# Patient Record
Sex: FEMALE | Race: WHITE | Employment: OTHER | URBAN - METROPOLITAN AREA
[De-identification: names, ages, dates, MRNs, and addresses within clinical notes are randomized per-mention and may not be internally consistent; named-entity substitution may affect disease eponyms.]

---

## 2017-08-07 ENCOUNTER — OFFICE VISIT (OUTPATIENT)
Dept: ORTHOPEDIC SURGERY | Age: 82
End: 2017-08-07

## 2017-08-07 VITALS
SYSTOLIC BLOOD PRESSURE: 134 MMHG | BODY MASS INDEX: 35.03 KG/M2 | WEIGHT: 218 LBS | HEIGHT: 66 IN | RESPIRATION RATE: 16 BRPM | DIASTOLIC BLOOD PRESSURE: 69 MMHG

## 2017-08-07 DIAGNOSIS — M65.329 TRIGGER INDEX FINGER, UNSPECIFIED LATERALITY: ICD-10-CM

## 2017-08-07 DIAGNOSIS — G56.03 CARPAL TUNNEL SYNDROME, BILATERAL: Primary | ICD-10-CM

## 2017-08-07 DIAGNOSIS — M65.311 TRIGGER THUMB OF BOTH THUMBS: ICD-10-CM

## 2017-08-07 DIAGNOSIS — M65.312 TRIGGER THUMB OF BOTH THUMBS: ICD-10-CM

## 2017-08-07 PROCEDURE — 1090F PRES/ABSN URINE INCON ASSESS: CPT | Performed by: ORTHOPAEDIC SURGERY

## 2017-08-07 PROCEDURE — 1036F TOBACCO NON-USER: CPT | Performed by: ORTHOPAEDIC SURGERY

## 2017-08-07 PROCEDURE — 4040F PNEUMOC VAC/ADMIN/RCVD: CPT | Performed by: ORTHOPAEDIC SURGERY

## 2017-08-07 PROCEDURE — G8417 CALC BMI ABV UP PARAM F/U: HCPCS | Performed by: ORTHOPAEDIC SURGERY

## 2017-08-07 PROCEDURE — 99203 OFFICE O/P NEW LOW 30 MIN: CPT | Performed by: ORTHOPAEDIC SURGERY

## 2017-08-07 PROCEDURE — 1123F ACP DISCUSS/DSCN MKR DOCD: CPT | Performed by: ORTHOPAEDIC SURGERY

## 2017-08-07 PROCEDURE — G8427 DOCREV CUR MEDS BY ELIG CLIN: HCPCS | Performed by: ORTHOPAEDIC SURGERY

## 2017-08-07 RX ORDER — SIMVASTATIN 40 MG
40 TABLET ORAL NIGHTLY
COMMUNITY
Start: 2017-06-26 | End: 2018-07-02 | Stop reason: SDUPTHER

## 2017-08-07 RX ORDER — LISINOPRIL AND HYDROCHLOROTHIAZIDE 20; 12.5 MG/1; MG/1
TABLET ORAL
COMMUNITY
Start: 2017-05-23 | End: 2018-09-18 | Stop reason: SDUPTHER

## 2017-08-07 RX ORDER — GLIPIZIDE AND METFORMIN HCL 2.5; 5 MG/1; MG/1
1 TABLET, FILM COATED ORAL
COMMUNITY
Start: 2017-06-09 | End: 2017-09-25 | Stop reason: ALTCHOICE

## 2017-08-07 RX ORDER — GABAPENTIN 100 MG/1
CAPSULE ORAL
COMMUNITY
Start: 2017-07-06 | End: 2017-08-31 | Stop reason: ALTCHOICE

## 2017-08-07 RX ORDER — CELECOXIB 200 MG/1
200 CAPSULE ORAL DAILY
COMMUNITY
Start: 2017-07-28 | End: 2018-05-15 | Stop reason: SDUPTHER

## 2017-08-07 RX ORDER — AMLODIPINE BESYLATE 5 MG/1
5 TABLET ORAL EVERY EVENING
COMMUNITY
Start: 2017-06-09 | End: 2018-04-24

## 2017-08-07 RX ORDER — LEVOTHYROXINE SODIUM 112 UG/1
112 TABLET ORAL DAILY
COMMUNITY
Start: 2017-06-26 | End: 2018-07-02 | Stop reason: SDUPTHER

## 2017-08-07 RX ORDER — ESTRADIOL 0.1 MG/G
2 CREAM VAGINAL PRN
COMMUNITY
End: 2020-03-30 | Stop reason: SDUPTHER

## 2017-08-07 RX ORDER — ATENOLOL 50 MG/1
50 TABLET ORAL DAILY
COMMUNITY
Start: 2017-06-26 | End: 2018-07-23 | Stop reason: SDUPTHER

## 2017-08-28 ENCOUNTER — OFFICE VISIT (OUTPATIENT)
Dept: ORTHOPEDIC SURGERY | Age: 82
End: 2017-08-28

## 2017-08-28 VITALS
SYSTOLIC BLOOD PRESSURE: 147 MMHG | WEIGHT: 218 LBS | DIASTOLIC BLOOD PRESSURE: 64 MMHG | HEART RATE: 51 BPM | HEIGHT: 66 IN | TEMPERATURE: 97.8 F | BODY MASS INDEX: 35.03 KG/M2

## 2017-08-28 DIAGNOSIS — M17.12 ARTHRITIS OF LEFT KNEE: ICD-10-CM

## 2017-08-28 DIAGNOSIS — Z96.651 HISTORY OF TOTAL RIGHT KNEE REPLACEMENT: ICD-10-CM

## 2017-08-28 DIAGNOSIS — M25.562 LEFT KNEE PAIN, UNSPECIFIED CHRONICITY: Primary | ICD-10-CM

## 2017-08-28 PROCEDURE — 99214 OFFICE O/P EST MOD 30 MIN: CPT | Performed by: PHYSICIAN ASSISTANT

## 2017-08-28 PROCEDURE — 1036F TOBACCO NON-USER: CPT | Performed by: PHYSICIAN ASSISTANT

## 2017-08-28 PROCEDURE — 1123F ACP DISCUSS/DSCN MKR DOCD: CPT | Performed by: PHYSICIAN ASSISTANT

## 2017-08-28 PROCEDURE — G8417 CALC BMI ABV UP PARAM F/U: HCPCS | Performed by: PHYSICIAN ASSISTANT

## 2017-08-28 PROCEDURE — 1090F PRES/ABSN URINE INCON ASSESS: CPT | Performed by: PHYSICIAN ASSISTANT

## 2017-08-28 PROCEDURE — 4040F PNEUMOC VAC/ADMIN/RCVD: CPT | Performed by: PHYSICIAN ASSISTANT

## 2017-08-28 PROCEDURE — G8427 DOCREV CUR MEDS BY ELIG CLIN: HCPCS | Performed by: PHYSICIAN ASSISTANT

## 2017-08-28 PROCEDURE — 20610 DRAIN/INJ JOINT/BURSA W/O US: CPT | Performed by: PHYSICIAN ASSISTANT

## 2017-08-31 ENCOUNTER — PAT TELEPHONE (OUTPATIENT)
Dept: PREADMISSION TESTING | Age: 82
End: 2017-08-31

## 2017-08-31 VITALS — WEIGHT: 218 LBS | HEIGHT: 66 IN | BODY MASS INDEX: 35.03 KG/M2

## 2017-09-05 ENCOUNTER — HOSPITAL ENCOUNTER (OUTPATIENT)
Dept: SURGERY | Age: 82
Discharge: HOME | End: 2017-09-05
Attending: ORTHOPAEDIC SURGERY | Admitting: ORTHOPAEDIC SURGERY

## 2017-09-05 VITALS
HEART RATE: 58 BPM | TEMPERATURE: 97.6 F | SYSTOLIC BLOOD PRESSURE: 153 MMHG | BODY MASS INDEX: 33.86 KG/M2 | RESPIRATION RATE: 18 BRPM | OXYGEN SATURATION: 98 % | DIASTOLIC BLOOD PRESSURE: 69 MMHG | WEIGHT: 209.77 LBS

## 2017-09-05 LAB
GLUCOSE BLD-MCNC: 150 MG/DL (ref 70–99)
GLUCOSE BLD-MCNC: 167 MG/DL (ref 70–99)
PERFORMED ON: ABNORMAL
PERFORMED ON: ABNORMAL

## 2017-09-05 RX ORDER — PROMETHAZINE HYDROCHLORIDE 25 MG/ML
6.25 INJECTION, SOLUTION INTRAMUSCULAR; INTRAVENOUS
Status: ACTIVE | OUTPATIENT
Start: 2017-09-05 | End: 2017-09-05

## 2017-09-05 RX ORDER — FENTANYL CITRATE 50 UG/ML
25 INJECTION, SOLUTION INTRAMUSCULAR; INTRAVENOUS EVERY 5 MIN PRN
Status: DISCONTINUED | OUTPATIENT
Start: 2017-09-05 | End: 2017-09-06 | Stop reason: HOSPADM

## 2017-09-05 RX ORDER — LABETALOL HYDROCHLORIDE 5 MG/ML
5 INJECTION, SOLUTION INTRAVENOUS EVERY 10 MIN PRN
Status: DISCONTINUED | OUTPATIENT
Start: 2017-09-05 | End: 2017-09-06 | Stop reason: HOSPADM

## 2017-09-05 RX ORDER — SODIUM CHLORIDE 0.9 % (FLUSH) 0.9 %
10 SYRINGE (ML) INJECTION PRN
Status: DISCONTINUED | OUTPATIENT
Start: 2017-09-05 | End: 2017-09-06 | Stop reason: HOSPADM

## 2017-09-05 RX ORDER — SODIUM CHLORIDE 9 MG/ML
INJECTION, SOLUTION INTRAVENOUS CONTINUOUS
Status: DISCONTINUED | OUTPATIENT
Start: 2017-09-05 | End: 2017-09-06 | Stop reason: HOSPADM

## 2017-09-05 RX ORDER — SODIUM CHLORIDE 0.9 % (FLUSH) 0.9 %
10 SYRINGE (ML) INJECTION EVERY 12 HOURS SCHEDULED
Status: DISCONTINUED | OUTPATIENT
Start: 2017-09-05 | End: 2017-09-06 | Stop reason: HOSPADM

## 2017-09-05 RX ADMIN — SODIUM CHLORIDE: 9 INJECTION, SOLUTION INTRAVENOUS at 09:45

## 2017-09-05 ASSESSMENT — PAIN SCALES - GENERAL
PAINLEVEL_OUTOF10: 0

## 2017-09-05 ASSESSMENT — PAIN - FUNCTIONAL ASSESSMENT: PAIN_FUNCTIONAL_ASSESSMENT: 0-10

## 2017-09-08 ENCOUNTER — TELEPHONE (OUTPATIENT)
Dept: ORTHOPEDIC SURGERY | Age: 82
End: 2017-09-08

## 2017-09-08 ENCOUNTER — OFFICE VISIT (OUTPATIENT)
Dept: ORTHOPEDIC SURGERY | Age: 82
End: 2017-09-08

## 2017-09-08 VITALS — HEIGHT: 66 IN | BODY MASS INDEX: 35.03 KG/M2 | RESPIRATION RATE: 16 BRPM | WEIGHT: 218 LBS

## 2017-09-08 DIAGNOSIS — G56.03 CARPAL TUNNEL SYNDROME, BILATERAL: Primary | ICD-10-CM

## 2017-09-08 PROCEDURE — 99024 POSTOP FOLLOW-UP VISIT: CPT | Performed by: PHYSICIAN ASSISTANT

## 2017-09-11 ENCOUNTER — OFFICE VISIT (OUTPATIENT)
Dept: ORTHOPEDIC SURGERY | Age: 82
End: 2017-09-11

## 2017-09-11 VITALS — HEIGHT: 66 IN | WEIGHT: 218 LBS | BODY MASS INDEX: 35.03 KG/M2 | RESPIRATION RATE: 15 BRPM

## 2017-09-11 DIAGNOSIS — G56.03 CARPAL TUNNEL SYNDROME, BILATERAL: Primary | ICD-10-CM

## 2017-09-11 PROCEDURE — 99024 POSTOP FOLLOW-UP VISIT: CPT | Performed by: PHYSICIAN ASSISTANT

## 2017-09-25 ENCOUNTER — PAT TELEPHONE (OUTPATIENT)
Dept: PREADMISSION TESTING | Age: 82
End: 2017-09-25

## 2017-09-25 ENCOUNTER — SURG/PROC ORDERS (OUTPATIENT)
Dept: ANESTHESIOLOGY | Age: 82
End: 2017-09-25

## 2017-09-25 VITALS — BODY MASS INDEX: 35.03 KG/M2 | WEIGHT: 218 LBS | HEIGHT: 66 IN

## 2017-09-25 RX ORDER — SODIUM CHLORIDE 9 MG/ML
INJECTION, SOLUTION INTRAVENOUS CONTINUOUS
Status: CANCELLED | OUTPATIENT
Start: 2017-09-25

## 2017-09-25 RX ORDER — SODIUM CHLORIDE 0.9 % (FLUSH) 0.9 %
10 SYRINGE (ML) INJECTION PRN
Status: CANCELLED | OUTPATIENT
Start: 2017-09-25

## 2017-09-25 RX ORDER — SODIUM CHLORIDE 0.9 % (FLUSH) 0.9 %
10 SYRINGE (ML) INJECTION EVERY 12 HOURS SCHEDULED
Status: CANCELLED | OUTPATIENT
Start: 2017-09-25

## 2017-09-26 VITALS
DIASTOLIC BLOOD PRESSURE: 72 MMHG | WEIGHT: 209.11 LBS | BODY MASS INDEX: 33.61 KG/M2 | RESPIRATION RATE: 18 BRPM | HEART RATE: 56 BPM | TEMPERATURE: 97 F | OXYGEN SATURATION: 96 % | SYSTOLIC BLOOD PRESSURE: 165 MMHG | HEIGHT: 66 IN

## 2017-09-26 LAB
ANION GAP SERPL CALCULATED.3IONS-SCNC: 13 MMOL/L (ref 3–16)
BUN BLDV-MCNC: 34 MG/DL (ref 7–20)
CALCIUM SERPL-MCNC: 9.7 MG/DL (ref 8.3–10.6)
CHLORIDE BLD-SCNC: 102 MMOL/L (ref 99–110)
CO2: 23 MMOL/L (ref 21–32)
CREAT SERPL-MCNC: 0.9 MG/DL (ref 0.6–1.2)
GFR AFRICAN AMERICAN: >60
GFR NON-AFRICAN AMERICAN: 59
GLUCOSE BLD-MCNC: 173 MG/DL (ref 70–99)
GLUCOSE BLD-MCNC: 178 MG/DL (ref 70–99)
PERFORMED ON: ABNORMAL
POTASSIUM SERPL-SCNC: 4.8 MMOL/L (ref 3.5–5.1)
SODIUM BLD-SCNC: 138 MMOL/L (ref 136–145)

## 2017-09-26 RX ORDER — SODIUM CHLORIDE 9 MG/ML
INJECTION, SOLUTION INTRAVENOUS CONTINUOUS
Status: DISCONTINUED | OUTPATIENT
Start: 2017-09-26 | End: 2017-09-27 | Stop reason: HOSPADM

## 2017-09-26 RX ORDER — SODIUM CHLORIDE 0.9 % (FLUSH) 0.9 %
10 SYRINGE (ML) INJECTION EVERY 12 HOURS SCHEDULED
Status: DISCONTINUED | OUTPATIENT
Start: 2017-09-26 | End: 2017-09-27 | Stop reason: HOSPADM

## 2017-09-26 RX ORDER — FENTANYL CITRATE 50 UG/ML
25 INJECTION, SOLUTION INTRAMUSCULAR; INTRAVENOUS EVERY 5 MIN PRN
Status: DISCONTINUED | OUTPATIENT
Start: 2017-09-26 | End: 2017-09-27 | Stop reason: HOSPADM

## 2017-09-26 RX ORDER — ONDANSETRON 2 MG/ML
4 INJECTION INTRAMUSCULAR; INTRAVENOUS
Status: ACTIVE | OUTPATIENT
Start: 2017-09-26 | End: 2017-09-26

## 2017-09-26 RX ORDER — MORPHINE SULFATE 2 MG/ML
1 INJECTION, SOLUTION INTRAMUSCULAR; INTRAVENOUS EVERY 5 MIN PRN
Status: DISCONTINUED | OUTPATIENT
Start: 2017-09-26 | End: 2017-09-27 | Stop reason: HOSPADM

## 2017-09-26 RX ORDER — MEPERIDINE HYDROCHLORIDE 25 MG/ML
12.5 INJECTION INTRAMUSCULAR; INTRAVENOUS; SUBCUTANEOUS EVERY 5 MIN PRN
Status: DISCONTINUED | OUTPATIENT
Start: 2017-09-26 | End: 2017-09-27 | Stop reason: HOSPADM

## 2017-09-26 RX ORDER — FENTANYL CITRATE 50 UG/ML
50 INJECTION, SOLUTION INTRAMUSCULAR; INTRAVENOUS EVERY 5 MIN PRN
Status: DISCONTINUED | OUTPATIENT
Start: 2017-09-26 | End: 2017-09-27 | Stop reason: HOSPADM

## 2017-09-26 RX ORDER — SODIUM CHLORIDE 0.9 % (FLUSH) 0.9 %
10 SYRINGE (ML) INJECTION PRN
Status: DISCONTINUED | OUTPATIENT
Start: 2017-09-26 | End: 2017-09-27 | Stop reason: HOSPADM

## 2017-09-26 RX ORDER — MORPHINE SULFATE 2 MG/ML
2 INJECTION, SOLUTION INTRAMUSCULAR; INTRAVENOUS EVERY 5 MIN PRN
Status: DISCONTINUED | OUTPATIENT
Start: 2017-09-26 | End: 2017-09-27 | Stop reason: HOSPADM

## 2017-09-26 RX ORDER — OXYCODONE HYDROCHLORIDE AND ACETAMINOPHEN 5; 325 MG/1; MG/1
1 TABLET ORAL PRN
Status: ACTIVE | OUTPATIENT
Start: 2017-09-26 | End: 2017-09-26

## 2017-09-26 RX ORDER — OXYCODONE HYDROCHLORIDE AND ACETAMINOPHEN 5; 325 MG/1; MG/1
2 TABLET ORAL PRN
Status: ACTIVE | OUTPATIENT
Start: 2017-09-26 | End: 2017-09-26

## 2017-09-26 RX ADMIN — SODIUM CHLORIDE: 9 INJECTION, SOLUTION INTRAVENOUS at 08:07

## 2017-09-26 ASSESSMENT — ENCOUNTER SYMPTOMS: SHORTNESS OF BREATH: 0

## 2017-09-26 ASSESSMENT — PAIN SCALES - GENERAL
PAINLEVEL_OUTOF10: 0

## 2017-09-26 ASSESSMENT — PAIN - FUNCTIONAL ASSESSMENT: PAIN_FUNCTIONAL_ASSESSMENT: 0-10

## 2017-09-28 ENCOUNTER — HOSPITAL ENCOUNTER (OUTPATIENT)
Dept: SURGERY | Age: 82
Discharge: HOME | End: 2017-09-26

## 2017-10-02 ENCOUNTER — OFFICE VISIT (OUTPATIENT)
Dept: ORTHOPEDIC SURGERY | Age: 82
End: 2017-10-02

## 2017-10-02 VITALS — WEIGHT: 209 LBS | RESPIRATION RATE: 18 BRPM | BODY MASS INDEX: 30.96 KG/M2 | HEIGHT: 69 IN

## 2017-10-02 DIAGNOSIS — M65.329 TRIGGER INDEX FINGER, UNSPECIFIED LATERALITY: Primary | ICD-10-CM

## 2017-10-02 PROCEDURE — 99024 POSTOP FOLLOW-UP VISIT: CPT | Performed by: PHYSICIAN ASSISTANT

## 2017-10-02 NOTE — PROGRESS NOTES
Ms. Adria Lino returns today in follow-up of her recent right Carpal Tunnel Release, right index finger and thumb trigger finger release done approximately 1 week ago. She has done well noting mild discomfort and no other reported complications. She notes pre-operative symptoms to be Improved at this time. Physical Exam:  Skin incision is healing well, no significant drainage, no dehiscence. Digital range of motion is full and equal bilateral.  Wrist range of motion is full and equal bilateral.  Sensation is Improved from preoperatvely  Vascular examination reveals normal, good capillary refill and good color. Swelling is minimal.  There is no clinical evidence of persistant Median Nerve compression. Preoperative triggering has improved. Impression:  Ms. Adria Lino is doing well after recent right Carpal Tunnel Release, right index finger and thumb trigger finger release    Plan:  Ms. Adria Lino is instructed in work on Active & Passive range of motion of the digits, wrist, & elbow. These modalities were specifically demonstrated to her today. We discussed the appropriateness of gradual resumption of use of the operated hand and the return to normal use as comfort allows. She is given instructions regarding management of the fresh surgical incision and progressive use of desensitization and tissue massage techniques. We discussed the appropriate expectations and timeline for symptom improvement. She is provided a written patient instruction sheet titled: Postoperative Instructions After Carpal Tunnel Release. I have asked Ms. Adria Lino to follow-up with me, either by scheduling an appointment for approximately 2-4 weeks from now, or by contacting me by telephone over the next 2-4 weeks if her symptoms have not fully resolved or if she has not regained full & painless return of function.       She is also specifically instructed to return to the office or call for an appointment sooner if her symptoms are changing or worsening prior to that time.

## 2017-10-02 NOTE — MR AVS SNAPSHOT
After Visit Summary             Argentina Silverman   10/2/2017 1:00 PM   Office Visit    Description:  Female : 1929   Provider:  Solomon Post PA-C   Department:  [address] [facility] Sp[facility]              Your Follow-Up and Future Appointments         Below is a list of your follow-up and future appointments. This may not be a complete list as you may have made appointments directly with providers that we are not aware of or your providers may have made some for you. Please call your providers to confirm appointments. It is important to keep your appointments. Please bring your current insurance card, photo ID, co-pay, and all medication bottles to your appointment. If self-pay, payment is expected at the time of service. Information from Your Visit        Department     Name Address Phone Fax    [address] [facility]) [address] [place] [address] [phone]. [phone]      You Were Seen for:         Comments    Trigger index finger, unspecified laterality   [[id number]]         Vital Signs     Respirations Height Weight Body Mass Index Smoking Status       18 5' 9\" (1.753 m) 209 lb (94.8 kg) 30.86 kg/m2 Never Smoker       Additional Information about your Body Mass Index (BMI)           Your BMI as listed above is considered obese (30 or more). BMI is an estimate of body fat, calculated from your height and weight. The higher your BMI, the greater your risk of heart disease, high blood pressure, type 2 diabetes, stroke, gallstones, arthritis, sleep apnea, and certain cancers. BMI is not perfect. It may overestimate body fat in athletes and people who are more muscular. Even a small weight loss (between 5 and 10 percent of your current weight) by decreasing your calorie intake and becoming more physically active will help lower your risk of developing or worsening diseases associated with obesity. Learn more at: [email or web address]          Instructions      Postoperative Instructions After Trigger Finger Release    Dr. Ho George. Osbaldo      1. After first office visit from surgery, you may chose to wear a small bandage over the incision if you wish, though you do not need to. 2. Keep incision dry for a total of 12 days from the day of surgery. Thereafter, you may wash with mild soap and water and shower normally. 3. Once your stiches have fully disappeared, you should begin gently massaging the incision with Vitamin E (may use Vitamin E lotion or contents of Vitamin E capsule). 4. Work hard on motion of the fingers and wrist, straightening each finger fully and bending each finger fully, bending wrist forward and bending wrist backwards. Do not be concerned if you experience discomfort. This will not damage the surgery. 5. You may begin using the hand as it feels comfortable beginning 12 - 14 days from the day of surgery. You may not feel entirely comfortable gripping or lifting heavy objects for several weeks. 6. The stitches are dissolvable and may take up to three weeks from day of surgery to completely disappear. 7. You may expect to see some skin peel off around the incision. You may be left with a small area of pink baby skin. This is quite normal.            Protocol for Managing Open Wounds  Dr. Ho George. Osbaldo            1. Please remove all dressings so that you may see the skin fully. 2. Prepare a clean sink full of warm water (bath temperature). Add one to two squirts of liquid antibacterial soap. Insert hand and soak for approximately five minutes, making sure to exercise your motion of fingers and wrists fully while soaking in warm water. 3. I typically recommend soaking two to three times per day with a clean dressing change after each soak.     Thank you for choosing [facility]) Physicians for your Hand and Upper Extremity needs. If we can be of any further assistance to you, please do not hesitate to contact us. Office Phone Number:  [phone]  or  [phone]              Medications and Orders      Your Current Medications Are              metFORMIN (GLUCOPHAGE) 500 MG tablet Take 1,000 mg by mouth 2 times daily (with meals)     amLODIPine (NORVASC) 5 MG tablet Take 5 mg by mouth every evening     atenolol (TENORMIN) 50 MG tablet Take 50 mg by mouth daily     celecoxib (CELEBREX) 200 MG capsule Take 200 mg by mouth daily     levothyroxine (SYNTHROID) 112 MCG tablet Take 112 mcg by mouth daily     lisinopril-hydrochlorothiazide (PRINZIDE;ZESTORETIC) 20-12.5 MG per tablet     simvastatin (ZOCOR) 40 MG tablet Take 40 mg by mouth nightly     aspirin 81 MG tablet Take 81 mg by mouth daily    estradiol (ESTRACE) 0.1 MG/GM vaginal cream Place 2 g vaginally as needed     Multiple Vitamin (MULTI VITAMIN DAILY PO) Take by mouth      Allergies              Gabapentin     LOWER LEG SWELLING    Pravachol [Pravastatin Sodium] Rash    Tegaderm Ag Mesh 2\"x2\" [Wound Dressings] Rash         Additional Information        Basic Information     Date Of Birth Sex Race Ethnicity Preferred Language    12/30/1929 Female White Non-/Non  English      Problem List as of 10/2/2017                 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 How Do I Sign Up? 1. In your Internet browser, go to [email or web address]  2. Click on the Sign Up Now link in the Sign In box. You will see the New Member Sign Up page. 3. Enter your [employer] Access Code exactly as it appears below. You will not need to use this code after youve completed the sign-up process. If you do not sign up before the expiration date, you must request a new code. [employer] Access Code: [email or web address]  Expires: 10/6/2017  3:47 PM    4. Enter your Social Security Number ([id number]) and Date of Birth (mm/dd/yyyy) as indicated and click Submit. You will be taken to the next sign-up page. 5. Create a [employer] ID. This will be your [employer] login ID and cannot be changed, so think of one that is secure and easy to remember. 6. Create a [employer] password. You can change your password at any time. 7. Enter your Password Reset Question and Answer. This can be used at a later time if you forget your password. 8. Enter your e-mail address. You will receive e-mail notification when new information is available in [address]. 9. Click Sign Up. You can now view your medical record. Additional Information  If you have questions, please contact the physician practice where you receive care. Remember, [employer] is NOT to be used for urgent needs. For medical emergencies, dial 911. For questions regarding your [employer] account call [phone]. If you have a clinical question, please call your doctor's office.

## 2017-10-02 NOTE — PATIENT INSTRUCTIONS
Postoperative Instructions After Trigger Finger Release    Dr. Tu Roblero      1. After first office visit from surgery, you may chose to wear a small bandage over the incision if you wish, though you do not need to. 2. Keep incision dry for a total of 12 days from the day of surgery. Thereafter, you may wash with mild soap and water and shower normally. 3. Once your stiches have fully disappeared, you should begin gently massaging the incision with Vitamin E (may use Vitamin E lotion or contents of Vitamin E capsule). 4. Work hard on motion of the fingers and wrist, straightening each finger fully and bending each finger fully, bending wrist forward and bending wrist backwards. Do not be concerned if you experience discomfort. This will not damage the surgery. 5. You may begin using the hand as it feels comfortable beginning 12 - 14 days from the day of surgery. You may not feel entirely comfortable gripping or lifting heavy objects for several weeks. 6. The stitches are dissolvable and may take up to three weeks from day of surgery to completely disappear. 7. You may expect to see some skin peel off around the incision. You may be left with a small area of pink baby skin. This is quite normal.            Protocol for Managing Open Wounds  Dr. Tu Castro. Osbaldo            1. Please remove all dressings so that you may see the skin fully. 2. Prepare a clean sink full of warm water (bath temperature). Add one to two squirts of liquid antibacterial soap. Insert hand and soak for approximately five minutes, making sure to exercise your motion of fingers and wrists fully while soaking in warm water. 3. I typically recommend soaking two to three times per day with a clean dressing change after each soak. Thank you for choosing [facility]) Physicians for your Hand and Upper Extremity needs. If we can be of any further assistance to you, please do not hesitate to contact us.     Office Phone Number:  [phone]  or  [phone]

## 2018-03-22 ENCOUNTER — TELEPHONE (OUTPATIENT)
Dept: FAMILY MEDICINE CLINIC | Age: 83
End: 2018-03-22

## 2018-03-22 NOTE — TELEPHONE ENCOUNTER
Please call Hannah Gagnon -- I am so sorry, but right now I am not taking new patients because my schedule is full.

## 2018-04-24 ENCOUNTER — OFFICE VISIT (OUTPATIENT)
Dept: INTERNAL MEDICINE CLINIC | Age: 83
End: 2018-04-24

## 2018-04-24 VITALS
BODY MASS INDEX: 30.81 KG/M2 | OXYGEN SATURATION: 97 % | HEART RATE: 76 BPM | DIASTOLIC BLOOD PRESSURE: 62 MMHG | HEIGHT: 69 IN | SYSTOLIC BLOOD PRESSURE: 124 MMHG | WEIGHT: 208 LBS | RESPIRATION RATE: 16 BRPM

## 2018-04-24 DIAGNOSIS — I10 ESSENTIAL HYPERTENSION: ICD-10-CM

## 2018-04-24 DIAGNOSIS — E89.0 POSTABLATIVE HYPOTHYROIDISM: ICD-10-CM

## 2018-04-24 DIAGNOSIS — E11.9 TYPE 2 DIABETES MELLITUS WITHOUT COMPLICATION, WITHOUT LONG-TERM CURRENT USE OF INSULIN (HCC): Primary | ICD-10-CM

## 2018-04-24 DIAGNOSIS — R60.0 PEDAL EDEMA: ICD-10-CM

## 2018-04-24 DIAGNOSIS — M40.13 OTHER SECONDARY KYPHOSIS, CERVICOTHORACIC REGION: ICD-10-CM

## 2018-04-24 DIAGNOSIS — M17.12 ARTHRITIS OF LEFT KNEE: ICD-10-CM

## 2018-04-24 DIAGNOSIS — E78.2 MIXED HYPERLIPIDEMIA: ICD-10-CM

## 2018-04-24 PROBLEM — M25.562 LEFT KNEE PAIN: Status: RESOLVED | Noted: 2017-08-28 | Resolved: 2018-04-24

## 2018-04-24 PROBLEM — E03.9 HYPOTHYROIDISM: Status: RESOLVED | Noted: 2018-04-24 | Resolved: 2018-04-24

## 2018-04-24 PROBLEM — E03.9 HYPOTHYROIDISM: Status: ACTIVE | Noted: 2018-04-24

## 2018-04-24 LAB — HBA1C MFR BLD: 7.8 %

## 2018-04-24 PROCEDURE — 1090F PRES/ABSN URINE INCON ASSESS: CPT | Performed by: INTERNAL MEDICINE

## 2018-04-24 PROCEDURE — 83036 HEMOGLOBIN GLYCOSYLATED A1C: CPT | Performed by: INTERNAL MEDICINE

## 2018-04-24 PROCEDURE — G8417 CALC BMI ABV UP PARAM F/U: HCPCS | Performed by: INTERNAL MEDICINE

## 2018-04-24 PROCEDURE — 1123F ACP DISCUSS/DSCN MKR DOCD: CPT | Performed by: INTERNAL MEDICINE

## 2018-04-24 PROCEDURE — 1036F TOBACCO NON-USER: CPT | Performed by: INTERNAL MEDICINE

## 2018-04-24 PROCEDURE — 4040F PNEUMOC VAC/ADMIN/RCVD: CPT | Performed by: INTERNAL MEDICINE

## 2018-04-24 PROCEDURE — G8427 DOCREV CUR MEDS BY ELIG CLIN: HCPCS | Performed by: INTERNAL MEDICINE

## 2018-04-24 PROCEDURE — 99204 OFFICE O/P NEW MOD 45 MIN: CPT | Performed by: INTERNAL MEDICINE

## 2018-04-24 ASSESSMENT — PATIENT HEALTH QUESTIONNAIRE - PHQ9
2. FEELING DOWN, DEPRESSED OR HOPELESS: 0
SUM OF ALL RESPONSES TO PHQ QUESTIONS 1-9: 0
SUM OF ALL RESPONSES TO PHQ9 QUESTIONS 1 & 2: 0
1. LITTLE INTEREST OR PLEASURE IN DOING THINGS: 0

## 2018-05-10 ENCOUNTER — OFFICE VISIT (OUTPATIENT)
Dept: INTERNAL MEDICINE CLINIC | Age: 83
End: 2018-05-10

## 2018-05-10 VITALS
RESPIRATION RATE: 16 BRPM | OXYGEN SATURATION: 94 % | WEIGHT: 208 LBS | DIASTOLIC BLOOD PRESSURE: 65 MMHG | SYSTOLIC BLOOD PRESSURE: 110 MMHG | HEIGHT: 69 IN | BODY MASS INDEX: 30.81 KG/M2 | HEART RATE: 69 BPM

## 2018-05-10 DIAGNOSIS — B00.1 HERPES LABIALIS: Primary | ICD-10-CM

## 2018-05-10 PROCEDURE — G8417 CALC BMI ABV UP PARAM F/U: HCPCS | Performed by: NURSE PRACTITIONER

## 2018-05-10 PROCEDURE — 1123F ACP DISCUSS/DSCN MKR DOCD: CPT | Performed by: NURSE PRACTITIONER

## 2018-05-10 PROCEDURE — 99213 OFFICE O/P EST LOW 20 MIN: CPT | Performed by: NURSE PRACTITIONER

## 2018-05-10 PROCEDURE — G8427 DOCREV CUR MEDS BY ELIG CLIN: HCPCS | Performed by: NURSE PRACTITIONER

## 2018-05-10 PROCEDURE — 4040F PNEUMOC VAC/ADMIN/RCVD: CPT | Performed by: NURSE PRACTITIONER

## 2018-05-10 PROCEDURE — 1090F PRES/ABSN URINE INCON ASSESS: CPT | Performed by: NURSE PRACTITIONER

## 2018-05-10 PROCEDURE — 1036F TOBACCO NON-USER: CPT | Performed by: NURSE PRACTITIONER

## 2018-05-10 RX ORDER — VALACYCLOVIR HYDROCHLORIDE 500 MG/1
500 TABLET, FILM COATED ORAL 2 TIMES DAILY
Qty: 10 TABLET | Refills: 1 | Status: ON HOLD | OUTPATIENT
Start: 2018-05-10 | End: 2018-07-08 | Stop reason: HOSPADM

## 2018-05-10 RX ORDER — ACYCLOVIR 50 MG/G
CREAM TOPICAL
Qty: 1 TUBE | Refills: 0 | Status: SHIPPED | OUTPATIENT
Start: 2018-05-10 | End: 2018-05-11 | Stop reason: CLARIF

## 2018-05-15 RX ORDER — CELECOXIB 200 MG/1
200 CAPSULE ORAL DAILY
Qty: 60 CAPSULE | Refills: 2 | Status: ON HOLD | OUTPATIENT
Start: 2018-05-15 | End: 2018-07-08 | Stop reason: HOSPADM

## 2018-07-02 NOTE — TELEPHONE ENCOUNTER
Patient requesting a medication refill.   Medication: Levothyroxine and simvastatin  Dosage: 112 mcg and 40 mg  Frequency: 1 tab daily and 1 tab nightly    Pharmacy: [facility]  Next office visit: 8/24/18  Last regular office visit: 4/24/18

## 2018-07-03 RX ORDER — LEVOTHYROXINE SODIUM 112 UG/1
112 TABLET ORAL DAILY
Qty: 30 TABLET | Refills: 7 | Status: SHIPPED | OUTPATIENT
Start: 2018-07-03 | End: 2019-01-31 | Stop reason: SDUPTHER

## 2018-07-03 RX ORDER — SIMVASTATIN 40 MG
40 TABLET ORAL NIGHTLY
Qty: 30 TABLET | Refills: 7 | Status: SHIPPED | OUTPATIENT
Start: 2018-07-03 | End: 2019-06-09 | Stop reason: SDUPTHER

## 2018-07-07 PROBLEM — S12.000A CLOSED FRACTURE OF FIRST CERVICAL VERTEBRA (HCC): Status: ACTIVE | Noted: 2018-07-07

## 2018-07-07 PROBLEM — S12.100A DENS FRACTURE, CLOSED, INITIAL ENCOUNTER (HCC): Status: ACTIVE | Noted: 2018-07-07

## 2018-07-07 PROBLEM — S12.100A CLOSED FRACTURE OF SECOND CERVICAL VERTEBRA (HCC): Status: ACTIVE | Noted: 2018-07-07

## 2018-07-11 ENCOUNTER — TELEPHONE (OUTPATIENT)
Dept: INTERNAL MEDICINE CLINIC | Age: 83
End: 2018-07-11

## 2018-07-11 NOTE — TELEPHONE ENCOUNTER
Please make sure patient is scheduled for discharge followup and orders can be signed off after that.

## 2018-07-11 NOTE — TELEPHONE ENCOUNTER
SPOKE WITH CARETAKER, SAYS THAT SHE WILL CHECK PATIENTS SCHEDULE AND GIVE THE OFFICE A CALL BACK TO SCHEDULE AN APPOINTMENT.    [facility] NOTIFIED.

## 2018-07-12 ENCOUNTER — OFFICE VISIT (OUTPATIENT)
Dept: INTERNAL MEDICINE CLINIC | Age: 83
End: 2018-07-12

## 2018-07-12 VITALS
HEART RATE: 62 BPM | SYSTOLIC BLOOD PRESSURE: 126 MMHG | DIASTOLIC BLOOD PRESSURE: 89 MMHG | RESPIRATION RATE: 14 BRPM | OXYGEN SATURATION: 96 %

## 2018-07-12 DIAGNOSIS — S12.101D CLOSED NONDISPLACED FRACTURE OF SECOND CERVICAL VERTEBRA WITH ROUTINE HEALING, UNSPECIFIED FRACTURE MORPHOLOGY, SUBSEQUENT ENCOUNTER: ICD-10-CM

## 2018-07-12 DIAGNOSIS — Z09 HOSPITAL DISCHARGE FOLLOW-UP: Primary | ICD-10-CM

## 2018-07-12 DIAGNOSIS — E11.9 TYPE 2 DIABETES MELLITUS WITHOUT COMPLICATION, WITHOUT LONG-TERM CURRENT USE OF INSULIN (HCC): ICD-10-CM

## 2018-07-12 DIAGNOSIS — S12.091D OTHER CLOSED NONDISPLACED FRACTURE OF FIRST CERVICAL VERTEBRA WITH ROUTINE HEALING, SUBSEQUENT ENCOUNTER: ICD-10-CM

## 2018-07-12 PROCEDURE — 1111F DSCHRG MED/CURRENT MED MERGE: CPT | Performed by: INTERNAL MEDICINE

## 2018-07-12 PROCEDURE — 99495 TRANSJ CARE MGMT MOD F2F 14D: CPT | Performed by: INTERNAL MEDICINE

## 2018-07-12 NOTE — PROGRESS NOTES
112 MCG tablet  Take 1 tablet by mouth daily             lisinopril-hydrochlorothiazide (PRINZIDE;ZESTORETIC) 20-12.5 MG per tablet               metFORMIN (GLUCOPHAGE) 500 MG tablet  Take 1,000 mg by mouth 2 times daily (with meals)              Multiple Vitamin (MULTI VITAMIN DAILY PO)  Take by mouth             simvastatin (ZOCOR) 40 MG tablet  Take 1 tablet by mouth nightly             traMADol (ULTRAM) 50 MG tablet  Take 1 tablet by mouth every 6 hours as needed for Pain for up to 7 days. Intended supply: 7 days. Take lowest dose possible to manage pain. Medications marked \"taking\" at this time  Outpatient Prescriptions Marked as Taking for the 7/12/18 encounter (Office Visit) with Laureano Copeland MD   Medication Sig Dispense Refill    acetaminophen (AMINOFEN) 325 MG tablet Take 2 tablets by mouth every 6 hours as needed for Pain 120 tablet 0    calcitonin (MIACALCIN) 200 UNIT/ACT nasal spray 1 spray by Nasal route 2 times daily 1 Bottle 3    levothyroxine (SYNTHROID) 112 MCG tablet Take 1 tablet by mouth daily 30 tablet 7    simvastatin (ZOCOR) 40 MG tablet Take 1 tablet by mouth nightly 30 tablet 7    Cranberry-Cholecalciferol 4200-500 MG-UNIT CAPS Take by mouth      metFORMIN (GLUCOPHAGE) 500 MG tablet Take 1,000 mg by mouth 2 times daily (with meals)       atenolol (TENORMIN) 50 MG tablet Take 50 mg by mouth daily       lisinopril-hydrochlorothiazide (PRINZIDE;ZESTORETIC) 20-12.5 MG per tablet       aspirin 81 MG tablet Take 81 mg by mouth daily      estradiol (ESTRACE) 0.1 MG/GM vaginal cream Place 2 g vaginally as needed       Multiple Vitamin (MULTI VITAMIN DAILY PO) Take by mouth          Medications patient taking as of now reconciled against medications ordered at time of hospital discharge: Yes    Vitals:    07/12/18 1112   BP: 126/89   Pulse: 62   Resp: 14   SpO2: 96%     There is no height or weight on file to calculate BMI.      Wt Readings from Last 3 Encounters: 07/06/18 210 lb (95.3 kg)   05/10/18 208 lb (94.3 kg)   04/24/18 208 lb (94.3 kg)     BP Readings from Last 3 Encounters:   07/12/18 126/89   07/08/18 (!) 178/69   07/06/18 (!) 187/54        Inpatient course: Discharge summary reviewed- see chart. Chief Complaint   Patient presents with   [address] from Hospital     fall at restaurant     History of Present illness - Follow up of Hospital diagnosis(es): fall with C1 and C2 fractures, conservatively managed with J collar and neurosurg followup.scheduled for Aug 7    Reviewed circumstances of fall:  Narrow walkway between tables at [facility] with protruding high chair leg that tripped her    Home OT and PT evaluating and will make a few changes for home safety    Interval history/Current status: *started calcitonin, pain managed with tylenol only though has some toradol on hand, bruising changing colors .  Pt was also advised to stop celebrex, calcium and osteobiflex--advised that she can resume calcijm and osteobiflex now, and ask about celebrex when she sees [facility] in 3 weeks, since she is missing it for chronic shoulder arthritis pain    Physical Exam: in wheelchair, extensive facial bruising, J collar in place  Cor RRR  Lungs CTA  Ecchymosis left volar wrist, bilateral knees left > right  Remainder of exam appears atraumatic    Ext wihtout deformity,   Assessment/Plan:  Mary Beth Eric was seen today for follow-up from hospital.    Diagnoses and all orders for this visit:    Hospital discharge follow-up    Other closed nondisplaced fracture of first cervical vertebra with routine healing, subsequent encounter    Closed nondisplaced fracture of second cervical vertebra with routine healing, unspecified fracture morphology, subsequent encounter    Type 2 diabetes mellitus without complication, without long-term current use of insulin [facility])          Medical Decision Making: moderate complexity

## 2018-07-23 NOTE — TELEPHONE ENCOUNTER
Patient requesting a medication refill.   Medication: atenolol 50 mg and metformin 500 mg  Dosage: 1 tab  Frequency: 1 tab daily and 2 tabs BID  [facility]  Next office visit: 8/24/18  Last regular office visit: 4/24/18

## 2018-07-23 NOTE — TELEPHONE ENCOUNTER
Patient called requesting refills on her Metformin, and atenolol. Please call in to [facility] on [address].

## 2018-07-24 RX ORDER — ATENOLOL 50 MG/1
50 TABLET ORAL DAILY
Qty: 30 TABLET | Refills: 11 | Status: SHIPPED | OUTPATIENT
Start: 2018-07-24 | End: 2019-08-14 | Stop reason: SDUPTHER

## 2018-08-24 ENCOUNTER — OFFICE VISIT (OUTPATIENT)
Dept: INTERNAL MEDICINE CLINIC | Age: 83
End: 2018-08-24

## 2018-08-24 ENCOUNTER — HOSPITAL ENCOUNTER (OUTPATIENT)
Dept: OTHER | Age: 83
Discharge: HOME | End: 2018-08-24
Attending: INTERNAL MEDICINE | Admitting: INTERNAL MEDICINE

## 2018-08-24 VITALS
HEART RATE: 68 BPM | RESPIRATION RATE: 16 BRPM | SYSTOLIC BLOOD PRESSURE: 129 MMHG | DIASTOLIC BLOOD PRESSURE: 64 MMHG | HEIGHT: 66 IN | WEIGHT: 198 LBS | BODY MASS INDEX: 31.82 KG/M2 | OXYGEN SATURATION: 95 %

## 2018-08-24 DIAGNOSIS — S91.311A LACERATION OF RIGHT FOOT, INITIAL ENCOUNTER: ICD-10-CM

## 2018-08-24 DIAGNOSIS — E89.0 POSTABLATIVE HYPOTHYROIDISM: ICD-10-CM

## 2018-08-24 DIAGNOSIS — I10 ESSENTIAL HYPERTENSION: ICD-10-CM

## 2018-08-24 DIAGNOSIS — E11.9 TYPE 2 DIABETES MELLITUS WITHOUT COMPLICATION, WITHOUT LONG-TERM CURRENT USE OF INSULIN (HCC): Primary | ICD-10-CM

## 2018-08-24 DIAGNOSIS — E78.2 MIXED HYPERLIPIDEMIA: ICD-10-CM

## 2018-08-24 DIAGNOSIS — E11.9 TYPE 2 DIABETES MELLITUS WITHOUT COMPLICATION, WITHOUT LONG-TERM CURRENT USE OF INSULIN (HCC): ICD-10-CM

## 2018-08-24 LAB
A/G RATIO: 1.6 (ref 1.1–2.2)
ALBUMIN SERPL-MCNC: 4.3 G/DL (ref 3.4–5)
ALP BLD-CCNC: 65 U/L (ref 40–129)
ALT SERPL-CCNC: 9 U/L (ref 10–40)
ANION GAP SERPL CALCULATED.3IONS-SCNC: 16 MMOL/L (ref 3–16)
AST SERPL-CCNC: 14 U/L (ref 15–37)
BILIRUB SERPL-MCNC: 0.6 MG/DL (ref 0–1)
BUN BLDV-MCNC: 19 MG/DL (ref 7–20)
CALCIUM SERPL-MCNC: 10.2 MG/DL (ref 8.3–10.6)
CHLORIDE BLD-SCNC: 97 MMOL/L (ref 99–110)
CHOLESTEROL, TOTAL: 134 MG/DL (ref 0–199)
CO2: 25 MMOL/L (ref 21–32)
CREAT SERPL-MCNC: 0.8 MG/DL (ref 0.6–1.2)
ESTIMATED AVERAGE GLUCOSE: 168.6 MG/DL
GFR AFRICAN AMERICAN: >60
GFR NON-AFRICAN AMERICAN: >60
GLOBULIN: 2.7 G/DL
GLUCOSE BLD-MCNC: 186 MG/DL (ref 70–99)
HBA1C MFR BLD: 7.5 %
HDLC SERPL-MCNC: 44 MG/DL (ref 40–60)
LDL CHOLESTEROL CALCULATED: 48 MG/DL
POTASSIUM SERPL-SCNC: 4.3 MMOL/L (ref 3.5–5.1)
SODIUM BLD-SCNC: 138 MMOL/L (ref 136–145)
TOTAL PROTEIN: 7 G/DL (ref 6.4–8.2)
TRIGL SERPL-MCNC: 212 MG/DL (ref 0–150)
TSH REFLEX FT4: 0.84 UIU/ML (ref 0.27–4.2)
VLDLC SERPL CALC-MCNC: 42 MG/DL

## 2018-08-24 PROCEDURE — G8417 CALC BMI ABV UP PARAM F/U: HCPCS | Performed by: INTERNAL MEDICINE

## 2018-08-24 PROCEDURE — 4040F PNEUMOC VAC/ADMIN/RCVD: CPT | Performed by: INTERNAL MEDICINE

## 2018-08-24 PROCEDURE — 99215 OFFICE O/P EST HI 40 MIN: CPT | Performed by: INTERNAL MEDICINE

## 2018-08-24 PROCEDURE — 1036F TOBACCO NON-USER: CPT | Performed by: INTERNAL MEDICINE

## 2018-08-24 PROCEDURE — G8427 DOCREV CUR MEDS BY ELIG CLIN: HCPCS | Performed by: INTERNAL MEDICINE

## 2018-08-24 PROCEDURE — 1123F ACP DISCUSS/DSCN MKR DOCD: CPT | Performed by: INTERNAL MEDICINE

## 2018-08-24 PROCEDURE — 1090F PRES/ABSN URINE INCON ASSESS: CPT | Performed by: INTERNAL MEDICINE

## 2018-08-24 PROCEDURE — 1101F PT FALLS ASSESS-DOCD LE1/YR: CPT | Performed by: INTERNAL MEDICINE

## 2018-08-24 NOTE — PROGRESS NOTES
Chief Complaint   Patient presents with    Diabetes       HPI: Here for diabetes and management of multiple chronic conditions as per the active problems list below, which I reviewed and updated with the patient today. States doing well with no new concerns except if noted below. Noted scratch on foot this morning-- blood on floor, not sure how sustained. Pt sustained dens fracture in July and is doing well, still wearing collar. I have reviewed the chart notes available from myself and other providers. I have addressed all active problems listed below, and created or updated the problems list as needed. 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       Discussed all labs, other tests, and imaging if available   Lab Results   Component Value Date    WBC 15.5 (H) 07/08/2018    HGB 11.9 (L) 07/08/2018    HCT 35.9 (L) 07/08/2018    MCV 94.1 07/08/2018     07/08/2018    LYMPHOPCT 20.4 07/08/2018    RBC 3.81 (L) 07/08/2018    MCH 31.1 07/08/2018    MCHC 33.1 07/08/2018    RDW 14.1 07/08/2018     Lab Results   Component Value Date     08/24/2018    K 4.3 08/24/2018    CL 97 (L) 08/24/2018    CO2 25 08/24/2018    BUN 19 08/24/2018    CREATININE 0.8 08/24/2018    GLUCOSE 186 (H) 08/24/2018    CALCIUM 10.2 08/24/2018    PROT 7.0 08/24/2018    LABALBU 4.3 08/24/2018    BILITOT 0.6 08/24/2018    ALKPHOS 65 08/24/2018    AST 14 (L) 08/24/2018    ALT 9 (L) 08/24/2018    LABGLOM >60 08/24/2018    GFRAA >60 08/24/2018    AGRATIO 1.6 08/24/2018    GLOB 2.7 08/24/2018     Lab Results   Component Value Date    TRIG 212 (H) 08/24/2018     Lab Results   Component Value Date    HDL 44 08/24/2018     Lab Results   Component Value Date    LDLCALC 48 08/24/2018     No results found for: PSA, PSADIA  Lab Results   Component Value Date    TSHFT4 0.84 08/24/2018    TSH 0.42 08/06/2010     Lab Results   Component Value Date    LABA1C 7.5 08/24/2018     Lab Results   Component Value Date    .6 08/24/2018       Prior to Admission medications    Medication Sig Start Date End Date Taking?  Authorizing Provider   metFORMIN (GLUCOPHAGE) 1000 MG tablet Take 1 tablet by mouth 2 times daily (with meals) 7/24/18  Yes Denisse Cortes MD   atenolol (TENORMIN) 50 MG tablet Take 1 tablet by mouth daily 7/24/18  Yes Denisse Cortes MD   acetaminophen (AMINOFEN) 325 MG tablet Take 2 tablets by mouth every 6 hours as needed for Pain 7/8/18  Yes Cynthia Stuart MD   calcitonin (MIACALCIN) 200 UNIT/ACT nasal spray 1 spray by Nasal route 2 times daily 7/8/18  Yes Cynthia Stuart MD   levothyroxine (SYNTHROID) 112 MCG tablet Take 1 tablet by mouth daily 7/3/18  Yes Denisse Cortes MD   simvastatin (ZOCOR) 40 MG tablet Take 1 tablet by mouth nightly 7/3/18  Yes Denisse Cortes MD   Cranberry-Cholecalciferol 4200-500 MG-UNIT CAPS Take by mouth   Yes Historical Provider, MD   lisinopril-hydrochlorothiazide (PRINZIDE;ZESTORETIC) 20-12.5 MG per tablet  5/23/17  Yes Historical Provider, MD   aspirin 81 MG tablet Take 81 mg by mouth daily   Yes Historical Provider, MD   estradiol (ESTRACE) 0.1 MG/GM vaginal cream Place 2 g vaginally as needed    Yes Historical Provider, MD   Multiple Vitamin (MULTI VITAMIN DAILY PO) Take by mouth   Yes Historical Provider, MD       ROS  12 system review as documented by MA, reviewed    Past Medical History:   Diagnosis

## 2018-08-24 NOTE — PROGRESS NOTES
CONSTITUTIONAL: [x] All Symptoms Negative  [] Fever     [] Chills     [] Weight Loss  [] Fatigue     [] Sweating     [] Weakness  Comments:     EYE: [x] All Symptoms Negative  [] Blurred Vision     [] Double Vision     [] Light Sensitivity  [] Eye Pain     [] Eye Discharge     [] Eye Redness  Comments:     GASTROINTESTINAL: [x] All Symptoms Negative  [] Heart Burn     [] Nausea     [] Vomiting  [] Abdominal Pain     Diarrhea     [] Constipation  [] Blood in Stool     [] Black Tarry Stool  Comments:     ENDO: [x] All Symptoms Negative  [] Easy Bruising     [] Increased Thirst  Comments:      SKIN: [] All Symptoms Negative  [] Rash     [] Itching  Comments: CUT ON SOLE RT FOOT    NEUROLOGICAL: [x] All Symptoms Negative  [] Dizziness     [] Tingling     [] Tremor  [] Sensory Change     [] Speech Change     [] Focal Weakness  [] Seizures     [] LOC  Comments:     CARDIOVASCULAR: [x] All Symptoms Negative  [] Chest Pain     [] Palpitations     [] Orthopnea  [] Cramps When Walking     [] Leg Swelling  Comments:     URINARY: [x] All Symptoms Negative  [] Pain/Burning     [] Urgency     [] Frequency  [] Blood in Urine     [] Flank Pain  Comments:     PSYCHIATRIC: [x] All Symptoms Negative  [] Nervous/Anxious     [] Suicidal Ideas     [] Substance Use/Abuse  [] Hallucinations     [] Nervous/Anxious     [] Insomnia  [] Memory Loss  Comments:     HENT: [x] All Symptoms Negative  [] Headache     [] Hearing Loss     [] Ringing     [] Ear Pain  [] Ear Discharge     [] Nose Bleed     [] Congestion  [] Stridor     [] Sore Throat  Comments:     RESPIRATORY: [x] All Symptoms Negative  [] Cough     [] Bloody Mucous     [] Sputum Production  [] Shortness of Breath     [] Wheezing  Comments:     MUSKOSKELETAL: [x] All Symptoms Negative  [] Muscle Aches     [] Neck Pain     [] Back Pain  [] Joint Pain     [] Falls  Comments:

## 2018-09-18 DIAGNOSIS — I10 ESSENTIAL HYPERTENSION: Primary | ICD-10-CM

## 2018-09-20 RX ORDER — LISINOPRIL AND HYDROCHLOROTHIAZIDE 20; 12.5 MG/1; MG/1
1 TABLET ORAL DAILY
Qty: 30 TABLET | Refills: 3 | Status: SHIPPED | OUTPATIENT
Start: 2018-09-20 | End: 2018-11-23 | Stop reason: SDUPTHER

## 2018-09-21 ENCOUNTER — HOSPITAL ENCOUNTER (OUTPATIENT)
Dept: OTHER | Age: 83
Discharge: HOME | End: 2018-09-28
Attending: NEUROLOGICAL SURGERY | Admitting: NEUROLOGICAL SURGERY

## 2018-09-21 DIAGNOSIS — S12.000A: ICD-10-CM

## 2018-09-21 DIAGNOSIS — S12.000A CLOSED DISPLACED FRACTURE OF FIRST CERVICAL VERTEBRA, UNSPECIFIED FRACTURE MORPHOLOGY, INITIAL ENCOUNTER (HCC): ICD-10-CM

## 2018-10-04 ENCOUNTER — HOSPITAL ENCOUNTER (OUTPATIENT)
Dept: CT IMAGING | Age: 83
Discharge: HOME | End: 2018-10-04
Payer: MEDICARE

## 2018-10-04 DIAGNOSIS — S12.9XXA CLOSED FRACTURE OF CERVICAL VERTEBRA WITHOUT SPINAL CORD INJURY, UNSPECIFIED CERVICAL VERTEBRAL LEVEL, INITIAL ENCOUNTER (HCC): ICD-10-CM

## 2018-10-04 PROCEDURE — 72125 CT NECK SPINE W/O DYE: CPT

## 2018-11-23 DIAGNOSIS — I10 ESSENTIAL HYPERTENSION: ICD-10-CM

## 2018-11-26 RX ORDER — LISINOPRIL AND HYDROCHLOROTHIAZIDE 20; 12.5 MG/1; MG/1
TABLET ORAL
Qty: 90 TABLET | Refills: 2 | Status: SHIPPED | OUTPATIENT
Start: 2018-11-26 | End: 2019-08-26 | Stop reason: SDUPTHER

## 2018-12-27 ENCOUNTER — OFFICE VISIT (OUTPATIENT)
Dept: INTERNAL MEDICINE CLINIC | Age: 83
End: 2018-12-27
Payer: MEDICARE

## 2018-12-27 VITALS
SYSTOLIC BLOOD PRESSURE: 171 MMHG | WEIGHT: 200 LBS | HEIGHT: 66 IN | HEART RATE: 59 BPM | DIASTOLIC BLOOD PRESSURE: 68 MMHG | RESPIRATION RATE: 16 BRPM | BODY MASS INDEX: 32.14 KG/M2

## 2018-12-27 DIAGNOSIS — E78.2 MIXED HYPERLIPIDEMIA: ICD-10-CM

## 2018-12-27 DIAGNOSIS — E11.9 TYPE 2 DIABETES MELLITUS WITHOUT COMPLICATION, WITHOUT LONG-TERM CURRENT USE OF INSULIN (HCC): Primary | ICD-10-CM

## 2018-12-27 DIAGNOSIS — I10 ESSENTIAL HYPERTENSION: ICD-10-CM

## 2018-12-27 DIAGNOSIS — E89.0 POSTABLATIVE HYPOTHYROIDISM: ICD-10-CM

## 2018-12-27 DIAGNOSIS — I89.0 LYMPHEDEMA: ICD-10-CM

## 2018-12-27 DIAGNOSIS — R60.0 PEDAL EDEMA: ICD-10-CM

## 2018-12-27 PROCEDURE — 1123F ACP DISCUSS/DSCN MKR DOCD: CPT | Performed by: INTERNAL MEDICINE

## 2018-12-27 PROCEDURE — 1036F TOBACCO NON-USER: CPT | Performed by: INTERNAL MEDICINE

## 2018-12-27 PROCEDURE — 4040F PNEUMOC VAC/ADMIN/RCVD: CPT | Performed by: INTERNAL MEDICINE

## 2018-12-27 PROCEDURE — 1101F PT FALLS ASSESS-DOCD LE1/YR: CPT | Performed by: INTERNAL MEDICINE

## 2018-12-27 PROCEDURE — 1090F PRES/ABSN URINE INCON ASSESS: CPT | Performed by: INTERNAL MEDICINE

## 2018-12-27 PROCEDURE — G8417 CALC BMI ABV UP PARAM F/U: HCPCS | Performed by: INTERNAL MEDICINE

## 2018-12-27 PROCEDURE — G8482 FLU IMMUNIZE ORDER/ADMIN: HCPCS | Performed by: INTERNAL MEDICINE

## 2018-12-27 PROCEDURE — G8427 DOCREV CUR MEDS BY ELIG CLIN: HCPCS | Performed by: INTERNAL MEDICINE

## 2018-12-27 PROCEDURE — 99214 OFFICE O/P EST MOD 30 MIN: CPT | Performed by: INTERNAL MEDICINE

## 2018-12-27 RX ORDER — FUROSEMIDE 20 MG/1
20 TABLET ORAL DAILY
Qty: 30 TABLET | Refills: 1 | Status: SHIPPED | OUTPATIENT
Start: 2018-12-27 | End: 2019-03-08 | Stop reason: SDUPTHER

## 2018-12-27 ASSESSMENT — ENCOUNTER SYMPTOMS
WHEEZING: 0
DIARRHEA: 0
BLOOD IN STOOL: 0
VOMITING: 0
EYE REDNESS: 0
ABDOMINAL PAIN: 0
CONSTIPATION: 0
STRIDOR: 0
EYE PAIN: 0
SHORTNESS OF BREATH: 0
EYE DISCHARGE: 0
SORE THROAT: 0
COUGH: 0
BACK PAIN: 0

## 2019-02-01 RX ORDER — LEVOTHYROXINE SODIUM 112 UG/1
TABLET ORAL
Qty: 30 TABLET | Refills: 6 | Status: SHIPPED | OUTPATIENT
Start: 2019-02-01 | End: 2019-02-26 | Stop reason: SDUPTHER

## 2019-02-26 ENCOUNTER — OFFICE VISIT (OUTPATIENT)
Dept: INTERNAL MEDICINE CLINIC | Age: 84
End: 2019-02-26
Payer: MEDICARE

## 2019-02-26 VITALS
HEART RATE: 61 BPM | TEMPERATURE: 97.6 F | OXYGEN SATURATION: 96 % | RESPIRATION RATE: 18 BRPM | DIASTOLIC BLOOD PRESSURE: 62 MMHG | SYSTOLIC BLOOD PRESSURE: 128 MMHG

## 2019-02-26 DIAGNOSIS — E78.2 MIXED HYPERLIPIDEMIA: ICD-10-CM

## 2019-02-26 DIAGNOSIS — I89.0 LYMPHEDEMA: ICD-10-CM

## 2019-02-26 DIAGNOSIS — E89.0 POSTABLATIVE HYPOTHYROIDISM: ICD-10-CM

## 2019-02-26 DIAGNOSIS — M54.2 CHRONIC NECK PAIN: ICD-10-CM

## 2019-02-26 DIAGNOSIS — E11.9 TYPE 2 DIABETES MELLITUS WITHOUT COMPLICATION, WITHOUT LONG-TERM CURRENT USE OF INSULIN (HCC): ICD-10-CM

## 2019-02-26 DIAGNOSIS — G89.29 CHRONIC NECK PAIN: ICD-10-CM

## 2019-02-26 DIAGNOSIS — Z79.899 HIGH RISK MEDICATION USE: ICD-10-CM

## 2019-02-26 DIAGNOSIS — I10 ESSENTIAL HYPERTENSION: ICD-10-CM

## 2019-02-26 DIAGNOSIS — E11.9 TYPE 2 DIABETES MELLITUS WITHOUT COMPLICATION, WITHOUT LONG-TERM CURRENT USE OF INSULIN (HCC): Primary | ICD-10-CM

## 2019-02-26 LAB
ALBUMIN SERPL-MCNC: 4.2 G/DL (ref 3.4–5)
ANION GAP SERPL CALCULATED.3IONS-SCNC: 17 MMOL/L (ref 3–16)
BUN BLDV-MCNC: 21 MG/DL (ref 7–20)
CALCIUM SERPL-MCNC: 10.2 MG/DL (ref 8.3–10.6)
CHLORIDE BLD-SCNC: 97 MMOL/L (ref 99–110)
CO2: 25 MMOL/L (ref 21–32)
CREAT SERPL-MCNC: 0.8 MG/DL (ref 0.6–1.2)
GFR AFRICAN AMERICAN: >60
GFR NON-AFRICAN AMERICAN: >60
GLUCOSE BLD-MCNC: 149 MG/DL (ref 70–99)
PHOSPHORUS: 3.9 MG/DL (ref 2.5–4.9)
POTASSIUM SERPL-SCNC: 4.6 MMOL/L (ref 3.5–5.1)
SODIUM BLD-SCNC: 139 MMOL/L (ref 136–145)

## 2019-02-26 PROCEDURE — 1036F TOBACCO NON-USER: CPT | Performed by: INTERNAL MEDICINE

## 2019-02-26 PROCEDURE — 1101F PT FALLS ASSESS-DOCD LE1/YR: CPT | Performed by: INTERNAL MEDICINE

## 2019-02-26 PROCEDURE — G0009 ADMIN PNEUMOCOCCAL VACCINE: HCPCS | Performed by: INTERNAL MEDICINE

## 2019-02-26 PROCEDURE — 90732 PPSV23 VACC 2 YRS+ SUBQ/IM: CPT | Performed by: INTERNAL MEDICINE

## 2019-02-26 PROCEDURE — 4040F PNEUMOC VAC/ADMIN/RCVD: CPT | Performed by: INTERNAL MEDICINE

## 2019-02-26 PROCEDURE — G8427 DOCREV CUR MEDS BY ELIG CLIN: HCPCS | Performed by: INTERNAL MEDICINE

## 2019-02-26 PROCEDURE — G8417 CALC BMI ABV UP PARAM F/U: HCPCS | Performed by: INTERNAL MEDICINE

## 2019-02-26 PROCEDURE — G8482 FLU IMMUNIZE ORDER/ADMIN: HCPCS | Performed by: INTERNAL MEDICINE

## 2019-02-26 PROCEDURE — 1123F ACP DISCUSS/DSCN MKR DOCD: CPT | Performed by: INTERNAL MEDICINE

## 2019-02-26 PROCEDURE — 1090F PRES/ABSN URINE INCON ASSESS: CPT | Performed by: INTERNAL MEDICINE

## 2019-02-26 PROCEDURE — 99214 OFFICE O/P EST MOD 30 MIN: CPT | Performed by: INTERNAL MEDICINE

## 2019-02-26 RX ORDER — LEVOTHYROXINE SODIUM 112 UG/1
TABLET ORAL
Qty: 90 TABLET | Refills: 3 | Status: SHIPPED | OUTPATIENT
Start: 2019-02-26 | End: 2020-02-27

## 2019-02-26 RX ORDER — CELECOXIB 200 MG/1
CAPSULE ORAL
COMMUNITY
Start: 2019-01-31 | End: 2020-03-30 | Stop reason: SDUPTHER

## 2019-02-26 ASSESSMENT — PATIENT HEALTH QUESTIONNAIRE - PHQ9
SUM OF ALL RESPONSES TO PHQ QUESTIONS 1-9: 1
DEPRESSION UNABLE TO ASSESS: PT REFUSES
1. LITTLE INTEREST OR PLEASURE IN DOING THINGS: 1
SUM OF ALL RESPONSES TO PHQ QUESTIONS 1-9: 1

## 2019-02-27 LAB
ESTIMATED AVERAGE GLUCOSE: 171.4 MG/DL
HBA1C MFR BLD: 7.6 %

## 2019-03-08 DIAGNOSIS — E78.2 MIXED HYPERLIPIDEMIA: ICD-10-CM

## 2019-03-08 DIAGNOSIS — R60.0 PEDAL EDEMA: ICD-10-CM

## 2019-03-08 DIAGNOSIS — I89.0 LYMPHEDEMA: ICD-10-CM

## 2019-03-08 RX ORDER — FUROSEMIDE 20 MG/1
TABLET ORAL
Qty: 30 TABLET | Refills: 0 | Status: SHIPPED | OUTPATIENT
Start: 2019-03-08 | End: 2019-04-11 | Stop reason: SDUPTHER

## 2019-04-11 DIAGNOSIS — E78.2 MIXED HYPERLIPIDEMIA: ICD-10-CM

## 2019-04-11 DIAGNOSIS — I89.0 LYMPHEDEMA: ICD-10-CM

## 2019-04-11 DIAGNOSIS — R60.0 PEDAL EDEMA: ICD-10-CM

## 2019-04-12 RX ORDER — FUROSEMIDE 20 MG/1
TABLET ORAL
Qty: 30 TABLET | Refills: 0 | Status: SHIPPED | OUTPATIENT
Start: 2019-04-12 | End: 2019-05-06 | Stop reason: SDUPTHER

## 2019-05-06 DIAGNOSIS — E78.2 MIXED HYPERLIPIDEMIA: ICD-10-CM

## 2019-05-06 DIAGNOSIS — I89.0 LYMPHEDEMA: ICD-10-CM

## 2019-05-06 DIAGNOSIS — R60.0 PEDAL EDEMA: ICD-10-CM

## 2019-05-06 RX ORDER — FUROSEMIDE 20 MG/1
TABLET ORAL
Qty: 30 TABLET | Refills: 1 | Status: SHIPPED | OUTPATIENT
Start: 2019-05-06 | End: 2019-06-09 | Stop reason: SDUPTHER

## 2019-05-06 NOTE — TELEPHONE ENCOUNTER
Patient requesting a medication refill. Medication lasix  Dosage 20mg  Frequencydaily  Last [facility] 4/12/19  [facility]   Next office visit6/27/19  Last regular office visit2/26/19

## 2019-06-09 DIAGNOSIS — E78.2 MIXED HYPERLIPIDEMIA: ICD-10-CM

## 2019-06-09 DIAGNOSIS — R60.0 PEDAL EDEMA: ICD-10-CM

## 2019-06-09 DIAGNOSIS — I89.0 LYMPHEDEMA: ICD-10-CM

## 2019-06-10 RX ORDER — FUROSEMIDE 20 MG/1
TABLET ORAL
Qty: 30 TABLET | Refills: 0 | Status: SHIPPED | OUTPATIENT
Start: 2019-06-10 | End: 2019-07-18 | Stop reason: SDUPTHER

## 2019-06-10 RX ORDER — SIMVASTATIN 40 MG
TABLET ORAL
Qty: 30 TABLET | Refills: 6 | Status: SHIPPED | OUTPATIENT
Start: 2019-06-10 | End: 2019-12-02

## 2019-06-10 NOTE — TELEPHONE ENCOUNTER
Requested Prescriptions     Pending Prescriptions Disp Refills    simvastatin (ZOCOR) 40 MG tablet [Pharmacy Med Name: SIMVASTATIN 40 MG TABLET] 30 tablet 6     Sig: TAKE ONE TABLET BY MOUTH ONCE NIGHTLY    furosemide (LASIX) 20 MG tablet [Pharmacy Med Name: FUROSEMIDE 20 MG TABLET] 30 tablet 0     Sig: TAKE ONE TABLET BY MOUTH DAILY   last filled simvastatin - 7/3/18 Furosemide- 5/6/19 needs to be signed first then will be sent to [address] [place] [address]  2/26/19  Next  6/27/19

## 2019-06-27 ENCOUNTER — OFFICE VISIT (OUTPATIENT)
Dept: INTERNAL MEDICINE CLINIC | Age: 84
End: 2019-06-27
Payer: MEDICARE

## 2019-06-27 VITALS
DIASTOLIC BLOOD PRESSURE: 60 MMHG | TEMPERATURE: 98.1 F | HEIGHT: 66 IN | BODY MASS INDEX: 31.53 KG/M2 | OXYGEN SATURATION: 97 % | HEART RATE: 53 BPM | SYSTOLIC BLOOD PRESSURE: 130 MMHG | WEIGHT: 196.2 LBS

## 2019-06-27 DIAGNOSIS — E89.0 POSTABLATIVE HYPOTHYROIDISM: ICD-10-CM

## 2019-06-27 DIAGNOSIS — I89.0 LYMPHEDEMA: ICD-10-CM

## 2019-06-27 DIAGNOSIS — E11.9 TYPE 2 DIABETES MELLITUS WITHOUT COMPLICATION, WITHOUT LONG-TERM CURRENT USE OF INSULIN (HCC): Primary | ICD-10-CM

## 2019-06-27 DIAGNOSIS — I10 ESSENTIAL HYPERTENSION: ICD-10-CM

## 2019-06-27 DIAGNOSIS — Z91.81 AT HIGH RISK FOR FALLS: ICD-10-CM

## 2019-06-27 DIAGNOSIS — E78.2 MIXED HYPERLIPIDEMIA: ICD-10-CM

## 2019-06-27 LAB — HBA1C MFR BLD: 7.5 %

## 2019-06-27 PROCEDURE — 4040F PNEUMOC VAC/ADMIN/RCVD: CPT | Performed by: INTERNAL MEDICINE

## 2019-06-27 PROCEDURE — 99214 OFFICE O/P EST MOD 30 MIN: CPT | Performed by: INTERNAL MEDICINE

## 2019-06-27 PROCEDURE — 1036F TOBACCO NON-USER: CPT | Performed by: INTERNAL MEDICINE

## 2019-06-27 PROCEDURE — G8417 CALC BMI ABV UP PARAM F/U: HCPCS | Performed by: INTERNAL MEDICINE

## 2019-06-27 PROCEDURE — G8427 DOCREV CUR MEDS BY ELIG CLIN: HCPCS | Performed by: INTERNAL MEDICINE

## 2019-06-27 PROCEDURE — 1123F ACP DISCUSS/DSCN MKR DOCD: CPT | Performed by: INTERNAL MEDICINE

## 2019-06-27 PROCEDURE — 83036 HEMOGLOBIN GLYCOSYLATED A1C: CPT | Performed by: INTERNAL MEDICINE

## 2019-06-27 PROCEDURE — 1090F PRES/ABSN URINE INCON ASSESS: CPT | Performed by: INTERNAL MEDICINE

## 2019-06-27 ASSESSMENT — PATIENT HEALTH QUESTIONNAIRE - PHQ9
SUM OF ALL RESPONSES TO PHQ QUESTIONS 1-9: 0
2. FEELING DOWN, DEPRESSED OR HOPELESS: 0
SUM OF ALL RESPONSES TO PHQ9 QUESTIONS 1 & 2: 0
SUM OF ALL RESPONSES TO PHQ QUESTIONS 1-9: 0
1. LITTLE INTEREST OR PLEASURE IN DOING THINGS: 0

## 2019-06-27 ASSESSMENT — ENCOUNTER SYMPTOMS
COUGH: 1
SHORTNESS OF BREATH: 0
WHEEZING: 0
ANAL BLEEDING: 0
ABDOMINAL PAIN: 0
SINUS PRESSURE: 0
EYE DISCHARGE: 0
ALLERGIC/IMMUNOLOGIC NEGATIVE: 1
COLOR CHANGE: 0
APNEA: 0
DIARRHEA: 0
RECTAL PAIN: 0
RHINORRHEA: 0
BACK PAIN: 0
FACIAL SWELLING: 0
ABDOMINAL DISTENTION: 0
NAUSEA: 0
GASTROINTESTINAL NEGATIVE: 1
EYE PAIN: 0
EYE ITCHING: 0
SORE THROAT: 1
PHOTOPHOBIA: 0
CHEST TIGHTNESS: 0
BLOOD IN STOOL: 0
SINUS PAIN: 0
CONSTIPATION: 0
VOMITING: 0
EYES NEGATIVE: 1
VOICE CHANGE: 0
STRIDOR: 0
TROUBLE SWALLOWING: 0
EYE REDNESS: 0
CHOKING: 0

## 2019-06-27 NOTE — ASSESSMENT & PLAN NOTE
Stable on current levothyroxine dose. Denies heat or cold intolerance, unintentional weight change, tachycardia, diarrhea or constipation.

## 2019-06-27 NOTE — ASSESSMENT & PLAN NOTE
Blood pressure stable today and remains asymptomatic without new headaches or changes in vision. Compliant with prinzide and atenolol. As above, feels unsteady and has had one fall a year ago but none since and denies orthostatic presyncope.

## 2019-06-27 NOTE — PROGRESS NOTES
Chief Complaint   Patient presents with    Diabetes     Diabetes 4 month follow up - has a cold - for about a week       HPI: Here for diabetes followup and management of multiple chronic conditions as per the active problems list below, which I reviewed and updated with the patient today. States doing well with no new concerns except if noted below. I have reviewed the chart notes available from myself and other providers. I have addressed all activeproblems listed below, and created or updated the problems list as needed. I have reviewed and updated the problems list in detail with patient. 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    Arthritis of left knee    Type 2 diabetes mellitus without complication, without long-term current use of insulin (HCC)    Essential hypertension    Mixed hyperlipidemia    Pedal edema    Other secondary kyphosis, cervicothoracic region    Postablative hypothyroidism    Closed fracture of second cervical vertebra (HCC)    Closed fracture of first cervical vertebra (HCC)    Lymphedema    High risk medication use    Chronic neck pain       Type 2 diabetes mellitus without complication, without long-term current use of insulin (HCC)  In past has been well-controlled but in the last two years, has had elevated A1C above 7.5 controlled by metformin daily and lifestyle. Checks BG about once per week and usually is around 125-130. Tingling intermittently distal to knees bilaterally and feels unstable without lightheadedness or presyncope when she first gets up. Occasionally has diplopia, followed by ophthalmology (next appointment at end of July). Denies polyuria, polydipsia, chest pain, heart palpitations, SOB. Eats many fruits, vegetables and salads. Eats potato bread and cake occasionally.  Does not get out to walk much due to fear after a fall where she cracked two vertebrae. Saw podiatry last week. Essential hypertension  Blood pressure stable today and remains asymptomatic without new headaches or changes in vision. Compliant with prinzide and atenolol. As above, feels unsteady and has had one fall a year ago but none since and denies orthostatic presyncope. Lymphedema  Bilateral edema has been the same recently but has been improved since starting lasix. Right continues to be more swollen than left. Compliant with lasix daily. Postablative hypothyroidism  Stable on current levothyroxine dose. Denies heat or cold intolerance, unintentional weight change, tachycardia, diarrhea or constipation. Mixed hyperlipidemia  Stable on high intensity statin and ASA. No myalgias. Mildly increased bleeding and bruising since being on ASA.       Discussed all labs, other tests, and imaging if available   Lab Results   Component Value Date    WBC 15.5 (H) 07/08/2018    HGB 11.9 (L) 07/08/2018    HCT 35.9 (L) 07/08/2018    MCV 94.1 07/08/2018     07/08/2018    LYMPHOPCT 20.4 07/08/2018    RBC 3.81 (L) 07/08/2018    MCH 31.1 07/08/2018    MCHC 33.1 07/08/2018    RDW 14.1 07/08/2018     Lab Results   Component Value Date     02/26/2019    K 4.6 02/26/2019    CL 97 (L) 02/26/2019    CO2 25 02/26/2019    BUN 21 (H) 02/26/2019    CREATININE 0.8 02/26/2019    GLUCOSE 149 (H) 02/26/2019    CALCIUM 10.2 02/26/2019    PROT 7.0 08/24/2018    LABALBU 4.2 02/26/2019    BILITOT 0.6 08/24/2018    ALKPHOS 65 08/24/2018    AST 14 (L) 08/24/2018    ALT 9 (L) 08/24/2018    LABGLOM >60 02/26/2019    GFRAA >60 02/26/2019    AGRATIO 1.6 08/24/2018    GLOB 2.7 08/24/2018     Lab Results   Component Value Date    TRIG 212 (H) 08/24/2018     Lab Results   Component Value Date    HDL 44 08/24/2018     Lab Results   Component Value Date    LDLCALC 48 08/24/2018     No results found for: PSA, PSADIA  Lab Results   Component Value Date    TSHFT4 0.84 08/24/2018    TSH 0.42 08/06/2010     Lab Results   Component Value Date    LABA1C 7.5 06/27/2019     Lab Results   Component Value Date    .4 02/26/2019       I have extensively reviewed and reconciled the medication list, discontinued medications not taking or no longer appropriate, and updated the active meds list    Prior to Admission medications    Medication Sig Start Date End Date Taking?  Authorizing Provider   simvastatin (ZOCOR) 40 MG tablet TAKE ONE TABLET BY MOUTH ONCE NIGHTLY 6/10/19  Yes Savanna Allen MD   furosemide (LASIX) 20 MG tablet TAKE ONE TABLET BY MOUTH DAILY 6/10/19  Yes Savanna Allen MD   levothyroxine (SYNTHROID) 112 MCG tablet TAKE ONE TABLET BY MOUTH DAILY 2/26/19  Yes Savanna Allen MD   lisinopril-hydrochlorothiazide (PRINZIDE;ZESTORETIC) 20-12.5 MG per tablet TAKE ONE TABLET BY MOUTH DAILY 11/26/18  Yes Savanna Allen MD   metFORMIN (GLUCOPHAGE) 1000 MG tablet Take 1 tablet by mouth 2 times daily (with meals) 7/24/18  Yes Savanna Allen MD   atenolol (TENORMIN) 50 MG tablet Take 1 tablet by mouth daily 7/24/18  Yes Savanna Allen MD   acetaminophen (AMINOFEN) 325 MG tablet Take 2 tablets by mouth every 6 hours as needed for Pain 7/8/18  Yes Nakita Vanegas MD   Cranberry-Cholecalciferol 4200-500 MG-UNIT CAPS Take by mouth   Yes Historical Provider, MD   aspirin 81 MG tablet Take 81 mg by mouth daily   Yes Historical Provider, MD   Multiple Vitamin (MULTI VITAMIN DAILY PO) Take by mouth   Yes Historical Provider, MD   celecoxib (CELEBREX) 200 MG capsule  1/31/19   Historical Provider, MD   estradiol (ESTRACE) 0.1 MG/GM vaginal cream Place 2 g vaginally as needed     Historical Provider, MD       ROS: 12 systems reviewed, as documented by MA    Past Medical History:   Diagnosis Date    Arthritis     Cataract     Diabetes ([facility] [address]     High blood pressure     Kidney problem     Neuropathy     Thyroid disorder        Past Surgical History: Upper Arm, Position: Sitting, Cuff Size: Large Adult)   Pulse 53   Temp 98.1 °F (36.7 °C) (Oral)   Ht 5' 6\" (1.676 m)   Wt 196 lb 3.2 oz (89 kg)   SpO2 97%   BMI 31.67 kg/m²   Body mass index is 31.67 kg/m². Physical Exam   Constitutional: She is oriented to person, place, and time. She appears well-developed and well-nourished. No distress. HENT:   Head: Normocephalic and atraumatic. Nose: Nose normal.   Mouth/Throat: No oropharyngeal exudate. Mild chin tremoring   Eyes: Pupils are equal, round, and reactive to light. Conjunctivae and EOM are normal. Right eye exhibits no discharge. Left eye exhibits no discharge. No scleral icterus. Neck: Neck supple. No JVD present. No tracheal deviation present. Cardiovascular: Normal rate, regular rhythm, normal heart sounds and intact distal pulses. Exam reveals no gallop and no friction rub. No murmur heard. Pulmonary/Chest: Effort normal and breath sounds normal. No respiratory distress. She has no wheezes. She has no rales. She exhibits no tenderness. Musculoskeletal: Normal range of motion. She exhibits edema (mixed edema with pitting mildly to knees with significant non-pitting edema as well, worse in R). She exhibits no tenderness. Lymphadenopathy:     She has no cervical adenopathy. Neurological: She is alert and oriented to person, place, and time. No cranial nerve deficit. She exhibits normal muscle tone. Coordination normal.   Skin: Skin is warm and dry. Capillary refill takes less than 2 seconds. No rash noted. She is not diaphoretic. No erythema. No pallor. Psychiatric: She has a normal mood and affect. Her behavior is normal. Judgment and thought content normal.   Nursing note and vitals reviewed.       ASSESSMENT AND PLANS:      Except as noted below, all chronic problems have been reviewed and are stable to continue medications or other therapy as previously documented in the patient's chart, with changes per orders or documentation below:        Assessment and Plan: Patient received counseling and, if relevant,printed instructions for all symptoms listed in CC and HPI, as well as for all diagnoses brought onto today's visit note below. Typical counseling includes, but is not limited to, non pharmacologic measures to manage listed symptoms and conditions; appropriate use, risks and benefits for all prescribed medications; potential interactions between medications both prescribed and OTC; diet; exercise; healthy behaviors; and goalsetting to improve health. Pt.or responsible party was involved in shared decision making and had opportunity to have all questions answered. 1. Type 2 diabetes mellitus without complication, without long-term current use of insulin ([facility] [address]- A1C has been stable over the last two years but worse than it has been for her previously on continued diet and metformin. Given lack of other neuropathic findings, fairly good glycemic control chronically, and isolated tingling with bilateral leg lymphedema, symptom may have compressive components. It is less likely to be neuropathic although this is possible. - POCT glycosylated hemoglobin (Hb A1C)  - Comprehensive Metabolic Panel; Future    2. Lymphedema- stable on current regimen. Continue compression stockings and daily lasix. - Comprehensive Metabolic Panel; Future    3. Essential hypertension- stable without concerns for hypotension despite multiple medications. Appropriate to continue current regimen. - Comprehensive Metabolic Panel; Future    4. Mixed hyperlipidemia- due for fasting lipids today but is not fasting. Will order for her to complete.  - Lipid Panel; Future    5. Postablative hypothyroidism- continue current regimen.   - TSH WITH REFLEX TO FT4; Future    6.  At high risk for falls        Problem List     Type 2 diabetes mellitus without complication, without long-term current use of insulin ([facility] [address] - Primary     In past has been well-controlled but in the last two years, has had elevated A1C above 7.5 controlled by metformin daily and lifestyle. Checks BG about once per week and usually is around 125-130. Tingling intermittently distal to knees bilaterally and feels unstable without lightheadedness or presyncope when she first gets up. Occasionally has diplopia, followed by ophthalmology (next appointment at end of July). Denies polyuria, polydipsia, chest pain, heart palpitations, SOB. Eats many fruits, vegetables and salads. Eats potato bread and cake occasionally. Does not get out to walk much due to fear after a fall where she cracked two vertebrae. Saw podiatry last week. Relevant Medications    metFORMIN (GLUCOPHAGE) 1000 MG tablet    Other Relevant Orders    POCT glycosylated hemoglobin (Hb A1C) (Completed)    Comprehensive Metabolic Panel    Essential hypertension     Blood pressure stable today and remains asymptomatic without new headaches or changes in vision. Compliant with prinzide and atenolol. As above, feels unsteady and has had one fall a year ago but none since and denies orthostatic presyncope. Relevant Medications    aspirin 81 MG tablet    atenolol (TENORMIN) 50 MG tablet    lisinopril-hydrochlorothiazide (PRINZIDE;ZESTORETIC) 20-12.5 MG per tablet    simvastatin (ZOCOR) 40 MG tablet    furosemide (LASIX) 20 MG tablet    Other Relevant Orders    Comprehensive Metabolic Panel    Mixed hyperlipidemia     Stable on high intensity statin and ASA. No myalgias. Mildly increased bleeding and bruising since being on ASA. Relevant Medications    aspirin 81 MG tablet    atenolol (TENORMIN) 50 MG tablet    lisinopril-hydrochlorothiazide (PRINZIDE;ZESTORETIC) 20-12.5 MG per tablet    simvastatin (ZOCOR) 40 MG tablet    furosemide (LASIX) 20 MG tablet    Other Relevant Orders    Lipid Panel    Postablative hypothyroidism     Stable on current levothyroxine dose.  Denies heat or cold intolerance, unintentional weight

## 2019-06-27 NOTE — ASSESSMENT & PLAN NOTE
Bilateral edema has been the same recently but has been improved since starting lasix. Right continues to be more swollen than left. Compliant with lasix daily.

## 2019-06-27 NOTE — ASSESSMENT & PLAN NOTE
In past has been well-controlled but in the last two years, has had elevated A1C above 7.5 controlled by metformin daily and lifestyle. Checks BG about once per week and usually is around 125-130. Tingling intermittently distal to knees bilaterally and feels unstable without lightheadedness or presyncope when she first gets up. Occasionally has diplopia, followed by ophthalmology (next appointment at end of July). Denies polyuria, polydipsia, chest pain, heart palpitations, SOB. Eats many fruits, vegetables and salads. Eats potato bread and cake occasionally. Does not get out to walk much due to fear after a fall where she cracked two vertebrae. Saw podiatry last week.

## 2019-06-27 NOTE — PROGRESS NOTES
Review of Systems   Constitutional: Negative. Negative for activity change, appetite change, chills, diaphoresis, fatigue, fever and unexpected weight change. HENT: Positive for congestion and sore throat. Negative for dental problem, drooling, ear discharge, ear pain, facial swelling, hearing loss, mouth sores, nosebleeds, postnasal drip, rhinorrhea, sinus pressure, sinus pain, sneezing, tinnitus, trouble swallowing and voice change. Has had a cold for a little over a week   Eyes: Negative. Negative for photophobia, pain, discharge, redness, itching and visual disturbance. Respiratory: Positive for cough. Negative for apnea, choking, chest tightness, shortness of breath, wheezing and stridor. Has a cold   Cardiovascular: Negative. Negative for chest pain, palpitations and leg swelling. Gastrointestinal: Negative. Negative for abdominal distention, abdominal pain, anal bleeding, blood in stool, constipation, diarrhea, nausea, rectal pain and vomiting. Endocrine: Negative. Negative for cold intolerance, heat intolerance, polydipsia, polyphagia and polyuria. Genitourinary: Negative. Negative for decreased urine volume, difficulty urinating, dyspareunia, dysuria, enuresis, flank pain, frequency, genital sores, hematuria, menstrual problem, pelvic pain, urgency, vaginal bleeding, vaginal discharge and vaginal pain. Musculoskeletal: Negative. Negative for arthralgias, back pain, gait problem, joint swelling, myalgias, neck pain and neck stiffness. Skin: Negative. Negative for color change, pallor, rash and wound. Allergic/Immunologic: Negative. Negative for environmental allergies, food allergies and immunocompromised state. Neurological: Negative. Negative for dizziness, tremors, seizures, syncope, facial asymmetry, speech difficulty, weakness, light-headedness, numbness and headaches. Hematological: Negative. Negative for adenopathy. Does not bruise/bleed easily. Psychiatric/Behavioral: Negative. Negative for agitation, behavioral problems, confusion, decreased concentration, dysphoric mood, hallucinations, self-injury, sleep disturbance and suicidal ideas. The patient is not nervous/anxious and is not hyperactive.

## 2019-06-27 NOTE — ASSESSMENT & PLAN NOTE
Stable on high intensity statin and ASA. No myalgias. Mildly increased bleeding and bruising since being on ASA.

## 2019-07-18 DIAGNOSIS — E78.2 MIXED HYPERLIPIDEMIA: ICD-10-CM

## 2019-07-18 DIAGNOSIS — I89.0 LYMPHEDEMA: ICD-10-CM

## 2019-07-18 DIAGNOSIS — R60.0 PEDAL EDEMA: ICD-10-CM

## 2019-07-18 RX ORDER — FUROSEMIDE 20 MG/1
TABLET ORAL
Qty: 30 TABLET | Refills: 0 | Status: SHIPPED | OUTPATIENT
Start: 2019-07-18 | End: 2019-08-26 | Stop reason: SDUPTHER

## 2019-08-15 RX ORDER — ATENOLOL 50 MG/1
TABLET ORAL
Qty: 30 TABLET | Refills: 10 | Status: SHIPPED | OUTPATIENT
Start: 2019-08-15 | End: 2019-12-02

## 2019-10-31 DIAGNOSIS — I10 ESSENTIAL HYPERTENSION: ICD-10-CM

## 2019-11-01 RX ORDER — LISINOPRIL AND HYDROCHLOROTHIAZIDE 20; 12.5 MG/1; MG/1
TABLET ORAL
Qty: 90 TABLET | Refills: 0 | Status: SHIPPED | OUTPATIENT
Start: 2019-11-01 | End: 2019-12-02

## 2019-11-07 DIAGNOSIS — R60.0 PEDAL EDEMA: ICD-10-CM

## 2019-11-07 DIAGNOSIS — E78.2 MIXED HYPERLIPIDEMIA: ICD-10-CM

## 2019-11-07 DIAGNOSIS — I89.0 LYMPHEDEMA: ICD-10-CM

## 2019-11-08 RX ORDER — FUROSEMIDE 20 MG/1
TABLET ORAL
Qty: 30 TABLET | Refills: 0 | Status: SHIPPED | OUTPATIENT
Start: 2019-11-08 | End: 2019-12-02

## 2019-12-02 ENCOUNTER — OFFICE VISIT (OUTPATIENT)
Dept: INTERNAL MEDICINE CLINIC | Age: 84
End: 2019-12-02
Payer: MEDICARE

## 2019-12-02 VITALS
BODY MASS INDEX: 31.53 KG/M2 | HEIGHT: 66 IN | RESPIRATION RATE: 12 BRPM | OXYGEN SATURATION: 95 % | DIASTOLIC BLOOD PRESSURE: 74 MMHG | TEMPERATURE: 97.9 F | SYSTOLIC BLOOD PRESSURE: 138 MMHG | WEIGHT: 196.2 LBS | HEART RATE: 61 BPM

## 2019-12-02 DIAGNOSIS — I10 ESSENTIAL HYPERTENSION: ICD-10-CM

## 2019-12-02 DIAGNOSIS — Z79.899 HIGH RISK MEDICATION USE: ICD-10-CM

## 2019-12-02 DIAGNOSIS — E89.0 POSTABLATIVE HYPOTHYROIDISM: ICD-10-CM

## 2019-12-02 DIAGNOSIS — I89.0 LYMPHEDEMA: ICD-10-CM

## 2019-12-02 DIAGNOSIS — E78.2 MIXED HYPERLIPIDEMIA: ICD-10-CM

## 2019-12-02 DIAGNOSIS — R60.0 PEDAL EDEMA: ICD-10-CM

## 2019-12-02 DIAGNOSIS — E11.9 TYPE 2 DIABETES MELLITUS WITHOUT COMPLICATION, WITHOUT LONG-TERM CURRENT USE OF INSULIN (HCC): Primary | ICD-10-CM

## 2019-12-02 PROCEDURE — 99214 OFFICE O/P EST MOD 30 MIN: CPT | Performed by: INTERNAL MEDICINE

## 2019-12-02 PROCEDURE — 1090F PRES/ABSN URINE INCON ASSESS: CPT | Performed by: INTERNAL MEDICINE

## 2019-12-02 PROCEDURE — 1123F ACP DISCUSS/DSCN MKR DOCD: CPT | Performed by: INTERNAL MEDICINE

## 2019-12-02 PROCEDURE — G8427 DOCREV CUR MEDS BY ELIG CLIN: HCPCS | Performed by: INTERNAL MEDICINE

## 2019-12-02 PROCEDURE — 1036F TOBACCO NON-USER: CPT | Performed by: INTERNAL MEDICINE

## 2019-12-02 PROCEDURE — 4040F PNEUMOC VAC/ADMIN/RCVD: CPT | Performed by: INTERNAL MEDICINE

## 2019-12-02 PROCEDURE — G8417 CALC BMI ABV UP PARAM F/U: HCPCS | Performed by: INTERNAL MEDICINE

## 2019-12-02 PROCEDURE — G8482 FLU IMMUNIZE ORDER/ADMIN: HCPCS | Performed by: INTERNAL MEDICINE

## 2019-12-02 RX ORDER — FUROSEMIDE 20 MG/1
20 TABLET ORAL DAILY
Qty: 90 TABLET | Refills: 1 | Status: SHIPPED | OUTPATIENT
Start: 2019-12-02 | End: 2020-03-30 | Stop reason: SDUPTHER

## 2019-12-02 RX ORDER — ATENOLOL 50 MG/1
50 TABLET ORAL DAILY
Qty: 90 TABLET | Refills: 3 | Status: SHIPPED | OUTPATIENT
Start: 2019-12-02 | End: 2020-03-30 | Stop reason: SDUPTHER

## 2019-12-02 RX ORDER — LISINOPRIL 20 MG/1
20 TABLET ORAL DAILY
Qty: 90 TABLET | Refills: 3 | Status: SHIPPED | OUTPATIENT
Start: 2019-12-02 | End: 2020-03-30 | Stop reason: SDUPTHER

## 2019-12-02 RX ORDER — SIMVASTATIN 40 MG
40 TABLET ORAL NIGHTLY
Qty: 90 TABLET | Refills: 3 | Status: SHIPPED | OUTPATIENT
Start: 2019-12-02 | End: 2020-03-30 | Stop reason: SDUPTHER

## 2019-12-02 ASSESSMENT — ENCOUNTER SYMPTOMS
SHORTNESS OF BREATH: 0
WHEEZING: 0
EYE PAIN: 0
DIARRHEA: 0
SORE THROAT: 0
BLOOD IN STOOL: 0
NAUSEA: 0
STRIDOR: 0
PHOTOPHOBIA: 0
CONSTIPATION: 0
COUGH: 0
EYE REDNESS: 0
EYE DISCHARGE: 0
BACK PAIN: 0
VOMITING: 0
ABDOMINAL PAIN: 0

## 2020-01-08 ENCOUNTER — HOSPITAL ENCOUNTER (OUTPATIENT)
Dept: WOMENS IMAGING | Age: 85
Discharge: HOME | End: 2020-01-08
Payer: MEDICARE

## 2020-01-08 PROCEDURE — 77067 SCR MAMMO BI INCL CAD: CPT

## 2020-01-27 RX ORDER — LISINOPRIL AND HYDROCHLOROTHIAZIDE 20; 12.5 MG/1; MG/1
TABLET ORAL
Qty: 90 TABLET | Refills: 0 | OUTPATIENT
Start: 2020-01-27

## 2020-01-27 NOTE — TELEPHONE ENCOUNTER
Patient called and stated melyr did not have the plain lisinopril. Spoke with pharmacy they did have it and going to refill for patient.

## 2020-02-05 DIAGNOSIS — E89.0 POSTABLATIVE HYPOTHYROIDISM: ICD-10-CM

## 2020-02-05 DIAGNOSIS — E11.9 TYPE 2 DIABETES MELLITUS WITHOUT COMPLICATION, WITHOUT LONG-TERM CURRENT USE OF INSULIN (HCC): ICD-10-CM

## 2020-02-05 DIAGNOSIS — E78.2 MIXED HYPERLIPIDEMIA: ICD-10-CM

## 2020-02-05 LAB
A/G RATIO: 1.6 (ref 1.1–2.2)
ALBUMIN SERPL-MCNC: 4.5 G/DL (ref 3.4–5)
ALP BLD-CCNC: 77 U/L (ref 40–129)
ALT SERPL-CCNC: 10 U/L (ref 10–40)
ANION GAP SERPL CALCULATED.3IONS-SCNC: 14 MMOL/L (ref 3–16)
AST SERPL-CCNC: 14 U/L (ref 15–37)
BILIRUB SERPL-MCNC: 0.4 MG/DL (ref 0–1)
BUN BLDV-MCNC: 24 MG/DL (ref 7–20)
CALCIUM SERPL-MCNC: 9.9 MG/DL (ref 8.3–10.6)
CHLORIDE BLD-SCNC: 98 MMOL/L (ref 99–110)
CHOLESTEROL, TOTAL: 223 MG/DL (ref 0–199)
CO2: 25 MMOL/L (ref 21–32)
CREAT SERPL-MCNC: 1.1 MG/DL (ref 0.6–1.2)
GFR AFRICAN AMERICAN: 56
GFR NON-AFRICAN AMERICAN: 47
GLOBULIN: 2.9 G/DL
GLUCOSE BLD-MCNC: 138 MG/DL (ref 70–99)
HDLC SERPL-MCNC: 45 MG/DL (ref 40–60)
LDL CHOLESTEROL CALCULATED: ABNORMAL MG/DL
LDL CHOLESTEROL DIRECT: 151 MG/DL
POTASSIUM SERPL-SCNC: 4.9 MMOL/L (ref 3.5–5.1)
SODIUM BLD-SCNC: 137 MMOL/L (ref 136–145)
TOTAL PROTEIN: 7.4 G/DL (ref 6.4–8.2)
TRIGL SERPL-MCNC: 308 MG/DL (ref 0–150)
TSH REFLEX FT4: 1.47 UIU/ML (ref 0.27–4.2)
VLDLC SERPL CALC-MCNC: ABNORMAL MG/DL

## 2020-02-06 LAB
ESTIMATED AVERAGE GLUCOSE: 157.1 MG/DL
HBA1C MFR BLD: 7.1 %

## 2020-02-27 RX ORDER — LEVOTHYROXINE SODIUM 112 UG/1
TABLET ORAL
Qty: 90 TABLET | Refills: 2 | Status: SHIPPED | OUTPATIENT
Start: 2020-02-27 | End: 2020-03-30 | Stop reason: SDUPTHER

## 2020-02-27 NOTE — TELEPHONE ENCOUNTER
Requested Prescriptions     Pending Prescriptions Disp Refills    levothyroxine (SYNTHROID) 112 MCG tablet [Pharmacy Med Name: LEVOTHYROXINE 112 MCG TABLET] 90 tablet 2     Sig: TAKE ONE TABLET BY MOUTH DAILY   Patient requesting a medication refill.     Last filled on: 11/6/19  [facility]  Next office visit: 4/2/20  Last regular office visit: 12/2/19

## 2020-03-30 NOTE — TELEPHONE ENCOUNTER
[facility]     Patient has cancel appointment had schedule due to corona virus

## 2020-03-31 RX ORDER — FUROSEMIDE 20 MG/1
20 TABLET ORAL DAILY
Qty: 90 TABLET | Refills: 1 | Status: SHIPPED | OUTPATIENT
Start: 2020-03-31 | End: 2020-09-14

## 2020-03-31 RX ORDER — ATENOLOL 50 MG/1
50 TABLET ORAL DAILY
Qty: 90 TABLET | Refills: 3 | Status: SHIPPED | OUTPATIENT
Start: 2020-03-31 | End: 2021-01-18 | Stop reason: SDUPTHER

## 2020-03-31 RX ORDER — ESTRADIOL 0.1 MG/G
2 CREAM VAGINAL
Qty: 1 TUBE | Refills: 5 | Status: SHIPPED | OUTPATIENT
Start: 2020-04-01 | End: 2020-06-18

## 2020-03-31 RX ORDER — LISINOPRIL 20 MG/1
20 TABLET ORAL DAILY
Qty: 90 TABLET | Refills: 0 | Status: SHIPPED | OUTPATIENT
Start: 2020-03-31 | End: 2020-07-02

## 2020-03-31 RX ORDER — SIMVASTATIN 40 MG
40 TABLET ORAL NIGHTLY
Qty: 90 TABLET | Refills: 3 | Status: SHIPPED | OUTPATIENT
Start: 2020-03-31 | End: 2020-06-18

## 2020-03-31 RX ORDER — LEVOTHYROXINE SODIUM 112 UG/1
TABLET ORAL
Qty: 90 TABLET | Refills: 3 | Status: SHIPPED | OUTPATIENT
Start: 2020-03-31 | End: 2020-06-24 | Stop reason: SDUPTHER

## 2020-03-31 RX ORDER — CELECOXIB 200 MG/1
200 CAPSULE ORAL DAILY PRN
Qty: 90 CAPSULE | Refills: 1 | Status: SHIPPED | OUTPATIENT
Start: 2020-03-31 | End: 2020-09-14

## 2020-06-18 ENCOUNTER — OFFICE VISIT (OUTPATIENT)
Dept: INTERNAL MEDICINE CLINIC | Age: 85
End: 2020-06-18
Payer: MEDICARE

## 2020-06-18 VITALS
BODY MASS INDEX: 32.05 KG/M2 | TEMPERATURE: 97.2 F | DIASTOLIC BLOOD PRESSURE: 82 MMHG | OXYGEN SATURATION: 93 % | HEART RATE: 52 BPM | RESPIRATION RATE: 16 BRPM | WEIGHT: 199.4 LBS | SYSTOLIC BLOOD PRESSURE: 130 MMHG | HEIGHT: 66 IN

## 2020-06-18 PROCEDURE — G8427 DOCREV CUR MEDS BY ELIG CLIN: HCPCS | Performed by: INTERNAL MEDICINE

## 2020-06-18 PROCEDURE — 99214 OFFICE O/P EST MOD 30 MIN: CPT | Performed by: INTERNAL MEDICINE

## 2020-06-18 PROCEDURE — 1123F ACP DISCUSS/DSCN MKR DOCD: CPT | Performed by: INTERNAL MEDICINE

## 2020-06-18 PROCEDURE — 1090F PRES/ABSN URINE INCON ASSESS: CPT | Performed by: INTERNAL MEDICINE

## 2020-06-18 PROCEDURE — 3051F HG A1C>EQUAL 7.0%<8.0%: CPT | Performed by: INTERNAL MEDICINE

## 2020-06-18 PROCEDURE — 4040F PNEUMOC VAC/ADMIN/RCVD: CPT | Performed by: INTERNAL MEDICINE

## 2020-06-18 PROCEDURE — 1036F TOBACCO NON-USER: CPT | Performed by: INTERNAL MEDICINE

## 2020-06-18 PROCEDURE — G8417 CALC BMI ABV UP PARAM F/U: HCPCS | Performed by: INTERNAL MEDICINE

## 2020-06-18 RX ORDER — ATORVASTATIN CALCIUM 40 MG/1
40 TABLET, FILM COATED ORAL DAILY
Qty: 90 TABLET | Refills: 3 | Status: SHIPPED | OUTPATIENT
Start: 2020-06-18 | End: 2021-05-21

## 2020-06-18 ASSESSMENT — ENCOUNTER SYMPTOMS
APNEA: 0
CHEST TIGHTNESS: 0
CHOKING: 0
CONSTIPATION: 0
RESPIRATORY NEGATIVE: 1
EYE PAIN: 0
EYES NEGATIVE: 1
BACK PAIN: 0
COUGH: 0
FACIAL SWELLING: 0
GASTROINTESTINAL NEGATIVE: 1
DIARRHEA: 0
BLOOD IN STOOL: 0
VOICE CHANGE: 0
ABDOMINAL PAIN: 0
SINUS PAIN: 0
SHORTNESS OF BREATH: 0
ABDOMINAL DISTENTION: 0
SINUS PRESSURE: 0
SORE THROAT: 0
ALLERGIC/IMMUNOLOGIC NEGATIVE: 1
EYE DISCHARGE: 0
WHEEZING: 0
EYE REDNESS: 0
VOMITING: 0
STRIDOR: 0
EYE ITCHING: 0
PHOTOPHOBIA: 0
ANAL BLEEDING: 0
NAUSEA: 0
RHINORRHEA: 0
RECTAL PAIN: 0
TROUBLE SWALLOWING: 0
COLOR CHANGE: 0

## 2020-06-18 ASSESSMENT — PATIENT HEALTH QUESTIONNAIRE - PHQ9
SUM OF ALL RESPONSES TO PHQ QUESTIONS 1-9: 0
SUM OF ALL RESPONSES TO PHQ9 QUESTIONS 1 & 2: 0
1. LITTLE INTEREST OR PLEASURE IN DOING THINGS: 0
2. FEELING DOWN, DEPRESSED OR HOPELESS: 0
SUM OF ALL RESPONSES TO PHQ QUESTIONS 1-9: 0

## 2020-06-18 NOTE — PROGRESS NOTES
Chief Complaint   Patient presents with    Diabetes     Diabetes follow up       HPI: Here for diabetes followup and management of multiple chronic conditions as per the active problems list below, which I reviewed and updated with the patient today. States doing well with no new concerns except if noted below. Has been testing BS a couple of times per week, running mid 120s since she got back on testing. Daughter states it helps her stay adherent with her diet to test and wants her to continue. I have reviewed the chart notes available from myself and other providers. I have addressed all activeproblems listed below, and created or updated the problems list as needed. I have reviewed and updated the problems list in detail with patient. 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       Discussed all labs, other tests, and imaging if available   Lab Results   Component Value Date    WBC 15.5 (H) 07/08/2018    HGB 11.9 (L) 07/08/2018    HCT 35.9 (L) 07/08/2018    MCV 94.1 07/08/2018     07/08/2018    LYMPHOPCT 20.4 07/08/2018    RBC 3.81 (L) 07/08/2018    MCH 31.1 07/08/2018    MCHC 33.1 07/08/2018    RDW 14.1 07/08/2018     Lab Results   Component Value Date     02/05/2020    K 4.9 02/05/2020    CL 98 (L) 02/05/2020    CO2 25 Cranberry-Cholecalciferol 4200-500 MG-UNIT CAPS Take by mouth Yes Historical Provider, MD   aspirin 81 MG tablet Take 81 mg by mouth daily Yes Historical Provider, MD   Multiple Vitamin (MULTI VITAMIN DAILY PO) Take by mouth Yes Historical Provider, MD   estradiol (ESTRACE) 0.1 MG/GM vaginal cream Place 2 g vaginally three times a week PRN  Patient not taking: Reported on 6/18/2020  Maris Shin MD          ROS: 12 systems reviewed, as documented by MA    Past Medical History:   Diagnosis Date    Arthritis     Cataract     Diabetes ([facility] [address]     High blood pressure     Kidney problem     Neuropathy     Thyroid disorder        Past Surgical History:   Procedure Laterality Date    APPENDECTOMY      CARPAL TUNNEL RELEASE Left 09/05/2017      Left carpal tunnel release  & Left Thumb, Index Finger and Middle Finger trigger finger release    CARPAL TUNNEL RELEASE Right 09/26/2017    COLONOSCOPY      FINGER TRIGGER RELEASE Right 09/26/2017    Thumb & Index Finger    HYSTERECTOMY      OVARY REMOVAL      TOTAL KNEE ARTHROPLASTY Right 2006    Dr. Nora Mayorga History     Socioeconomic History    Marital status:       Spouse name: Not on file    Number of children: Not on file    Years of education: Not on file    Highest education level: Not on file   Occupational History    Occupation: NA   Social Needs    Financial resource strain: Not on file    Food insecurity     Worry: Not on file     Inability: Not on file    Transportation needs     Medical: Not on file     Non-medical: Not on file   Tobacco Use    Smoking status: Never Smoker    Smokeless tobacco: Never Used   Substance and Sexual Activity    Alcohol use: Yes     Comment: occas    Drug use: No    Sexual activity: Not on file   Lifestyle    Physical activity     Days per week: Not on file     Minutes per session: Not on file    Stress: Not on file   Relationships    Social connections     Talks on phone: Not on file Gets together: Not on file     Attends Yazidi service: Not on file     Active member of club or organization: Not on file     Attends meetings of clubs or organizations: Not on file     Relationship status: Not on file    Intimate partner violence     Fear of current or ex partner: Not on file     Emotionally abused: Not on file     Physically abused: Not on file     Forced sexual activity: Not on file   Other Topics Concern    Not on file   Social History Narrative    Not on file       History reviewed. No pertinent family history. Health Maintenance Due   Topic Date Due    Annual Wellness Visit (AWV)  05/29/2019         /82 (Site: Left Lower Arm, Position: Sitting, Cuff Size: Medium Adult)   Pulse 52   Temp 97.2 °F (36.2 °C) (Temporal)   Resp 16   Ht 5' 6\" (1.676 m)   Wt 199 lb 6.4 oz (90.4 kg)   SpO2 93%   BMI 32.18 kg/m²   Body mass index is 32.18 kg/m². Physical Exam  Constitutional:       General: She is not in acute distress. Comments: Using cane, moves very cautiously and slowly   HENT:      Head: Normocephalic and atraumatic. Nose: Nose normal.      Mouth/Throat:      Comments: Wearing mask  Eyes:      General: No scleral icterus. Right eye: No discharge. Left eye: No discharge. Conjunctiva/sclera: Conjunctivae normal.      Pupils: Pupils are equal, round, and reactive to light. Neck:      Musculoskeletal: Neck supple. Thyroid: No thyromegaly. Vascular: No JVD. Trachea: No tracheal deviation. Cardiovascular:      Rate and Rhythm: Normal rate and regular rhythm. Heart sounds: Normal heart sounds. No murmur. No friction rub. No gallop. Pulmonary:      Effort: Pulmonary effort is normal. No respiratory distress. Breath sounds: Normal breath sounds. No wheezing or rales. Chest:      Chest wall: No tenderness. Abdominal:      General: Bowel sounds are normal. There is no distension. Palpations: Abdomen is soft.  There is no mass. Tenderness: There is no abdominal tenderness. There is no guarding or rebound. Musculoskeletal: Normal range of motion. General: No tenderness. Comments: Kyphotic spine   Lymphadenopathy:      Cervical: No cervical adenopathy. Skin:     General: Skin is warm and dry. Capillary Refill: Capillary refill takes less than 2 seconds. Coloration: Skin is not pale. Findings: No erythema or rash. Neurological:      Mental Status: She is alert and oriented to person, place, and time. Cranial Nerves: No cranial nerve deficit. Motor: No abnormal muscle tone. Coordination: Coordination normal.      Deep Tendon Reflexes: Reflexes are normal and symmetric. Reflexes normal.   Psychiatric:         Mood and Affect: Mood normal.         Behavior: Behavior normal.         Thought Content: Thought content normal.         Judgment: Judgment normal.         ASSESSMENT AND PLANS:      Except as noted below, all chronic problems have been reviewed and are stable to continue medications or other therapy as previously documented in the patient's chart, with changes per orders or documentation below:        Assessment and Plan: Patient received counseling and, if relevant,printed instructions for all symptoms listed in CC and HPI, as well as for all diagnoses brought onto today's visit note below. Typical counseling includes, but is not limited to, non pharmacologic measures to manage listed symptoms and conditions; appropriate use, risks and benefits for all prescribed medications; potential interactions between medications both prescribed and OTC; diet; exercise; healthy behaviors; and goalsetting to improve health. Pt.or responsible party was involved in shared decision making and had opportunity to have all questions answered. 1. Type 2 diabetes mellitus without complication, without long-term current use of insulin ([facility] [address]    2. Essential hypertension    3.  Mixed

## 2020-06-24 ENCOUNTER — TELEPHONE (OUTPATIENT)
Dept: INTERNAL MEDICINE CLINIC | Age: 85
End: 2020-06-24

## 2020-06-24 RX ORDER — LEVOTHYROXINE SODIUM 112 UG/1
TABLET ORAL
Qty: 10 TABLET | Refills: 0 | Status: SHIPPED | OUTPATIENT
Start: 2020-06-24 | End: 2020-10-26

## 2020-07-02 RX ORDER — LISINOPRIL 20 MG/1
TABLET ORAL
Qty: 90 TABLET | Refills: 3 | Status: SHIPPED | OUTPATIENT
Start: 2020-07-02 | End: 2020-10-26

## 2020-09-14 RX ORDER — FUROSEMIDE 20 MG/1
TABLET ORAL
Qty: 90 TABLET | Refills: 0 | Status: SHIPPED | OUTPATIENT
Start: 2020-09-14 | End: 2020-10-26

## 2020-09-14 RX ORDER — CELECOXIB 200 MG/1
CAPSULE ORAL
Qty: 90 CAPSULE | Refills: 0 | Status: SHIPPED | OUTPATIENT
Start: 2020-09-14 | End: 2020-12-14 | Stop reason: SDUPTHER

## 2020-09-14 NOTE — TELEPHONE ENCOUNTER
Requested Prescriptions     Pending Prescriptions Disp Refills    furosemide (LASIX) 20 MG tablet [Pharmacy Med Name: FUROSEMIDE TABS 20MG] 90 tablet 3     Sig: TAKE 1 TABLET DAILY    celecoxib (CELEBREX) 200 MG capsule [Pharmacy Med Name: CELECOXIB CAPS 200MG] 90 capsule 3     Sig: TAKE 1 CAPSULE DAILY AS NEEDED FOR PAIN   Patient requesting a medication refill.   Last filled on: 6/15/20  Ph[facility]: [facility]  Next office visit: 10/26/2020  Last regular office visit: 6/18/2020

## 2020-10-26 ENCOUNTER — OFFICE VISIT (OUTPATIENT)
Dept: INTERNAL MEDICINE CLINIC | Age: 85
End: 2020-10-26
Payer: MEDICARE

## 2020-10-26 VITALS
HEIGHT: 66 IN | SYSTOLIC BLOOD PRESSURE: 162 MMHG | WEIGHT: 193 LBS | DIASTOLIC BLOOD PRESSURE: 70 MMHG | BODY MASS INDEX: 31.02 KG/M2 | HEIGHT: 66 IN | SYSTOLIC BLOOD PRESSURE: 162 MMHG | TEMPERATURE: 97.9 F | HEART RATE: 65 BPM | WEIGHT: 193 LBS | OXYGEN SATURATION: 99 % | OXYGEN SATURATION: 99 % | DIASTOLIC BLOOD PRESSURE: 70 MMHG | HEART RATE: 65 BPM | BODY MASS INDEX: 31.02 KG/M2 | TEMPERATURE: 97.9 F

## 2020-10-26 DIAGNOSIS — R60.0 PEDAL EDEMA: ICD-10-CM

## 2020-10-26 DIAGNOSIS — E11.9 TYPE 2 DIABETES MELLITUS WITHOUT COMPLICATION, WITHOUT LONG-TERM CURRENT USE OF INSULIN (HCC): ICD-10-CM

## 2020-10-26 DIAGNOSIS — I89.0 LYMPHEDEMA: ICD-10-CM

## 2020-10-26 LAB
A/G RATIO: 1.6 (ref 1.1–2.2)
ALBUMIN SERPL-MCNC: 4.1 G/DL (ref 3.4–5)
ALP BLD-CCNC: 73 U/L (ref 40–129)
ALT SERPL-CCNC: 14 U/L (ref 10–40)
ANION GAP SERPL CALCULATED.3IONS-SCNC: 13 MMOL/L (ref 3–16)
AST SERPL-CCNC: 17 U/L (ref 15–37)
BILIRUB SERPL-MCNC: 0.5 MG/DL (ref 0–1)
BUN BLDV-MCNC: 21 MG/DL (ref 7–20)
CALCIUM SERPL-MCNC: 10.1 MG/DL (ref 8.3–10.6)
CHLORIDE BLD-SCNC: 100 MMOL/L (ref 99–110)
CO2: 26 MMOL/L (ref 21–32)
CREAT SERPL-MCNC: 0.9 MG/DL (ref 0.6–1.2)
GFR AFRICAN AMERICAN: >60
GFR NON-AFRICAN AMERICAN: 59
GLOBULIN: 2.5 G/DL
GLUCOSE BLD-MCNC: 177 MG/DL (ref 70–99)
HBA1C MFR BLD: 6.9 %
POTASSIUM SERPL-SCNC: 5.4 MMOL/L (ref 3.5–5.1)
SODIUM BLD-SCNC: 139 MMOL/L (ref 136–145)
TOTAL PROTEIN: 6.6 G/DL (ref 6.4–8.2)

## 2020-10-26 PROCEDURE — 1123F ACP DISCUSS/DSCN MKR DOCD: CPT | Performed by: INTERNAL MEDICINE

## 2020-10-26 PROCEDURE — G8427 DOCREV CUR MEDS BY ELIG CLIN: HCPCS | Performed by: INTERNAL MEDICINE

## 2020-10-26 PROCEDURE — G8417 CALC BMI ABV UP PARAM F/U: HCPCS | Performed by: INTERNAL MEDICINE

## 2020-10-26 PROCEDURE — 90694 VACC AIIV4 NO PRSRV 0.5ML IM: CPT | Performed by: INTERNAL MEDICINE

## 2020-10-26 PROCEDURE — 83036 HEMOGLOBIN GLYCOSYLATED A1C: CPT | Performed by: INTERNAL MEDICINE

## 2020-10-26 PROCEDURE — 99214 OFFICE O/P EST MOD 30 MIN: CPT | Performed by: INTERNAL MEDICINE

## 2020-10-26 PROCEDURE — G0438 PPPS, INITIAL VISIT: HCPCS | Performed by: INTERNAL MEDICINE

## 2020-10-26 PROCEDURE — 1036F TOBACCO NON-USER: CPT | Performed by: INTERNAL MEDICINE

## 2020-10-26 PROCEDURE — G0008 ADMIN INFLUENZA VIRUS VAC: HCPCS | Performed by: INTERNAL MEDICINE

## 2020-10-26 PROCEDURE — 4040F PNEUMOC VAC/ADMIN/RCVD: CPT | Performed by: INTERNAL MEDICINE

## 2020-10-26 PROCEDURE — 1090F PRES/ABSN URINE INCON ASSESS: CPT | Performed by: INTERNAL MEDICINE

## 2020-10-26 PROCEDURE — G8484 FLU IMMUNIZE NO ADMIN: HCPCS | Performed by: INTERNAL MEDICINE

## 2020-10-26 RX ORDER — FUROSEMIDE 20 MG/1
20 TABLET ORAL DAILY
Qty: 145 TABLET | Refills: 1 | Status: SHIPPED | OUTPATIENT
Start: 2020-10-26 | End: 2021-01-18

## 2020-10-26 RX ORDER — LEVOTHYROXINE SODIUM 112 UG/1
TABLET ORAL
Qty: 90 TABLET | Refills: 3 | Status: SHIPPED | OUTPATIENT
Start: 2020-10-26 | End: 2021-09-16 | Stop reason: SDUPTHER

## 2020-10-26 RX ORDER — LISINOPRIL 40 MG/1
40 TABLET ORAL DAILY
Qty: 90 TABLET | Refills: 3 | Status: ON HOLD | OUTPATIENT
Start: 2020-10-26 | End: 2021-05-11 | Stop reason: HOSPADM

## 2020-10-26 ASSESSMENT — ENCOUNTER SYMPTOMS
COUGH: 0
SHORTNESS OF BREATH: 0
EYE DISCHARGE: 0
BACK PAIN: 0
EYE PAIN: 0
EYE REDNESS: 0
DIARRHEA: 1
BLOOD IN STOOL: 0
VOMITING: 0
PHOTOPHOBIA: 0
CONSTIPATION: 0
ABDOMINAL PAIN: 0
STRIDOR: 0
SORE THROAT: 0
NAUSEA: 0
WHEEZING: 0

## 2020-10-26 ASSESSMENT — PATIENT HEALTH QUESTIONNAIRE - PHQ9
1. LITTLE INTEREST OR PLEASURE IN DOING THINGS: 0
SUM OF ALL RESPONSES TO PHQ QUESTIONS 1-9: 0
SUM OF ALL RESPONSES TO PHQ9 QUESTIONS 1 & 2: 0
2. FEELING DOWN, DEPRESSED OR HOPELESS: 0
SUM OF ALL RESPONSES TO PHQ QUESTIONS 1-9: 0
SUM OF ALL RESPONSES TO PHQ QUESTIONS 1-9: 0

## 2020-10-26 ASSESSMENT — LIFESTYLE VARIABLES: HOW OFTEN DO YOU HAVE A DRINK CONTAINING ALCOHOL: 0

## 2020-10-26 NOTE — PROGRESS NOTES
Cataract     Diabetes ([facility] [address]     High blood pressure     Kidney problem     Neuropathy     Thyroid disorder        Past Surgical History:   Procedure Laterality Date    APPENDECTOMY      CARPAL TUNNEL RELEASE Left 09/05/2017      Left carpal tunnel release  & Left Thumb, Index Finger and Middle Finger trigger finger release    CARPAL TUNNEL RELEASE Right 09/26/2017    COLONOSCOPY      FINGER TRIGGER RELEASE Right 09/26/2017    Thumb & Index Finger    HYSTERECTOMY      OVARY REMOVAL      TOTAL KNEE ARTHROPLASTY Right 2006    Dr. Silva Vargas       No family history on file. CareTeam (Including outside providers/suppliers regularly involved in providing care):   Patient Care Team:  Robert Whaley MD as PCP - Olga Long MD as PCP - [facility] Empaneled Provider    Wt Readings from Last 3 Encounters:   10/26/20 193 lb (87.5 kg)   10/26/20 193 lb (87.5 kg)   06/18/20 199 lb 6.4 oz (90.4 kg)     Vitals:    10/26/20 1047 10/26/20 1050   BP: (!) 193/80 (!) 162/70   Site:  Left Upper Arm   Pulse: 65    Temp: 97.9 °F (36.6 °C)    SpO2: 99%    Weight: 193 lb (87.5 kg)    Height: 5' 6\" (1.676 m)      Body mass index is 31.15 kg/m². Based upon direct observation of the patient, evaluation of cognition reveals recent and remote memory intact. Patient's complete Health Risk Assessment and screening values have been reviewed and are found in Flowsheets. The following problems were reviewed today and where indicated follow up appointments were made and/or referrals ordered. Positive Risk Factor Screenings with Interventions:     Cognitive:   Words recalled: 0 Words Recalled  Clock Drawing Test (CDT) Score: Normal  Total Score Interpretation: Positive Mini-Cog  Cognitive Impairment Interventions:  · gave memory tips, family is assisting    Health Habits/Nutrition:  Health Habits/Nutrition  Do you exercise for at least 20 minutes 2-3 times per week?: (!) No  Have you lost any weight without trying in the past 3 months?: No  Do you eat fewer than 2 meals per day?: No  Have you seen a dentist within the past year?: (!) No(dentures)  Body mass index: (!) 31.15  Health Habits/Nutrition Interventions:  · Inadequate physical activity:  educational materials provided to promote increased physical activity    Personalized Preventive Plan   Current Health Maintenance Status  Immunization History   Administered Date(s) Administered    Influenza Vaccine, unspecified formulation 10/10/2003, 10/05/2004, 10/04/2005, 09/01/2012, 10/01/2014, 11/20/2017    Influenza Virus Vaccine 11/20/2017    Influenza, High Dose (Fluzone 65 yrs and older) 09/28/2018    Influenza, Krupa Mo, Recombinant, IM PF (Flublok 18 yrs and older) 10/05/2019    Pneumococcal Conjugate 13-valent ([id number]) 02/15/2018    Pneumococcal Polysaccharide ([id number]) 02/26/2019, 10/05/2019    Tdap (Boostrix, Adacel) 11/20/2017    Zoster Live (Zostavax) 11/20/2017    Zoster Recombinant (Shingrix) 10/05/2019, 12/23/2019        Health Maintenance   Topic Date Due    Annual Wellness Visit (AWV)  05/29/2019    Flu vaccine (1) 09/01/2020    Lipid screen  02/05/2021    TSH testing  02/05/2021    Potassium monitoring  02/05/2021    Creatinine monitoring  02/05/2021    DTaP/Tdap/Td vaccine (2 - Td) 11/20/2027    Shingles Vaccine  Completed    Pneumococcal 65+ years Vaccine  Completed    Hepatitis A vaccine  Aged Out    Hib vaccine  Aged Out    Meningococcal (ACWY) vaccine  Aged Out     Recommendations for [employer] Due: see orders and patient instructions/AVS.  . Recommended screening schedule for the next 5-10 years is provided to the patient in written form: see Patient Kendal Ahr was seen today for medicare awv.     Diagnoses and all orders for this visit:    Routine general medical examination at a health care facility

## 2020-10-26 NOTE — PROGRESS NOTES
Chief Complaint   Patient presents with    Diabetes     4 month f/u       HPI: Here for diabetes followup and management of multiple chronic conditions as per the active problems list below, which I reviewed and updated with the patient today. States doing well with no new concerns except if noted below. I have reviewed the chart notes available from myself and other providers. I have addressed all activeproblems listed below, and created or updated the problems list as needed. I have reviewed and updated the problems list in detail with patient. 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       Discussed all labs, other tests, and imaging if available   Lab Results   Component Value Date    WBC 15.5 (H) 07/08/2018    HGB 11.9 (L) 07/08/2018    HCT 35.9 (L) 07/08/2018    MCV 94.1 07/08/2018     07/08/2018    LYMPHOPCT 20.4 07/08/2018    RBC 3.81 (L) 07/08/2018    MCH 31.1 07/08/2018    MCHC 33.1 07/08/2018    RDW 14.1 07/08/2018     Lab Results   Component Value Date     10/26/2020    K 5.4 (H) 10/26/2020     10/26/2020    CO2 26 10/26/2020    BUN 21 (H) 10/26/2020    CREATININE 0.9 10/26/2020    GLUCOSE 177 (H) 10/26/2020    CALCIUM 10.1 10/26/2020    PROT 6.6 10/26/2020    LABALBU 4.1 10/26/2020    BILITOT 0.5 10/26/2020 ALKPHOS 73 10/26/2020    AST 17 10/26/2020    ALT 14 10/26/2020    LABGLOM 59 (A) 10/26/2020    GFRAA >60 10/26/2020    AGRATIO 1.6 10/26/2020    GLOB 2.5 10/26/2020     Lab Results   Component Value Date    TRIG 308 (H) 02/05/2020    TRIG 212 (H) 08/24/2018     Lab Results   Component Value Date    HDL 45 02/05/2020    HDL 44 08/24/2018     Lab Results   Component Value Date    LDLCALC see below 02/05/2020    LDLCALC 48 08/24/2018     No results found for: PSA, PSADIA  Lab Results   Component Value Date    TSHFT4 1.47 02/05/2020    TSH 0.42 08/06/2010     Lab Results   Component Value Date    LABA1C 6.9 10/26/2020     Lab Results   Component Value Date    .1 02/05/2020       I have extensively reviewed and reconciled the medication list, discontinued medications not taking or no longer appropriate, and updated the active meds list    Prior to Visit Medications    Medication Sig Taking?  Authorizing Provider   furosemide (LASIX) 20 MG tablet TAKE 1 TABLET DAILY Yes Carey Jose MD   celecoxib (CELEBREX) 200 MG capsule TAKE 1 CAPSULE DAILY AS NEEDED FOR PAIN Yes Carey Jose MD   lisinopril (PRINIVIL;ZESTRIL) 20 MG tablet TAKE 1 TABLET DAILY Yes Nikolay Bynum MD   levothyroxine (SYNTHROID) 112 MCG tablet TAKE ONE TABLET BY MOUTH DAILY Yes Nikolay Bynum MD   atorvastatin (LIPITOR) 40 MG tablet Take 1 tablet by mouth daily Yes Nikolay Bynum MD   metFORMIN (GLUCOPHAGE) 1000 MG tablet Take 1 tablet by mouth 2 times daily (with meals) Yes Nikolay Bynum MD   atenolol (TENORMIN) 50 MG tablet Take 1 tablet by mouth daily Yes Nikolay Bynum MD   Cranberry-Cholecalciferol 4200-500 MG-UNIT CAPS Take by mouth Yes Historical Provider, MD   aspirin 81 MG tablet Take 81 mg by mouth daily Yes Historical Provider, MD   Multiple Vitamin (MULTI VITAMIN DAILY PO) Take by mouth Yes Historical Provider, MD   acetaminophen (AMINOFEN) 325 MG tablet Take 2 tablets by mouth every 6 hours as needed for Pain  Patient not taking: Reported on 10/26/2020  Berna Murray MD          ROS: 12 systems reviewed, as documented by MA    Past Medical History:   Diagnosis Date    Arthritis     Cataract     [facility])     High blood pressure     Kidney problem     Neuropathy     Thyroid disorder        Past Surgical History:   Procedure Laterality Date    APPENDECTOMY      CARPAL TUNNEL RELEASE Left 09/05/2017      Left carpal tunnel release  & Left Thumb, Index Finger and Middle Finger trigger finger release    CARPAL TUNNEL RELEASE Right 09/26/2017    COLONOSCOPY      FINGER TRIGGER RELEASE Right 09/26/2017    Thumb & Index Finger    HYSTERECTOMY      OVARY REMOVAL      TOTAL KNEE ARTHROPLASTY Right 2006    Dr. Sarah Hagan History     Socioeconomic History    Marital status:       Spouse name: Not on file    Number of children: Not on file    Years of education: Not on file    Highest education level: Not on file   Occupational History    Occupation: NA   Social Needs    Financial resource strain: Not on file    Food insecurity     Worry: Not on file     Inability: Not on file    Transportation needs     Medical: Not on file     Non-medical: Not on file   Tobacco Use    Smoking status: Never Smoker    Smokeless tobacco: Never Used   Substance and Sexual Activity    Alcohol use: Yes     Comment: occas    Drug use: No    Sexual activity: Not on file   Lifestyle    Physical activity     Days per week: Not on file     Minutes per session: Not on file    Stress: Not on file   Relationships    Social connections     Talks on phone: Not on file     Gets together: Not on file     Attends [employer] service: Not on file     Active member of club or organization: Not on file     Attends meetings of clubs or organizations: Not on file     Relationship status: Not on file    Intimate partner violence     Fear of current or ex partner: Not on file     Emotionally abused: Not on file Physically abused: Not on file     Forced sexual activity: Not on file   Other Topics Concern    Not on file   Social History Narrative    Not on file       No family history on file. There are no preventive care reminders to display for this patient. BP (!) 162/70   Pulse 65   Temp 97.9 °F (36.6 °C) (Infrared)   Ht 5' 6\" (1.676 m)   Wt 193 lb (87.5 kg)   SpO2 99%   BMI 31.15 kg/m²   Body mass index is 31.15 kg/m². Physical Exam  Constitutional:       General: She is not in acute distress. HENT:      Head: Normocephalic and atraumatic. Nose: Nose normal.      Mouth/Throat:      Pharynx: No oropharyngeal exudate. Eyes:      General: No scleral icterus. Right eye: No discharge. Left eye: No discharge. Conjunctiva/sclera: Conjunctivae normal.      Pupils: Pupils are equal, round, and reactive to light. Neck:      Musculoskeletal: Neck supple. Thyroid: No thyromegaly. Vascular: No JVD. Trachea: No tracheal deviation. Cardiovascular:      Rate and Rhythm: Normal rate and regular rhythm. Heart sounds: Normal heart sounds. No murmur. No friction rub. No gallop. Pulmonary:      Effort: Pulmonary effort is normal. No respiratory distress. Breath sounds: Normal breath sounds. No wheezing or rales. Chest:      Chest wall: No tenderness. Abdominal:      General: Bowel sounds are normal. There is no distension. Palpations: Abdomen is soft. There is no mass. Tenderness: There is no abdominal tenderness. There is no guarding or rebound. Musculoskeletal: Normal range of motion. General: No tenderness. Lymphadenopathy:      Cervical: No cervical adenopathy. Skin:     General: Skin is warm and dry. Coloration: Skin is not pale. Findings: No erythema or rash. Neurological:      Mental Status: She is alert and oriented to person, place, and time. Cranial Nerves: No cranial nerve deficit.       Motor: No abnormal muscle tone. Coordination: Coordination normal.      Deep Tendon Reflexes: Reflexes are normal and symmetric. Reflexes normal.   Psychiatric:         Judgment: Judgment normal.         ASSESSMENT AND PLANS:      Except as noted below, all chronic problems have been reviewed and are stable to continue medications or other therapy as previously documented in the patient's chart, with changes per orders or documentation below:        Assessment and Plan: Patient received counseling and, if relevant,printed instructions for all symptoms listed in CC and HPI, as well as for all diagnoses brought onto today's visit note below. Typical counseling includes, but is not limited to, non pharmacologic measures to manage listed symptoms and conditions; appropriate use, risks and benefits for all prescribed medications; potential interactions between medications both prescribed and OTC; diet; exercise; healthy behaviors; and goalsetting to improve health. Pt.or responsible party was involved in shared decision making and had opportunity to have all questions answered. 1. Type 2 diabetes mellitus without complication, without long-term current use of insulin (HCC)  - metFORMIN (GLUCOPHAGE) 1000 MG tablet; Take 1 tablet by mouth 2 times daily (with meals)  Dispense: 180 tablet; Refill: 0  - POCT glycosylated hemoglobin (Hb A1C)  - COMPREHENSIVE METABOLIC PANEL; Future  - lisinopril (PRINIVIL;ZESTRIL) 40 MG tablet; Take 1 tablet by mouth daily  Dispense: 90 tablet; Refill: 3    2. Postablative hypothyroidism  - levothyroxine (SYNTHROID) 112 MCG tablet; TAKE ONE TABLET BY MOUTH DAILY  Dispense: 90 tablet; Refill: 3    3. Pedal edema  - COMPREHENSIVE METABOLIC PANEL; Future  - furosemide (LASIX) 20 MG tablet; Take 1 tablet by mouth daily Take noon dose if increased swelling. Dispense: 145 tablet; Refill: 1    4. Lymphedema  - COMPREHENSIVE METABOLIC PANEL; Future  - furosemide (LASIX) 20 MG tablet;  Take 1 tablet by mouth

## 2020-10-26 NOTE — PATIENT INSTRUCTIONS
Personalized Preventive Plan for Edison Castillo - 10/26/2020  Medicare offers a range of preventive health benefits. Some of the tests and screenings are paid in full while other may be subject to a deductible, co-insurance, and/or copay. Some of these benefits include a comprehensive review of your medical history including lifestyle, illnesses that may run in your family, and various assessments and screenings as appropriate. After reviewing your medical record and screening and assessments performed today your provider may have ordered immunizations, labs, imaging, and/or referrals for you. A list of these orders (if applicable) as well as your Preventive Care list are included within your After Visit Summary for your review. Other Preventive Recommendations:    · A preventive eye exam performed by an eye specialist is recommended every 1-2 years to screen for glaucoma; cataracts, macular degeneration, and other eye disorders. · A preventive dental visit is recommended every 6 months. · Try to get at least 150 minutes of exercise per week or 10,000 steps per day on a pedometer . · Order or download the FREE \"Exercise & Physical Activity: Your Everyday Guide\" from [employer]. Call 0-882.908.9721 or search The [employer] online. · You need 7428-0531 mg of calcium and 1279-3603 IU of vitamin D per day. It is possible to meet your calcium requirement with diet alone, but a vitamin D supplement is usually necessary to meet this goal.  · When exposed to the sun, use a sunscreen that protects against both UVA and UVB radiation with an SPF of 30 or greater. Reapply every 2 to 3 hours or after sweating, drying off with a towel, or swimming. · Always wear a seat belt when traveling in a car. Always wear a helmet when riding a bicycle or motorcycle. Personalized Preventive Plan for Edison Castillo - 10/26/2020  Medicare offers a range of preventive health benefits. Some of the tests and screenings are paid in full while other may be subject to a deductible, co-insurance, and/or copay. Some of these benefits include a comprehensive review of your medical history including lifestyle, illnesses that may run in your family, and various assessments and screenings as appropriate. After reviewing your medical record and screening and assessments performed today your provider may have ordered immunizations, labs, imaging, and/or referrals for you. A list of these orders (if applicable) as well as your Preventive Care list are included within your After Visit Summary for your review. Other Preventive Recommendations:    A preventive eye exam performed by an eye specialist is recommended every 1-2 years to screen for glaucoma; cataracts, macular degeneration, and other eye disorders. A preventive dental visit is recommended every 6 months. Try to get at least 150 minutes of exercise per week or 10,000 steps per day on a pedometer . Order or download the FREE \"Exercise & Physical Activity: Your Everyday Guide\" from The [employer]. Call 0-920.622.2942 or search [employer] online. You need 3438-0181 mg of calcium and 0649-8609 IU of vitamin D per day. It is possible to meet your calcium requirement with diet alone, but a vitamin D supplement is usually necessary to meet this goal.  When exposed to the sun, use a sunscreen that protects against both UVA and UVB radiation with an SPF of 30 or greater. Reapply every 2 to 3 hours or after sweating, drying off with a towel, or swimming. Always wear a seat belt when traveling in a car. Always wear a helmet when riding a bicycle or motorcycle. Keep Your Memory [place]       Many factors can affect your ability to remembera hectic lifestyle, aging, stress, chronic disease, and certain medicines. But, there are steps you can take to sharpen your mind and help preserve your memory.    Challenge Your Brain   Regularly challenging your mind may help keeps it in top shape. Good mental exercises include:   Crossword puzzlesUse a dictionary if you need it; you will learn more that way. Brainteasers Try some! Crafts, such as wood working and sewing   Hobbies, such as gardening and building model airplanes   SocializingVisit old friends or join groups to meet new ones. Reading   Learning a new language   Taking a class, whether it be art history or ian chi   TravelingExperience the food, history, and culture of your destination   Learning to use a computer   Going to museums, the theater, or thought-provoking movies   Changing things in your daily life, such as reversing your pattern in the grocery store or brushing your teeth using your nondominant hand   Use Memory Aids   There is no need to remember every detail on your own. These memory aids can help:   Calendars and day planners   Electronic organizers to store all sorts of helpful informationThese devices can \"beep\" to remind you of appointments. A book of days to record birthdays, anniversaries, and other occasions that occur on the same date every year   Detailed \"to-do\" lists and strategically placed sticky notes   Quick \"study\" sessionsBefore a gathering, review who will be there so their names will be fresh in your mind. Establish routinesFor example, keep your keys, wallet, and umbrella in the same place all the time or take medicine with your 8:00 AM glass of juice   Live a Healthy Life   Many actions that will keep your body strong will do the same for your mind. For example:   Talk to Your Doctor About Herbs and Supplements    Malnutrition and vitamin deficiencies can impair your mental function. For example, vitamin B12 deficiency can cause a range of symptoms, including confusion. But, what if your nutritional needs are being met? Can herbs and supplements still offer a benefit?  Researchers have investigated a range of natural remedies, such as ginkgo , ginseng , and the supplement phosphatidylserine (PS). So far, though, the evidence is inconsistent as to whether these products can improve memory or thinking. If you are interested in taking herbs and supplements, talk to your doctor first because they may interact with other medicines that you are taking. Exercise Regularly    Among the many benefits of regular exercise are increased blood flow to the brain and decreased risk of certain diseases that can interfere with memory function. One study found that even moderate exercise has a beneficial effect. Examples of \"moderate\" exercise include:   Playing 18 holes of golf once a week, without a cart   Playing tennis twice a week   Walking one mile per day   Manage Stress    It can be tough to remember what is important when your mind is cluttered. Make time for relaxation. Choose activities that calm you down, and make it routine. Manage Chronic Conditions    Side effects of high blood pressure , diabetes, and heart disease can interfere with mental function. Many of the lifestyle steps discussed here can help manage these conditions. Strive to eat a healthy diet, exercise regularly, get stress under control, and follow your doctor's advice for your condition. Minimize Medications    Talk to your doctor about the medicines that you take. Some may be unnecessary. Also, healthy lifestyle habits may lower the need for certain drugs.      Last Reviewed: April 2010 Valentina Gomez MD   Updated: 4/13/2010

## 2020-10-26 NOTE — PROGRESS NOTES
Review of Systems   Constitutional: Negative for chills, fatigue, fever and unexpected weight change. HENT: Negative for congestion, ear discharge, ear pain, hearing loss, nosebleeds and sore throat. Eyes: Negative for photophobia, pain, discharge, redness and visual disturbance. Respiratory: Negative for cough, shortness of breath, wheezing and stridor. Cardiovascular: Negative for chest pain and palpitations. Gastrointestinal: Positive for diarrhea. Negative for abdominal pain, blood in stool, constipation, nausea and vomiting. Genitourinary: Negative for dysuria, flank pain, frequency, hematuria and urgency. Musculoskeletal: Positive for myalgias (shoulder). Negative for back pain and neck pain. Skin: Negative for rash. Neurological: Negative for dizziness, tremors, seizures, syncope, weakness and headaches. Hematological: Does not bruise/bleed easily. Psychiatric/Behavioral: Negative for hallucinations. The patient is not nervous/anxious.

## 2020-10-26 NOTE — PROGRESS NOTES
Medicare Annual Wellness Visit  Name: Anil Sheehan Date: 10/26/2020   MRN: [id number] Sex: Female   Age: 80 y.o. Ethnicity: Non-/Non    : 1929 Race: Eloina Max is here for Medicare AWV (AWV)    Screenings for behavioral, psychosocial and functional/safety risks, and cognitive dysfunction are all negative except as indicated below. These results, as well as other patient data from the [address] form, are documented in Flowsheets linked to this Encounter. Allergies   Allergen Reactions    Gabapentin      LOWER LEG SWELLING    Pravachol [Pravastatin Sodium] Rash    Tegaderm Ag Mesh 2\"X2\" [Wound Dressings] Rash       Prior to Visit Medications    Medication Sig Taking? Authorizing Provider   levothyroxine (SYNTHROID) 112 MCG tablet TAKE ONE TABLET BY MOUTH DAILY  Jerald Hinds MD   metFORMIN (GLUCOPHAGE) 1000 MG tablet Take 1 tablet by mouth 2 times daily (with meals)  Jerald Hinds MD   furosemide (LASIX) 20 MG tablet Take 1 tablet by mouth daily Take noon dose if increased swelling.   Jerald Hinds MD   celecoxib (CELEBREX) 200 MG capsule TAKE 1 CAPSULE DAILY AS NEEDED FOR PAIN  Deedee Kuhn MD   lisinopril (PRINIVIL;ZESTRIL) 20 MG tablet TAKE 1 TABLET DAILY  Jerald Hinds MD   atorvastatin (LIPITOR) 40 MG tablet Take 1 tablet by mouth daily  Jerald Hinds MD   atenolol (TENORMIN) 50 MG tablet Take 1 tablet by mouth daily  Jerald Hinds MD   acetaminophen (AMINOFEN) 325 MG tablet Take 2 tablets by mouth every 6 hours as needed for Pain  Patient not taking: Reported on 10/26/2020  Kathy Jordan MD   Cranberry-Cholecalciferol 4200-500 MG-UNIT CAPS Take by mouth  Historical Provider, MD   aspirin 81 MG tablet Take 81 mg by mouth daily  Historical Provider, MD   Multiple Vitamin (MULTI VITAMIN DAILY PO) Take by mouth  Historical Provider, MD       Past Medical History:   Diagnosis Date    Arthritis     Cataract     Diabetes ([facility] [address]     High blood pressure     Kidney problem     Neuropathy     Thyroid disorder        Past Surgical History:   Procedure Laterality Date    APPENDECTOMY      CARPAL TUNNEL RELEASE Left 09/05/2017      Left carpal tunnel release  & Left Thumb, Index Finger and Middle Finger trigger finger release    CARPAL TUNNEL RELEASE Right 09/26/2017    COLONOSCOPY      FINGER TRIGGER RELEASE Right 09/26/2017    Thumb & Index Finger    HYSTERECTOMY      OVARY REMOVAL      TOTAL KNEE ARTHROPLASTY Right 2006    Dr. Kamaljit Lee       No family history on file. CareTeam (Including outside providers/suppliers regularly involved in providing care):   Patient Care Team:  Radha Prasad MD as PCP - Sukh Wilkerson MD as PCP - [facility] Empaneled Provider    Wt Readings from Last 3 Encounters:   10/26/20 193 lb (87.5 kg)   10/26/20 193 lb (87.5 kg)   06/18/20 199 lb 6.4 oz (90.4 kg)     Vitals:    10/26/20 1047 10/26/20 1050   BP: (!) 193/80 (!) 162/70   Site:  Left Upper Arm   Pulse: 65    Temp: 97.9 °F (36.6 °C)    SpO2: 99%    Weight: 193 lb (87.5 kg)    Height: 5' 6\" (1.676 m)      Body mass index is 31.15 kg/m². Based upon direct observation of the patient, evaluation of cognition reveals {MEMORY:46804}. Patient's complete Health Risk Assessment and screening values have been reviewed and are found in Flowsheets. The following problems were reviewed today and where indicated follow up appointments were made and/or referrals ordered. Positive Risk Factor Screenings with Interventions:     Cognitive:   Words recalled: 0 Words Recalled  Clock Drawing Test (CDT) Score: Normal  Total Score Interpretation: Positive Mini-Cog  Cognitive Impairment Interventions:  · {Medicare AWV Cognitive Impairment Interventions:611399474}    Health Habits/Nutrition:  Health Habits/Nutrition  Do you exercise for at least 20 minutes 2-3 times per week?: (!) No  Have you lost any weight without trying in the past 3 months?: No  Do you eat fewer than 2 meals per day?: No  Have you seen a dentist within the past year?: (!) No(dentures)  Body mass index: (!) 31.15  Health Habits/Nutrition Interventions:  · {Medicare AW Health Habits/Nutrition Interventions:582543687}    Personalized Preventive Plan   Current Health Maintenance Status  Immunization History   Administered Date(s) Administered    Influenza Vaccine, unspecified formulation 10/10/2003, 10/05/2004, 10/04/2005, 09/01/2012, 10/01/2014, 11/20/2017    Influenza Virus Vaccine 11/20/2017    Influenza, High Dose (Fluzone 65 yrs and older) 09/28/2018    Influenza, Dulcy Cocks, Recombinant, IM PF (Flublok 18 yrs and older) 10/05/2019    Pneumococcal Conjugate 13-valent ([id number]) 02/15/2018    Pneumococcal Polysaccharide ([id number]) 02/26/2019, 10/05/2019    Tdap (Boostrix, Adacel) 11/20/2017    Zoster Live (Zostavax) 11/20/2017    Zoster Recombinant (Shingrix) 10/05/2019, 12/23/2019        Health Maintenance   Topic Date Due    Annual Wellness Visit (AWV)  05/29/2019    Flu vaccine (1) 09/01/2020    Lipid screen  02/05/2021    TSH testing  02/05/2021    Potassium monitoring  02/05/2021    Creatinine monitoring  02/05/2021    DTaP/Tdap/Td vaccine (2 - Td) 11/20/2027    Shingles Vaccine  Completed    Pneumococcal 65+ years Vaccine  Completed    Hepatitis A vaccine  Aged Out    Hib vaccine  Aged Out    Meningococcal (ACWY) vaccine  Aged Out     Recommendations for [employer] Due: see orders and patient instructions/AVS.  . Recommended screening schedule for the next 5-10 years is provided to the patient in written form: see Patient Instructions/AVS.    Holden Fragoso LPN, [birth year]/03/[birth year], performed the documented evaluation under the direct supervision of the attending physician. This encounter was performed under Remberto major MDs, direct supervision, 10/26/2020.

## 2020-11-04 ENCOUNTER — TELEPHONE (OUTPATIENT)
Dept: INTERNAL MEDICINE CLINIC | Age: 85
End: 2020-11-04

## 2020-11-04 NOTE — TELEPHONE ENCOUNTER
----- Message from Baylee Raymond MD sent at 10/29/2020  4:48 PM EDT -----  Labs were good except high glucose and potassium. Avoid  getting too much dietary potassium and make sure to follow your diabetes diet.

## 2020-11-23 ENCOUNTER — VIRTUAL VISIT (OUTPATIENT)
Dept: INTERNAL MEDICINE CLINIC | Age: 85
End: 2020-11-23
Payer: MEDICARE

## 2020-11-23 PROCEDURE — 99213 OFFICE O/P EST LOW 20 MIN: CPT | Performed by: INTERNAL MEDICINE

## 2020-11-23 RX ORDER — AMLODIPINE BESYLATE 5 MG/1
5 TABLET ORAL DAILY
Qty: 30 TABLET | Refills: 5 | Status: SHIPPED | OUTPATIENT
Start: 2020-11-23 | End: 2021-01-18

## 2020-11-23 NOTE — PROGRESS NOTES
necessary. Patient identification was verified at the start of the visit: Yes    Total time spent for this encounter: Not billed by time    Services were provided through a video synchronous discussion virtually to substitute for in-person clinic visit. Patient and provider were located at their individual homes. --Wang Yarbrough MD on 11/23/2020 at 2:28 PM    An electronic signature was used to authenticate this note.

## 2020-12-14 NOTE — TELEPHONE ENCOUNTER
Requested Prescriptions     Pending Prescriptions Disp Refills    celecoxib (CELEBREX) 200 MG capsule 90 capsule 0     Sig: TAKE 1 CAPSULE DAILY AS NEEDED FOR PAIN     Patient requesting a medication refill.     Next office visit: 12/21/2020    Last regular office visit: 11/23/2020

## 2020-12-15 RX ORDER — CELECOXIB 200 MG/1
CAPSULE ORAL
Qty: 90 CAPSULE | Refills: 1 | Status: SHIPPED | OUTPATIENT
Start: 2020-12-15 | End: 2021-01-18

## 2020-12-21 ENCOUNTER — VIRTUAL VISIT (OUTPATIENT)
Dept: INTERNAL MEDICINE CLINIC | Age: 85
End: 2020-12-21
Payer: MEDICARE

## 2020-12-21 PROCEDURE — 99213 OFFICE O/P EST LOW 20 MIN: CPT | Performed by: INTERNAL MEDICINE

## 2020-12-21 RX ORDER — HYDRALAZINE HYDROCHLORIDE 10 MG/1
10 TABLET, FILM COATED ORAL 3 TIMES DAILY
Qty: 90 TABLET | Refills: 1 | Status: SHIPPED | OUTPATIENT
Start: 2020-12-21 | End: 2021-01-18

## 2020-12-21 NOTE — PROGRESS NOTES
Olga Cartwright is a 80 y.o. female being evaluated by a Virtual Visit (video visit) encounter to address concerns as mentioned above. A caregiver was present when appropriate. Due to this being a TeleHealth encounter (During BDTPE-93 public health emergency), evaluation of the following organ systems was limited: Vitals/Constitutional/EENT/Resp/CV/GI//MS/Neuro/Skin/Heme-Lymph-Imm. Pursuant to the emergency declaration under the [address] [place] [address], [address] [place] [address] and the [employer] and [employer] Act, this Virtual Visit was conducted with patient's (and/or legal guardian's) consent, to reduce the patient's risk of exposure to COVID-19 and provide necessary medical care. The patient (and/or legal guardian) has also been advised to contact this office for worsening conditions or problems, and seek emergency medical treatment and/or call 911 if deemed necessary. Patient identification was verified at the start of the visit: Yes    Total time spent for this encounter: Not billed by time    Services were provided through a video synchronous discussion virtually to substitute for in-person clinic visit. Patient and provider were located at their individual homes. --Mana Colmenares MD on 12/21/2020 at 3:03 PM    An electronic signature was used to authenticate this note.

## 2021-01-04 ENCOUNTER — OFFICE VISIT (OUTPATIENT)
Dept: ORTHOPEDIC SURGERY | Age: 86
End: 2021-01-04
Payer: MEDICARE

## 2021-01-04 VITALS — WEIGHT: 193 LBS | BODY MASS INDEX: 31.02 KG/M2 | TEMPERATURE: 97.2 F | HEIGHT: 66 IN

## 2021-01-04 DIAGNOSIS — M12.811 ROTATOR CUFF ARTHROPATHY OF RIGHT SHOULDER: ICD-10-CM

## 2021-01-04 DIAGNOSIS — Z96.651 HISTORY OF TOTAL RIGHT KNEE REPLACEMENT: Primary | ICD-10-CM

## 2021-01-04 PROCEDURE — 1123F ACP DISCUSS/DSCN MKR DOCD: CPT | Performed by: PHYSICIAN ASSISTANT

## 2021-01-04 PROCEDURE — 1036F TOBACCO NON-USER: CPT | Performed by: PHYSICIAN ASSISTANT

## 2021-01-04 PROCEDURE — G8484 FLU IMMUNIZE NO ADMIN: HCPCS | Performed by: PHYSICIAN ASSISTANT

## 2021-01-04 PROCEDURE — 4040F PNEUMOC VAC/ADMIN/RCVD: CPT | Performed by: PHYSICIAN ASSISTANT

## 2021-01-04 PROCEDURE — 20610 DRAIN/INJ JOINT/BURSA W/O US: CPT | Performed by: PHYSICIAN ASSISTANT

## 2021-01-04 PROCEDURE — 1090F PRES/ABSN URINE INCON ASSESS: CPT | Performed by: PHYSICIAN ASSISTANT

## 2021-01-04 PROCEDURE — 99213 OFFICE O/P EST LOW 20 MIN: CPT | Performed by: PHYSICIAN ASSISTANT

## 2021-01-04 PROCEDURE — G8417 CALC BMI ABV UP PARAM F/U: HCPCS | Performed by: PHYSICIAN ASSISTANT

## 2021-01-04 PROCEDURE — G8427 DOCREV CUR MEDS BY ELIG CLIN: HCPCS | Performed by: PHYSICIAN ASSISTANT

## 2021-01-05 PROBLEM — M12.811 ROTATOR CUFF ARTHROPATHY OF RIGHT SHOULDER: Status: ACTIVE | Noted: 2021-01-05

## 2021-01-05 NOTE — PROGRESS NOTES
Subjective:      Patient ID: Lizet Gayle is a 80 y.o. female. Chief Complaint   Patient presents with    Shoulder Pain     right shoulder        HPI:   She is here for follow up her right knee arthroplasty. Date of procedure 2006. [facility]. No issues or complaints. Complaint today, right shoulder pain. Pain Scale 8/10 VAS. Location of pain deltoid region. Pain is worse with activity. Pain improves with rest.   Previous treatments have included Tylenol with minimal improvement    Review Of Systems:   A 14 point review of systems and history form completed by the patient has been reviewed. This form is scanned in the media tab of the patient's chart under today's date. Past Medical History:   Diagnosis Date    Arthritis     Cataract     Diabetes ([address]     High blood pressure     Kidney problem     Neuropathy     Thyroid disorder        No family history on file.     Past Surgical History:   Procedure Laterality Date    APPENDECTOMY      CARPAL TUNNEL RELEASE Left 09/05/2017      Left carpal tunnel release  & Left Thumb, Index Finger and Middle Finger trigger finger release    CARPAL TUNNEL RELEASE Right 09/26/2017    COLONOSCOPY      FINGER TRIGGER RELEASE Right 09/26/2017    Thumb & Index Finger    HYSTERECTOMY      OVARY REMOVAL      TOTAL KNEE ARTHROPLASTY Right 2006    Dr. Lou Aguirre Occupation: NA   Tobacco Use    Smoking status: Never Smoker    Smokeless tobacco: Never Used   Substance and Sexual Activity    Alcohol use: Yes     Comment: occas    Drug use: No    Sexual activity: Not on file       Current Outpatient Medications   Medication Sig Dispense Refill    hydrALAZINE (APRESOLINE) 10 MG tablet Take 1 tablet by mouth 3 times daily 90 tablet 1    celecoxib (CELEBREX) 200 MG capsule TAKE 1 CAPSULE DAILY AS NEEDED FOR PAIN 90 capsule 1    amLODIPine (NORVASC) 5 MG tablet Take 1 tablet by mouth daily 30 tablet 5    levothyroxine (SYNTHROID) 112 MCG tablet TAKE ONE TABLET BY MOUTH DAILY 90 tablet 3    metFORMIN (GLUCOPHAGE) 1000 MG tablet Take 1 tablet by mouth 2 times daily (with meals) 180 tablet 0    furosemide (LASIX) 20 MG tablet Take 1 tablet by mouth daily Take noon dose if increased swelling. 145 tablet 1    lisinopril (PRINIVIL;ZESTRIL) 40 MG tablet Take 1 tablet by mouth daily 90 tablet 3    atorvastatin (LIPITOR) 40 MG tablet Take 1 tablet by mouth daily 90 tablet 3    atenolol (TENORMIN) 50 MG tablet Take 1 tablet by mouth daily 90 tablet 3    acetaminophen (AMINOFEN) 325 MG tablet Take 2 tablets by mouth every 6 hours as needed for Pain 120 tablet 0    Cranberry-Cholecalciferol 4200-500 MG-UNIT CAPS Take by mouth      aspirin 81 MG tablet Take 81 mg by mouth daily      Multiple Vitamin (MULTI VITAMIN DAILY PO) Take by mouth       No current facility-administered medications for this visit. Objective:     She is alert, oriented x 3, pleasant, well nourished, developed and in no   acute distress. Temp 97.2 °F (36.2 °C) (Infrared)   Ht 5' 6\" (1.676 m)   Wt 193 lb (87.5 kg)   BMI 31.15 kg/m²      Examination of the right knee: Inspection of the skin demonstrates a well-healed midline incision. Inspection of the soft tissues without significant swelling or erythema. The overall alignment of the knee is neutral.  There is minimal intra-articular effusion. AROM     Extension 0     Flexion  120   There no pain associated with ROM testing. Pes anserine bursa non-tender to palpation. Patellar tendon non-tender to palpation. Quadriceps tendon non-tender to palpation. Collateral ligaments non-tender to palpation. Popliteal fossa non-tender to palpation. Retro patellar crepitus is not present. There is no instability with varus/valgus stress testing in full extension or 90 degrees of flexion. There is no instability with anterior drawer testing. Extensor Mechanism is intact. Examination of the right shoulder shows: There is no deformity. There is no erythema. There is no  soft tissue swelling. Deltoid region is  tender to palpation. AC Joint is not tender to palpation. Clavicle is not tender to palpation. Bicipital Groove is not  tender to palpation. Pectoralis  is not tender to palpation. Scapula/ trapezius is not tender to palpation. There is moderate weakness with supraspinatus testing. There is moderate pain with supraspinatus testing. Yergason Test negative. Drop Arm Test negative. Apprehension Test negative. Cross Arm Test negative. Shoulder AROM-         IR to ASIS ER 15    Examination of the upper extremities are intact with sensation to light touch. Motor testing  5/5 in all major motor groups including hand intrinsics. Radial, Median and Ulnar nerves are intact. He's Sign absent. Examination of the upper extremities shows intact perfusion to all extremities. No cyanosis. Digits are warm to touch. Capillary refill is less than 2 seconds. There is no edema noted. Examination of the skin over the upper extremities:  Reveals the skin to be intact without lacerations or abrasions. There are no significant erythema, rashes or skin lesions. X Rays: performed in the office today:   AP Standing, Lateral and Sunrise Right Knee: There is a right cemented total knee arthroplasty present. The alignment is satisfactory. There are no signs of failure or loosening. AP, Y and Axillary views of the right shoulder. Early glenohumeral arthritis with moderate superior migration of the humeral head within the glenoid consistent with early right shoulder rotator cuff arthropathy. Diagnosis:       ICD-10-CM    1.  History of total right knee replacement  Z96.651 XR SHOULDER RIGHT (MIN 2 VIEWS)     XR KNEE RIGHT (3 VIEWS)   2. Rotator cuff arthropathy of right shoulder  M12.811 VT ARTHROCENTESIS ASPIR&/INJ MAJOR JT/BURSA W/O US     CT TRIAMCINOLONE ACETONIDE INJ        Assessment and Plan:       Assessment:  She has a clinically and radiographically stable right knee arthroplasty which is now 4 years postop. She has right shoulder pain due to right shoulder rotator cuff arthropathy. 24 minutes was the total time spent on today's visit  including reviewing test results, obtaining or reviewing history, physical exam, time spent on documentation or ordering prescriptions, tests and procedures after the visit. Plan:  Medications-Tylenol    PT- A home exercise program was instructed today including ROM exercises and strengthening exercises. The patient verbalized understanding of these exercises as well as the importance of the exercise program to promote return of normal function. If pain intensifies or other problems arise you are to notify the office. Further Imaging-none indicated at this time    Procedures-right shoulder subacromial corticosteroid injection    Cortisone  Injection  PROCEDURE NOTE:  Pre op Diagnosis: Shoulder pain  Post op Diagnosis: Same  With the patient's permission, Tyrell Leal right shoulder was prepped  in standard sterile fashion with  Alcohol and 2 cc of 0.25% Marcaine and 1 cc of Kenalog 40 mg was injected into the right lateral subacromial space  without difficulty. She tolerated this well without difficulty. A band-aid was applied. She   was advised to ice the shoulder for 15-20 minutes to relieve any injection site related pain. Discussed use of corticosteroid injections for pain management. These can be safely done about every 3 to 4 months. Follow up-    Call or return to clinic if these symptoms worsen or fail to improve as anticipated.

## 2021-01-06 DIAGNOSIS — E11.9 TYPE 2 DIABETES MELLITUS WITHOUT COMPLICATION, WITHOUT LONG-TERM CURRENT USE OF INSULIN (HCC): ICD-10-CM

## 2021-01-06 NOTE — TELEPHONE ENCOUNTER
Requested Prescriptions     Pending Prescriptions Disp Refills    metFORMIN (GLUCOPHAGE) 1000 MG tablet [Pharmacy Med Name: METFORMIN HCL TABS 1000MG] 180 tablet 3     Sig: TAKE 1 TABLET TWICE A DAY WITH MEALS   Patient requesting a medication refill.   Pharmacy: [facility]  Next office visit: 1/18/2021  Last regular office visit: 12/21/2020

## 2021-01-18 ENCOUNTER — VIRTUAL VISIT (OUTPATIENT)
Dept: INTERNAL MEDICINE CLINIC | Age: 86
End: 2021-01-18
Payer: MEDICARE

## 2021-01-18 DIAGNOSIS — R60.0 PEDAL EDEMA: ICD-10-CM

## 2021-01-18 DIAGNOSIS — I10 ESSENTIAL HYPERTENSION: Primary | ICD-10-CM

## 2021-01-18 DIAGNOSIS — I89.0 LYMPHEDEMA: ICD-10-CM

## 2021-01-18 DIAGNOSIS — R00.1 BRADYCARDIA: ICD-10-CM

## 2021-01-18 PROCEDURE — 1090F PRES/ABSN URINE INCON ASSESS: CPT | Performed by: INTERNAL MEDICINE

## 2021-01-18 PROCEDURE — G8427 DOCREV CUR MEDS BY ELIG CLIN: HCPCS | Performed by: INTERNAL MEDICINE

## 2021-01-18 PROCEDURE — 4040F PNEUMOC VAC/ADMIN/RCVD: CPT | Performed by: INTERNAL MEDICINE

## 2021-01-18 PROCEDURE — 1123F ACP DISCUSS/DSCN MKR DOCD: CPT | Performed by: INTERNAL MEDICINE

## 2021-01-18 PROCEDURE — 99214 OFFICE O/P EST MOD 30 MIN: CPT | Performed by: INTERNAL MEDICINE

## 2021-01-18 RX ORDER — ATENOLOL 50 MG/1
50 TABLET ORAL DAILY
Qty: 90 TABLET | Refills: 3 | Status: SHIPPED | OUTPATIENT
Start: 2021-01-18 | End: 2021-01-19

## 2021-01-18 RX ORDER — FUROSEMIDE 40 MG/1
40 TABLET ORAL DAILY
Qty: 60 TABLET | Refills: 5 | Status: ON HOLD | OUTPATIENT
Start: 2021-01-18 | End: 2021-05-11 | Stop reason: HOSPADM

## 2021-01-18 RX ORDER — DOXAZOSIN MESYLATE 4 MG/1
4 TABLET ORAL DAILY
Qty: 30 TABLET | Refills: 3 | Status: SHIPPED | OUTPATIENT
Start: 2021-01-18 | End: 2021-02-23

## 2021-01-18 ASSESSMENT — PATIENT HEALTH QUESTIONNAIRE - PHQ9
SUM OF ALL RESPONSES TO PHQ QUESTIONS 1-9: 0
SUM OF ALL RESPONSES TO PHQ QUESTIONS 1-9: 0

## 2021-01-18 NOTE — PROGRESS NOTES
Chief Complaint   Patient presents with    1 Month Follow-Up     BP check       HPI: Virtual visit via do[employer]. me during covid-19 pandemic for htn followup. Added hydralazine with lisinopril, amlodipine, lasix at last visit. Also recommended compression for ongoing leg edema. Since starting the hydralazine, no side effects, but no improvement either. Leg/foot edema remains severe. Takes extra dose of lasix prn, not every day, even now with severe edema. She didn't tolerate the stronger compression so is not wearing any. Blood pressures remain > 678M systolic. Medications reviewed and reconciled with what patient reports to be taking. There were no vitals taken for this visit. See home reported vitals. Appears in usual state of health. Bilateral feet and ankles puffy      ASSESSMENT/PLAN: Pt received counseling and, if relevant, printed instructions for all symptoms listed in CC and HPI, as well as for all diagnoses listed below. 1. Essential hypertension--inadequately controlled. She needs to stop celebrex (and use only tylenol for pain), and will also stop amlodipine in view of severe edema. Try increasing lasix and adding doxazosin as next step. Hydralazine stopped since no impact. - atenolol (TENORMIN) 50 MG tablet; Take 1 tablet by mouth daily  Dispense: 90 tablet; Refill: 3    2. Lymphedema  - furosemide (LASIX) 40 MG tablet; Take 1 tablet by mouth daily Take second dose if increased swelling. Dispense: 60 tablet; Refill: 5    3. Pedal edema  - furosemide (LASIX) 40 MG tablet; Take 1 tablet by mouth daily Take second dose if increased swelling. Dispense: 60 tablet; Refill: 5    4. Bradycardia--mild but unable to push atenolol dosing due to this.        Problem List Items Addressed This Visit     Essential hypertension - Primary    Relevant Medications    atenolol (TENORMIN) 50 MG tablet    Pedal edema    Relevant Medications    furosemide (LASIX) 40 MG tablet    Lymphedema    Relevant Medications    furosemide (LASIX) 40 MG tablet    Bradycardia            Return in about 1 month (around 2/18/2021) for f/u mult med extended with labs. Conception Dus is a 80 y.o. female being evaluated by a Virtual Visit (video visit) encounter to address concerns as mentioned above. A caregiver was present when appropriate. Due to this being a TeleHealth encounter (During EMPHO-29 public health emergency), evaluation of the following organ systems was limited: Vitals/Constitutional/EENT/Resp/CV/GI//MS/Neuro/Skin/Heme-Lymph-Imm. Pursuant to the emergency declaration under the [facility], [address] [place] [address] authority and the [employer] and [employer] Act, this Virtual Visit was conducted with patient's (and/or legal guardian's) consent, to reduce the patient's risk of exposure to COVID-19 and provide necessary medical care. The patient (and/or legal guardian) has also been advised to contact this office for worsening conditions or problems, and seek emergency medical treatment and/or call 911 if deemed necessary. Patient identification was verified at the start of the visit: Yes    Total time spent for this encounter: Not billed by time    Services were provided through a video synchronous discussion virtually to substitute for in-person clinic visit. Patient and provider were located at their individual homes. --Cherrie Marques MD on 1/18/2021 at 3:10 PM    An electronic signature was used to authenticate this note.       MDM moderate

## 2021-01-19 ENCOUNTER — INITIAL CONSULT (OUTPATIENT)
Dept: SURGERY | Age: 86
End: 2021-01-19
Payer: MEDICARE

## 2021-01-19 VITALS — DIASTOLIC BLOOD PRESSURE: 69 MMHG | HEIGHT: 66 IN | BODY MASS INDEX: 31.15 KG/M2 | SYSTOLIC BLOOD PRESSURE: 165 MMHG

## 2021-01-19 DIAGNOSIS — M79.89 LEG SWELLING: Primary | ICD-10-CM

## 2021-01-19 PROCEDURE — G8484 FLU IMMUNIZE NO ADMIN: HCPCS | Performed by: SURGERY

## 2021-01-19 PROCEDURE — 1090F PRES/ABSN URINE INCON ASSESS: CPT | Performed by: SURGERY

## 2021-01-19 PROCEDURE — 1123F ACP DISCUSS/DSCN MKR DOCD: CPT | Performed by: SURGERY

## 2021-01-19 PROCEDURE — 99204 OFFICE O/P NEW MOD 45 MIN: CPT | Performed by: SURGERY

## 2021-01-19 PROCEDURE — 1036F TOBACCO NON-USER: CPT | Performed by: SURGERY

## 2021-01-19 PROCEDURE — 4040F PNEUMOC VAC/ADMIN/RCVD: CPT | Performed by: SURGERY

## 2021-01-19 PROCEDURE — G8417 CALC BMI ABV UP PARAM F/U: HCPCS | Performed by: SURGERY

## 2021-01-19 PROCEDURE — G8427 DOCREV CUR MEDS BY ELIG CLIN: HCPCS | Performed by: SURGERY

## 2021-01-19 ASSESSMENT — ENCOUNTER SYMPTOMS
RESPIRATORY NEGATIVE: 1
GASTROINTESTINAL NEGATIVE: 1
ALLERGIC/IMMUNOLOGIC NEGATIVE: 1

## 2021-01-19 NOTE — PROGRESS NOTES
Use    Smoking status: Never Smoker    Smokeless tobacco: Never Used   Substance and Sexual Activity    Alcohol use: Yes     Comment: occas    Drug use: No    Sexual activity: Not on file   Lifestyle    Physical activity     Days per week: Not on file     Minutes per session: Not on file    Stress: Not on file   Relationships    Social connections     Talks on phone: Not on file     Gets together: Not on file     Attends Faith service: Not on file     Active member of club or organization: Not on file     Attends meetings of clubs or organizations: Not on file     Relationship status: Not on file    Intimate partner violence     Fear of current or ex partner: Not on file     Emotionally abused: Not on file     Physically abused: Not on file     Forced sexual activity: Not on file   Other Topics Concern    Not on file   Social History Narrative    Not on file       No family history on file.       Current Outpatient Medications:     furosemide (LASIX) 40 MG tablet, Take 1 tablet by mouth daily Take second dose if increased swelling., Disp: 60 tablet, Rfl: 5    doxazosin (CARDURA) 4 MG tablet, Take 1 tablet by mouth daily Begin one half, increase to full after one week., Disp: 30 tablet, Rfl: 3    metFORMIN (GLUCOPHAGE) 1000 MG tablet, TAKE 1 TABLET TWICE A DAY WITH MEALS, Disp: 180 tablet, Rfl: 3    levothyroxine (SYNTHROID) 112 MCG tablet, TAKE ONE TABLET BY MOUTH DAILY, Disp: 90 tablet, Rfl: 3    lisinopril (PRINIVIL;ZESTRIL) 40 MG tablet, Take 1 tablet by mouth daily, Disp: 90 tablet, Rfl: 3    atorvastatin (LIPITOR) 40 MG tablet, Take 1 tablet by mouth daily, Disp: 90 tablet, Rfl: 3    acetaminophen (AMINOFEN) 325 MG tablet, Take 2 tablets by mouth every 6 hours as needed for Pain, Disp: 120 tablet, Rfl: 0    Cranberry-Cholecalciferol 4200-500 MG-UNIT CAPS, Take by mouth, Disp: , Rfl:     aspirin 81 MG tablet, Take 81 mg by mouth daily, Disp: , Rfl:     Multiple Vitamin (MULTI VITAMIN DAILY PO), Take by mouth, Disp: , Rfl:     Gabapentin, Pravachol [pravastatin sodium], and Tegaderm ag mesh 2\"x2\" [wound dressings]    Vitals:    01/19/21 1508   BP: (!) 165/69   Site: Left Upper Arm   Height: 5' 6\" (1.676 m)       Orders Only on 10/26/2020   Component Date Value Ref Range Status    Sodium 10/26/2020 139  136 - 145 mmol/L Final    Potassium 10/26/2020 5.4* 3.5 - 5.1 mmol/L Final    Chloride 10/26/2020 100  99 - 110 mmol/L Final    CO2 10/26/2020 26  21 - 32 mmol/L Final    Anion Gap 10/26/2020 13  3 - 16 Final    Glucose 10/26/2020 177* 70 - 99 mg/dL Final    BUN 10/26/2020 21* 7 - 20 mg/dL Final    CREATININE 10/26/2020 0.9  0.6 - 1.2 mg/dL Final    GFR Non- 10/26/2020 59* >60 Final    Comment: >60 mL/min/1.73m2 EGFR, calc. for ages 25 and older using the  MDRD formula (not corrected for weight), is valid for stable  renal function.  GFR  10/26/2020 >60  >60 Final    Comment: Chronic Kidney Disease: less than 60 ml/min/1.73 sq.m. Kidney Failure: less than 15 ml/min/1.73 sq.m. Results valid for patients 18 years and older.  Calcium 10/26/2020 10.1  8.3 - 10.6 mg/dL Final    Total Protein 10/26/2020 6.6  6.4 - 8.2 g/dL Final    Alb 10/26/2020 4.1  3.4 - 5.0 g/dL Final    Albumin/Globulin Ratio 10/26/2020 1.6  1.1 - 2.2 Final    Total Bilirubin 10/26/2020 0.5  0.0 - 1.0 mg/dL Final    Alkaline Phosphatase 10/26/2020 73  40 - 129 U/L Final    ALT 10/26/2020 14  10 - 40 U/L Final    AST 10/26/2020 17  15 - 37 U/L Final    Globulin 10/26/2020 2.5  g/dL Final       Review of Systems   Constitutional: Negative. HENT: Negative. Eyes: Positive for visual disturbance (wears glasses). Respiratory: Negative. Cardiovascular: Positive for palpitations and leg swelling. Gastrointestinal: Negative. Endocrine: Negative. Genitourinary: Negative.     Musculoskeletal: Positive for gait problem (in a wheelchair) and joint swelling (ankles). Skin: Negative. Allergic/Immunologic: Negative. Hematological: Negative. Psychiatric/Behavioral: Negative. All other systems reviewed and are negative. Physical Exam  Vitals signs and nursing note reviewed. Constitutional:       Appearance: Normal appearance. She is well-developed. HENT:      Mouth/Throat:      Pharynx: No oropharyngeal exudate. Eyes:      Conjunctiva/sclera: Conjunctivae normal.      Pupils: Pupils are equal, round, and reactive to light. Neck:      Musculoskeletal: Normal range of motion. Cardiovascular:      Rate and Rhythm: Normal rate. Rhythm irregular. Pulses:           Dorsalis pedis pulses are 2+ on the right side and 2+ on the left side. Posterior tibial pulses are 0 on the right side and 0 on the left side. Heart sounds: Normal heart sounds. Comments:     MEASUREMENTS:    RIGHT ANKLE: 32.4 cm   RIGHT CALF: 41.7 cm     LEFT ANKLE: 26.8 cm   LEFT CALF: 37.8 cm       Doppler 1/19/2021  Rt DP: biphasic  Rt PT: monophasic (weak)  Rt AT: not checked    Lt DP: biphasic  Lt PT: monophasic  Lt AT:not checked    Pulmonary:      Effort: Pulmonary effort is normal.      Breath sounds: Normal breath sounds. Chest:      Comments: Lungs clear to auscultation no hitting edema of lower back  Abdominal:      General: Bowel sounds are normal.   Musculoskeletal: Normal range of motion. Neurological:      Mental Status: She is alert and oriented to person, place, and time. Deep Tendon Reflexes: Reflexes are normal and symmetric. Psychiatric:         Speech: Speech normal.         Behavior: Behavior normal.         Thought Content: Thought content normal.         Judgment: Judgment normal.       Mrs. Mi Cea denies any cardiac problems. She is hypertensive, has high cholesterol and is diabetic, followed and controlled by Dr. Gabe Mckinney. She has had her appendix removed. She has had an hysterectomy. She has had a thyroidectomy. ASSESSMENT:    Problem List Items Addressed This Visit     None      Visit Diagnoses     Leg swelling    -  Primary        She has impressive swelling of the right leg versus the left right leg had a knee replacement 2006 she has denies any history of DVT or varicose veins has had no duplex scan of her veins that she can recall ever she has good pulse in each foot she has no pain she has pitting edema markedly worse on the right than the left he denies change in her abdominal girth or extra fluid in her abdomen does say that her doctor is increased her water pills recently and she thinks this is helped. If the ultrasound fails to show any reason for the swelling and I think we have to look at the pelvis and abdomen for a reason  PLAN:    Return for a bilateral LE venous scan. We will call with results. Lea Bingham MA am scribing for and in the presence of Yue Aquino MD on this date of 01/19/21    I Shree Blanc MD personally performed the services described in this documentation as scribed by the Medical Assistant Karie Skiff May in my presence and it is both accurate and complete.         Electronically signed by Yue Aquino MD on 1/19/2021 at 3:30 PM

## 2021-01-25 ENCOUNTER — PROCEDURE VISIT (OUTPATIENT)
Dept: SURGERY | Age: 86
End: 2021-01-25
Payer: MEDICARE

## 2021-01-25 DIAGNOSIS — M79.605 BILATERAL LEG PAIN: Primary | ICD-10-CM

## 2021-01-25 DIAGNOSIS — M79.604 BILATERAL LEG PAIN: Primary | ICD-10-CM

## 2021-01-25 PROCEDURE — 93970 EXTREMITY STUDY: CPT | Performed by: SURGERY

## 2021-01-29 ENCOUNTER — TELEPHONE (OUTPATIENT)
Dept: SURGERY | Age: 86
End: 2021-01-29

## 2021-02-23 ENCOUNTER — OFFICE VISIT (OUTPATIENT)
Dept: INTERNAL MEDICINE CLINIC | Age: 86
End: 2021-02-23
Payer: MEDICARE

## 2021-02-23 VITALS
BODY MASS INDEX: 31.82 KG/M2 | WEIGHT: 198 LBS | DIASTOLIC BLOOD PRESSURE: 66 MMHG | SYSTOLIC BLOOD PRESSURE: 136 MMHG | HEIGHT: 66 IN | HEART RATE: 66 BPM | OXYGEN SATURATION: 96 % | TEMPERATURE: 98.1 F

## 2021-02-23 DIAGNOSIS — I10 ESSENTIAL HYPERTENSION: ICD-10-CM

## 2021-02-23 DIAGNOSIS — E78.2 MIXED HYPERLIPIDEMIA: ICD-10-CM

## 2021-02-23 DIAGNOSIS — E11.9 TYPE 2 DIABETES MELLITUS WITHOUT COMPLICATION, WITHOUT LONG-TERM CURRENT USE OF INSULIN (HCC): Primary | ICD-10-CM

## 2021-02-23 DIAGNOSIS — E89.0 POSTABLATIVE HYPOTHYROIDISM: ICD-10-CM

## 2021-02-23 DIAGNOSIS — I89.0 LYMPHEDEMA: ICD-10-CM

## 2021-02-23 PROCEDURE — G8484 FLU IMMUNIZE NO ADMIN: HCPCS | Performed by: INTERNAL MEDICINE

## 2021-02-23 PROCEDURE — 1090F PRES/ABSN URINE INCON ASSESS: CPT | Performed by: INTERNAL MEDICINE

## 2021-02-23 PROCEDURE — 99214 OFFICE O/P EST MOD 30 MIN: CPT | Performed by: INTERNAL MEDICINE

## 2021-02-23 PROCEDURE — 4040F PNEUMOC VAC/ADMIN/RCVD: CPT | Performed by: INTERNAL MEDICINE

## 2021-02-23 PROCEDURE — 1036F TOBACCO NON-USER: CPT | Performed by: INTERNAL MEDICINE

## 2021-02-23 PROCEDURE — 1123F ACP DISCUSS/DSCN MKR DOCD: CPT | Performed by: INTERNAL MEDICINE

## 2021-02-23 PROCEDURE — G8417 CALC BMI ABV UP PARAM F/U: HCPCS | Performed by: INTERNAL MEDICINE

## 2021-02-23 PROCEDURE — G8427 DOCREV CUR MEDS BY ELIG CLIN: HCPCS | Performed by: INTERNAL MEDICINE

## 2021-02-23 RX ORDER — DOXAZOSIN MESYLATE 4 MG/1
4 TABLET ORAL DAILY
Qty: 14 TABLET | Refills: 0 | Status: SHIPPED | OUTPATIENT
Start: 2021-02-23 | End: 2021-05-09

## 2021-02-23 RX ORDER — DOXAZOSIN MESYLATE 4 MG/1
4 TABLET ORAL DAILY
Qty: 90 TABLET | Refills: 3 | Status: SHIPPED | OUTPATIENT
Start: 2021-02-23 | End: 2021-02-23 | Stop reason: SDUPTHER

## 2021-02-23 ASSESSMENT — PATIENT HEALTH QUESTIONNAIRE - PHQ9
SUM OF ALL RESPONSES TO PHQ QUESTIONS 1-9: 0
2. FEELING DOWN, DEPRESSED OR HOPELESS: 0
SUM OF ALL RESPONSES TO PHQ QUESTIONS 1-9: 0
SUM OF ALL RESPONSES TO PHQ9 QUESTIONS 1 & 2: 0

## 2021-02-23 NOTE — PROGRESS NOTES
Rate and Rhythm: Normal rate and regular rhythm. Heart sounds: Normal heart sounds. No murmur. No friction rub. No gallop. Pulmonary:      Effort: Pulmonary effort is normal. No respiratory distress. Breath sounds: Normal breath sounds. No wheezing or rales. Chest:      Chest wall: No tenderness. Abdominal:      General: Bowel sounds are normal. There is no distension. Palpations: Abdomen is soft. There is no mass. Tenderness: There is no abdominal tenderness. There is no guarding or rebound. Musculoskeletal: Normal range of motion. General: No tenderness. Lymphadenopathy:      Cervical: No cervical adenopathy. Skin:     General: Skin is warm and dry. Coloration: Skin is not pale. Findings: No erythema or rash. Neurological:      Mental Status: She is alert and oriented to person, place, and time. Cranial Nerves: No cranial nerve deficit. Motor: No abnormal muscle tone. Coordination: Coordination normal.      Deep Tendon Reflexes: Reflexes are normal and symmetric. Reflexes normal.   Psychiatric:         Judgment: Judgment normal.         ASSESSMENT AND PLANS:      Except as noted below, all chronic problems have been reviewed and are stable to continue medications or other therapy as previously documented in the patient's chart, with changes per orders or documentation below:        Assessment and Plan: Patient received counseling and, if relevant,printed instructions for all symptoms listed in CC and HPI, as well as for all diagnoses brought onto today's visit note below. Typical counseling includes, but is not limited to, non pharmacologic measures to manage listed symptoms and conditions; appropriate use, risks and benefits for all prescribed medications; potential interactions between medications both prescribed and OTC; diet; exercise; healthy behaviors; and goalsetting to improve health.  Pt.or responsible party was involved in shared decision making and had opportunity to have all questions answered. 1. Type 2 diabetes mellitus without complication, without long-term current use of insulin ([facility] [address]  - COMPREHENSIVE METABOLIC PANEL; Future  - HEMOGLOBIN A1C; Future    2. Mixed hyperlipidemia  - COMPREHENSIVE METABOLIC PANEL; Future  - LIPID PANEL; Future    3. Essential hypertension  - COMPREHENSIVE METABOLIC PANEL; Future    4. Lymphedema  - COMPREHENSIVE METABOLIC PANEL; Future    5.  Postablative hypothyroidism  - TSH WITH REFLEX TO FT4; Future            Problem List     Type 2 diabetes mellitus without complication, without long-term current use of insulin (HCC) - Primary    Relevant Medications    lisinopril (PRINIVIL;ZESTRIL) 40 MG tablet    metFORMIN (GLUCOPHAGE) 1000 MG tablet    Other Relevant Orders    COMPREHENSIVE METABOLIC PANEL    HEMOGLOBIN A1C    Essential hypertension    Relevant Orders    COMPREHENSIVE METABOLIC PANEL    Mixed hyperlipidemia    Relevant Medications    aspirin 81 MG tablet    atorvastatin (LIPITOR) 40 MG tablet    lisinopril (PRINIVIL;ZESTRIL) 40 MG tablet    furosemide (LASIX) 40 MG tablet    doxazosin (CARDURA) 4 MG tablet    Other Relevant Orders    COMPREHENSIVE METABOLIC PANEL    LIPID PANEL    Postablative hypothyroidism    Relevant Medications    levothyroxine (SYNTHROID) 112 MCG tablet    Other Relevant Orders    TSH WITH REFLEX TO FT4    Lymphedema    Relevant Medications    furosemide (LASIX) 40 MG tablet    Other Relevant Orders    COMPREHENSIVE METABOLIC PANEL        Orders Placed This Encounter   Procedures    TSH WITH REFLEX TO FT4     Standing Status:   Future     Standing Expiration Date:   2/23/2022    COMPREHENSIVE METABOLIC PANEL     Standing Status:   Future     Standing Expiration Date:   2/23/2022    LIPID PANEL     Standing Status:   Future     Standing Expiration Date:   2/23/2022     Order Specific Question:   Is Patient Fasting?/# of Hours     Answer:   yes    HEMOGLOBIN A1C Standing Status:   Future     Standing Expiration Date:   2/23/2022       I have reconciled the medications in chart with what patient reports to be taking, andreviewed action/ sideeffects and how to take any new medications. Patient/caregiver understands purpose and side effects. A complete  list of medications was provided in their after-visit summary. Return in about 4 months (around 6/23/2021) for f/u dm etc. extended. Time basedbilling: I spent over  30 minutes with this patient, and as is the nature of primary care and typical for my extended visits, over 50 percent of this visit was spent on counseling and coordination ofcare.

## 2021-02-23 NOTE — TELEPHONE ENCOUNTER
Patient doesn't have any refills at [facility]. They just wanted a couple of weeks worth to get by until [facility].

## 2021-02-24 NOTE — TELEPHONE ENCOUNTER
rx sent to wrong pharmacy. Called in to [facility] for a temporary supply of doxazosin 4 mg, total 14 no refills.

## 2021-02-26 DIAGNOSIS — E11.9 TYPE 2 DIABETES MELLITUS WITHOUT COMPLICATION, WITHOUT LONG-TERM CURRENT USE OF INSULIN (HCC): ICD-10-CM

## 2021-02-26 DIAGNOSIS — E78.2 MIXED HYPERLIPIDEMIA: ICD-10-CM

## 2021-02-26 DIAGNOSIS — I89.0 LYMPHEDEMA: ICD-10-CM

## 2021-02-26 DIAGNOSIS — I10 ESSENTIAL HYPERTENSION: ICD-10-CM

## 2021-02-26 DIAGNOSIS — E89.0 POSTABLATIVE HYPOTHYROIDISM: ICD-10-CM

## 2021-02-26 LAB
A/G RATIO: 1.5 (ref 1.1–2.2)
ALBUMIN SERPL-MCNC: 4.1 G/DL (ref 3.4–5)
ALP BLD-CCNC: 95 U/L (ref 40–129)
ALT SERPL-CCNC: 14 U/L (ref 10–40)
ANION GAP SERPL CALCULATED.3IONS-SCNC: 13 MMOL/L (ref 3–16)
AST SERPL-CCNC: 16 U/L (ref 15–37)
BILIRUB SERPL-MCNC: 0.3 MG/DL (ref 0–1)
BUN BLDV-MCNC: 17 MG/DL (ref 7–20)
CALCIUM SERPL-MCNC: 9.7 MG/DL (ref 8.3–10.6)
CHLORIDE BLD-SCNC: 98 MMOL/L (ref 99–110)
CHOLESTEROL, TOTAL: 146 MG/DL (ref 0–199)
CO2: 27 MMOL/L (ref 21–32)
CREAT SERPL-MCNC: 1 MG/DL (ref 0.6–1.2)
GFR AFRICAN AMERICAN: >60
GFR NON-AFRICAN AMERICAN: 52
GLOBULIN: 2.7 G/DL
GLUCOSE BLD-MCNC: 148 MG/DL (ref 70–99)
HDLC SERPL-MCNC: 54 MG/DL (ref 40–60)
LDL CHOLESTEROL CALCULATED: 70 MG/DL
POTASSIUM SERPL-SCNC: 5.2 MMOL/L (ref 3.5–5.1)
SODIUM BLD-SCNC: 138 MMOL/L (ref 136–145)
TOTAL PROTEIN: 6.8 G/DL (ref 6.4–8.2)
TRIGL SERPL-MCNC: 112 MG/DL (ref 0–150)
TSH REFLEX FT4: 2.13 UIU/ML (ref 0.27–4.2)
VLDLC SERPL CALC-MCNC: 22 MG/DL

## 2021-02-27 LAB
ESTIMATED AVERAGE GLUCOSE: 177.2 MG/DL
HBA1C MFR BLD: 7.8 %

## 2021-03-31 ENCOUNTER — OFFICE VISIT (OUTPATIENT)
Dept: ORTHOPEDIC SURGERY | Age: 86
End: 2021-03-31
Payer: MEDICARE

## 2021-03-31 VITALS
BODY MASS INDEX: 31.82 KG/M2 | HEIGHT: 66 IN | HEART RATE: 54 BPM | TEMPERATURE: 96.6 F | SYSTOLIC BLOOD PRESSURE: 142 MMHG | DIASTOLIC BLOOD PRESSURE: 57 MMHG | WEIGHT: 198 LBS

## 2021-03-31 DIAGNOSIS — M79.18 LEFT BUTTOCK PAIN: ICD-10-CM

## 2021-03-31 DIAGNOSIS — M17.12 ARTHRITIS OF LEFT KNEE: ICD-10-CM

## 2021-03-31 DIAGNOSIS — M16.12 ARTHRITIS OF LEFT HIP: ICD-10-CM

## 2021-03-31 DIAGNOSIS — M25.562 LEFT KNEE PAIN, UNSPECIFIED CHRONICITY: Primary | ICD-10-CM

## 2021-03-31 PROCEDURE — G8417 CALC BMI ABV UP PARAM F/U: HCPCS | Performed by: PHYSICIAN ASSISTANT

## 2021-03-31 PROCEDURE — G8484 FLU IMMUNIZE NO ADMIN: HCPCS | Performed by: PHYSICIAN ASSISTANT

## 2021-03-31 PROCEDURE — 1090F PRES/ABSN URINE INCON ASSESS: CPT | Performed by: PHYSICIAN ASSISTANT

## 2021-03-31 PROCEDURE — 99214 OFFICE O/P EST MOD 30 MIN: CPT | Performed by: PHYSICIAN ASSISTANT

## 2021-03-31 PROCEDURE — 1036F TOBACCO NON-USER: CPT | Performed by: PHYSICIAN ASSISTANT

## 2021-03-31 PROCEDURE — 1123F ACP DISCUSS/DSCN MKR DOCD: CPT | Performed by: PHYSICIAN ASSISTANT

## 2021-03-31 PROCEDURE — 20610 DRAIN/INJ JOINT/BURSA W/O US: CPT | Performed by: PHYSICIAN ASSISTANT

## 2021-03-31 PROCEDURE — G8427 DOCREV CUR MEDS BY ELIG CLIN: HCPCS | Performed by: PHYSICIAN ASSISTANT

## 2021-03-31 PROCEDURE — 4040F PNEUMOC VAC/ADMIN/RCVD: CPT | Performed by: PHYSICIAN ASSISTANT

## 2021-03-31 NOTE — PROGRESS NOTES
status: Never Smoker    Smokeless tobacco: Never Used   Substance and Sexual Activity    Alcohol use: Yes     Comment: occas    Drug use: No    Sexual activity: Not on file       Current Outpatient Medications   Medication Sig Dispense Refill    doxazosin (CARDURA) 4 MG tablet Take 1 tablet by mouth daily 14 tablet 0    furosemide (LASIX) 40 MG tablet Take 1 tablet by mouth daily Take second dose if increased swelling. 60 tablet 5    metFORMIN (GLUCOPHAGE) 1000 MG tablet TAKE 1 TABLET TWICE A DAY WITH MEALS 180 tablet 3    levothyroxine (SYNTHROID) 112 MCG tablet TAKE ONE TABLET BY MOUTH DAILY 90 tablet 3    lisinopril (PRINIVIL;ZESTRIL) 40 MG tablet Take 1 tablet by mouth daily 90 tablet 3    atorvastatin (LIPITOR) 40 MG tablet Take 1 tablet by mouth daily 90 tablet 3    acetaminophen (AMINOFEN) 325 MG tablet Take 2 tablets by mouth every 6 hours as needed for Pain 120 tablet 0    Cranberry-Cholecalciferol 4200-500 MG-UNIT CAPS Take by mouth      aspirin 81 MG tablet Take 81 mg by mouth daily      Multiple Vitamin (MULTI VITAMIN DAILY PO) Take by mouth       No current facility-administered medications for this visit. Objective:     She is alert, oriented x 3, pleasant, well nourished, developed and in no   acute distress. BP (!) 142/57   Pulse 54   Temp 96.6 °F (35.9 °C) (Temporal)   Ht 5' 6\" (1.676 m)   Wt 198 lb (89.8 kg)   BMI 31.96 kg/m²      Examination of the left knee: Inspection of the skin no skin lesion or abrasion. Inspection of the soft tissues minimal soft tissue swelling no erythema. The overall alignment of the knee is mild varus. Gait is antalgic. Gait noted when she got up from the wheelchair to the x-ray table. There is minimal intra-articular effusion.                          AROM:           PROM:  Extension-         5 degrees                   5 degrees  Flexion -           115 degrees                   120 degrees  There moderate pain associated with ROM Sunrise views of left knee:   No acute fractures or dislocations noted. Knee x-rays demonstrate osteoarthritis. Medial compartment-    4/4 Kellgren and Justo Classification. Lateral compartment-    2/4 Kellgren and Justo Classification. PF compartment-          3/4 Kellgren and Justo Classification. AP pelvis:  Moderate degenerative arthritis at the left and right hip. No acute fractures. Additional Tests reviewed: None. Additional Outside Records reviewed: None. Diagnosis:       ICD-10-CM    1. Left knee pain, unspecified chronicity  M25.562 XR KNEE LEFT (3 VIEWS)   2. Left buttock pain  M79.18 XR PELVIS (1-2 VIEWS)   3. Arthritis of left knee  M17.12 GA ARTHROCENTESIS ASPIR&/INJ MAJOR JT/BURSA W/O US     GA TRIAMCINOLONE ACETONIDE INJ   4. Arthritis of left hip  M16.12 FL ARTHR/ASP/INJ MAJOR JT/BURSA LT WO US        Assessment and Plan:       Assessment:  Left knee pain that radiates up the posterior thigh to her buttock region. X-rays demonstrate severe left knee arthritis and moderate left hip arthritis. Some of her symptoms may also be lumbar radicular nature. She is neurologically intact in both lower extremities. Plan:  Medications-she already takes Celebrex 200 mg daily. NSAIDS discussed. She  was advised that NSAID-type medications have two very important potential side effects: gastrointestinal irritation including hemorrhage and renal injuries. She was asked to take the medication with food and to stop if she experiences any GI upset. I asked her to call for vomiting, abdominal pain or black/bloody stools. She should have renal function testing per his medical provider periodically. The patient expresses understanding of these issues and questions were answered. She leaves in a couple weeks to go to [place] for vacation. I am recommending left knee intra-articular injection today. Left hip intra-articular injection in about 10 days.   If she fails to get significant improvement of her left buttock pain and left posterior thigh pain from that injection I would be willing to prescribe a Medrol Dosepak for possible stenosis. PT- A home exercise program was instructed today including ROM exercises and strengthening exercises. The patient verbalized understanding of these exercises as well as the importance of the exercise program to promote return of normal function. If pain intensifies or other problems arise you are to notify the office. Further Imaging- none    Procedures- Risk and benefits of corticosteroid intra-articular injection was discussed today. All questions were answered to her satisfaction. She verbally consented to proceed with intra-articular injection today. Cortisone Injection                                                       PROCEDURE NOTE:     Pre op Diagnosis:  left knee pain     Post op Diagnosis: Same  With the [facility] permission, her left knee was prepped  in standard sterile fashion with  Alcohol and 2 cc of 0.25% Marcaine and 1 cc of Kenalog 40 mg was injected into the left lateral compartment  without difficulty. The patient tolerated this well without difficulty. A band-aid was applied. The patient was advised to ice the knee for 15-20 minutes to relieve any injection site related pain. I have recommended intra articular injection of the left hip for both diagnostic and therapeutic purpose. This will be performed under fluoroscopy to be sure the injection is indeed intra articular. This will be performed by Dr Rosita Rouse and scheduled in the near future. The risks and benefits were discussed in detail today. All questions were answered. [facility] verbally consents to the procedure of cortisone hip injection. Again, she may call prior to leaving for vacation if no improvement from hip injection for a Medrol Dosepak.       Follow up:   Call or return to clinic if these symptoms worsen or fail to improve as anticipated.

## 2021-03-31 NOTE — LETTER
[facility]  Surgery Scheduling Form:    DEMOGRAPHICS:                                                                                                                Patient Name:  Vera Hinton  Patient :  1929   Patient SS#:      Patient Phone:  [phone] (home)      Patient Address:  [address] [address]    PCP:  Miguel Ridley MD  Insurance:   Payor/Plan Subscr  Sex Relation Sub. Ins. ID Effective Group Num   1.  Laukaantie 26 1929 Female Self [id number] 1/1/15 [id number]                                   [address] [address]     DIAGNOSIS & PROCEDURE:                                                                                            .Diagnosis:   Left Hip arthritis     M16.12  Operation:  Left Hip injection under fluoro   Location:  [facility]  Surgeon:  Soraya Encinas MD    SCHEDULING INFORMATION:                                                                                         .    Surgeon's Scheduling Instruction: 3.5cc of injectable 0.25%Marcaine and 1.5cc of injectable Kenalog 40 mg    Requested Date:   21 OR Time:  12:30 Patient Arrival Time:      OR Time Required:     Anesthesia:    Equipment:    Mini C-Arm:     Standard C-Arm:    Status: outpatient  PAT Required:    Comments:                        Clari Gipson MD      3/31/21  BILLING INFORMATION:                                                                                                     Procedure:       CPT Code Modifier  15276  [id number]    Left Hip injection under fluoro                            Pre-Certification:

## 2021-04-08 ENCOUNTER — OFFICE VISIT (OUTPATIENT)
Dept: ORTHOPEDIC SURGERY | Age: 86
End: 2021-04-08
Payer: MEDICARE

## 2021-04-08 ENCOUNTER — HOSPITAL ENCOUNTER (OUTPATIENT)
Dept: GENERAL RADIOLOGY | Age: 86
Discharge: HOME | End: 2021-04-08
Payer: MEDICARE

## 2021-04-08 DIAGNOSIS — M16.12 ARTHRITIS OF LEFT HIP: Primary | ICD-10-CM

## 2021-04-08 DIAGNOSIS — M16.12 PRIMARY OSTEOARTHRITIS OF LEFT HIP: ICD-10-CM

## 2021-04-08 PROCEDURE — 77002 NEEDLE LOCALIZATION BY XRAY: CPT

## 2021-04-08 PROCEDURE — 20610 DRAIN/INJ JOINT/BURSA W/O US: CPT

## 2021-04-08 PROCEDURE — 6360000002 HC RX W HCPCS

## 2021-04-08 PROCEDURE — 2500000003 HC RX 250 WO HCPCS

## 2021-04-08 NOTE — PROGRESS NOTES
After obtaining the patient's consent, the patient was placed supine on the fluoroscopy table. The left groin was prepped with chlorhexidine. The hip was visualized under fluoroscopic guidance. The needle was placed anteriorly into the left hip joint and aspiration was performed. No blood or joint fluid was aspirated. A mixture of 3.5mL 0.25% marcaine and 1.5mL 40 mg/mL Kenalog was injected into the left hip joint. The needle was removed and a bandage was applied. The patient tolerated the procedure without any difficulty. The patient was injected for degenerative arthritis of the hip.

## 2021-05-09 ENCOUNTER — HOSPITAL ENCOUNTER (INPATIENT)
Age: 86
LOS: 2 days | Discharge: HOME HEALTH | DRG: 312 | End: 2021-05-11
Attending: EMERGENCY MEDICINE | Admitting: HOSPITALIST
Payer: MEDICARE

## 2021-05-09 ENCOUNTER — APPOINTMENT (OUTPATIENT)
Dept: CT IMAGING | Age: 86
DRG: 312 | End: 2021-05-09
Payer: MEDICARE

## 2021-05-09 ENCOUNTER — APPOINTMENT (OUTPATIENT)
Dept: GENERAL RADIOLOGY | Age: 86
DRG: 312 | End: 2021-05-09
Payer: MEDICARE

## 2021-05-09 DIAGNOSIS — S09.90XA INJURY OF HEAD, INITIAL ENCOUNTER: Primary | ICD-10-CM

## 2021-05-09 DIAGNOSIS — I95.1 ORTHOSTASIS: ICD-10-CM
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
## 2021-05-09 DIAGNOSIS — S01.81XA FACIAL LACERATION, INITIAL ENCOUNTER: ICD-10-CM

## 2021-05-09 LAB
A/G RATIO: 1.2 (ref 1.1–2.2)
ALBUMIN SERPL-MCNC: 3.8 G/DL (ref 3.4–5)
ALP BLD-CCNC: 83 U/L (ref 40–129)
ALT SERPL-CCNC: 15 U/L (ref 10–40)
ANION GAP SERPL CALCULATED.3IONS-SCNC: 14 MMOL/L (ref 3–16)
AST SERPL-CCNC: 19 U/L (ref 15–37)
BACTERIA: ABNORMAL /HPF
BASOPHILS ABSOLUTE: 0.1 K/UL (ref 0–0.2)
BASOPHILS RELATIVE PERCENT: 0.4 %
BILIRUB SERPL-MCNC: 0.3 MG/DL (ref 0–1)
BILIRUBIN URINE: NEGATIVE
BLOOD, URINE: ABNORMAL
BUN BLDV-MCNC: 28 MG/DL (ref 7–20)
CALCIUM SERPL-MCNC: 9.4 MG/DL (ref 8.3–10.6)
CHLORIDE BLD-SCNC: 101 MMOL/L (ref 99–110)
CLARITY: ABNORMAL
CO2: 21 MMOL/L (ref 21–32)
COLOR: YELLOW
CREAT SERPL-MCNC: 1.1 MG/DL (ref 0.6–1.2)
EOSINOPHILS ABSOLUTE: 0.1 K/UL (ref 0–0.6)
EOSINOPHILS RELATIVE PERCENT: 0.8 %
EPITHELIAL CELLS, UA: 1 /HPF (ref 0–5)
GFR AFRICAN AMERICAN: 56
GFR NON-AFRICAN AMERICAN: 47
GLOBULIN: 3.1 G/DL
GLUCOSE BLD-MCNC: 150 MG/DL (ref 70–99)
GLUCOSE BLD-MCNC: 167 MG/DL (ref 70–99)
GLUCOSE BLD-MCNC: 170 MG/DL (ref 70–99)
GLUCOSE URINE: NEGATIVE MG/DL
HCT VFR BLD CALC: 33.7 % (ref 36–48)
HEMOGLOBIN: 11.2 G/DL (ref 12–16)
HYALINE CASTS: 0 /LPF (ref 0–8)
KETONES, URINE: NEGATIVE MG/DL
LACTIC ACID, SEPSIS: 2.4 MMOL/L (ref 0.4–1.9)
LACTIC ACID, SEPSIS: 2.4 MMOL/L (ref 0.4–1.9)
LEUKOCYTE ESTERASE, URINE: ABNORMAL
LYMPHOCYTES ABSOLUTE: 1.9 K/UL (ref 1–5.1)
LYMPHOCYTES RELATIVE PERCENT: 12 %
MCH RBC QN AUTO: 31.8 PG (ref 26–34)
MCHC RBC AUTO-ENTMCNC: 33.3 G/DL (ref 31–36)
MCV RBC AUTO: 95.2 FL (ref 80–100)
MICROSCOPIC EXAMINATION: YES
MONOCYTES ABSOLUTE: 1.1 K/UL (ref 0–1.3)
MONOCYTES RELATIVE PERCENT: 7.1 %
NEUTROPHILS ABSOLUTE: 12.5 K/UL (ref 1.7–7.7)
NEUTROPHILS RELATIVE PERCENT: 79.7 %
NITRITE, URINE: POSITIVE
PDW BLD-RTO: 14.5 % (ref 12.4–15.4)
PERFORMED ON: ABNORMAL
PERFORMED ON: ABNORMAL
PH UA: 5 (ref 5–8)
PLATELET # BLD: 307 K/UL (ref 135–450)
PMV BLD AUTO: 7.6 FL (ref 5–10.5)
POTASSIUM SERPL-SCNC: 4.8 MMOL/L (ref 3.5–5.1)
PROTEIN UA: 30 MG/DL
RBC # BLD: 3.54 M/UL (ref 4–5.2)
RBC UA: 3 /HPF (ref 0–4)
SODIUM BLD-SCNC: 136 MMOL/L (ref 136–145)
SPECIFIC GRAVITY UA: 1.01 (ref 1–1.03)
TOTAL CK: 154 U/L (ref 26–192)
TOTAL PROTEIN: 6.9 G/DL (ref 6.4–8.2)
TROPONIN: <0.01 NG/ML
URINE REFLEX TO CULTURE: YES
URINE TYPE: ABNORMAL
UROBILINOGEN, URINE: 1 E.U./DL
WBC # BLD: 15.6 K/UL (ref 4–11)
WBC UA: 20 /HPF (ref 0–5)

## 2021-05-09 PROCEDURE — 2500000003 HC RX 250 WO HCPCS: Performed by: PHYSICIAN ASSISTANT

## 2021-05-09 PROCEDURE — 94760 N-INVAS EAR/PLS OXIMETRY 1: CPT

## 2021-05-09 PROCEDURE — 0HQ1XZZ REPAIR FACE SKIN, EXTERNAL APPROACH: ICD-10-PCS | Performed by: EMERGENCY MEDICINE

## 2021-05-09 PROCEDURE — 87040 BLOOD CULTURE FOR BACTERIA: CPT

## 2021-05-09 PROCEDURE — 6370000000 HC RX 637 (ALT 250 FOR IP): Performed by: HOSPITALIST

## 2021-05-09 PROCEDURE — 1200000000 HC SEMI PRIVATE

## 2021-05-09 PROCEDURE — 93005 ELECTROCARDIOGRAM TRACING: CPT | Performed by: PHYSICIAN ASSISTANT

## 2021-05-09 PROCEDURE — 87086 URINE CULTURE/COLONY COUNT: CPT

## 2021-05-09 PROCEDURE — 80053 COMPREHEN METABOLIC PANEL: CPT

## 2021-05-09 PROCEDURE — 2580000003 HC RX 258: Performed by: HOSPITALIST

## 2021-05-09 PROCEDURE — 70450 CT HEAD/BRAIN W/O DYE: CPT

## 2021-05-09 PROCEDURE — 72125 CT NECK SPINE W/O DYE: CPT

## 2021-05-09 PROCEDURE — 85025 COMPLETE CBC W/AUTO DIFF WBC: CPT

## 2021-05-09 PROCEDURE — 6360000002 HC RX W HCPCS: Performed by: PHYSICIAN ASSISTANT

## 2021-05-09 PROCEDURE — 87186 SC STD MICRODIL/AGAR DIL: CPT

## 2021-05-09 PROCEDURE — 99284 EMERGENCY DEPT VISIT MOD MDM: CPT

## 2021-05-09 PROCEDURE — 84484 ASSAY OF TROPONIN QUANT: CPT

## 2021-05-09 PROCEDURE — 83605 ASSAY OF LACTIC ACID: CPT

## 2021-05-09 PROCEDURE — 12011 RPR F/E/E/N/L/M 2.5 CM/<: CPT

## 2021-05-09 PROCEDURE — 70486 CT MAXILLOFACIAL W/O DYE: CPT

## 2021-05-09 PROCEDURE — 81001 URINALYSIS AUTO W/SCOPE: CPT

## 2021-05-09 PROCEDURE — 82550 ASSAY OF CK (CPK): CPT

## 2021-05-09 PROCEDURE — 2580000003 HC RX 258: Performed by: PHYSICIAN ASSISTANT

## 2021-05-09 PROCEDURE — 73080 X-RAY EXAM OF ELBOW: CPT

## 2021-05-09 PROCEDURE — 87088 URINE BACTERIA CULTURE: CPT

## 2021-05-09 PROCEDURE — 83036 HEMOGLOBIN GLYCOSYLATED A1C: CPT

## 2021-05-09 PROCEDURE — 36415 COLL VENOUS BLD VENIPUNCTURE: CPT

## 2021-05-09 RX ORDER — POLYETHYLENE GLYCOL 3350 17 G/17G
17 POWDER, FOR SOLUTION ORAL DAILY PRN
Status: DISCONTINUED | OUTPATIENT
Start: 2021-05-09 | End: 2021-05-11 | Stop reason: HOSPADM

## 2021-05-09 RX ORDER — DEXTROSE MONOHYDRATE 50 MG/ML
100 INJECTION, SOLUTION INTRAVENOUS PRN
Status: DISCONTINUED | OUTPATIENT
Start: 2021-05-09 | End: 2021-05-11 | Stop reason: HOSPADM

## 2021-05-09 RX ORDER — SODIUM CHLORIDE 9 MG/ML
25 INJECTION, SOLUTION INTRAVENOUS PRN
Status: DISCONTINUED | OUTPATIENT
Start: 2021-05-09 | End: 2021-05-11 | Stop reason: HOSPADM

## 2021-05-09 RX ORDER — ONDANSETRON 2 MG/ML
4 INJECTION INTRAMUSCULAR; INTRAVENOUS EVERY 6 HOURS PRN
Status: DISCONTINUED | OUTPATIENT
Start: 2021-05-09 | End: 2021-05-11 | Stop reason: HOSPADM

## 2021-05-09 RX ORDER — NICOTINE POLACRILEX 4 MG
15 LOZENGE BUCCAL PRN
Status: DISCONTINUED | OUTPATIENT
Start: 2021-05-09 | End: 2021-05-11 | Stop reason: HOSPADM

## 2021-05-09 RX ORDER — ATORVASTATIN CALCIUM 40 MG/1
40 TABLET, FILM COATED ORAL DAILY
Status: DISCONTINUED | OUTPATIENT
Start: 2021-05-10 | End: 2021-05-11 | Stop reason: HOSPADM

## 2021-05-09 RX ORDER — ACETAMINOPHEN 325 MG/1
650 TABLET ORAL EVERY 6 HOURS PRN
Status: DISCONTINUED | OUTPATIENT
Start: 2021-05-09 | End: 2021-05-11 | Stop reason: HOSPADM

## 2021-05-09 RX ORDER — SODIUM CHLORIDE, SODIUM LACTATE, POTASSIUM CHLORIDE, CALCIUM CHLORIDE 600; 310; 30; 20 MG/100ML; MG/100ML; MG/100ML; MG/100ML
INJECTION, SOLUTION INTRAVENOUS CONTINUOUS
Status: DISCONTINUED | OUTPATIENT
Start: 2021-05-09 | End: 2021-05-11

## 2021-05-09 RX ORDER — ACETAMINOPHEN 650 MG/1
650 SUPPOSITORY RECTAL EVERY 6 HOURS PRN
Status: DISCONTINUED | OUTPATIENT
Start: 2021-05-09 | End: 2021-05-11 | Stop reason: HOSPADM

## 2021-05-09 RX ORDER — DEXTROSE MONOHYDRATE 25 G/50ML
12.5 INJECTION, SOLUTION INTRAVENOUS PRN
Status: DISCONTINUED | OUTPATIENT
Start: 2021-05-09 | End: 2021-05-11 | Stop reason: HOSPADM

## 2021-05-09 RX ORDER — PROMETHAZINE HYDROCHLORIDE 25 MG/1
12.5 TABLET ORAL EVERY 6 HOURS PRN
Status: DISCONTINUED | OUTPATIENT
Start: 2021-05-09 | End: 2021-05-11 | Stop reason: HOSPADM

## 2021-05-09 RX ORDER — LEVOTHYROXINE SODIUM 112 UG/1
112 TABLET ORAL
Status: DISCONTINUED | OUTPATIENT
Start: 2021-05-10 | End: 2021-05-09

## 2021-05-09 RX ORDER — SODIUM CHLORIDE 0.9 % (FLUSH) 0.9 %
5-40 SYRINGE (ML) INJECTION EVERY 12 HOURS SCHEDULED
Status: DISCONTINUED | OUTPATIENT
Start: 2021-05-09 | End: 2021-05-11 | Stop reason: HOSPADM

## 2021-05-09 RX ORDER — LIDOCAINE HYDROCHLORIDE 10 MG/ML
5 INJECTION, SOLUTION INFILTRATION; PERINEURAL ONCE
Status: COMPLETED | OUTPATIENT
Start: 2021-05-09 | End: 2021-05-09

## 2021-05-09 RX ORDER — SODIUM CHLORIDE 0.9 % (FLUSH) 0.9 %
5-40 SYRINGE (ML) INJECTION PRN
Status: DISCONTINUED | OUTPATIENT
Start: 2021-05-09 | End: 2021-05-11 | Stop reason: HOSPADM

## 2021-05-09 RX ORDER — 0.9 % SODIUM CHLORIDE 0.9 %
500 INTRAVENOUS SOLUTION INTRAVENOUS ONCE
Status: COMPLETED | OUTPATIENT
Start: 2021-05-09 | End: 2021-05-09

## 2021-05-09 RX ORDER — LEVOTHYROXINE SODIUM 112 UG/1
112 TABLET ORAL
Status: DISCONTINUED | OUTPATIENT
Start: 2021-05-09 | End: 2021-05-10

## 2021-05-09 RX ADMIN — CEFTRIAXONE 1000 MG: 1 INJECTION, POWDER, FOR SOLUTION INTRAMUSCULAR; INTRAVENOUS at 14:02

## 2021-05-09 RX ADMIN — SODIUM CHLORIDE, POTASSIUM CHLORIDE, SODIUM LACTATE AND CALCIUM CHLORIDE: 600; 310; 30; 20 INJECTION, SOLUTION INTRAVENOUS at 15:25

## 2021-05-09 RX ADMIN — LEVOTHYROXINE SODIUM 112 MCG: 0.11 TABLET ORAL at 17:56

## 2021-05-09 RX ADMIN — SODIUM CHLORIDE 500 ML: 9 INJECTION, SOLUTION INTRAVENOUS at 12:52

## 2021-05-09 RX ADMIN — LIDOCAINE HYDROCHLORIDE 5 ML: 10 INJECTION, SOLUTION INFILTRATION; PERINEURAL at 14:02

## 2021-05-09 ASSESSMENT — ENCOUNTER SYMPTOMS
NAUSEA: 0
ABDOMINAL PAIN: 0
VOMITING: 0
SHORTNESS OF BREATH: 0

## 2021-05-09 ASSESSMENT — PAIN DESCRIPTION - LOCATION
LOCATION: HEAD
LOCATION: HEAD

## 2021-05-09 ASSESSMENT — PAIN SCALES - GENERAL
PAINLEVEL_OUTOF10: 4
PAINLEVEL_OUTOF10: 0

## 2021-05-09 ASSESSMENT — PAIN DESCRIPTION - PAIN TYPE
TYPE: ACUTE PAIN
TYPE: ACUTE PAIN

## 2021-05-09 NOTE — ED NOTES
ED SBAR report provider to Sue Chavira RN. Patient to be transported to Room 4267 via stretcher by transport tech. Patient transported with bedside cardiac monitor and with IV medications infusing. IV site clean, dry, and intact. MEWS score and pain assessed as 0 and documented.  Updated patient and family on plan of care.'       Arleth Maude, RN  05/09/21 8913

## 2021-05-09 NOTE — PROGRESS NOTES
4 Eyes Skin Assessment     NAME:  Rafael Rashid  YOB: 1929  MEDICAL RECORD NUMBER:  [id number]    The patient is being assess for  Admission    I agree that 2 RN's have performed a thorough Head to Toe Skin Assessment on the patient. ALL assessment sites listed below have been assessed. Areas assessed by both nurses:    Head, Face, Ears, Shoulders, Back, Chest, Arms, Elbows, Hands, Sacrum. Buttock, Coccyx, Ischium and Legs. Feet and Heels        Does the Patient have a Wound?  No noted wound(s)       Alex Prevention initiated:  NA   Wound Care Orders initiated:  NA    Pressure Injury (Stage 3,4, Unstageable, DTI, NWPT, and Complex wounds) if present place consult order under [de-identified] NA    New and Established Ostomies if present place consult order under : NA      Nurse 1 eSignature: Electronically signed by Cayla Busby RN on 5/9/21 at 4:42 PM EDT    **SHARE this note so that the co-signing nurse is able to place an eSignature**    Nurse 2 eSignature: Electronically signed by Ade Nickerson RN on 5/9/21 at 4:42 PM EDT

## 2021-05-09 NOTE — H&P
HOSPITAL MEDICINE     History & Physical        Patient:  Shawanda De La Paz  YOB: 1929    MRN: [id number]     Acct: [de-identified]    PCP: Angelita Turner MD    Date of Admission: 5/9/2021    Date of Service:   Pt seen/examined on 5/9/2021     Admitted to Inpatient with expected LOS greater than two midnights due to medical therapy. History obtained from reviewing the medical record and patient/family Interview. Assessment:     Shawanda De La Paz is a 80 y.o. female who presented/brought to  on 5/9/2021  For       fall with scalp laceration, right periorbital ecchymosis  -CT head without intracranial bleeding  -CT cervical spine showed old C1, C2 fractures  -CT facial bones without acute fracture  -Supportive care, pain medication    Orthostatic hypotension  -Multifactorial, multiple antihypertensive agent, diuretics, (Cardura, Lasix, lisinopril 40 mg) possible dehydration  -Received IV bolus of 500 normal saline in the ED, continue IV fluid  -Monitor orthostatic vitals every shift  -Avoid aggressive treatment for hypertension in elderly  -Limit diuretics use    Bacteriuria/  Patient denied symptoms, received IV ceftriaxone in the ED Blood culture obtained . no plan to continue antibiotics will follow culture data    Leukocytosis  No clear nidus of infection.   Monitoring    Generalized debility  PT and OT consulted    Beatties mellitus type II  On Metformin, on hold, insulin sliding scale, diabetic diet      Hypothyroidism  Continue Synthroid             Code Status:  DNR CCA  PT/OT Eval Status:   ordered     DVT prophylaxis: [] Lovenox                                 [x] SCDs                                 [] SQ Heparin                                 [] Encourage ambulation           [] Already on Anticoagulation     Disposition:    [x] Home       [] TCU       [] Rehab       [] Psych       [] SNF       [] [place]       [] ARTHROPLASTY Right 2006    Dr. Debra Delaney       Medications Prior to Admission:      Prior to Admission medications    Medication Sig Start Date End Date Taking? Authorizing Provider   doxazosin (CARDURA) 4 MG tablet Take 1 tablet by mouth daily 2/23/21   Maria Elena Calixto MD   furosemide (LASIX) 40 MG tablet Take 1 tablet by mouth daily Take second dose if increased swelling. 1/18/21   Maria Elena Calixto MD   metFORMIN (GLUCOPHAGE) 1000 MG tablet TAKE 1 TABLET TWICE A DAY WITH MEALS 1/7/21   Maria Elena Calixto MD   levothyroxine (SYNTHROID) 112 MCG tablet TAKE ONE TABLET BY MOUTH DAILY 10/26/20   Maria Elena Calixto MD   lisinopril (PRINIVIL;ZESTRIL) 40 MG tablet Take 1 tablet by mouth daily 10/26/20   Maria Elena Calixto MD   atorvastatin (LIPITOR) 40 MG tablet Take 1 tablet by mouth daily 6/18/20   Maria Elena Calixto MD   acetaminophen (AMINOFEN) 325 MG tablet Take 2 tablets by mouth every 6 hours as needed for Pain 7/8/18   Autumn Valdivia MD   Cranberry-Cholecalciferol 4200-500 MG-UNIT CAPS Take by mouth    Historical Provider, MD   aspirin 81 MG tablet Take 81 mg by mouth daily    Historical Provider, MD   Multiple Vitamin (MULTI VITAMIN DAILY PO) Take by mouth    Historical Provider, MD       Allergies:  Gabapentin, Pravachol [pravastatin sodium], and Tegaderm ag mesh 2\"x2\" [wound dressings]    Social History:      The patient currently lives alone    TOBACCO:   reports that she has never smoked. She has never used smokeless tobacco.  ETOH:   reports current alcohol use. Family History:       Reviewed in detail . Positive as follows:  Does not remember    REVIEW OF SYSTEMS:   Pertinent positives as noted in the HPI. All other systems reviewed and negative.     PHYSICAL EXAM:    BP (!) 173/63   Pulse 56   Temp 98.7 °F (37.1 °C)   Resp 19   SpO2 97%         Vitals:    05/09/21 1032 05/09/21 1230   BP: (!) 173/63    Pulse: 56    Resp: 19    Temp: 98.7 °F (37.1 °C)    SpO2: 100% 97%       General appearance:  No apparent distress, appears stated age and cooperative. HEENT:  Normal cephalic,, 3 cm sutured laceration on the right side of the forehead, periorbital ecchymosis noted without obvious deformity. Pupils equal, round, and reactive to light. Extra ocular muscles intact. Conjunctivae/corneas clear. Neck: Supple, with full range of motion. No jugular venous distention. Trachea midline. Respiratory:  Normal respiratory effort. Clear to auscultation, bilaterally without Rales/Wheezes/Rhonchi. Cardiovascular:  Regular rate and rhythm with normal S1/S2 without murmurs, rubs or gallops. Abdomen: Soft, non-tender, non-distended with normal bowel sounds. Musculoskeletal:  No clubbing, cyanosis or edema bilaterally. Full range of motion without deformity. Skin: Periorbital ecchymosis, in the right side, bruises on the right elbow  Neurologic:  Neurovascularly intact without any focal sensory/motor deficits. Cranial nerves: II-XII intact, grossly non-focal.  Psychiatric:  Alert and oriented, thought content appropriate, normal insight  Capillary Refill: Brisk,< 3 seconds   Peripheral Pulses: +2 palpable, equal bilaterally       Labs:     Recent Labs     05/09/21  1104   WBC 15.6*   HGB 11.2*   HCT 33.7*        Recent Labs     05/09/21  1104      K 4.8      CO2 21   BUN 28*   CREATININE 1.1   CALCIUM 9.4     Recent Labs     05/09/21  1104   AST 19   ALT 15   BILITOT 0.3   ALKPHOS 83     No results for input(s): INR in the last 72 hours. Recent Labs     05/09/21  1104   CKTOTAL 154   TROPONINI <0.01       Urinalysis:      Lab Results   Component Value Date    NITRU POSITIVE 05/09/2021    WBCUA 20 05/09/2021    BACTERIA 2+ 05/09/2021    RBCUA 3 05/09/2021    BLOODU TRACE 05/09/2021    SPECGRAV 1.015 05/09/2021    GLUCOSEU Negative 05/09/2021       Radiology:      EKG:  I have reviewed the EKG with the following interpretation: sinus bradycardia.  no acute ischemic changes     XR ELBOW RIGHT (MIN 3 VIEWS) Final Result   No acute osseous injury of the right elbow is identified. CT HEAD WO CONTRAST   Final Result   No acute intracranial abnormality. No acute traumatic injury of the facial bones. Old C1 and C2 fractures as discussed above. No new fracture. CT CERVICAL SPINE WO CONTRAST   Final Result   No acute intracranial abnormality. No acute traumatic injury of the facial bones. Old C1 and C2 fractures as discussed above. No new fracture. CT FACIAL BONES WO CONTRAST   Final Result   No acute intracranial abnormality. No acute traumatic injury of the facial bones. Old C1 and C2 fractures as discussed above. No new fracture. Thank you Ramon Dent MD for the opportunity to be involved in this patient's care.     Electronically signed by Regis Robles MD on 5/9/2021 at 1:53 PM

## 2021-05-09 NOTE — ED PROVIDER NOTES
[address] [facility]      Pt Name: Don Romero  MRN: [id number]  [place] 12/30/1929  Date of evaluation: 5/9/2021  Provider: SARITHA Magaña    This patient was seen and evaluated by the attending physician Dr. Serina Patel       Chief Complaint   Patient presents with   Vince Mote     stood up this morning out of bed, got dizzy and fell and hit her head, unsure of LOC. has bruises to the right side of her face and arms with laceration to right side of forhead          HISTORY OF PRESENT ILLNESS  (Location/Symptom, Timing/Onset, Context/Setting, Quality, Duration, Modifying Factors, Severity.)   Don Romero is a 80 y.o. female who presents to the emergency department after fall. Patient and daughter in law report she has been dizzy with standing for the past week. Patient reports the dizziness only occurs when she stands up. Last time son and LEONARDO left her house at 10:30 pm and she was sitting in recliner when they left. At some point, she reports she stood up and became dizzy and fell. Hit her head on something. Unsure what. Unsure of LOC. Takes ASA 81. She does not know what time this happened. She stayed on the floor all night and was scooting around. Reports she could not get up because she did not have strength in her arms and legs. Was not dizzy after the fall. Again, she only gets dizzy when she stands up. Denies numbness tingling or vision changes. LEONARDO called this morning and could not get ahold of her so they went over this morning and found her sitting on floor. Family helped her up and patient was able to ambulate. Family reports no difficulty ambulating. Patient reports a headache, some neck pain, right elbow pain with skin tear. Denies eye pain. Denies vision changes. Denies nausea vomiting abdominal pain, chest pain, shortness of breath. Has lac on the forehead. Patient unsure last tetanus. A DAY WITH MEALS    MULTIPLE VITAMIN (MULTI VITAMIN DAILY PO)    Take by mouth       ALLERGIES     Gabapentin, Pravachol [pravastatin sodium], and Tegaderm ag mesh 2\"x2\" [wound dressings]    FAMILY HISTORY     History reviewed. No pertinent family history. Family Status   Relation Name Status    Mother      Father          SOCIAL HISTORY      reports that she has never smoked. She has never used smokeless tobacco. She reports current alcohol use. She reports that she does not use drugs. PHYSICAL EXAM    (up to 7 for level 4, 8 or more for level 5)     ED Triage Vitals [21 1032]   BP Temp Temp src Pulse Resp SpO2 Height Weight   (!) 173/63 98.7 °F (37.1 °C) -- 56 19 100 % -- --       Physical Exam  Constitutional:       General: She is not in acute distress. Appearance: Normal appearance. She is well-developed. She is not ill-appearing, toxic-appearing or diaphoretic. HENT:      Head:      Comments: Large amount of ecchymosis the right periorbital area. Also with moderate amount of edema. There is no bony tenderness palpation of the right orbit. Has normal extraocular movements bilaterally    No merino signs bilaterally    1 cm laceration on the right upper forehead just anterior to hairline     Nose: Nose normal.      Comments: No erythema, edema or ecchymosis of the nasal bridge. No tender to palpation. No septal hematoma  Eyes:      Extraocular Movements: Extraocular movements intact. Conjunctiva/sclera: Conjunctivae normal.      Pupils: Pupils are equal, round, and reactive to light. Comments: No Hyphema or subconjunctival hemorrhage   Neck:      Musculoskeletal: Normal range of motion and neck supple. Cardiovascular:      Rate and Rhythm: Normal rate and regular rhythm. Heart sounds: Normal heart sounds. Pulmonary:      Effort: Pulmonary effort is normal. No respiratory distress. Breath sounds: Normal breath sounds.    Abdominal:      General: There is no distension. Palpations: Abdomen is soft. There is no mass. Tenderness: There is no abdominal tenderness. There is no guarding or rebound. Hernia: No hernia is present. Comments: No bruising on abdomen   Musculoskeletal: Normal range of motion. Comments: Mild tenderness palpation of the cervical midline. No tenderness palpation of the thoracic lumbar midline. No bruising on back. No tenderness palpation of the right elbow but does have moderate sized area of ecchymosis posterior laterally and superficial skin tear that is small. Elbow has full range of motion. No erythema or edema. Wrist and shoulder are nontender. Compartments in the arm are soft. Radial pulses 2+. No tenderness to palpation of the left shoulder, wrist, elbow. No tenderness palpation of the bilateral hips, knees, ankles. All 4 extremities are soft. Neurological:      Mental Status: She is alert. Comments: No facial drooping  Normal finger to nose and rapid alternating hands bilaterally  Normal  And symmetric  strength bilaterally  No pronator drift  No drift of the RUE, LUE, RLE against gravity  Has limited strength of the LLE at baseline and cannot hold off the bed for testing  5/5 strength all 4 extremities  Negative test of skew  Normal EOM movements  No truncal instability  Normal and symmetric sensation throughout  No aphasia or dysarthria. Psychiatric:         Mood and Affect: Mood normal.         Behavior: Behavior normal.         Thought Content:  Thought content normal.         Judgment: Judgment normal.         DIFFERENTIAL DIAGNOSIS   Subdural hematoma, Epidural Hematoma, Subarachnoid Hemorrhage, Traumatic Brain Injury, Cervical Spine fracture  arrhythmia, orthostatic hypotension, dizziness, sepsis, arrhythmia, other    DIAGNOSTICRESULTS         RADIOLOGY:   Non-plain film images such as CT, Ultrasound and MRI are read by the radiologist. Plain radiographic images are visualized and preliminarily interpreted by SARITHA Artis with the below findings:      Interpretation per the Radiologist below, if available at the time of this note:    XR ELBOW RIGHT (MIN 3 VIEWS)   Final Result   No acute osseous injury of the right elbow is identified. CT HEAD WO CONTRAST   Final Result   No acute intracranial abnormality. No acute traumatic injury of the facial bones. Old C1 and C2 fractures as discussed above. No new fracture. CT CERVICAL SPINE WO CONTRAST   Final Result   No acute intracranial abnormality. No acute traumatic injury of the facial bones. Old C1 and C2 fractures as discussed above. No new fracture. CT FACIAL BONES WO CONTRAST   Final Result   No acute intracranial abnormality. No acute traumatic injury of the facial bones. Old C1 and C2 fractures as discussed above. No new fracture.                LABS:  Results for orders placed or performed during the hospital encounter of 05/09/21   CBC Auto Differential   Result Value Ref Range    WBC 15.6 (H) 4.0 - 11.0 K/uL    RBC 3.54 (L) 4.00 - 5.20 M/uL    Hemoglobin 11.2 (L) 12.0 - 16.0 g/dL    Hematocrit 33.7 (L) 36.0 - 48.0 %    MCV 95.2 80.0 - 100.0 fL    MCH 31.8 26.0 - 34.0 pg    MCHC 33.3 31.0 - 36.0 g/dL    RDW 14.5 12.4 - 15.4 %    Platelets 657 334 - 777 K/uL    MPV 7.6 5.0 - 10.5 fL    Neutrophils % 79.7 %    Lymphocytes % 12.0 %    Monocytes % 7.1 %    Eosinophils % 0.8 %    Basophils % 0.4 %    Neutrophils Absolute 12.5 (H) 1.7 - 7.7 K/uL    Lymphocytes Absolute 1.9 1.0 - 5.1 K/uL    Monocytes Absolute 1.1 0.0 - 1.3 K/uL    Eosinophils Absolute 0.1 0.0 - 0.6 K/uL    Basophils Absolute 0.1 0.0 - 0.2 K/uL   Comprehensive Metabolic Panel   Result Value Ref Range    Sodium 136 136 - 145 mmol/L    Potassium 4.8 3.5 - 5.1 mmol/L    Chloride 101 99 - 110 mmol/L    CO2 21 21 - 32 mmol/L    Anion Gap 14 3 - 16    Glucose 150 (H) 70 - 99 mg/dL    BUN 28 (H) 7 - 20 mg/dL CREATININE 1.1 0.6 - 1.2 mg/dL    GFR Non- 47 (A) >60    GFR  56 (A) >60    Calcium 9.4 8.3 - 10.6 mg/dL    Total Protein 6.9 6.4 - 8.2 g/dL    Albumin 3.8 3.4 - 5.0 g/dL    Albumin/Globulin Ratio 1.2 1.1 - 2.2    Total Bilirubin 0.3 0.0 - 1.0 mg/dL    Alkaline Phosphatase 83 40 - 129 U/L    ALT 15 10 - 40 U/L    AST 19 15 - 37 U/L    Globulin 3.1 g/dL   Troponin   Result Value Ref Range    Troponin <0.01 <0.01 ng/mL   Urinalysis Reflex to Culture    Specimen: Urine, clean catch   Result Value Ref Range    Color, UA YELLOW Straw/Yellow    Clarity, UA CLOUDY (A) Clear    Glucose, Ur Negative Negative mg/dL    Bilirubin Urine Negative Negative    Ketones, Urine Negative Negative mg/dL    Specific Gravity, UA 1.015 1.005 - 1.030    Blood, Urine TRACE (A) Negative    pH, UA 5.0 5.0 - 8.0    Protein, UA 30 (A) Negative mg/dL    Urobilinogen, Urine 1.0 <2.0 E.U./dL    Nitrite, Urine POSITIVE (A) Negative    Leukocyte Esterase, Urine SMALL (A) Negative    Microscopic Examination YES     Urine Type NotGiven     Urine Reflex to Culture Yes    CK   Result Value Ref Range    Total  26 - 192 U/L   Microscopic Urinalysis   Result Value Ref Range    Bacteria, UA 2+ (A) None Seen /HPF    Hyaline Casts, UA 0 0 - 8 /LPF    WBC, UA 20 (H) 0 - 5 /HPF    RBC, UA 3 0 - 4 /HPF    Epithelial Cells, UA 1 0 - 5 /HPF       All other labs were withinnormal range or not returned as of this dictation. EMERGENCY DEPARTMENT COURSE and DIFFERENTIAL DIAGNOSIS/MDM:   Vitals:    Vitals:    05/09/21 1032 05/09/21 1230 05/09/21 1330 05/09/21 1401   BP: (!) 173/63  (!) 170/54    Pulse: 56  57    Resp: 19  15    Temp: 98.7 °F (37.1 °C)      SpO2: 100% 97% 99%    Weight:    198 lb 3.1 oz (89.9 kg)       Patient was nontoxic, well appearing, afebrile with normal vital signs with the exception /63. Saturating well on room air. Has dizziness with standing only for 1 week.   NIHSS is 0 with negative test of skew and no trunchal instability. Low suspicion for CVA. Will obtain orthostatics. Was placed in c collar. Has hx of C1 and C2 fracture. Labs remarkable for leukocytosis 15, BUN 28, trop negative. Xray right elbow negative. CT head, c spine, and facial bones negative. laceration repaired by Dr. Ibrahima Butterfield. Orthostatics were positive. Upon standing, systolic BP went from  026B to 120s and she became dizzy. Given her significant orthostatic hypotension with symptoms, will plan for admission. Urine was also positive for infection. Lactic and cultures ordered. Given rocephin. Upon reevaluation, she is lying in stretcher in no distress. PROCEDURES:  None    FINAL IMPRESSION      1. Injury of head, initial encounter    2. Fall, initial encounter    3. Facial laceration, initial encounter    4. Orthostasis          DISPOSITION/PLAN   DISPOSITION Admitted 05/09/2021 01:52:42 PM      PATIENT REFERRED TO:  No follow-up provider specified.     DISCHARGE MEDICATIONS:  New Prescriptions    No medications on file       (Please note that portions of this note werecompleted with a voice recognition program.  Efforts were made to edit the dictations but occasionally words are mis-transcribed.)    Jocelyn Joya, [address] [place] [address]  05/09/21 2407

## 2021-05-09 NOTE — ED NOTES
Fall risk screening completed. Fall risk bracelet applied to patient. Non-skid socks provided and placed on patient. The fall risk is indicated using  dome light . Based on score, a bed alarm was indicated and applied. The call light is within the patient's reach, and instructions for use were provided. The bed is in the lowest position with wheels locked. The patient has been advised to notify staff, using the call light, if there is a need to get up or use restroom. The patient verbalized understanding of safety precautions and how to contact staff for assistance.       Priscila Cotto RN  05/09/21 0683

## 2021-05-09 NOTE — PROGRESS NOTES
Patient admitted to room 4267. Vital signs stable. Patient denies pain at this time. Call light in reach and bed alarm on. Daughters at bedside. Will monitor.  Electronically signed by Sherwin Berg RN on 5/9/2021 at 4:13 PM

## 2021-05-09 NOTE — ED PROVIDER NOTES
[facility]  eMERGENCY dEPARTMENT eNCOUnter   Physician Attestation    Pt Name: Joe Alaniz  MRN: [id number]  [place] 12/30/1929  Date of evaluation: 5/9/21        Physician Note:    I havepersonally performed and/or participated in the history, exam and medical decision making and agree with all pertinent clinical information. I have also reviewed and agree with the past medical, family and social historyunless otherwise noted. I have personally performed a face to face diagnostic evaluation onthis patient. I have reviewed the mid-levels findings and agree. History: This is a 77-year-old female who still lives at home by herself. Basically last night she got dizzy and had to fell to the ground. She does not think she passed out. She basically could not get herself up because she just does not have the strength in her upper extremities. She was on the ground all night long. Clearly struck her head and has a laceration to her right forehead. Physical Exam  Vitals signs and nursing note reviewed. Constitutional:       Appearance: She is well-developed. She is not diaphoretic. HENT:      Head: Normocephalic. Comments: Right periorbital ecchymosis. Right superior forehead 1.8 cm laceration. Surrounding abrasion and hematoma. Right Ear: External ear normal.      Left Ear: External ear normal.   Eyes:      General: No scleral icterus. Right eye: No discharge. Left eye: No discharge. Extraocular Movements: Extraocular movements intact. Conjunctiva/sclera: Conjunctivae normal.   Neck:      Musculoskeletal: Normal range of motion. Trachea: No tracheal deviation. Pulmonary:      Effort: Pulmonary effort is normal. No respiratory distress. Breath sounds: No stridor. Musculoskeletal: Normal range of motion. Comments: Clinically no signs of a hip fracture. She is able to flex both hips well.   A little less on the left but she does have some chronic hip and knee problems on the left side so her range mobility for that side is basically her baseline. Skin:     General: Skin is warm and dry. Neurological:      General: No focal deficit present. Mental Status: She is alert and oriented to person, place, and time. GCS: GCS eye subscore is 4. GCS verbal subscore is 5. GCS motor subscore is 6. Cranial Nerves: Cranial nerves are intact. Coordination: Coordination normal.   Psychiatric:         Behavior: Behavior normal.     Lac Repair    Date/Time: 5/9/2021 1:24 PM  Performed by: Myra Hall MD  Authorized by: Myar Hall MD     Consent:     Consent obtained:  Verbal and written    Consent given by:  Patient  Anesthesia (see MAR for exact dosages): Anesthesia method:  Local infiltration    Local anesthetic:  Lidocaine 1% w/o epi  Laceration details:     Location:  Face    Face location:  Forehead    Length (cm):  1.8    Depth (mm):  7  Repair type:     Repair type:  Simple  Pre-procedure details:     Preparation:  Patient was prepped and draped in usual sterile fashion  Exploration:     Hemostasis achieved with:  Direct pressure    Wound exploration: entire depth of wound probed and visualized      Contaminated: no    Treatment:     Area cleansed with:  Hibiclens    Amount of cleaning:  Standard    Irrigation solution:  Sterile saline    Irrigation method:  Syringe    Visualized foreign bodies/material removed: no    Skin repair:     Repair method:  Sutures    Suture size:  5-0    Suture material:  Nylon    Suture technique:  Simple interrupted    Number of sutures:  3  Approximation:     Approximation:  Close  Post-procedure details:     Dressing:  Antibiotic ointment    Patient tolerance of procedure: Tolerated well, no immediate complications      EKG visualized preliminarily interpreted by myself. Rhythm is sinus bradycardia at a rate of 57 with an axis of -19.   Left ventricular perjury with polarization changes are seen. Intervals are normal no acute injury or acute ischemia. 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 Loss of R wave in lead V2 is a new finding. 1. Injury of head, initial encounter    2. Fall, initial encounter    3. Facial laceration, initial encounter    4. Orthostasis          DISPOSITION/PLAN  PATIENT REFERRED TO:  No follow-up provider specified.   DISCHARGE MEDICATIONS:  New Prescriptions    No medications on file         MD Cam Mckinney MD  05/09/21 2313

## 2021-05-09 NOTE — ED NOTES
Wound care completed on laceration above right eye    Skin tear noted below right elbow, wound care completed, mepitel dressing applied      Taj Albarado RN  05/09/21 1111

## 2021-05-09 NOTE — ED NOTES
Checked off orthostatic vitals as completed in error.      Marilyn Pantoja [facility]  05/09/21 1125

## 2021-05-10 LAB
EKG ATRIAL RATE: 57 BPM
EKG DIAGNOSIS: NORMAL
EKG P AXIS: 37 DEGREES
EKG P-R INTERVAL: 176 MS
EKG Q-T INTERVAL: 442 MS
EKG QRS DURATION: 112 MS
EKG QTC CALCULATION (BAZETT): 430 MS
EKG R AXIS: -19 DEGREES
EKG T AXIS: 57 DEGREES
EKG VENTRICULAR RATE: 57 BPM
ESTIMATED AVERAGE GLUCOSE: 165.7 MG/DL
GLUCOSE BLD-MCNC: 142 MG/DL (ref 70–99)
GLUCOSE BLD-MCNC: 158 MG/DL (ref 70–99)
GLUCOSE BLD-MCNC: 168 MG/DL (ref 70–99)
GLUCOSE BLD-MCNC: 205 MG/DL (ref 70–99)
HBA1C MFR BLD: 7.4 %
LV EF: 58 %
LVEF MODALITY: NORMAL
PERFORMED ON: ABNORMAL

## 2021-05-10 PROCEDURE — 93306 TTE W/DOPPLER COMPLETE: CPT

## 2021-05-10 PROCEDURE — 1200000000 HC SEMI PRIVATE

## 2021-05-10 PROCEDURE — 93010 ELECTROCARDIOGRAM REPORT: CPT | Performed by: INTERNAL MEDICINE

## 2021-05-10 PROCEDURE — 97162 PT EVAL MOD COMPLEX 30 MIN: CPT

## 2021-05-10 PROCEDURE — 6370000000 HC RX 637 (ALT 250 FOR IP): Performed by: HOSPITALIST

## 2021-05-10 PROCEDURE — 97530 THERAPEUTIC ACTIVITIES: CPT

## 2021-05-10 PROCEDURE — 2580000003 HC RX 258: Performed by: HOSPITALIST

## 2021-05-10 PROCEDURE — 94760 N-INVAS EAR/PLS OXIMETRY 1: CPT

## 2021-05-10 RX ORDER — LEVOTHYROXINE SODIUM 112 UG/1
112 TABLET ORAL DAILY
Status: DISCONTINUED | OUTPATIENT
Start: 2021-05-11 | End: 2021-05-11 | Stop reason: HOSPADM

## 2021-05-10 RX ADMIN — SODIUM CHLORIDE, POTASSIUM CHLORIDE, SODIUM LACTATE AND CALCIUM CHLORIDE: 600; 310; 30; 20 INJECTION, SOLUTION INTRAVENOUS at 08:42

## 2021-05-10 RX ADMIN — Medication 10 ML: at 22:27

## 2021-05-10 RX ADMIN — ATORVASTATIN CALCIUM 40 MG: 40 TABLET, FILM COATED ORAL at 08:38

## 2021-05-10 RX ADMIN — INSULIN LISPRO 1 UNITS: 100 INJECTION, SOLUTION INTRAVENOUS; SUBCUTANEOUS at 17:11

## 2021-05-10 RX ADMIN — LEVOTHYROXINE SODIUM 112 MCG: 0.11 TABLET ORAL at 08:38

## 2021-05-10 RX ADMIN — INSULIN LISPRO 1 UNITS: 100 INJECTION, SOLUTION INTRAVENOUS; SUBCUTANEOUS at 22:27

## 2021-05-10 RX ADMIN — SODIUM CHLORIDE, POTASSIUM CHLORIDE, SODIUM LACTATE AND CALCIUM CHLORIDE: 600; 310; 30; 20 INJECTION, SOLUTION INTRAVENOUS at 22:31

## 2021-05-10 RX ADMIN — INSULIN LISPRO 1 UNITS: 100 INJECTION, SOLUTION INTRAVENOUS; SUBCUTANEOUS at 08:37

## 2021-05-10 RX ADMIN — INSULIN LISPRO 2 UNITS: 100 INJECTION, SOLUTION INTRAVENOUS; SUBCUTANEOUS at 12:01

## 2021-05-10 ASSESSMENT — PAIN SCALES - GENERAL: PAINLEVEL_OUTOF10: 0

## 2021-05-10 NOTE — PROGRESS NOTES
Physical Therapy    Facility/Department: [address] MED SURG  Initial Assessment    NAME: Rafael Rashid  : 1929  MRN: [id number]    Date of Service: 5/10/2021  Assessment: Rafael Rashid is a 80 y.o. female who presents to the emergency department on 21 after fall, possible LOC, hit head. Patient and daughter in law report she has been dizzy with standing for the past week. Pt reporting she lived alone in one story home prior to admission, independent with ADLs, DIL assisting with laundry/cleaning/groceries, ambulating with rollator inside of home. Pt currently functioning slightly below baseline. DIL adamant about pt not going to nursing facility. Recommend return home with initial 24-48hr supv and level 3 HHPT. Discussed the benefits of used the RW vs the rollator. Discharge Recommendations:  Patient would benefit from continued therapy after discharge, S Level 3, Home with Home health PT     Rafael Rashid scored a 19/24 on the AM-PAC short mobility form. Current research shows that an AM-PAC score of 18 or greater is typically associated with a discharge to the patient's home setting. Based on the patient's AM-PAC score and their current functional mobility deficits, it is recommended that the patient have 2-3 sessions per week of Physical Therapy at d/c to increase the patient's independence. At this time, this patient demonstrates the endurance and safety to discharge home with HHPT and a follow up treatment frequency of 2-3x/wk. Please see assessment section for further patient specific details. If patient discharges prior to next session this note will serve as a discharge summary. Please see below for the latest assessment towards goals. Assessment   Body structures, Functions, Activity limitations: Decreased functional mobility ; Decreased strength;Decreased balance  Assessment: Rafael Rashid is a 80 y.o. female who presents to the emergency department on 21 after fall, possible LOC, hit head. Patient and daughter in law report she has been dizzy with standing for the past week. Pt reporting she lived alone in one story home prior to admission, independent with ADLs, DIL assisting with laundry/cleaning/groceries, ambulating with rollator inside of home. Pt currently functioning slightly below baseline. DIL adamant about pt not going to nursing facility. Recommend return home with initial 24-48hr supv and level 3 HHPT. Discussed the benefits of used the RW vs the rollator. Treatment Diagnosis: impaired mobility  Prognosis: Fair  Decision Making: Medium Complexity  History: see below  Exam: see below  Clinical Presentation: evolving  PT Education: PT Role;Plan of Care;Goals; Family Education;Transfer Training;Gait Training;Functional Mobility Training  REQUIRES PT FOLLOW UP: Yes  Activity Tolerance  Activity Tolerance: Patient Tolerated treatment well       Patient Diagnosis(es): The primary encounter diagnosis was Injury of head, initial encounter. Diagnoses of Fall, initial encounter, Facial laceration, initial encounter, and Orthostasis were also pertinent to this visit. has a past medical history of Arthritis, Cataract, Diabetes ([address] High blood pressure, Kidney problem, Neuropathy, and Thyroid disorder. has a past surgical history that includes Appendectomy; Hysterectomy; Ovary removal; Total knee arthroplasty (Right, 2006); Carpal tunnel release (Left, 09/05/2017); Colonoscopy; Carpal tunnel release (Right, 09/26/2017); and Finger trigger release (Right, 09/26/2017).     Restrictions  Restrictions/Precautions  Restrictions/Precautions: Fall Risk  Position Activity Restriction  Other position/activity restrictions: Orthostatics 5/10/21: supine 183/80, seated 179/73; standing 150/60 (pt denies any dizziness)     Vision/Hearing  Vision: Impaired  Vision Exceptions: Wears glasses for reading  Hearing: Within functional limits       Subjective  General  Chart Reviewed: Decreased awareness of deficits  Initiation: Does not require cues  Sequencing: Does not require cues    Objective     Observation/Palpation  Observation: bruising to right eye       Strength RLE  Strength RLE: WFL  Strength LLE  Strength LLE: Exception  Comment: mild to moderate generalized weakness - moreso proximally  Motor Control  Gross Motor?: WFL     Bed mobility  Rolling to Right: Stand by assistance  Supine to Sit: Contact guard assistance  Sit to Supine: Contact guard assistance  Scooting: Stand by assistance  Transfers  Sit to Stand: Stand by assistance  Stand to sit: Stand by assistance  Comment: Pt requires cues for hand placement  Ambulation  Ambulation?: Yes  Ambulation 1  Surface: level tile  Device: Rolling Walker  Assistance: Stand by assistance;Contact guard assistance  Quality of Gait: +trendelenberg on left; appears antalgic due to arthritic left hip and knee  Gait Deviations: Slow Yasmeen;Decreased step length;Decreased step height  Distance: 5' + 40'  Comments: Pt and DIL reporting she has been ambulating with the trendelenberg for several years now. Stairs/Curb  Stairs?: No     Balance  Posture: Fair  Sitting - Static: Good  Sitting - Dynamic: Good  Standing - Static: [email or web address])  Standing - Dynamic: [email or web address])        Plan   Plan  Times per week: 3-5x/week  Current Treatment Recommendations: Strengthening, Functional Mobility Training, Transfer Training, Balance Training, Gait Training, Stair training, Patient/Caregiver Education & Training, Safety Education & Training, Neuromuscular Re-education  Safety Devices  Type of devices:  All fall risk precautions in place, Call light within reach, Gait belt, Patient at risk for falls, Left in chair, Chair alarm in place, Nurse notified      AM-PAC Score  AM-PAC Inpatient Mobility Raw Score : 19 (05/10/21 1116)  AM-PAC Inpatient T-Scale Score : 45.44 (05/10/21 1116)  Mobility Inpatient CMS 0-100% Score: 41.77 (05/10/21 1116)  Mobility Inpatient CMS G-Code Modifier : CK (05/10/21 1116)          Goals  Short term goals  Time Frame for Short term goals: by acute discharge  Short term goal 1: bed mobility with supv  Short term goal 2: sit<>stand with supv  Short term goal 3: ambulate > 36' with RW and SBA  Patient Goals   Patient goals : none stated       Therapy Time   Individual Concurrent Group Co-treatment   Time In 1000         Time Out 1100         Minutes 60         Timed Code Treatment Minutes: 45 Minutes       Wilmer Shea PT

## 2021-05-10 NOTE — PROGRESS NOTES
ALKPHOS 83     No results for input(s): INR in the last 72 hours. Recent Labs     05/09/21  1104 05/09/21  1530 05/09/21  1847   CKTOTAL 154  --   --    TROPONINI <0.01 <0.01 <0.01       Urinalysis:      Lab Results   Component Value Date    NITRU POSITIVE 05/09/2021    WBCUA 20 05/09/2021    BACTERIA 2+ 05/09/2021    RBCUA 3 05/09/2021    BLOODU TRACE 05/09/2021    SPECGRAV 1.015 05/09/2021    GLUCOSEU Negative 05/09/2021       Radiology:  XR ELBOW RIGHT (MIN 3 VIEWS)   Final Result   No acute osseous injury of the right elbow is identified. CT HEAD WO CONTRAST   Final Result   No acute intracranial abnormality. No acute traumatic injury of the facial bones. Old C1 and C2 fractures as discussed above. No new fracture. CT CERVICAL SPINE WO CONTRAST   Final Result   No acute intracranial abnormality. No acute traumatic injury of the facial bones. Old C1 and C2 fractures as discussed above. No new fracture. CT FACIAL BONES WO CONTRAST   Final Result   No acute intracranial abnormality. No acute traumatic injury of the facial bones. Old C1 and C2 fractures as discussed above. No new fracture.                      Electronically signed by Clark Jones MD on 5/10/2021 at 2:34 PM

## 2021-05-10 NOTE — PLAN OF CARE
Problem: Falls - Risk of:  Goal: Will remain free from falls  Description: Will remain free from falls  5/10/2021 1005 by Adela Charlton RN  Outcome: Ongoing  5/10/2021 0039 by Carrol Birmingham RN  Outcome: Ongoing  Goal: Absence of physical injury  Description: Absence of physical injury  5/10/2021 1005 by Adela Charlton RN  Outcome: Ongoing  5/10/2021 0039 by Carrol Birmingham RN  Outcome: Ongoing     Problem: Skin Integrity:  Goal: Will show no infection signs and symptoms  Description: Will show no infection signs and symptoms  Outcome: Ongoing  Goal: Absence of new skin breakdown  Description: Absence of new skin breakdown  Outcome: Ongoing

## 2021-05-10 NOTE — PROGRESS NOTES
Occupational Therapy    OT referral received and appreciated. Pt's chart reviewed. Pt currently off of floor for testing on attempt. Will follow up later as schedule permits or next available date.      Electronically signed by GEE Guido on 5/10/2021 at 2:47 PM

## 2021-05-10 NOTE — CARE COORDINATION
INITIAL CASE MANAGEMENT ASSESSMENT    Reviewed chart, met with patient to assess possible discharge needs. Explained Case Management role/services. Living Situation: lives alone in a one story home, 2+2 stairs to enter with rails     ADLs: independent, daughter provides assistance with grocery and laundry     DME: grab bars, shower chair, rollator, cane, alert button, 4ww, transport chair     PT/OT Recs: DIL adamant about pt not going to nursing facility. Recommend return home with initial 24-48hr supv and level 3 HHPT. Discussed the benefits of used the RW vs the rollator. Patient would benefit from continued therapy after discharge, S Level 3, Home with Home health PT      Active Services: none reported     Transportation: daughter/ family to transport      Medications: [facility], no issues obtaining or taking medications     PCP: confirmed Kristen Argueta MD     PLAN/COMMENTS: Plan is home with family support, daughter states they may be able to provide 24 hour care if needed, they are refusing SNF, patient would like to be home independently, family would like referral to [facility] -  call to [place] who will follow     SW/CM provided contact information for patient or family to call with any questions. SW/CM will follow and assist as needed. The Plan for Transition of Care is related to the following treatment goals: independent at home     The Patient was provided with a choice of provider and agrees   with the discharge plan. [x] Yes [] No    Freedom of choice list was provided with basic dialogue that supports the patient's individualized plan of care/goals, treatment preferences and shares the quality data associated with the providers.  [x] Yes [] No    Liu DASILVA, LEE ANNW-S, Social Work  [phone]    Electronically signed by VIDYA Levi, SAGARW on 5/10/2021 at 12:56 PM

## 2021-05-10 NOTE — CONSULTS
[address] [place] [address] Nephrology   [facility] [employer]  [phone]  Nephrology Consult Note          Patient ID: Juan Luis Devine  Referring/ Physician: Zoie Olguin MD      Assessment/Plan:   Juan Luis Devine is being seen by nephrology for orthostatic hypotension and uncontrolled supine hypertension. Here with fall, possible syncope. Found to have positive orthostatic vitals on admission. Plan:     - recommend compression hose during the day, especially with ambulating.   - agree with gentle IVFs and repeat orthostatic vitals after addressing intravascular volume depletion. Was on lasix. - would avoid treating HTN right now unless SBP > 180 or DBP > 110 , her goal < 160/90  - check an ECHO. Abnormal ECG, bradycardia and possible syncope. - will try to add back lisinopril if SBP> 180 consistently prior to discharge,   Avoid BB  - discontinue doxazosin and lasix at discharge          #uncontrolled BP:  Has orthostatic hypotension accompanied by supine hypertension. BP goal will need to be raised for her. Home SBP  160-180. /73  ECG showed    Sinus bradycardiaLeft ventricular hypertrophy with QRS widening and repolarization abnormalityCannot rule out Septal infarct , age undeterminedAbnormal ECGWhen compared with ECG of 07-JUL-2018 09:13,Minimal criteria for Septal infarct are now     #Orthostatic hypotension:   Likely autonomic neuropathy  Her HR was unchanged with standing. Should avoid negative inotrope for her. Likely caused by starting alpha blocker in the setting of diuretic use. Her symptoms of dizziness developed after starting Doxazosin recently. She was also on furosemide for edema, but her edema is non pitting and seems most consistent with venous insufficiency. She may also have diabetic neuropathy and age related reduced barorecepter sensitivity. #Volume:   She has some non pitting edema in her legs  EF not known. Appears euvolemic. On room air.      #DM  For few palpitations, dizzy, edema  Gastrointestinal: nausea, vomiting, diarrhea, constipation,belly pain    Yellow skin, blood in stool  Musculoskeletal:  back pain, muscle weakness, gait problems,       joint pain or swelling. Genitourinary:  dysuria, poor urine flow, flank pain, blood in urine  Neurologic:  vertigo, TIA'S, syncope, seizures, focal weakness  Psychosocial:  insomnia, anxiety, or depression. Additional positive findings: -     PMH/SH/FH:    Medical Hx: reviewed and updated as appropriate  Past Medical History:   Diagnosis Date    Arthritis     Cataract     Diabetes ([facility] [address]     High blood pressure     Kidney problem     Neuropathy     Thyroid disorder          Surgical Hx: reviewed and updated as appropriate   has a past surgical history that includes Appendectomy; Hysterectomy; Ovary removal; Total knee arthroplasty (Right, 2006); Carpal tunnel release (Left, 09/05/2017); Colonoscopy; Carpal tunnel release (Right, 09/26/2017); and Finger trigger release (Right, 09/26/2017). Social Hx: reviewed and updated as appropriate  Social History     Tobacco Use    Smoking status: Never Smoker    Smokeless tobacco: Never Used   Substance Use Topics    Alcohol use: Yes     Comment: occas        Family hx: reviewed and updated as appropriate  family history is not on file. Medications:   [START ON 5/11/2021] levothyroxine, 112 mcg, Daily  Tetanus-Diphth-Acell Pertussis, 0.5 mL, Once  sodium chloride flush, 5-40 mL, 2 times per day  insulin lispro, 0-6 Units, TID WC  insulin lispro, 0-3 Units, Nightly  atorvastatin, 40 mg, Daily       Gabapentin, Pravachol [pravastatin sodium], and Tegaderm ag mesh 2\"x2\" [wound dressings]    Allergies:    Allergies   Allergen Reactions    Gabapentin      LOWER LEG SWELLING    Pravachol [Pravastatin Sodium] Rash    Tegaderm Ag Mesh 2\"X2\" [Wound Dressings] Rash         Physical Exam/Objective:   Vitals:    05/10/21 0934   BP: (!) 152/73   Pulse: 57   Resp: 18   Temp: 98.3 °F (36.8 °C)   SpO2: 95%       Intake/Output Summary (Last 24 hours) at 5/10/2021 1312  Last data filed at 5/10/2021 0839  Gross per 24 hour   Intake 1480 ml   Output 400 ml   Net 1080 ml         General appearance: female in no acute distress, comfortable, communicative and fully alert and oriented x 3. HEENT: Lips and teeth normal, no icterus, EOM intact, trachea midline. Neck : no masses, appears symmetrical and no JVD appreciated. Respiratory: Respiratory effort normal, bilateral equal chest rise. No wheeze, no crackles   Cardiovascular: Ausculation shows RR. Minimal non pitting  edema   Abdomen: abdomen is soft, non distended, no masses, no pain with palpation.   Musculoskeletal:  no joint swelling, no deformity, strength grossly normal.   Skin: no rashes, no induration, no tightening, no jaundice   Neuro/psychiatric:  Judgement and insight is normal. Follows commands, moves all extremities spontaneously       Data:   CBC:   Recent Labs     05/09/21  1104   WBC 15.6*   HGB 11.2*   HCT 33.7*        BMP:    Recent Labs     05/09/21  1104      K 4.8      CO2 21   BUN 28*   CREATININE 1.1   GLUCOSE 150*

## 2021-05-11 VITALS
TEMPERATURE: 98.5 F | SYSTOLIC BLOOD PRESSURE: 175 MMHG | DIASTOLIC BLOOD PRESSURE: 57 MMHG | HEART RATE: 58 BPM | RESPIRATION RATE: 14 BRPM | HEIGHT: 64 IN | OXYGEN SATURATION: 97 % | BODY MASS INDEX: 37.52 KG/M2 | WEIGHT: 219.8 LBS

## 2021-05-11 LAB
ALBUMIN SERPL-MCNC: 3.5 G/DL (ref 3.4–5)
ANION GAP SERPL CALCULATED.3IONS-SCNC: 9 MMOL/L (ref 3–16)
BUN BLDV-MCNC: 19 MG/DL (ref 7–20)
CALCIUM SERPL-MCNC: 9.1 MG/DL (ref 8.3–10.6)
CHLORIDE BLD-SCNC: 103 MMOL/L (ref 99–110)
CO2: 24 MMOL/L (ref 21–32)
CREAT SERPL-MCNC: 0.7 MG/DL (ref 0.6–1.2)
GFR AFRICAN AMERICAN: >60
GFR NON-AFRICAN AMERICAN: >60
GLUCOSE BLD-MCNC: 129 MG/DL (ref 70–99)
GLUCOSE BLD-MCNC: 153 MG/DL (ref 70–99)
GLUCOSE BLD-MCNC: 161 MG/DL (ref 70–99)
HCT VFR BLD CALC: 29.9 % (ref 36–48)
HEMOGLOBIN: 10.1 G/DL (ref 12–16)
MCH RBC QN AUTO: 32 PG (ref 26–34)
MCHC RBC AUTO-ENTMCNC: 33.7 G/DL (ref 31–36)
MCV RBC AUTO: 95 FL (ref 80–100)
PDW BLD-RTO: 14.6 % (ref 12.4–15.4)
PERFORMED ON: ABNORMAL
PERFORMED ON: ABNORMAL
PHOSPHORUS: 3.2 MG/DL (ref 2.5–4.9)
PLATELET # BLD: 253 K/UL (ref 135–450)
PMV BLD AUTO: 7.9 FL (ref 5–10.5)
POTASSIUM SERPL-SCNC: 4.5 MMOL/L (ref 3.5–5.1)
RBC # BLD: 3.15 M/UL (ref 4–5.2)
SODIUM BLD-SCNC: 136 MMOL/L (ref 136–145)
WBC # BLD: 10.3 K/UL (ref 4–11)

## 2021-05-11 PROCEDURE — 2580000003 HC RX 258: Performed by: HOSPITALIST

## 2021-05-11 PROCEDURE — 97530 THERAPEUTIC ACTIVITIES: CPT

## 2021-05-11 PROCEDURE — 36415 COLL VENOUS BLD VENIPUNCTURE: CPT

## 2021-05-11 PROCEDURE — 80069 RENAL FUNCTION PANEL: CPT

## 2021-05-11 PROCEDURE — 85027 COMPLETE CBC AUTOMATED: CPT

## 2021-05-11 PROCEDURE — 6370000000 HC RX 637 (ALT 250 FOR IP): Performed by: INTERNAL MEDICINE

## 2021-05-11 PROCEDURE — 97535 SELF CARE MNGMENT TRAINING: CPT

## 2021-05-11 PROCEDURE — 6370000000 HC RX 637 (ALT 250 FOR IP): Performed by: HOSPITALIST

## 2021-05-11 PROCEDURE — 97166 OT EVAL MOD COMPLEX 45 MIN: CPT

## 2021-05-11 RX ORDER — LISINOPRIL 40 MG/1
40 TABLET ORAL DAILY
Status: DISCONTINUED | OUTPATIENT
Start: 2021-05-11 | End: 2021-05-11

## 2021-05-11 RX ORDER — LISINOPRIL 20 MG/1
20 TABLET ORAL DAILY
Qty: 30 TABLET | Refills: 3 | Status: SHIPPED | OUTPATIENT
Start: 2021-05-12 | End: 2021-06-01 | Stop reason: SDUPTHER

## 2021-05-11 RX ORDER — LISINOPRIL 20 MG/1
20 TABLET ORAL DAILY
Status: DISCONTINUED | OUTPATIENT
Start: 2021-05-11 | End: 2021-05-11 | Stop reason: HOSPADM

## 2021-05-11 RX ADMIN — INSULIN LISPRO 1 UNITS: 100 INJECTION, SOLUTION INTRAVENOUS; SUBCUTANEOUS at 13:42

## 2021-05-11 RX ADMIN — LEVOTHYROXINE SODIUM 112 MCG: 0.11 TABLET ORAL at 05:45

## 2021-05-11 RX ADMIN — LISINOPRIL 20 MG: 20 TABLET ORAL at 09:54

## 2021-05-11 RX ADMIN — ACETAMINOPHEN 650 MG: 325 TABLET ORAL at 05:52

## 2021-05-11 RX ADMIN — Medication 10 ML: at 09:54

## 2021-05-11 RX ADMIN — ATORVASTATIN CALCIUM 40 MG: 40 TABLET, FILM COATED ORAL at 09:54

## 2021-05-11 ASSESSMENT — PAIN SCALES - GENERAL
PAINLEVEL_OUTOF10: 3
PAINLEVEL_OUTOF10: 6
PAINLEVEL_OUTOF10: 0

## 2021-05-11 ASSESSMENT — PAIN DESCRIPTION - PROGRESSION: CLINICAL_PROGRESSION: NOT CHANGED

## 2021-05-11 ASSESSMENT — PAIN DESCRIPTION - LOCATION: LOCATION: ELBOW

## 2021-05-11 ASSESSMENT — PAIN DESCRIPTION - FREQUENCY: FREQUENCY: CONTINUOUS

## 2021-05-11 ASSESSMENT — PAIN DESCRIPTION - PAIN TYPE: TYPE: CHRONIC PAIN

## 2021-05-11 ASSESSMENT — PAIN DESCRIPTION - ORIENTATION: ORIENTATION: LEFT

## 2021-05-11 NOTE — H&P
[facility]  Received referral from case management    Faxed orders to [address] care to see patient by   5/13/2021  Keri Valdez LPN    [address] [facility] Cell [phone], Fax [phone]

## 2021-05-11 NOTE — PROGRESS NOTES
RN performed dressing change: lidocaine periwound, mepilex transfer, ace wrap, per patient request. Patient rates pain 8 out of 10 stating \" I try not to take so much pain medication\" RN educated patient on pain control. Will continue to monitor.

## 2021-05-11 NOTE — DISCHARGE INSTR - COC
Continuity of Care Form    Patient Name: Waynetta Romberg   :  1929  MRN:  [id number]    Admit date:  2021  Discharge date:  21    Code Status Order: DNR-CCA   Advance Directives:   [facility] Documentation       Date/Time Healthcare Directive Type of Healthcare Directive Copy in [address] Agent's Name Healthcare Agent's Phone Number    21 0342  Yes, patient has an advance directive for healthcare treatment  Living will;Durable power of  for health care                    Admitting Physician:  Susana Zarate MD  PCP: Jann Razo MD    Discharging Nurse: Susanna Lanier. [employer].   [address] Unit/Room#: O3P-1401/4928-97  Discharging Unit Phone Number: [phone]    Emergency Contact:   Extended Emergency Contact Information  Primary Emergency Contact: Hector [address] Phone: [phone]  Relation: Child  Secondary Emergency Contact: [address] [place] [address] Phone: [phone]  Relation: Child    Past Surgical History:  Past Surgical History:   Procedure Laterality Date    APPENDECTOMY      CARPAL TUNNEL RELEASE Left 2017      Left carpal tunnel release  & Left Thumb, Index Finger and Middle Finger trigger finger release    CARPAL TUNNEL RELEASE Right 2017    COLONOSCOPY      FINGER TRIGGER RELEASE Right 2017    Thumb & Index Finger    HYSTERECTOMY      OVARY REMOVAL      TOTAL KNEE ARTHROPLASTY Right     Dr. Rebecca Kelly       Immunization History:   Immunization History   Administered Date(s) Administered    Influenza Vaccine, unspecified formulation 10/10/2003, 10/05/2004, 10/04/2005, 2012, 10/01/2014, 2017    Influenza Virus Vaccine 2017    Influenza, High Dose (Fluzone 65 yrs and older) 2018    Influenza, Sampson Foots, Recombinant, IM PF (Flublok 18 yrs and older) 10/05/2019    Influenza, Quadv, adjuvanted, 65 yrs +, IM, PF (Fluad) 10/26/2020    Pneumococcal Conjugate 13-valent (Bcbyfgx13) 02/15/2018    Pneumococcal Polysaccharide (Vblypyoxb25) 02/26/2019, 10/05/2019    Tdap (Boostrix, Adacel) 11/20/2017    Zoster Live (Zostavax) 11/20/2017    Zoster Recombinant (Shingrix) 10/05/2019, 12/23/2019       Active Problems:  Patient Active Problem List   Diagnosis Code    Carpal tunnel syndrome, bilateral G56.03    Trigger index finger M65.329    Trigger thumb of both thumbs M65.311, M65.312    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 Z96.651    Arthritis of left knee M17.12    Type 2 diabetes mellitus without complication, without long-term current use of insulin (HCC) E11.9    Essential hypertension I10    Mixed hyperlipidemia E78.2    Pedal edema R60.0    Other secondary kyphosis, 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    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    Closed fracture of second cervical vertebra ([facility]) S12.100A    Closed fracture of first cervical vertebra ([facility]) S12.000A    Lymphedema I89.0    High risk medication use Z79.899    Chronic neck pain M54.2, G89.29    Rotator cuff arthropathy of right shoulder M12.811    Bradycardia R00.1    Arthritis of left hip M16.12    Orthostatic syncope I95.1       Isolation/Infection:   Isolation            No Isolation          Patient Infection Status       None to display            Nurse Assessment:  Last Vital Signs: BP (!) 175/57   Pulse 58   Temp 98.5 °F (36.9 °C) (Oral)   Resp 14   Ht 5' 4\" (1.626 m)   Wt 219 lb 12.8 oz (99.7 kg)   SpO2 97%   BMI 37.73 kg/m²     Last documented pain score (0-10 scale): Pain Level: 6  Last Weight:   Wt Readings from Last 1 Encounters:   05/11/21 219 lb 12.8 oz (99.7 kg)     Mental Status:  oriented and alert    IV Access:  - None    Nursing Mobility/ADLs:  Walking   Assisted  Transfer  Assisted  Bathing  Assisted  Dressing  Assisted  Toileting  Assisted  Feeding  Independent  Med Admin  Assisted  Med Delivery   whole    Wound Care Documentation and Therapy:  Incision (Active)   Number of days:        Incision 09/05/17 Hand Left (Active)   Number of days: 7653       Incision 09/26/17 Hand Right (Active)   Number of days: 9505        Elimination:  Continence:   · Bowel: Yes  · Bladder: Yes  Urinary Catheter: None   Colostomy/Ileostomy/Ileal Conduit: No       Date of Last BM:05/10/2021    Intake/Output Summary (Last 24 hours) at 5/11/2021 1233  Last data filed at 5/11/2021 0307  Gross per 24 hour   Intake 3397.5 ml   Output    Net 3397.5 ml     I/O last 3 completed shifts: In: 3877.5 [P.O.:1200; I.V.:2677.5]  Out: -     Safety Concerns:     History of Falls (last 30 days)    Impairments/Disabilities:      None    Nutrition Therapy:  Current Nutrition Therapy:   - Oral Diet:  General    Routes of Feeding: Oral  Liquids: No Restrictions  Daily Fluid Restriction: no  Last Modified Barium Swallow with Video (Video Swallowing Test): not done    Treatments at the Time of Hospital Discharge:   Respiratory Treatments: N/A  Oxygen Therapy:  is not on home oxygen therapy.   Ventilator:    - No ventilator support    Rehab Therapies: Physical Therapy, Occupational Therapy and Nurse  Weight Bearing Status/Restrictions: No weight bearing restirctions  Other Medical Equipment (for information only, NOT a DME order):  walker  Other Treatments: [facility]: LEVEL 3 [address] to establish plan of care for patient over 60 day period   Nursing  Initial home SN evaluation visit to occur within 24-48 hours for:  1)  medication management  2)  VS and clinical assessment  3)  S&S chronic disease exacerbation education + when to contact MD/NP  4)  care coordination  Medication Reconciliation during 1st SN visit  PT/OT/Speech   Evaluations in home within 24-48 hours of discharge to include DME and home safety   Frontload therapy 5 days, then 3x a week   OT to evaluate if patient has [address] needs for personal care    evaluation within 24-48 hours to evaluate resources & insurance for potential [place], [place], LTC, and Medicaid options   Palliative Care referral within 5 days of hospital discharge   PCP Visit scheduled within 3 - 7 days of hospital discharge [address] (If patient is agreeable and meets guidelines)      Patient's personal belongings (please select all that are sent with patient):  None, Dentures upper and lower    RN SIGNATURE:  Electronically signed by Raymundo Sheppard RN on 5/11/21 at 2:37 PM EDT    CASE MANAGEMENT/SOCIAL WORK SECTION    Inpatient Status Date: 5-9-21    Readmission Risk Assessment Score:  Readmission Risk              Risk of Unplanned Readmission:        11           Discharging to Facility/ Agency   · Name: [facility]  · Address:[address] [place] [address]  · Phone:[phone]  · Fax:    / signature: Electronically signed by NE Stapleton on 5/11/21 at 1:05 PM EDT    PHYSICIAN SECTION    Prognosis: Good    Condition at Discharge: Stable    Rehab Potential (if transferring to Rehab): Good    Recommended Labs or Other Treatments After Discharge:   Home nursing:  vital sign checks,  medication education  Home PT, OT:  Evaluate and treat    Physician Certification: I certify the above information and transfer of Will Downs  is necessary for the continuing treatment of the diagnosis listed and that she requires [address] for less 30 days.      Update Admission H&P: No change in H&P    PHYSICIAN SIGNATURE:  Electronically signed by Nat March MD on 5/11/21 at 12:38 PM EDT

## 2021-05-11 NOTE — CARE COORDINATION
[facility]    DC order noted, all docs needed have been faxed to [facility] for home care services.         Christian Cisse  Work mobile: [phone]  [facility] office: [phone]

## 2021-05-11 NOTE — PROGRESS NOTES
Occupational Therapy   Occupational Therapy Initial Assessment  Date: 2021   Patient Name: Barak Mederos  MRN: [id number]     : 1929    Date of Service: 2021    Assessment: Pt is 80 y.o. F who presents after being found down. Pt has chronic orthostatic dizziness, states she was walking to bathroom when she got dizzy and fell forward and hit her head. Pt was on the floor until family attempted to call patient with no answer, and went over to check on her. Pt underwent repair of R forehead laceration and has bruising to R side of face. PTA pt lives alone in house with 4 ALINE. Pt reports independence in self-care tasks, assist from family for heavier homemaking responsibilities, and uses 4WW inside home and cane for short distances outside home. Currently, pt presents with ROM/strength in [facility] however complaints of pain in L elbow d/t arthritis. Pt completed bed mobility with SBA/CGA and sit <> stand transfers with SBA. Pt completed functional mobility with RW with SBA/CGA, steady with no overt LOB. Pt completed toileting and hand washing with SBA/CGA. Anticipate pt may require min A for LB ADLs at d/c. Anticipate pt to d/c home with initial 24/7 supervision/assist and home health OT. Discharge Recommendations:  24 hour supervision or assist, Home with Home health OT, S Level 3     [address] [place] [address]: LEVEL 3 SAFETY     - Initial home health evaluation to occur within 24-48 hours, in patient home   - Therapy evaluations in home within 24-48 hours of discharge; including DME and home safety   - Frontload therapy 5 days, then 3x a week   - Therapy to evaluate if patient has [address] needs for personal care   -  evaluation within 24-48 hours, includes evaluation of resources and insurance to determine [place], [place], LTC, and Medicaid options     Assessment   Performance deficits / Impairments: Decreased functional mobility ; Decreased strength;Decreased endurance;Decreased ADL status; Decreased balance  Assessment: Pt is 80 y.o. F who presents after being found down. Pt has chronic orthostatic dizziness, states she was walking to bathroom when she got dizzy and fell forward and hit her head. Pt was on the floor until family attempted to call patient with no answer, and went over to check on her. Pt underwent repair of R forehead laceration and has bruising to R side of face. PTA pt lives alone in house with 4 ALINE. Pt reports independence in self-care tasks, assist from family for heavier homemaking responsibilities, and uses 4WW inside home and cane for short distances outside home. Currently, pt presents with ROM/strength in [facility] however complaints of pain in L elbow d/t arthritis. Pt completed bed mobility with SBA/CGA and sit <> stand transfers with SBA. Pt completed functional mobility with RW with SBA/CGA, steady with no overt LOB. Pt completed toileting and hand washing with SBA/CGA. Anticipate pt may require min A for LB ADLs at d/c. Anticipate pt to d/c home with initial 24/7 supervision/assist and home health OT. Prognosis: Fair  Decision Making: Medium Complexity  History: PMH: DM, neuropathy, HTN  Exam: ADLs, transfers, func mob, bed mob  Assistance / Modification: CGA for mobility, min A for ADLs  OT Education: Transfer Training;OT Role;Plan of Care;Precautions; ADL Adaptive Strategies  REQUIRES OT FOLLOW UP: Yes  Activity Tolerance  Activity Tolerance: Patient Tolerated treatment well           Patient Diagnosis(es): The primary encounter diagnosis was Injury of head, initial encounter. Diagnoses of Fall, initial encounter, Facial laceration, initial encounter, and Orthostasis were also pertinent to this visit. has a past medical history of Arthritis, Cataract, Diabetes ([facility] [address] High blood pressure, Kidney problem, Neuropathy, and Thyroid disorder. has a past surgical history that includes Appendectomy; Hysterectomy;  Ovary removal; Total knee arthroplasty (Right, 2006); Carpal tunnel release (Left, 09/05/2017); Colonoscopy; Carpal tunnel release (Right, 09/26/2017); and Finger trigger release (Right, 09/26/2017). Restrictions  Restrictions/Precautions  Restrictions/Precautions: Fall Risk  Position Activity Restriction  Other position/activity restrictions: Orthostatics 5/10/21: supine 183/80, seated 179/73; standing 150/60 (pt denies any dizziness)    Subjective   General  Chart Reviewed: Yes  Patient assessed for rehabilitation services?: Yes  Additional Pertinent Hx: Pt is 80 y.o. F who presents after being found down. Pt has chronic orthostatic dizziness, states she was walking to bathroom when she got dizzy and fell forward and hit her head. Pt was on the floor until family attempted to call patient with no answer, and went over to check on her. Pt underwent repair of R forehead laceration and has bruising to R side of face. PMH: DM, neuropathy, HTN  Family / Caregiver Present: No  Referring Practitioner: Twila Short MD  Diagnosis: Orthostatic syncope  Subjective  Subjective: Pt met bedside, agreeable for therapy evaluation and OOB activity. Requesting to use restroom.   Patient Currently in Pain: (Pain to L elbow)  Vital Signs  Patient Currently in Pain: (Pain to L elbow)     Social/Functional History  Social/Functional History  Lives With: Alone  Type of Home: House  Home Layout: One level, Laundry in basement  Home Access: Stairs to enter with rails  Entrance Stairs - Number of Steps: 2+2  Bathroom Shower/Tub: Tub/Shower unit, Shower chair with back  [employer]: Handicap height  Bathroom Equipment: Grab bars in shower, Grab bars around toilet, Hand-held shower, Shower chair  Home Equipment: Rolling walker, 4 wheeled walker, Cane, Alert Button(DIL just signed pt up for life alert on 5/10/21; transport chair)  Receives Help From: Family  ADL Assistance: [facility] [facility] [address]: (dtr performs laundry, takes pt to grocery store)  Ambulation Assistance: Independent(primarily uses rollator in home, [facility] for outside of home, transport chair for longer distances)  Transfer Assistance: Independent  Active : No  Additional Comments: LEONARDO present in room - reports pt has not fallen in a long time despite her arthritic left side. LEONARDO adamant about pt returning home. Objective   Vision: Impaired  Vision Exceptions: Wears glasses for reading  Hearing: Within functional limits      Orientation  Overall Orientation Status: Within Functional Limits     Observation/Palpation  Observation: bruising to right eye     Balance  Sitting Balance: Stand by assistance  Standing Balance: Contact guard assistance  Standing Balance  Time: ~2 minutes  Activity: Func mob, transfers    Functional Mobility  Functional - Mobility Device: Rolling Walker  Activity: To/from bathroom  Assist Level: Contact guard assistance  Functional Mobility Comments: Pt completed functional mobility with RW with CGA, steady with no overt LOB. Toilet Transfers  Toilet - Technique: Ambulating(RW)  Equipment Used: Grab bars(Comfort height commode)  Toilet Transfer: Contact guard assistance    ADL  Grooming: Stand by assistance;Contact guard assistance(Pt washed hands at sink with SBA/CGA)  Toileting: Contact guard assistance(Pt completed clothing management and pericare with CGA)  Additional Comments: PTA pt reports independence in self-care tasks. Anticipate pt to require min A for LB ADLs, SBA for UB ADLs, CGA for toileting. Tone RUE  RUE Tone: Normotonic  Tone LUE  LUE Tone: Normotonic  Coordination  Movements Are Fluid And Coordinated: Yes     Bed mobility  Rolling to Left: Supervision  Supine to Sit: Stand by assistance;Contact guard assistance  Sit to Supine: Unable to assess(in recliner at end of session)  Comment: Unable to obtain orthostatics as dynamap not recording systolic readings.      Transfers  Sit to stand: Contact guard assistance  Stand to sit: Contact guard assistance  Transfer Comments: CGA for sit <> stand from EOB to RW and sit to recliner chair     Cognition  Overall Cognitive Status: Exceptions  Arousal/Alertness: Appropriate responses to stimuli  Following Commands: Follows all commands without difficulty  Attention Span: Appears intact  Memory: Appears intact  Safety Judgement: Decreased awareness of need for assistance  Insights: Decreased awareness of deficits  Initiation: Does not require cues  Sequencing: Does not require cues        Sensation  Overall Sensation Status: WFL        LUE AROM (degrees)  LUE AROM : WFL  Left Hand AROM (degrees)  Left Hand AROM: WFL  RUE AROM (degrees)  RUE AROM : WFL  Right Hand AROM (degrees)  Right Hand AROM: WFL     LUE Strength  Gross LUE Strength: WFL  RUE Strength  Gross RUE Strength: WFL          Plan   Plan  Times per week: 3-5  Current Treatment Recommendations: Strengthening, Endurance Training, Balance Training, Functional Mobility Training, Safety Education & Training, Equipment Evaluation, Education, & procurement, Patient/Caregiver Education & Training, Self-Care / ADL           AM-PAC Score    Joe Alaniz scored a 20/24 on the AM-PAC ADL Inpatient form. Current research shows that an AM-PAC score of 18 or greater is typically associated with a discharge to the patient's home setting. Based on the patient's AM-PAC score, and their current ADL deficits, it is recommended that the patient have 2-3 sessions per week of Occupational Therapy at d/c to increase the patient's independence. At this time, this patient demonstrates the endurance and safety to discharge home with home health OT (home vs OP services) and a follow up treatment frequency of 2-3x/wk. Please see assessment section for further patient specific details. If patient discharges prior to next session this note will serve as a discharge summary. Please see below for the latest assessment towards goals.         AM-PAC Inpatient Daily Activity Raw Score: 20 (05/11/21 0805)  AM-PAC Inpatient ADL T-Scale Score : 42.03 (05/11/21 0805)  ADL Inpatient CMS 0-100% Score: 38.32 (05/11/21 0805)  ADL Inpatient CMS G-Code Modifier : CJ (05/11/21 0805)    Goals  Short term goals  Time Frame for Short term goals: prior to d/c  Short term goal 1: Pt will complete functional mobility and transfers with SBA  Short term goal 2: Pt will complete toileting with SBA  Short term goal 3: Pt will complete dressing with CGA  Short term goal 4: Pt will complete bathing with CGA  Short term goal 5: Pt will tolerate 3+ minutes of standing activity to increase strength and activity tolerance for self-care and transfers. Patient Goals   Patient goals : \"to go home\"       Therapy Time   Individual Concurrent Group Co-treatment   Time In       0725   Time Out       0805   Minutes       40   Timed Code Treatment Minutes: 25 Minutes(15 min eval)     If pt is discharged prior to next OT session, this note will serve as the discharge summary.     DENISE Lopez/SAGAR#[id number]

## 2021-05-11 NOTE — DISCHARGE SUMMARY
Hospital Medicine Discharge Summary    Patient ID: Rc Villagran      Patient's PCP: Bola Damico MD    Admit Date: 5/9/2021     Discharge Date:   5/11/2021     Admitting Physician: Fay Woods MD     Discharge Physician: Alex Mondragon MD     Discharge Diagnoses: Active Hospital Problems    Diagnosis Date Noted    Orthostatic syncope [I95.1] 05/09/2021       The patient was seen and examined on day of discharge and this discharge summary is in conjunction with any daily progress note from day of discharge. Hospital Course:      Anna Devries a 80 y. o. female who presented/brought to  on 5/9/2021  For  fall and dizziness       fall with scalp laceration, right periorbital ecchymosis  -CT head without intracranial bleeding  -CT cervical spine showed old C1, C2 fractures  -CT facial bones without acute fracture  -Continue current regimen.     Orthostatic hypotension ? Syncope  - nephro consulted:  Likely autonomic dysregulation  -Multifactorial, multiple antihypertensive agent, diuretics, (Cardura, Lasix, lisinopril 40 mg)  ? Diabetic neuropathy   - on discharge, holding cardura and lasix.   Lisinopril decreased to 20mg daily     -Consult nephrology team for blood pressure management specially supine hypertension.  -Compression stocking  -Obtain echocardiogram     Bacteriuria  Patient denied symptoms, received IV ceftriaxone in the [address] culture obtained . no plan to continue antibiotics       Leukocytosis  No clear nidus of infection.  follow cbc     Generalized debility  PT and OT consulted     Diabetes mellitus type II  On Metformin, on hold, insulin sliding scale, diabetic diet        Hypothyroidism  Continue Synthroid         Exam:     BP (!) 175/57   Pulse 58   Temp 98.5 °F (36.9 °C) (Oral)   Resp 14   Ht 5' 4\" (1.626 m)   Wt 219 lb 12.8 oz (99.7 kg)   SpO2 97%   BMI 37.73 kg/m²     General appearance: No apparent distress, appears stated age and cooperative. HEENT: Pupils equal, round, and reactive to light. Conjunctivae/corneas clear. Right periorbital hematoma, forehead sutured laceration. Neck: Supple, with full range of motion. No jugular venous distention. Trachea midline. Respiratory:  Normal respiratory effort. Clear to auscultation, bilaterally without Rales/Wheezes/Rhonchi. Cardiovascular: Regular rate and rhythm with normal S1/S2 without murmurs, rubs or gallops. Abdomen: Soft, non-tender, non-distended with normal bowel sounds. Musculoskelatal: No clubbing, cyanosis or edema bilaterally. Full range of motion without deformity. Skin: Skin color, texture, turgor normal.  No rashes or lesions. Neurologic:  Neurovascularly intact without any focal sensory/motor deficits. Cranial nerves: II-XII intact, grossly non-focal.  Psychiatric: Alert and oriented, thought content appropriate, normal insight      Consults:     IP CONSULT TO NEPHROLOGY  IP CONSULT TO HOME CARE NEEDS    Significant Diagnostic Studies:             Radiology:  XR ELBOW RIGHT (MIN 3 VIEWS)   Final Result   No acute osseous injury of the right elbow is identified.           CT HEAD WO CONTRAST   Final Result   No acute intracranial abnormality.       No acute traumatic injury of the facial bones.       Old C1 and C2 fractures as discussed above. No new fracture.           CT CERVICAL SPINE WO CONTRAST   Final Result   No acute intracranial abnormality.       No acute traumatic injury of the facial bones.       Old C1 and C2 fractures as discussed above. No new fracture.           CT FACIAL BONES WO CONTRAST   Final Result   No acute intracranial abnormality.       No acute traumatic injury of the facial bones.       Old C1 and C2 fractures as discussed above.   No new fracture.             PCP/SNF to follow up: BP manaement    Disposition:  Home    Condition on D/C:  Stable     Discharge Instructions/Follow-up:  F/u wit PCP within 1 week    Code Status:  DNR-CCA Activity: activity as tolerated    Diet: regular diet    Labs: For convenience and continuity at follow-up the following most recent labs are provided:      CBC:    Lab Results   Component Value Date    WBC 10.3 05/11/2021    HGB 10.1 05/11/2021    HCT 29.9 05/11/2021     05/11/2021       Renal:    Lab Results   Component Value Date     05/11/2021    K 4.5 05/11/2021     05/11/2021    CO2 24 05/11/2021    BUN 19 05/11/2021    CREATININE 0.7 05/11/2021    CALCIUM 9.1 05/11/2021    PHOS 3.2 05/11/2021       Discharge Medications:     Current Discharge Medication List           Details   lisinopril (PRINIVIL;ZESTRIL) 20 MG tablet Take 1 tablet by mouth daily  Qty: 30 tablet, Refills: 3              Details   metFORMIN (GLUCOPHAGE) 1000 MG tablet TAKE 1 TABLET TWICE A DAY WITH MEALS  Qty: 180 tablet, Refills: 3    Associated Diagnoses: Type 2 diabetes mellitus without complication, without long-term current use of insulin (HCC)      levothyroxine (SYNTHROID) 112 MCG tablet TAKE ONE TABLET BY MOUTH DAILY  Qty: 90 tablet, Refills: 3    Associated Diagnoses: Postablative hypothyroidism      atorvastatin (LIPITOR) 40 MG tablet Take 1 tablet by mouth daily  Qty: 90 tablet, Refills: 3    Associated Diagnoses: Mixed hyperlipidemia      Cranberry-Cholecalciferol 4200-500 MG-UNIT CAPS Take by mouth      Multiple Vitamin (MULTI VITAMIN DAILY PO) Take by mouth      acetaminophen (AMINOFEN) 325 MG tablet Take 2 tablets by mouth every 6 hours as needed for Pain  Qty: 120 tablet, Refills: 0      aspirin 81 MG tablet Take 81 mg by mouth daily             Time Spent on discharge is more than 1 hour in the examination, evaluation, counseling and review of medications and discharge plan. Signed: Dianna Gold MD   5/11/2021      Thank you Elizabeth Mark MD for the opportunity to be involved in this patient's care. If you have any questions or concerns please feel free to contact me at [phone].

## 2021-05-11 NOTE — PROGRESS NOTES
[facility] NEPHROLOGY                                        Inpatient Progress note    Summary:   Lesli Malcolm is being seen by nephrology for orthostatic hypotension and uncontrolled supine hypertension. Here with fall, possible syncope. Found to have positive orthostatic vitals on admission. Interval History  Seen and examined at bedside. She is sitting up to chair. Having some arthritis pain and took tylenol  Has no chest pain, no SOB and no headaches. Had dizziness yesterday AM but improved as the day went on     Echo showed EF 55-60%  G1DD with elevated LV filling pressures. Left atrium severely dilated, RV normal. No regional wall motion abnormality mentioned. BP past day   152/73 sitting   140/54  187/62  145/88  176/61    HR 50-63    Sating well on room air. Renal function stable  K 4.5  Cr 0.7      Plan:   - stop fluids. Elevated filling pressures and diastolic dysfunction on TTE  - start lisinopril 20 mg  - use compression hose, ambulate today and assess for dizziness and lightheadedness. Goal BP should be around < 160/90 supine/sitting to allow for the orthostatic drops when standing/ambulating. At [place], would stop the coreg, cardura and lasix. F/u with me in 4 weeks from dc discussed. Will need to use compression hose regularly to manage edema      Amara Brice MD  [facility]  Office: [phone]    Assessment:   #uncontrolled BP:  Has orthostatic hypotension accompanied by supine hypertension. BP goal will need to be raised for her. Home SBP  160-180. /73  ECG showed    Sinus bradycardiaLeft ventricular hypertrophy with QRS widening and repolarization abnormalityCannot rule out Septal infarct , age undeterminedAbnormal ECGWhen compared with ECG of 07-JUL-2018 09:13,Minimal criteria for Septal infarct are now     #Orthostatic hypotension:   Likely autonomic neuropathy  Her HR was unchanged with standing. Should avoid negative inotrope for her. Likely caused by starting alpha blocker in the setting of diuretic use. Her symptoms of dizziness developed after starting Doxazosin recently. She was also on furosemide for edema, but her edema is non pitting and seems most consistent with venous insufficiency. She may also have diabetic neuropathy and age related reduced barorecepter sensitivity. #Volume:   She has some non pitting edema in her legs  EF not known. Appears euvolemic. On room air. #DM  For few decades at least            ROS:   [address] All other remaining systems are negative. Constitutional:  fever, chills, weakness, weight change, fatigue,      Skin:  rash, pruritus, hair loss, bruising, dry skin, petechiae. Head, Face, Neck   headaches, swelling,  cervical adenopathy. Respiratory: shortness of breath, cough, or wheezing  Cardiovascular: chest pain, palpitations, dizzy, edema  Gastrointestinal: nausea, vomiting, diarrhea, constipation,belly pain    Yellow skin, blood in stool  Musculoskeletal:  back pain, muscle weakness, gait problems,       joint pain or swelling. Genitourinary:  dysuria, poor urine flow, flank pain, blood in urine  Neurologic:  vertigo, TIA'S, syncope, seizures, focal weakness  Psychosocial:  insomnia, anxiety, or depression. Additional positive findings: -     PMH:   Past medical history, surgical history, social history, family history are reviewed and updated as appropriate. Reviewed current medication list.   Allergies reviewed and updated as needed. PE:   Vitals:    05/11/21 0421   BP: (!) 176/61   Pulse: 60   Resp: 14   Temp: 97.3 °F (36.3 °C)   SpO2: 97%       General appearance: female in no acute distress, comfortable, communicative and fully alert and oriented x 3. HEENT: Lips and teeth normal, no icterus, EOM intact, trachea midline. Neck : no masses, appears symmetrical and no JVD appreciated.    Respiratory: Respiratory effort normal, bilateral equal chest

## 2021-05-11 NOTE — PROGRESS NOTES
Physical Therapy  Facility/Department: [address] MED SURG  Daily Treatment Note  NAME: Yari Luz  : 1929  MRN: [id number]    Date of Service: 2021    Discharge Recommendations:  Patient would benefit from continued therapy after discharge, S Level 3, Home with Home health PT     Yari Luz scored a 19/24 on the AM-PAC short mobility form. Current research shows that an AM-PAC score of 18 or greater is typically associated with a discharge to the patient's home setting. Based on the patient's AM-PAC score and their current functional mobility deficits, it is recommended that the patient have 2-3 sessions per week of Physical Therapy at d/c to increase the patient's independence. At this time, this patient demonstrates the endurance and safety to discharge home with HHPT and a follow up treatment frequency of 2-3x/wk. Please see assessment section for further patient specific details. If patient discharges prior to next session this note will serve as a discharge summary. Please see below for the latest assessment towards goals. HOME HEALTH CARE: LEVEL 3 SAFETY        -Initial home health evaluation to occur within 24-48 hours, in patient home    -Home health agency to establish plan of care for patient over 60 day period    -Medication Reconciliation    -PT/OT/Speech evaluations in home within 24-48 hours of discharge; including  -DME and home safety    -Frontload therapy 5 days, then 3x a week    -OT to evaluate if patient has [address] needs for personal care    - evaluation within 24-48 hours, includes evaluation of resources   and insurance to determine [place], [place], LTC, and Medicaid options    -PCP Visit scheduled within three to seven days of discharge       Assessment   Body structures, Functions, Activity limitations: Decreased functional mobility ; Decreased strength;Decreased balance  Assessment: Yari Luz is a 80 y.o. female who presents to the department on 5/9/21 after fall, possible LOC, hit head. Patient and daughter in law report she has been dizzy with standing for the past week. Response To Previous Treatment: Patient with no complaints from previous session. Family / Caregiver Present: No  Referring Practitioner: Nick Pratt MD  Subjective  Subjective: Pt is agreeable to PT - reports very mild dizziness this date when transitioning from supine to sitting. Cognition   Cognition  Overall Cognitive Status: Exceptions  Arousal/Alertness: Appropriate responses to stimuli  Following Commands: Follows all commands without difficulty  Attention Span: Appears intact  Memory: Appears intact  Safety Judgement: Decreased awareness of need for assistance  Insights: Decreased awareness of deficits  Initiation: Does not require cues  Sequencing: Does not require cues     Objective   Bed mobility  Rolling to Left: Supervision  Supine to Sit: Stand by assistance;Contact guard assistance  Sit to Supine: Unable to assess(in recliner at end of session)  Comment: Unable to obtain orthostatics as dynamap not recording systolic readings.   Transfers  Sit to Stand: Stand by assistance  Stand to sit: Stand by assistance  Ambulation  Ambulation?: Yes  Ambulation 1  Surface: level tile  Device: Rolling Walker  Assistance: Stand by assistance;Contact guard assistance  Quality of Gait: +trendelenberg on left; appears antalgic due to arthritic left hip and knee (chronic per family)  Gait Deviations: Slow Yasmeen;Decreased step length;Decreased step height  Distance: 10' + 30'  Stairs/Curb  Stairs?: No     Balance  Posture: Fair  Sitting - Static: Good  Sitting - Dynamic: Good  Standing - Static: [email or web address])  Standing - Dynamic: [email or web address])         AM-PAC Score  AM-PAC Inpatient Mobility Raw Score : 19 (05/11/21 0804)  AM-PAC Inpatient T-Scale Score : 45.44 (05/11/21 0804)  Mobility Inpatient CMS 0-100% Score: 41.77 (05/11/21 0804)  Mobility Inpatient CMS G-Code Modifier : CK (05/11/21 0804)          Goals  Short term goals  Time Frame for Short term goals: by acute discharge - all goals ongoing as of 5/11/21  Short term goal 1: bed mobility with supv  Short term goal 2: sit<>stand with supv  Short term goal 3: ambulate > 36' with RW and SBA  Patient Goals   Patient goals : none stated    Plan    Plan  Times per week: 3-5x/week  Current Treatment Recommendations: Strengthening, Functional Mobility Training, Transfer Training, Balance Training, Gait Training, Stair training, Patient/Caregiver Education & Training, Safety Education & Training, Neuromuscular Re-education  Safety Devices  Type of devices:  All fall risk precautions in place, Call light within reach, Gait belt, Patient at risk for falls, Left in chair, Chair alarm in place, Nurse notified(IV disconnected during session - nurseSol, aware)     Therapy Time   Individual Concurrent Group Co-treatment   Time In 0725         Time Out 0803         Minutes 38         Timed Code Treatment Minutes: 238 Michelle Jon., PT

## 2021-05-11 NOTE — PROGRESS NOTES
This RN prepared pt for discharge to home at [phone]. This RN provided discharge education regarding medication regimen, and home care. Pt. Verbalized understanding. Denies questions. No LDAs present at discharge. Discontinued IV without complications. Pt. tolerated well.   Pt is being transported by daughter to home with homecare

## 2021-05-11 NOTE — CARE COORDINATION
SW noted dc. [facility] for [facility] and asked MD for order for home care if appropriate.     Electronically signed by NE Davis on 5/11/2021 at 12:38 PM

## 2021-05-12 LAB
ORGANISM: ABNORMAL
URINE CULTURE, ROUTINE: ABNORMAL

## 2021-05-12 NOTE — PROGRESS NOTES
Physician Progress Note      PATIENT:               Martinez Peak  CSN #:                  [id number]  :                       1929  ADMIT DATE:       2021 10:17 AM  DISCH DATE:        2021 3:29 PM  RESPONDING  PROVIDER #:        Luciano Dodson MD          QUERY TEXT:    Dear Dr Rena Beckwith,    Pt admitted with orthostatic hypotension. Pt noted to have elevated WBC and   Lactate. If possible, please document in the progress notes and discharge   summary if you are evaluating and /or treating any of the following: The medical record reflects the following:  Risk Factors: Current admission with UTI  Clinical Indicators: WBC 15.6, Neut 12.5, Lactate 2.4, U/A- cloudy, traced   blood, 30 protein,  + Nitrite, small leuk, 2+ bacteria, 20 WBC. Raza Hurley Urine C&S- E    Coli  Treatment: IVF, Rocephin IV, Urine Culture,    Thank You,  Rach Negrete RN BSN CDS CRCR  [email or web address]  Options provided:  -- Sepsis, present on admission  -- No Sepsis, UTI only  -- Sepsis was ruled out  -- Other - I will add my own diagnosis  -- Disagree - Not applicable / Not valid  -- Disagree - Clinically unable to determine / Unknown  -- Refer to Clinical Documentation Reviewer    PROVIDER RESPONSE TEXT:    This patient has UTI only, patient is not septic.     Query created by: Rasheeda Mena on 2021 9:53 AM      Electronically signed by:  Luciano Dodson MD 2021 11:38 AM

## 2021-05-12 NOTE — PROGRESS NOTES
CLINICAL PHARMACY NOTE: MEDS TO [address] Select Patient?: No  Total # of Prescriptions Filled: 1   The following medications were delivered to the patient:  · Lisinopril 20mg  Total # of Interventions Completed: 0  Time Spent (min): 30    Additional Documentation:

## 2021-05-13 ENCOUNTER — TELEPHONE (OUTPATIENT)
Dept: INTERNAL MEDICINE CLINIC | Age: 86
End: 2021-05-13

## 2021-05-13 LAB
BLOOD CULTURE, ROUTINE: NORMAL
CULTURE, BLOOD 2: NORMAL

## 2021-05-13 NOTE — TELEPHONE ENCOUNTER
[facility] Transitions Initial Follow Up Call    Outreach made within 2 business days of discharge: Yes    Patient: Chayo Harris Patient : 1929   MRN: [id number]  Reason for Admission: There are no discharge diagnoses documented for the most recent discharge. Discharge Date: 21       Spoke with: Amanda Yu    Discharge department/facility: [facility] Interactive Patient Contact:  Was patient able to fill all prescriptions:   Was patient instructed to bring all medications to the follow-up visit:   Is patient taking all medications as directed in the discharge summary?  Yes  Does patient understand their discharge instructions: Yes  Does patient have questions or concerns that need addressed prior to 7-14 day follow up office visit: no    Scheduled appointment with PCP within 7-14 days    Follow Up  Future Appointments   Date Time Provider Thanh Rojo   2021  1:30 PM Yanna Bermudez MD [facility]&BRIT Hudson MA

## 2021-05-13 NOTE — TELEPHONE ENCOUNTER
[facility] Transitions Initial Follow Up Call    Outreach made within 2 business days of discharge: Yes    Patient: Fiona Olmedo Patient : 1929   MRN: [id number]  Reason for Admission: There are no discharge diagnoses documented for the most recent discharge. Discharge Date: 21       Spoke with: Patient's daughter Stacie Carbajal    Discharge department/facility:  [facility] Interactive Patient Contact:  Was patient able to fill all prescriptions: Yes  Was patient instructed to bring all medications to the follow-up visit: Yes  Is patient taking all medications as directed in the discharge summary?  Yes  Does patient understand their discharge instructions: Yes  Does patient have questions or concerns that need addressed prior to 7-14 day follow up office visit: no    Scheduled appointment with PCP within 7-14 days    Follow Up  Future Appointments   Date Time Provider Thanh Rojo   2021 12:30 PM MD [facility] SUAREZ   2021  1:30 PM MD [facility] ERICK Pinto LPN

## 2021-05-14 ENCOUNTER — TELEPHONE (OUTPATIENT)
Dept: INTERNAL MEDICINE CLINIC | Age: 86
End: 2021-05-14

## 2021-05-14 NOTE — RESULT ENCOUNTER NOTE
Patient's positive result has been appropriately evaluated by the provider pool. Patient was contacted and notified of the change in treatment plan.     Medication was phoned to the patient's pharmacy per protocol:    Pharmacy:   [address] [place] [address] [address]  Phone: [phone] Fax: [phone]    [facility] [address] [phone] - f [phone]  [address] [place] [address]  Phone: [phone] Fax: [address] [facility] 42, [address] [facility] [phone] f [phone]  [address] [place] [address]  Phone: [phone] Fax: [address] [facility] [address] [place] [address]  Phone: [phone] Fax: [phone]    [facility] [address] [place] [address] [phone] [facility] [phone]  [address] [address]  Phone: [phone] Fax: [phone]    Phone number: [phone]  Pharmacist receiving the prescription: message left on voicemail

## 2021-05-21 ENCOUNTER — OFFICE VISIT (OUTPATIENT)
Dept: INTERNAL MEDICINE CLINIC | Age: 86
End: 2021-05-21
Payer: MEDICARE

## 2021-05-21 VITALS
BODY MASS INDEX: 31.95 KG/M2 | WEIGHT: 187.13 LBS | SYSTOLIC BLOOD PRESSURE: 134 MMHG | DIASTOLIC BLOOD PRESSURE: 70 MMHG | HEART RATE: 63 BPM | HEIGHT: 64 IN | OXYGEN SATURATION: 97 %

## 2021-05-21 DIAGNOSIS — I89.0 LYMPHEDEMA: ICD-10-CM

## 2021-05-21 DIAGNOSIS — E78.2 MIXED HYPERLIPIDEMIA: ICD-10-CM

## 2021-05-21 DIAGNOSIS — Z09 HOSPITAL DISCHARGE FOLLOW-UP: Primary | ICD-10-CM

## 2021-05-21 DIAGNOSIS — N39.0 E. COLI UTI: ICD-10-CM

## 2021-05-21 DIAGNOSIS — I95.1 ORTHOSTATIC SYNCOPE: ICD-10-CM

## 2021-05-21 DIAGNOSIS — B96.20 E. COLI UTI: ICD-10-CM

## 2021-05-21 LAB
BACTERIA: ABNORMAL /HPF
BILIRUBIN URINE: NEGATIVE
BLOOD, URINE: NEGATIVE
CLARITY: CLEAR
COLOR: YELLOW
EPITHELIAL CELLS, UA: 5 /HPF (ref 0–5)
GLUCOSE URINE: NEGATIVE MG/DL
HYALINE CASTS: 1 /LPF (ref 0–8)
KETONES, URINE: NEGATIVE MG/DL
LEUKOCYTE ESTERASE, URINE: NEGATIVE
MICROSCOPIC EXAMINATION: YES
NITRITE, URINE: NEGATIVE
PH UA: 5.5 (ref 5–8)
PROTEIN UA: ABNORMAL MG/DL
RBC UA: 1 /HPF (ref 0–4)
SPECIFIC GRAVITY UA: 1.02 (ref 1–1.03)
URINE REFLEX TO CULTURE: ABNORMAL
URINE TYPE: ABNORMAL
UROBILINOGEN, URINE: 1 E.U./DL
WBC UA: 3 /HPF (ref 0–5)

## 2021-05-21 PROCEDURE — 99495 TRANSJ CARE MGMT MOD F2F 14D: CPT | Performed by: INTERNAL MEDICINE

## 2021-05-21 PROCEDURE — 93000 ELECTROCARDIOGRAM COMPLETE: CPT | Performed by: INTERNAL MEDICINE

## 2021-05-21 PROCEDURE — 1111F DSCHRG MED/CURRENT MED MERGE: CPT | Performed by: INTERNAL MEDICINE

## 2021-05-21 RX ORDER — ATORVASTATIN CALCIUM 40 MG/1
TABLET, FILM COATED ORAL
Qty: 90 TABLET | Refills: 3 | Status: SHIPPED | OUTPATIENT
Start: 2021-05-21

## 2021-05-21 NOTE — PROGRESS NOTES
Post-Discharge Transitional Care Management Services or Hospital Follow Up      Mayra Hills   YOB: 1929    Date of Office Visit:  5/21/2021  Date of Hospital Admission: 5/9/21  Date of Hospital Discharge: 5/11/21  Risk of hospital readmission (high >=14%.  Medium >=10%) :Readmission Risk Score: 11      Care management risk score Rising risk (score 2-5) and Complex Care (Scores >=6): 2     Non face to face  following discharge, date last encounter closed (first attempt may have been earlier): 5/13/2021  4:08 PM    Call initiated 2 business days of discharge: Yes    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    Arthritis of left knee    Type 2 diabetes mellitus without complication, without long-term current use of insulin ([facility] [address]    Essential hypertension    Mixed hyperlipidemia    Pedal edema    Other secondary kyphosis, cervicothoracic region    Postablative hypothyroidism    Closed fracture of second cervical vertebra (HCC)    Closed fracture of first cervical vertebra (HCC)    Lymphedema    High risk medication use    Chronic neck pain    Rotator cuff arthropathy of right shoulder    Bradycardia    Arthritis of left hip    Orthostatic syncope       Allergies   Allergen Reactions    Gabapentin      LOWER LEG SWELLING    Pravachol [Pravastatin Sodium] Rash    Tegaderm Ag Mesh 2\"X2\" [Wound Dressings] Rash       Medications listed as ordered at the time of discharge from [facility]   Brenda Lebron   Home Medication Instructions DAVID:    Printed on:05/24/21 5079   Medication Information                      acetaminophen (AMINOFEN) 325 MG tablet  Take 2 tablets by mouth every 6 hours as needed for Pain             aspirin 81 MG tablet  Take 81 mg by mouth daily             atorvastatin (LIPITOR) 40 MG tablet  TAKE 1 TABLET DAILY Cranberry-Cholecalciferol 4200-500 MG-UNIT CAPS  Take by mouth             levothyroxine (SYNTHROID) 112 MCG tablet  TAKE ONE TABLET BY MOUTH DAILY             lisinopril (PRINIVIL;ZESTRIL) 20 MG tablet  Take 1 tablet by mouth daily             metFORMIN (GLUCOPHAGE) 1000 MG tablet  TAKE 1 TABLET TWICE A DAY WITH MEALS             Multiple Vitamin (MULTI VITAMIN DAILY PO)  Take by mouth                   Medications marked \"taking\" at this time  Outpatient Medications Marked as Taking for the 5/21/21 encounter (Office Visit) with Zechariah Villegas MD   Medication Sig Dispense Refill    atorvastatin (LIPITOR) 40 MG tablet TAKE 1 TABLET DAILY 90 tablet 3    lisinopril (PRINIVIL;ZESTRIL) 20 MG tablet Take 1 tablet by mouth daily 30 tablet 3    metFORMIN (GLUCOPHAGE) 1000 MG tablet TAKE 1 TABLET TWICE A DAY WITH MEALS 180 tablet 3    levothyroxine (SYNTHROID) 112 MCG tablet TAKE ONE TABLET BY MOUTH DAILY (Patient taking differently: Take 112 mcg by mouth Daily TAKE ONE TABLET BY MOUTH DAILY at 6 am) 90 tablet 3    acetaminophen (AMINOFEN) 325 MG tablet Take 2 tablets by mouth every 6 hours as needed for Pain 120 tablet 0    Cranberry-Cholecalciferol 4200-500 MG-UNIT CAPS Take by mouth      aspirin 81 MG tablet Take 81 mg by mouth daily      Multiple Vitamin (MULTI VITAMIN DAILY PO) Take by mouth          Medications patient taking as of now reconciled against medications ordered at time of hospital discharge: Yes    Chief Complaint   Patient presents with    Follow-Up from [facility] discharged 5/11/21       History of Present illness - Follow up of Hospital diagnosis(es): fall attributed to orthostatic hypotension, also had E coli UTI    Inpatient course: Discharge summary reviewed- see chart. Only received antibiotics in the ER and later not continued by the hospitalist team but then she was called in antibiotics after her urine culture was positive.   She had severe orthostatic blood pressure drop and Lasix was discontinued with approval from her nephrologist    Interval history/Current status: improved, completed antibiotics yesterday. A comprehensive review of systems was negative except for what was noted in the HPI. Vitals:    05/21/21 1231   BP: 134/70   Site: Right Upper Arm   Position: Sitting   Cuff Size: Large Adult   Pulse: 63   SpO2: 97%   Weight: 187 lb 2 oz (84.9 kg)   Height: 5' 4\" (1.626 m)     Body mass index is 32.12 kg/m². Wt Readings from Last 3 Encounters:   05/21/21 187 lb 2 oz (84.9 kg)   05/11/21 219 lb 12.8 oz (99.7 kg)   03/31/21 198 lb (89.8 kg)     BP Readings from Last 3 Encounters:   05/21/21 134/70   05/11/21 (!) 175/57   03/31/21 (!) 142/57        Physical Exam:  General Appearance: alert and oriented to person, place and time, well developed and well- nourished, in no acute distress  Skin: warm and dry, no rash or erythema  Head: normocephalic and atraumatic  Neck: supple and non-tender without mass, no thyromegaly or thyroid nodules, no cervical lymphadenopathy  Pulmonary/Chest: clear to auscultation bilaterally- no wheezes, rales or rhonchi, normal air movement, no respiratory distress  Cardiovascular: normal rate, regular rhythm, normal S1 and S2, no murmurs, rubs, clicks, or gallops, distal pulses intact, no carotid bruits  Abdomen: soft, non-tender, non-distended, normal bowel sounds, no masses or organomegaly  Extremities: no cyanosis, clubbing , wearing compression with chronic lymphedema not worsened  Musculoskeletal: normal range of motion, no joint swelling, deformity or tenderness  Neurologic: reflexes normal and symmetric, no cranial nerve deficit, gait, coordination and speech normal    Assessment/Plan:  1. Hospital discharge follow-up    - IA DISCHARGE MEDS RECONCILED W/ CURRENT OUTPATIENT MED LIST  - EKG 12 lead    2.  Orthostatic syncope  Now off lasix, feels better    3. E. coli UTI  Treated, will confirm clearing  - Urinalysis Reflex to

## 2021-05-21 NOTE — TELEPHONE ENCOUNTER
Requested Prescriptions     Pending Prescriptions Disp Refills    atorvastatin (LIPITOR) 40 MG tablet [Pharmacy Med Name: ATORVASTATIN TABS 40MG] 90 tablet 3     Sig: TAKE 1 TABLET DAILY   Patient requesting a medication refill.   Pharmacy: [facility]  Next office visit: 5/21/2021  Last regular office visit: 2/23/2021

## 2021-06-01 RX ORDER — LISINOPRIL 20 MG/1
20 TABLET ORAL DAILY
Qty: 90 TABLET | Refills: 3 | Status: SHIPPED | OUTPATIENT
Start: 2021-06-01

## 2021-06-01 NOTE — TELEPHONE ENCOUNTER
Requested Prescriptions     Pending Prescriptions Disp Refills    lisinopril (PRINIVIL;ZESTRIL) 20 MG tablet 30 tablet 3     Sig: Take 1 tablet by mouth daily     Patient requesting a medication refill.     Pharmacy: [facility]  Next office visit: 6/17/2021  Last regular office visit: 5/21/2021

## 2021-06-15 ENCOUNTER — CLINICAL DOCUMENTATION (OUTPATIENT)
Dept: OTHER | Age: 86
End: 2021-06-15

## 2021-07-06 ENCOUNTER — APPOINTMENT (OUTPATIENT)
Dept: GENERAL RADIOLOGY | Age: 86
DRG: 853 | End: 2021-07-06
Payer: MEDICARE

## 2021-07-06 ENCOUNTER — HOSPITAL ENCOUNTER (INPATIENT)
Age: 86
LOS: 7 days | Discharge: HOME HEALTH | DRG: 853 | End: 2021-07-14
Attending: INTERNAL MEDICINE | Admitting: INTERNAL MEDICINE
Payer: MEDICARE

## 2021-07-06 DIAGNOSIS — S90.32XA HEMATOMA OF LEFT FOOT: ICD-10-CM

## 2021-07-06 DIAGNOSIS — A41.9 SEPTICEMIA (HCC): Primary | ICD-10-CM

## 2021-07-06 DIAGNOSIS — L03.116 CELLULITIS OF LEFT LOWER EXTREMITY: ICD-10-CM

## 2021-07-06 DIAGNOSIS — N17.9 AKI (ACUTE KIDNEY INJURY) (HCC): ICD-10-CM

## 2021-07-06 LAB
GLUCOSE BLD-MCNC: 254 MG/DL (ref 70–99)
PERFORMED ON: ABNORMAL

## 2021-07-06 PROCEDURE — 83605 ASSAY OF LACTIC ACID: CPT

## 2021-07-06 PROCEDURE — 84484 ASSAY OF TROPONIN QUANT: CPT

## 2021-07-06 PROCEDURE — 83880 ASSAY OF NATRIURETIC PEPTIDE: CPT

## 2021-07-06 PROCEDURE — 93005 ELECTROCARDIOGRAM TRACING: CPT | Performed by: EMERGENCY MEDICINE

## 2021-07-06 PROCEDURE — 85025 COMPLETE CBC W/AUTO DIFF WBC: CPT

## 2021-07-06 PROCEDURE — 87040 BLOOD CULTURE FOR BACTERIA: CPT

## 2021-07-06 PROCEDURE — 99283 EMERGENCY DEPT VISIT LOW MDM: CPT

## 2021-07-06 PROCEDURE — 73630 X-RAY EXAM OF FOOT: CPT

## 2021-07-06 PROCEDURE — 80053 COMPREHEN METABOLIC PANEL: CPT

## 2021-07-06 PROCEDURE — 84145 PROCALCITONIN (PCT): CPT

## 2021-07-06 PROCEDURE — 83036 HEMOGLOBIN GLYCOSYLATED A1C: CPT

## 2021-07-06 PROCEDURE — 71045 X-RAY EXAM CHEST 1 VIEW: CPT

## 2021-07-06 ASSESSMENT — PAIN DESCRIPTION - DESCRIPTORS: DESCRIPTORS: ACHING

## 2021-07-06 ASSESSMENT — PAIN DESCRIPTION - ORIENTATION: ORIENTATION: RIGHT;LEFT

## 2021-07-06 ASSESSMENT — PAIN DESCRIPTION - LOCATION: LOCATION: BACK

## 2021-07-06 ASSESSMENT — PAIN DESCRIPTION - PAIN TYPE: TYPE: CHRONIC PAIN

## 2021-07-06 ASSESSMENT — PAIN DESCRIPTION - FREQUENCY: FREQUENCY: CONTINUOUS

## 2021-07-06 ASSESSMENT — PAIN - FUNCTIONAL ASSESSMENT: PAIN_FUNCTIONAL_ASSESSMENT: PREVENTS OR INTERFERES SOME ACTIVE ACTIVITIES AND ADLS

## 2021-07-06 ASSESSMENT — PAIN DESCRIPTION - PROGRESSION: CLINICAL_PROGRESSION: NOT CHANGED

## 2021-07-06 ASSESSMENT — PAIN SCALES - GENERAL: PAINLEVEL_OUTOF10: 7

## 2021-07-06 ASSESSMENT — PAIN DESCRIPTION - ONSET: ONSET: ON-GOING

## 2021-07-07 PROBLEM — A41.9 SEPSIS DUE TO CELLULITIS (HCC): Status: ACTIVE | Noted: 2021-07-07

## 2021-07-07 PROBLEM — L03.90 SEPSIS DUE TO CELLULITIS (HCC): Status: ACTIVE | Noted: 2021-07-07

## 2021-07-07 LAB
A/G RATIO: 1.2 (ref 1.1–2.2)
ALBUMIN SERPL-MCNC: 4 G/DL (ref 3.4–5)
ALP BLD-CCNC: 62 U/L (ref 40–129)
ALT SERPL-CCNC: 13 U/L (ref 10–40)
ANION GAP SERPL CALCULATED.3IONS-SCNC: 20 MMOL/L (ref 3–16)
AST SERPL-CCNC: 21 U/L (ref 15–37)
BASOPHILS ABSOLUTE: 0 K/UL (ref 0–0.2)
BASOPHILS RELATIVE PERCENT: 0.1 %
BILIRUB SERPL-MCNC: 0.9 MG/DL (ref 0–1)
BUN BLDV-MCNC: 35 MG/DL (ref 7–20)
CALCIUM SERPL-MCNC: 9.5 MG/DL (ref 8.3–10.6)
CHLORIDE BLD-SCNC: 99 MMOL/L (ref 99–110)
CO2: 16 MMOL/L (ref 21–32)
CREAT SERPL-MCNC: 1.1 MG/DL (ref 0.6–1.2)
CREAT SERPL-MCNC: 1.6 MG/DL (ref 0.6–1.2)
EKG DIAGNOSIS: NORMAL
EKG Q-T INTERVAL: 382 MS
EKG QRS DURATION: 108 MS
EKG QTC CALCULATION (BAZETT): 482 MS
EKG R AXIS: -35 DEGREES
EKG T AXIS: 116 DEGREES
EKG VENTRICULAR RATE: 96 BPM
EOSINOPHILS ABSOLUTE: 0 K/UL (ref 0–0.6)
EOSINOPHILS RELATIVE PERCENT: 0 %
ESTIMATED AVERAGE GLUCOSE: 142.7 MG/DL
GFR AFRICAN AMERICAN: 37
GFR AFRICAN AMERICAN: 56
GFR NON-AFRICAN AMERICAN: 30
GFR NON-AFRICAN AMERICAN: 47
GLOBULIN: 3.4 G/DL
GLUCOSE BLD-MCNC: 194 MG/DL (ref 70–99)
GLUCOSE BLD-MCNC: 247 MG/DL (ref 70–99)
GLUCOSE BLD-MCNC: 253 MG/DL (ref 70–99)
GLUCOSE BLD-MCNC: 268 MG/DL (ref 70–99)
GLUCOSE BLD-MCNC: 276 MG/DL (ref 70–99)
HBA1C MFR BLD: 6.6 %
HCT VFR BLD CALC: 35 % (ref 36–48)
HEMOGLOBIN: 11.6 G/DL (ref 12–16)
LACTIC ACID: 2.7 MMOL/L (ref 0.4–2)
LYMPHOCYTES ABSOLUTE: 0.8 K/UL (ref 1–5.1)
LYMPHOCYTES RELATIVE PERCENT: 3.1 %
MCH RBC QN AUTO: 31.5 PG (ref 26–34)
MCHC RBC AUTO-ENTMCNC: 33.2 G/DL (ref 31–36)
MCV RBC AUTO: 94.9 FL (ref 80–100)
MONOCYTES ABSOLUTE: 0.9 K/UL (ref 0–1.3)
MONOCYTES RELATIVE PERCENT: 3.7 %
NEUTROPHILS ABSOLUTE: 23.5 K/UL (ref 1.7–7.7)
NEUTROPHILS RELATIVE PERCENT: 93.1 %
PDW BLD-RTO: 15.1 % (ref 12.4–15.4)
PERFORMED ON: ABNORMAL
PLATELET # BLD: 300 K/UL (ref 135–450)
PMV BLD AUTO: 8.1 FL (ref 5–10.5)
POTASSIUM SERPL-SCNC: 4.7 MMOL/L (ref 3.5–5.1)
PRO-BNP: 1789 PG/ML (ref 0–449)
PROCALCITONIN: 1.96 NG/ML (ref 0–0.15)
RBC # BLD: 3.69 M/UL (ref 4–5.2)
SODIUM BLD-SCNC: 135 MMOL/L (ref 136–145)
TOTAL PROTEIN: 7.4 G/DL (ref 6.4–8.2)
TROPONIN: 0.02 NG/ML
VANCOMYCIN RANDOM: 9.3 UG/ML
WBC # BLD: 25.2 K/UL (ref 4–11)

## 2021-07-07 PROCEDURE — 6370000000 HC RX 637 (ALT 250 FOR IP): Performed by: INTERNAL MEDICINE

## 2021-07-07 PROCEDURE — 2580000003 HC RX 258: Performed by: INTERNAL MEDICINE

## 2021-07-07 PROCEDURE — 97166 OT EVAL MOD COMPLEX 45 MIN: CPT

## 2021-07-07 PROCEDURE — 2580000003 HC RX 258: Performed by: NURSE PRACTITIONER

## 2021-07-07 PROCEDURE — 1200000000 HC SEMI PRIVATE

## 2021-07-07 PROCEDURE — 6360000002 HC RX W HCPCS: Performed by: INTERNAL MEDICINE

## 2021-07-07 PROCEDURE — 99223 1ST HOSP IP/OBS HIGH 75: CPT | Performed by: INTERNAL MEDICINE

## 2021-07-07 PROCEDURE — 6360000002 HC RX W HCPCS: Performed by: NURSE PRACTITIONER

## 2021-07-07 PROCEDURE — 97530 THERAPEUTIC ACTIVITIES: CPT

## 2021-07-07 PROCEDURE — 82565 ASSAY OF CREATININE: CPT

## 2021-07-07 PROCEDURE — 93010 ELECTROCARDIOGRAM REPORT: CPT | Performed by: INTERNAL MEDICINE

## 2021-07-07 PROCEDURE — 80202 ASSAY OF VANCOMYCIN: CPT

## 2021-07-07 PROCEDURE — 36415 COLL VENOUS BLD VENIPUNCTURE: CPT

## 2021-07-07 PROCEDURE — 97162 PT EVAL MOD COMPLEX 30 MIN: CPT

## 2021-07-07 RX ORDER — LEVOTHYROXINE SODIUM 0.1 MG/1
100 TABLET ORAL DAILY
Status: DISCONTINUED | OUTPATIENT
Start: 2021-07-07 | End: 2021-07-14 | Stop reason: HOSPADM

## 2021-07-07 RX ORDER — HYDROCODONE BITARTRATE AND ACETAMINOPHEN 5; 325 MG/1; MG/1
1 TABLET ORAL EVERY 6 HOURS PRN
Status: DISCONTINUED | OUTPATIENT
Start: 2021-07-07 | End: 2021-07-14 | Stop reason: HOSPADM

## 2021-07-07 RX ORDER — LISINOPRIL 20 MG/1
20 TABLET ORAL DAILY
Status: DISCONTINUED | OUTPATIENT
Start: 2021-07-07 | End: 2021-07-12

## 2021-07-07 RX ORDER — SODIUM CHLORIDE 0.9 % (FLUSH) 0.9 %
5-40 SYRINGE (ML) INJECTION PRN
Status: DISCONTINUED | OUTPATIENT
Start: 2021-07-07 | End: 2021-07-14 | Stop reason: HOSPADM

## 2021-07-07 RX ORDER — NICOTINE POLACRILEX 4 MG
15 LOZENGE BUCCAL PRN
Status: DISCONTINUED | OUTPATIENT
Start: 2021-07-07 | End: 2021-07-10 | Stop reason: SDUPTHER

## 2021-07-07 RX ORDER — ONDANSETRON 2 MG/ML
4 INJECTION INTRAMUSCULAR; INTRAVENOUS EVERY 6 HOURS PRN
Status: DISCONTINUED | OUTPATIENT
Start: 2021-07-07 | End: 2021-07-14 | Stop reason: HOSPADM

## 2021-07-07 RX ORDER — SODIUM CHLORIDE 0.9 % (FLUSH) 0.9 %
5-40 SYRINGE (ML) INJECTION EVERY 12 HOURS SCHEDULED
Status: DISCONTINUED | OUTPATIENT
Start: 2021-07-07 | End: 2021-07-14 | Stop reason: HOSPADM

## 2021-07-07 RX ORDER — DEXTROSE MONOHYDRATE 50 MG/ML
100 INJECTION, SOLUTION INTRAVENOUS PRN
Status: DISCONTINUED | OUTPATIENT
Start: 2021-07-07 | End: 2021-07-10 | Stop reason: SDUPTHER

## 2021-07-07 RX ORDER — LINEZOLID 2 MG/ML
600 INJECTION, SOLUTION INTRAVENOUS EVERY 12 HOURS
Status: DISCONTINUED | OUTPATIENT
Start: 2021-07-07 | End: 2021-07-09

## 2021-07-07 RX ORDER — FENTANYL CITRATE 50 UG/ML
25 INJECTION, SOLUTION INTRAMUSCULAR; INTRAVENOUS ONCE
Status: COMPLETED | OUTPATIENT
Start: 2021-07-07 | End: 2021-07-07

## 2021-07-07 RX ORDER — ATORVASTATIN CALCIUM 40 MG/1
40 TABLET, FILM COATED ORAL DAILY
Status: DISCONTINUED | OUTPATIENT
Start: 2021-07-07 | End: 2021-07-14 | Stop reason: HOSPADM

## 2021-07-07 RX ORDER — DEXTROSE MONOHYDRATE 25 G/50ML
12.5 INJECTION, SOLUTION INTRAVENOUS PRN
Status: DISCONTINUED | OUTPATIENT
Start: 2021-07-07 | End: 2021-07-10 | Stop reason: SDUPTHER

## 2021-07-07 RX ORDER — ASPIRIN 81 MG/1
81 TABLET, CHEWABLE ORAL DAILY
Status: DISCONTINUED | OUTPATIENT
Start: 2021-07-07 | End: 2021-07-14 | Stop reason: HOSPADM

## 2021-07-07 RX ORDER — MULTIVIT-MIN/IRON/FOLIC ACID/K 18-600-40
2000 CAPSULE ORAL DAILY
COMMUNITY

## 2021-07-07 RX ORDER — SODIUM CHLORIDE 9 MG/ML
25 INJECTION, SOLUTION INTRAVENOUS PRN
Status: DISCONTINUED | OUTPATIENT
Start: 2021-07-07 | End: 2021-07-14 | Stop reason: HOSPADM

## 2021-07-07 RX ORDER — SODIUM CHLORIDE 9 MG/ML
INJECTION, SOLUTION INTRAVENOUS CONTINUOUS
Status: DISCONTINUED | OUTPATIENT
Start: 2021-07-07 | End: 2021-07-08

## 2021-07-07 RX ORDER — ACETAMINOPHEN 325 MG/1
650 TABLET ORAL EVERY 4 HOURS PRN
Status: DISCONTINUED | OUTPATIENT
Start: 2021-07-07 | End: 2021-07-14 | Stop reason: HOSPADM

## 2021-07-07 RX ORDER — FENTANYL CITRATE 50 UG/ML
25 INJECTION, SOLUTION INTRAMUSCULAR; INTRAVENOUS EVERY 6 HOURS PRN
Status: DISCONTINUED | OUTPATIENT
Start: 2021-07-07 | End: 2021-07-14 | Stop reason: HOSPADM

## 2021-07-07 RX ADMIN — ASPIRIN 81 MG: 81 TABLET, CHEWABLE ORAL at 08:29

## 2021-07-07 RX ADMIN — SODIUM CHLORIDE: 9 INJECTION, SOLUTION INTRAVENOUS at 11:58

## 2021-07-07 RX ADMIN — SODIUM CHLORIDE, PRESERVATIVE FREE 10 ML: 5 INJECTION INTRAVENOUS at 21:03

## 2021-07-07 RX ADMIN — HYDROCODONE BITARTRATE AND ACETAMINOPHEN 1 TABLET: 5; 325 TABLET ORAL at 16:53

## 2021-07-07 RX ADMIN — CEFEPIME HYDROCHLORIDE 2000 MG: 2 INJECTION, POWDER, FOR SOLUTION INTRAVENOUS at 01:23

## 2021-07-07 RX ADMIN — FENTANYL CITRATE 25 MCG: 50 INJECTION, SOLUTION INTRAMUSCULAR; INTRAVENOUS at 01:34

## 2021-07-07 RX ADMIN — ATORVASTATIN CALCIUM 40 MG: 40 TABLET, FILM COATED ORAL at 08:29

## 2021-07-07 RX ADMIN — LINEZOLID 600 MG: 600 INJECTION, SOLUTION INTRAVENOUS at 21:03

## 2021-07-07 RX ADMIN — ENOXAPARIN SODIUM 30 MG: 30 INJECTION SUBCUTANEOUS at 08:29

## 2021-07-07 RX ADMIN — LEVOTHYROXINE SODIUM 100 MCG: 0.1 TABLET ORAL at 06:06

## 2021-07-07 RX ADMIN — Medication 1250 MG: at 02:15

## 2021-07-07 RX ADMIN — INSULIN LISPRO 2 UNITS: 100 INJECTION, SOLUTION INTRAVENOUS; SUBCUTANEOUS at 16:53

## 2021-07-07 RX ADMIN — INSULIN LISPRO 3 UNITS: 100 INJECTION, SOLUTION INTRAVENOUS; SUBCUTANEOUS at 11:58

## 2021-07-07 RX ADMIN — INSULIN LISPRO 1 UNITS: 100 INJECTION, SOLUTION INTRAVENOUS; SUBCUTANEOUS at 21:03

## 2021-07-07 RX ADMIN — LISINOPRIL 20 MG: 20 TABLET ORAL at 08:29

## 2021-07-07 RX ADMIN — CEFEPIME HYDROCHLORIDE 1000 MG: 1 INJECTION, POWDER, FOR SOLUTION INTRAMUSCULAR; INTRAVENOUS at 14:34

## 2021-07-07 ASSESSMENT — PAIN SCALES - GENERAL
PAINLEVEL_OUTOF10: 6
PAINLEVEL_OUTOF10: 7

## 2021-07-07 ASSESSMENT — ENCOUNTER SYMPTOMS
NAUSEA: 0
SHORTNESS OF BREATH: 0
DIARRHEA: 0
ABDOMINAL DISTENTION: 0
COLOR CHANGE: 1
VOMITING: 0
ABDOMINAL PAIN: 0
COUGH: 0

## 2021-07-07 NOTE — CARE COORDINATION
INITIAL CASE MANAGEMENT ASSESSMENT    Reviewed chart, met with patient & daughter-in-law Tahir Moses) to assess possible discharge needs. Explained Case Management role/services. Living Situation: lives alone in a 1 story house with 2+2 ALINE with rails    ADLs: independent daughter & DIL assist with groceries & laundry, she is independent with showering but they make sure they are there when she showers just in case she needs help/falls     DME: grab bars, shower chair, rollator, cane, alert button (as necklace but switching to watch since she wasn't wearing it last night), transport chair (in DILs car)    PT/OT Recs: ordered     Active Services: none at this time, has had [facility] in the past, if need [facility] they want [facility] again     Transportation: does not drive, family will transport home at dc     Medications: no barriers, uses mail-in or [facility] (usually [place])    PCP: Blas Breaux MD      HD/PD: na    PLAN/COMMENTS: plan is to return home, would like [facility] again if recommended    CM provided contact information for patient or family to call with any questions. CM will follow and assist as needed.     Shanique Burch RN, BSN,   [phone]    Electronically signed by Shanique Burch RN on 7/7/2021 at 10:03 AM

## 2021-07-07 NOTE — ED PROVIDER NOTES
**ADVANCED PRACTICE PROVIDER, I HAVE EVALUATED THIS PATIENT**        [address]      Pt Name: Shawna Prieto  VPM:[id number]  Deedeetrongfviola 12/30/1929  Date of evaluation: 7/6/2021  Provider: CAROLE Lawrence CNP      Chief Complaint:    Chief Complaint   Patient presents with    Other     found down at home. length of time on floor unknown. Nursing Notes, Past Medical Hx, Past Surgical Hx, Social Hx, Allergies, and Family Hx were all reviewed and agreed with or any disagreements were addressed in the HPI.    HPI: (Location, Duration, Timing, Severity, Quality, Assoc Sx, Context, Modifying factors)    Chief Complaint of left lower leg infection    This is a  80 y.o. female with PMH significant for HTN, HLD, DMII, and hypothyroid who presents to the emergency department today via EMS from home due to an infection of her left foot. Patient's daughter and daughter-in-law at bedside. They advised that when they went to check on the patient she was lying in the living room floor with a pillow underneath of her like she just laid down there for the night to go to sleep. They advised that she has had a large blood blister on the top of her left foot, and over the last 1 to 2 days she has developed redness and warmth to the left lower leg. No measured fevers at home. Patient is alert and oriented to person place and year. She denies any complaints. She advised she did not fall.     PastMedical/Surgical History:      Diagnosis Date    Arthritis     Cataract     Diabetes ([facility] [address]     High blood pressure     Kidney problem     Neuropathy     Thyroid disorder          Procedure Laterality Date    APPENDECTOMY      CARPAL TUNNEL RELEASE Left 09/05/2017      Left carpal tunnel release  & Left Thumb, Index Finger and Middle Finger trigger finger release    CARPAL TUNNEL RELEASE Right 09/26/2017    COLONOSCOPY      FINGER TRIGGER No JVD. Cardiovascular:      Rate and Rhythm: Normal rate and regular rhythm. Heart sounds: Normal heart sounds. Pulmonary:      Effort: Pulmonary effort is normal. No respiratory distress. Breath sounds: Normal breath sounds. Abdominal:      General: There is no distension. Palpations: Abdomen is soft. Abdomen is not rigid. Tenderness: There is no abdominal tenderness. Musculoskeletal:         General: Normal range of motion. Cervical back: Normal range of motion and neck supple. Feet:    Feet:      Left foot:      Skin integrity: Blister (hematoma) present. Skin:     General: Skin is warm and dry. Capillary Refill: Capillary refill takes less than 2 seconds. Findings: Erythema present. Neurological:      General: No focal deficit present. Mental Status: She is alert and oriented to person, place, and time.    Psychiatric:         Mood and Affect: Mood normal.             MEDICAL DECISION MAKING    Vitals:    Vitals:    07/06/21 2259 07/06/21 2330   BP:  117/63   Pulse: 99    Resp: 18    Temp: 97.9 °F (36.6 °C)    SpO2: 95%        LABS:  Labs Reviewed   CBC WITH AUTO DIFFERENTIAL - Abnormal; Notable for the following components:       Result Value    WBC 25.2 (*)     RBC 3.69 (*)     Hemoglobin 11.6 (*)     Hematocrit 35.0 (*)     Neutrophils Absolute 23.5 (*)     Lymphocytes Absolute 0.8 (*)     All other components within normal limits    Narrative:     Performed at:  [address] [address]   Phone [phone]   COMPREHENSIVE METABOLIC PANEL - Abnormal; Notable for the following components:    Sodium 135 (*)     CO2 16 (*)     Anion Gap 20 (*)     Glucose 276 (*)     BUN 35 (*)     CREATININE 1.6 (*)     GFR Non- 30 (*)     GFR  37 (*)     All other components within normal limits    Narrative:     Performed at:  [facility]  [address] [place], [place] [address]   Phone [phone]   PROCALCITONIN - Abnormal; Notable for the following components:    Procalcitonin 1.96 (*)     All other components within normal limits    Narrative:     Performed at:  [facility]  [address] [facility] [address]   Phone [phone]   LACTIC ACID, PLASMA - Abnormal; Notable for the following components:    Lactic Acid 2.7 (*)     All other components within normal limits    Narrative:     Performed at:  [facility]  [address] [facility] [address]   Phone [phone]   TROPONIN - Abnormal; Notable for the following components:    Troponin 0.02 (*)     All other components within normal limits    Narrative:     Performed at:  [facility]  [address] [facility] [address]   Phone [phone]   BRAIN NATRIURETIC PEPTIDE - Abnormal; Notable for the following components:    Pro-BNP 1,789 (*)     All other components within normal limits    Narrative:     Performed at:  [facility]  [address] [facility] [address]   Phone [phone]   POCT GLUCOSE - Abnormal; Notable for the following components:    POC Glucose 254 (*)     All other components within normal limits    Narrative:     Performed at:  [facility]  [address] [facility] [address]   Phone [phone]   CULTURE, BLOOD 1   CULTURE, BLOOD 2   URINE RT REFLEX TO CULTURE        Remainder of labs reviewed and were negative at this time or not returned at the time of this note.     RADIOLOGY:   Non-plain film images such as CT, Ultrasound and MRI are read by the radiologist. CAROLE Kirkland CNP have directly visualized the radiologic plain film image(s) with the below findings:      Interpretation per the Radiologist below, if available at the time of this note:    XR FOOT LEFT (MIN 3 VIEWS)   Final Result   1. No acute osseous abnormality. 2. Old fractures of the proximal phalanges of the 4th and 5th toes. XR CHEST PORTABLE   Final Result   No airspace disease by radiograph. No results found. MEDICAL DECISION MAKING / ED COURSE:      PROCEDURES:   Procedures    None    Patient was given:  Medications   cefepime (MAXIPIME) 2000 mg IVPB minibag (2,000 mg Intravenous New Bag 7/7/21 0123)   vancomycin (VANCOCIN) 1250 mg in dextrose 5 % 250 mL IVPB (has no administration in time range)   fentaNYL (SUBLIMAZE) injection 25 mcg (25 mcg Intravenous Given 7/7/21 0134)       Differential diagnosis: Necrotizing fasciitis, cellulitis, erysipelas, suppurative thrombophlebitis, aseptic superficial thrombophlebitis, DVT, gout, compartment syndrome, erythema migrans (lyme disease), contact dermatitis, lymphedema, other    Patient presents with an infection of her LLE. See HPI for full presentation. Physical exam as above. 80year-old lying in bed in no acute distress. Awake alert and oriented. She advised she did not fall that she just simply laid down to go to sleep on the living room floor. She had a hematoma to her left foot which her daughter at bedside advised has developed redness and warmth over the last 1 to 2 days. No measured fevers at home. CBC shows leukocytosis with a white count of 25 and left shift of 23 with no bandemia. Procalcitonin 1.9. Lactic acid 2.7. She has DASHAWN with a creatinine of 1.6 and GFR of 30. Troponin 0.02. Glucose is 270 with an anion gap of 20 and bicarb of 16. BNP 1700. CXR normal.  X-ray of the left foot normal.      Emergency department course included vancomycin and cefepime. She will be admitted for further work-up and treatment. She was given all test results and given an opportunity to ask and have any questions answered. She was agreeable to admission.   The hospitalist was consulted and agreed to meet patient and write orders. The patient tolerated their visit well. I evaluated the patient. The physician was available for consultation as needed. The patient and / or the family were informed of the results of any tests, a time was given to answer questions, a plan was proposed and they agreed with plan. CLINICAL IMPRESSION:  1. Septicemia ([facility] [address]    2. Cellulitis of left lower extremity    3. Hematoma of left foot    4. DASHAWN (acute kidney injury) ([facility]        DISPOSITION Admitted 07/07/2021 12:56:36 AM      PATIENT REFERRED TO:  No follow-up provider specified.     DISCHARGE MEDICATIONS:  New Prescriptions    No medications on file       DISCONTINUED MEDICATIONS:  Discontinued Medications    No medications on file              (Please note the MDM and HPI sections of this note were completed with a voice recognition program.  Efforts were made to edit the dictations but occasionally words are mis-transcribed.)    Electronically signed, CAROLE Mariee CNP,           CAROLE Mariee CNP  07/07/21 5455

## 2021-07-07 NOTE — H&P
[address] [address]                              HISTORY AND PHYSICAL    PATIENT NAME: Deysi Flores                   :        1929  MED REC NO:   [id number]                          ROOM:       4266  ACCOUNT NO:   [de-identified]                           ADMIT DATE: 2021  PROVIDER:     Ellie Pratt MD    I examined the patient on 2021. CHIEF COMPLAINT:  Left leg pain, swelling, and severe redness. HISTORY OF PRESENT ILLNESS:  A 42-year-old  female who lives at  home by herself, did not answer her daughter's phone call. Her  daughters came to check on her and found their mother lying on the  ground with her legs that were extremely red, warm, swollen, inflamed,  and had a large what appeared to be a blood blister over the dorsum of  the left foot closed to the left toe. The patient notes that over the  last two days, she has had rapidly progressive increasing severe  throbbing _____ leg pain that has been disabling. PAST MEDICAL/PAST SURGICAL HISTORY:  1. Type 2 diabetes mellitus. 2.  Hypertension. 3.  Obesity. 4.  Hypothyroidism. 5.  Dyslipidemia. PAST SURGICAL HISTORY:  No recent surgical procedures. ALLERGIC HISTORY:  GABAPENTIN, PRAVACHOL. FAMILY HISTORY:  Reviewed by me and is currently noncontributory. SOCIAL HISTORY:  Lives at home. No illicit substance use. Nonsmoker. MEDICATIONS:  Home medication list documented in the EMR and reviewed by  me. REVIEW OF SYSTEMS: Significant for the left leg pain and per the history  of present illness. All other systems have been reviewed and are  negative except for the history of present illness. PHYSICAL EXAMINATION:  VITAL SIGNS:  Temperature is 97.9, respiratory rate is 18, pulse 99,  blood pressure 117/63, saturating 95%. CNS:  The patient is alert and oriented.   PSYCH:  The patient is cooperative, answering questions appropriately. HEENT:  Eyes:  Pupils are reactive to light. ENT:  Extraocular muscle  movements are intact. RESPIRATORY:  No obvious audible rales, rubs, or rhonchi. CARDIOVASCULAR:  No murmurs, gallops, or rubs. ABDOMEN:  Nondistended. MUSCULOSKELETAL:  No obvious joint deformity. SKIN:  The patient has got a large what appears to be blood blister over  the dorsum of the left forefoot, associated with surrounding extensive  area of cellulitis that extends almost up to the left knee, maybe a few  centimeters below the left knee with an advancing red edge with some  subcutaneous edema and blanching. DIAGNOSTIC DATA:  X-ray of the chest shows no evidence of airspace  disease. X-ray of the left foot shows no acute osseous anomaly. Glucose 276. BUN 35, creatinine 1.6. Sodium 135, potassium 4.7. Procalcitonin 1.96. BNP 1789. CBC showed white count of 25.2,  hemoglobin of 11.6, hematocrit of 25.0, platelets of 663. Lactic acid  2.7. CONSULTATIONS REQUESTED:  Infectious Diseases. OLD MEDICAL RECORD REVIEW:  Shows an echo from 05/10/2021 that showed an  LV ejection fraction of 55-60% with mild concentric LVH and grade 1  diastolic dysfunction. ASSESSMENT:  1. Left leg cutaneous bacterial extensive cellulitis. 2.  Uncontrolled type 2 diabetes mellitus. 3.  Traumatic blood blister. 4.  Hypertension. 5.  Dyslipidemia. PLAN OF CARE:  The patient is admitted to Internal Medicine service. The patient was started on IV vancomycin and cefepime. Given the  rapidity of spread and the patient's type 2 diabetes status, I believe  that this patient may actually end up having either group A or group B  beta-hemolytic streptococci as the source of infection. We will  continue IV hydration and watch for any signs of hemodynamic  instability. ID consult requested. CODE STATUS:  Full.     EXPECTED LENGTH OF STAY:  More than two midnights based on the plan of  care above.    RISK: High due to the patient's presentation with the active infection  requiring IV antibiotics and narcotic medication for pain control. DISPOSITION: Admitted to Internal Medicine Service.         Jose Roberto Vieira MD    D: 07/07/2021 2:56:42       T: 07/07/2021 4:54:39     [email or web address]  Job#: [id number]     Doc#: [id number]    CC:

## 2021-07-07 NOTE — CARE COORDINATION
previously recorded wishes  [] New Advance Directive completed  [] Portable Do Not Rescitate prepared for Provider review and signature  [] POLST/POST/MOLST/MOST prepared for Provider review and signature      Follow-up plan:    [] Schedule follow-up conversation to continue planning  [] Referred individual to Provider for additional questions/concerns   [] Advised patient/agent/surrogate to review completed ACP document and update if needed with changes in condition, patient preferences or care setting    [] This note routed to one or more involved healthcare providers        Electronically signed by Carmen Quintanilla RN on 7/7/21 at 10:11 AM EDT

## 2021-07-07 NOTE — PROGRESS NOTES
H/p dictation id [id number]. Date of service 07/07/21. Left leg cellulitis. Htn. Dm II.  ckd III. Obesity.

## 2021-07-07 NOTE — PROGRESS NOTES
Physician Progress Note      PATIENT:               Glendy Goodman  CSN #:                  [id number]  :                       1929  ADMIT DATE:       2021 11:00 PM  [address] [place] DATE:  RESPONDING  PROVIDER #:        Lopez Moon MD          QUERY TEXT:    Patient admitted with cellulitis of left leg. Noted documentation of DASHAWN in   ED notes and CKD III in H&P. No noted hx CKD. On arrival BUN 35, creat 1.6,   GFR 30. Labs from 2021 show BUN 19, creat 0.7, GFR >60. If possible, please document in progress notes and discharge summary if you   are evaluating and /or treating any of the following: The medical record reflects the following:  Risk Factors: DM, HTN, infection  Clinical Indicators: BUN 35, creat 1.6, GFR 30  Treatment: IV fluids, monitoring renal labs    Thank you,  Jeniffer Otoole RN, BSN, SETH  [email or web address]  Options provided:  -- DASHAWN confirmed and CKD III ruled out  -- CKD III confirmed and DASHAWN ruled out  -- DASHAWN on CKD III confirmed  -- Other - I will add my own diagnosis  -- Disagree - Not applicable / Not valid  -- Disagree - Clinically unable to determine / Unknown  -- Refer to Clinical Documentation Reviewer    PROVIDER RESPONSE TEXT:    After study, DASHAWN confirmed and CKD III ruled out. Query created by: Aline Franklin on 2021 12:47 PM      QUERY TEXT:    Pt admitted with left leg cellulitis. Pt noted to have DM. If possible,   please document in the progress notes and discharge summary the relationship,   if any, between cellulitis and DM. The medical record reflects the following:  Risk Factors: DM, cellulitis  Clinical Indicators: glucose 254  Treatment: Maxipime, Vancomycin, podiatry and ID consults    Thank you,  Jeniffer Otoole RN, BSN, [email or web address]  Options provided:  -- Left leg cellulitis associated with Diabetes  -- Left leg cellulitis unrelated to Diabetes  -- Other - I will add my own diagnosis  -- Disagree - Not applicable / Not valid  -- Disagree - Clinically unable to determine / Unknown  -- Refer to Clinical Documentation Reviewer    PROVIDER RESPONSE TEXT:    Left leg cellulitis associated with Diabetes.     Query created by: Vivek Zaragoza on 7/7/2021 12:48 PM      Electronically signed by:  Jan Anaya MD 7/7/2021 6:37 PM

## 2021-07-07 NOTE — PROGRESS NOTES
seen and evaluated at bedside, agree with current management - discussed plan of care with family at bedside, please review same day H&P for further recommendations

## 2021-07-07 NOTE — PROGRESS NOTES
Message sent to Dr Juan Nuno via perfect serve. Informed MD that [address] notified Charlie Lugo that pt had a few runs of PAT with a HR in the 140's. Pt is not SR with PAC's with a HR in the 90's. Will continue to monitor.

## 2021-07-07 NOTE — PROGRESS NOTES
Physical Therapy    Facility/Department: [address] MED SURG  Initial Assessment    NAME: Tahira Guzman  : 1929  MRN: [id number]    Date of Service: 2021    Discharge Recommendations:  24 hour supervision or assist, Patient would benefit from continued therapy after discharge, S Level 1     Tahira Guzman scored a 17/24 on the AM-PAC short mobility form. Current research shows that an AM-PAC score of 18 or greater is typically associated with a discharge to the patient's home setting. Based on the patient's AM-PAC score and their current functional mobility deficits, it is recommended that the patient have 2-3 sessions per week of Physical Therapy at d/c to increase the patient's independence. At this time, this patient demonstrates the endurance and safety to discharge home with HHPT and a follow up treatment frequency of 2-3x/wk. Please see assessment section for further patient specific details. If patient discharges prior to next session this note will serve as a discharge summary. Please see below for the latest assessment towards goals. HOME HEALTH CARE: LEVEL 1 STANDARD     -Initial home health evaluation to occur within 24-48 hours, in patient home    -Home health agency to establish plan of care for patient over 60 day period    -Medication Reconciliation    -PCP Visit scheduled within seven days of discharge    -PT/OT to evaluate with goal of regaining prior level of functioning    -OT to evaluate if patient has [address] needs for personal care         Assessment   Body structures, Functions, Activity limitations: Decreased functional mobility ; Decreased ADL status; Decreased strength;Decreased endurance;Decreased balance  Assessment: Pt is a 80 y.o. female who presented to the ED on 21 after being found down at home. Blood glucose levels found to be >300. Prior to admission, pt living alone in one level home with 2+2 ALINE. Pt reports dtr's are with pt frequently.  Pt currently functioning below baseline. Anticipate return home with initial 24hr supv and level 1 HHPT. Treatment Diagnosis: impaired mobility  Prognosis: Fair  Decision Making: Medium Complexity  History: see below  Exam: see below  Clinical Presentation: evolving  PT Education: PT Role;Plan of Care; Functional Mobility Training;General Safety;Gait Training;Transfer Training  REQUIRES PT FOLLOW UP: Yes  Activity Tolerance  Activity Tolerance: Patient Tolerated treatment well;Patient limited by fatigue;Patient limited by endurance       Patient Diagnosis(es): The primary encounter diagnosis was Septicemia ([facility] [address] Diagnoses of Cellulitis of left lower extremity, Hematoma of left foot, and DASHAWN (acute kidney injury) ([address] were also pertinent to this visit. has a past medical history of Arthritis, Cataract, Diabetes ([facility] [address] High blood pressure, Kidney problem, Neuropathy, and Thyroid disorder. has a past surgical history that includes Appendectomy; Hysterectomy; Ovary removal; Total knee arthroplasty (Right, 2006); Carpal tunnel release (Left, 09/05/2017); Colonoscopy; Carpal tunnel release (Right, 09/26/2017); and Finger trigger release (Right, 09/26/2017). Restrictions  Restrictions/Precautions  Restrictions/Precautions: Fall Risk     Subjective  General  Chart Reviewed: Yes  Patient assessed for rehabilitation services?: Yes  Additional Pertinent Hx: Pt is a 80 y.o. female who presented to the ED on 7/6/21 after being found down at home. Blood glucose levels found to be >300.   Response To Previous Treatment: Not applicable  Family / Caregiver Present: Yes (2 dtrs)  Referring Practitioner: Lester Wen MD  Referral Date : 07/07/21  Diagnosis: type II diabetes  Follows Commands: Within Functional Limits  Subjective  Subjective: Pt is agreeable to PT          Orientation  Orientation  Overall Orientation Status: Within Functional Limits     Social/Functional History  Social/Functional History  Lives With: Alone  Type of Home: House  Home Layout: One level  Home Access: Stairs to enter with rails  Entrance Stairs - Number of Steps: 2+2  Bathroom Shower/Tub: Tub/Shower unit  Bathroom Toilet: Handicap height  Bathroom Equipment: Grab bars in shower, Shower chair, Grab bars around toilet, Hand-held shower, Tub transfer bench  Home Equipment: 4 wheeled walker, Cane, Alert Button, Rolling walker (transport chair)  Receives Help From: Family (just finished [facility])  ADL Assistance: Independent (dtr are present for bathing)  Homemaking Assistance:  (dtr performs laundry, takes pt to grocery store)  Ambulation Assistance: Independent (primarily uses RW in home, [facility] for outside of home, transport chair for longer distances)  Transfer Assistance: Independent  Active : No  Patient's  Info: dtr driving     Cognition   Cognition  Overall Cognitive Status: WNL    Objective  Strength RLE  Strength RLE: Exception  Comment: severe generalized weakness - +trendelenburg  Strength LLE  Strength LLE: Exception  Comment: mild generalized weakness  Motor Control  Gross Motor?: WFL     Bed mobility  Supine to Sit: Stand by assistance;Contact guard assistance (HOB slightly elevated; use of bed rail)  Sit to Supine: Unable to assess (in recliner at end of session)  Transfers  Sit to Stand: Contact guard assistance  Stand to sit: Contact guard assistance  Comment: cues for hand placement  Ambulation  Ambulation?: Yes  Ambulation 1  Surface: level tile  Device: Rolling Walker  Assistance: Minimal assistance; Moderate assistance  Quality of Gait: + trendelenburg gait  Gait Deviations: Slow Yasmeen;Decreased step length;Decreased step height  Distance: 15'  Stairs/Curb  Stairs?: No     Balance  Posture: Fair  Sitting - Static: Good  Sitting - Dynamic: Good  Standing - Static: Fair (@RW)  Standing - Dynamic: Fair (@RW)        Plan   Plan  Times per week: 3-5x/week  Current Treatment Recommendations: Strengthening, Transfer Training, Balance Training, Functional Mobility Training, Gait Training, Safety Education & Training, Patient/Caregiver Education & Training, Neuromuscular Re-education  Safety Devices  Type of devices:  All fall risk precautions in place, Call light within reach, Gait belt, Patient at risk for falls, Left in chair, Chair alarm in place, Nurse notified      AM-PAC Score  AM-PAC Inpatient Mobility Raw Score : 17 (07/07/21 1152)  AM-PAC Inpatient T-Scale Score : 42.13 (07/07/21 1152)  Mobility Inpatient CMS 0-100% Score: 50.57 (07/07/21 1152)  Mobility Inpatient CMS G-Code Modifier : CK (07/07/21 1152)          Goals  Short term goals  Time Frame for Short term goals: by acute discharge  Short term goal 1: bed mobility with supv  Short term goal 2: sit<>Stand with SBA  Short term goal 3: ambulate > 20' with RW and SBA  Patient Goals   Patient goals : none stated       Therapy Time   Individual Concurrent Group Co-treatment   Time In 1115         Time Out 1150         Minutes 35         Timed Code Treatment Minutes: 25 Minutes       Shellie Ray PT

## 2021-07-07 NOTE — CONSULTS
Infectious Diseases Inpatient Consult Note      Reason for Consult:  Left  Leg extensive Cellulitis with blisters and sepsis with wbc elevation    Requesting Physician:        Primary Care Physician:  Emory Carson MD    History Obtained [facility]       CHIEF COMPLAINT:     Chief Complaint   Patient presents with    Other     found down at home. length of time on floor unknown. HISTORY OF PRESENT ILLNESS:  80 y.o. woman was found down at home with on going lEFT Leg infection and cellulitis with large blood filled blister on the dorsum of the Left foot.  She sustained injury to Left foot in May 2021 per family now developed progressive cellulitis with skin lesions and local redness with serous drainage and on going pain, swelling extending up the Left leg and she has h/o bi lateral lower leg edema from venous stasis and WBC elevation with lactic acid elevation, DASHAWN and fevers on admit with on going septic process we are consulted for recommendations   Location : Left leg swelling, pain and skin changes       Quality : aching       Severity :  8/10   Duration :  weeks    Timing : intermittent   Context : Left foot trauma and chronic edema    Modifying factors : none   Associated signs and symptoms:  WBC elevation, BG elevation  Confusion, fevers         Past Medical History:    Past Medical History:   Diagnosis Date    Arthritis     Cataract     Diabetes ([facility] [address]     High blood pressure     Kidney problem     Neuropathy     Thyroid disorder        Past Surgical History:    Past Surgical History:   Procedure Laterality Date    APPENDECTOMY      CARPAL TUNNEL RELEASE Left 09/05/2017      Left carpal tunnel release  & Left Thumb, Index Finger and Middle Finger trigger finger release    CARPAL TUNNEL RELEASE Right 09/26/2017    COLONOSCOPY      FINGER TRIGGER RELEASE Right 09/26/2017    Thumb & Index Finger    HYSTERECTOMY      OVARY REMOVAL      TOTAL KNEE ARTHROPLASTY Right 2006    Dr. Marita Fierro       Current Medications:    Outpatient Medications Marked as Taking for the 7/6/21 encounter [facility] Encounter)   Medication Sig Dispense Refill    Cholecalciferol (VITAMIN D) 50 MCG (2000 UT) CAPS capsule Take 2,000 Units by mouth daily      lisinopril (PRINIVIL;ZESTRIL) 20 MG tablet Take 1 tablet by mouth daily 90 tablet 3    atorvastatin (LIPITOR) 40 MG tablet TAKE 1 TABLET DAILY 90 tablet 3    metFORMIN (GLUCOPHAGE) 1000 MG tablet TAKE 1 TABLET TWICE A DAY WITH MEALS 180 tablet 3    levothyroxine (SYNTHROID) 112 MCG tablet TAKE ONE TABLET BY MOUTH DAILY (Patient taking differently: Take 112 mcg by mouth Daily TAKE ONE TABLET BY MOUTH DAILY at 6 am) 90 tablet 3    acetaminophen (AMINOFEN) 325 MG tablet Take 2 tablets by mouth every 6 hours as needed for Pain 120 tablet 0    Cranberry-Cholecalciferol 4200-500 MG-UNIT CAPS Take by mouth      aspirin 81 MG tablet Take 81 mg by mouth daily      Multiple Vitamin (MULTI VITAMIN DAILY PO) Take by mouth         Allergies:  Gabapentin, Pravachol [pravastatin sodium], and Tegaderm ag mesh 2\"x2\" [wound dressings]    Immunizations :   Immunization History   Administered Date(s) Administered    Influenza Vaccine, unspecified formulation 10/10/2003, 10/05/2004, 10/04/2005, 09/01/2012, 10/01/2014, 11/20/2017    Influenza Virus Vaccine 11/20/2017    Influenza, High Dose (Fluzone 65 yrs and older) 09/28/2018    Influenza, [place], Recombinant, IM PF (Flublok 18 yrs and older) 10/05/2019    Influenza, Quadv, adjuvanted, 65 yrs +, IM, PF (Fluad) 10/26/2020    Pneumococcal Conjugate 13-valent ([id number]) 02/15/2018    Pneumococcal Polysaccharide ([id number]) 02/26/2019, 10/05/2019    Tdap (Boostrix, Adacel) 11/20/2017    Zoster Live (Zostavax) 11/20/2017    Zoster Recombinant (Shingrix) 10/05/2019, 12/23/2019         Social History:    Social History     Tobacco Use    Smoking status: Never Smoker    Smokeless tobacco: Never Used   Substance Use Topics    Alcohol use: Yes     Comment: occas    Drug use: No     Social History     Tobacco Use   Smoking Status Never Smoker   Smokeless Tobacco Never Used      Family History : no DVT no COPD      REVIEW OF SYSTEMS:     Not possible due to confusion    PHYSICAL EXAM:      Vitals:  T max  99.9   BP (!) 109/58   Pulse 89   Temp 98.7 °F (37.1 °C) (Oral)   Resp 18   Wt 177 lb 14.6 oz (80.7 kg)   SpO2 96%   BMI 30.54 kg/m²     General Appearance: alert,in some  acute distress, ++ pallor, no icterus   Skin: warm and dry, no rash or erythema  Head: normocephalic and atraumatic  Eyes: pupils equal, round, and reactive to light, conjunctivae normal  ENT: tympanic membrane, external ear and ear canal normal bilaterally, nose without deformity, nasal mucosa and turbinates normal without polyps  Neck: supple and non-tender without mass, no thyromegaly  no cervical lymphadenopathy  Pulmonary/Chest: clear to auscultation bilaterally- no wheezes, rales or rhonchi, normal air movement, no respiratory distress  Cardiovascular:  S1 and S2, no murmurs, rubs, clicks, or gallops, no carotid bruits  Abdomen: soft, non-tender, non-distended, normal bowel sounds, no masses or organomegaly  Extremities: no cyanosis, clubbing or ++ edema  Musculoskeletal: normal range of motion, no joint swelling, deformity or tenderness  Integumentary: No rashes, no abnormal skin lesions, no petechiae  Neurologic: reflexes normal and symmetric, no cranial nerve deficit  Lines: IV  Left leg extensive cellulitis with blood filled blister on the dorsum with serous fluid drainage and local warmth and cellulitis spreading up the leg  Rt leg edema   Rt TKA+     DATA:    CBC:   Lab Results   Component Value Date    WBC 25.2 (H) 07/06/2021    HGB 11.6 (L) 07/06/2021    HCT 35.0 (L) 07/06/2021    MCV 94.9 07/06/2021     07/06/2021     RENAL:   Lab Results   Component Value Date    CREATININE 1.6 (H) 07/06/2021    BUN 35 (H) 07/06/2021     (L) 07/06/2021    K 4.7 07/06/2021    CL 99 07/06/2021    CO2 16 (L) 07/06/2021     SED RATE: No results found for: SEDRATE  CK:   Lab Results   Component Value Date    CKTOTAL 154 05/09/2021     CRP: No results found for: CRP  Hepatic Function Panel:   Lab Results   Component Value Date    ALKPHOS 62 07/06/2021    ALT 13 07/06/2021    AST 21 07/06/2021    PROT 7.4 07/06/2021    BILITOT 0.9 07/06/2021    LABALBU 4.0 07/06/2021     UA:  Lab Results   Component Value Date    COLORU YELLOW 05/21/2021    CLARITYU Clear 05/21/2021    GLUCOSEU Negative 05/21/2021    BILIRUBINUR Negative 05/21/2021    KETUA Negative 05/21/2021    SPECGRAV 1.017 05/21/2021    BLOODU Negative 05/21/2021    PHUR 5.5 05/21/2021    PROTEINU TRACE 05/21/2021    UROBILINOGEN 1.0 05/21/2021    NITRU Negative 05/21/2021    LEUKOCYTESUR Negative 05/21/2021    LABMICR YES 05/21/2021    URINETYPE Cleancatch 05/21/2021      Urine Microscopic:   Lab Results   Component Value Date    BACTERIA RARE 05/21/2021    HYALCAST 1 05/21/2021    WBCUA 3 05/21/2021    RBCUA 1 05/21/2021    EPIU 5 05/21/2021     Urine Reflex to Culture:   Lab Results   Component Value Date    URRFLXCULT Not Indicated 05/21/2021       cREAT  1.6     pROCAL  1.96     LACTIC ACID  2.7     WBC  25.2       MICRO: cultures reviewed and updated by me            Culture, Blood 1 [[id number]] Collected: 07/06/21 2329   Order Status: Sent Specimen: Blood Updated: 07/06/21 2339   Culture, Blood 2 [[id number]] Collected: 07/06/21 2329   Order Status: Sent Specimen: Blood Updated: 07/06/21 2339       Blood Culture:   Lab Results   Component Value Date    BC No Growth after 4 days of incubation. 05/09/2021    BLOODCULT2 No Growth after 4 days of incubation. 05/09/2021       Viral Culture:    No results found for: COVID19  Urine Culture: No results for input(s): LABURIN in the last 72 hours.     Scheduled Meds:   aspirin  81 mg Oral Daily    atorvastatin  40 mg Oral Daily  levothyroxine  100 mcg Oral Daily    [Held by provider] lisinopril  20 mg Oral Daily    sodium chloride flush  5-40 mL Intravenous 2 times per day    cefepime  1,000 mg Intravenous Q12H    enoxaparin  30 mg Subcutaneous Daily    vancomycin (VANCOCIN) intermittent dosing (placeholder)   Other RX Placeholder    insulin lispro  0-6 Units Subcutaneous TID WC    insulin lispro  0-3 Units Subcutaneous Nightly       Continuous Infusions:   sodium chloride      sodium chloride 50 mL/hr at 07/07/21 1158    dextrose         PRN Meds:  sodium chloride flush, sodium chloride, ondansetron, fentanNYL, glucose, dextrose, glucagon (rDNA), dextrose    Imaging:   XR FOOT LEFT (MIN 3 VIEWS)   Final Result   1. No acute osseous abnormality. 2. Old fractures of the proximal phalanges of the 4th and 5th toes. XR CHEST PORTABLE   Final Result   No airspace disease by radiograph. All pertinent images and reports for the current Hospitalization were reviewed by me.     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    Arthritis of left knee    Type 2 diabetes mellitus without complication, without long-term current use of insulin (HCC)    Essential hypertension    Mixed hyperlipidemia    Pedal edema    Other secondary kyphosis, cervicothoracic region    Postablative hypothyroidism    Closed fracture of second cervical vertebra (HCC)    Closed fracture of first cervical vertebra (HCC)    Lymphedema    High risk medication use    Chronic neck pain    Rotator cuff arthropathy of right shoulder    Bradycardia    Arthritis of left hip    Orthostatic syncope    Sepsis due to cellulitis (HCC)     Sepsis on admit  WBC elevation   Lactic acidosis  DASHAWN  ON going severe Left leg cellulitis  Diabetic foot infection   Bi lateral lower leg venous stasis

## 2021-07-07 NOTE — PROGRESS NOTES
Occupational Therapy   Occupational Therapy Initial Assessment  Date: 2021   Patient Name: Raghavendra Clark  MRN: [id number]     : 1929    Date of Service: 2021    Discharge Recommendations:  2-3 sessions per week, 24 hour supervision or assist, Patient would benefit from continued therapy after discharge  OT Equipment Recommendations  Equipment Needed: No    Raghavendra Clark scored a 17/24 on the AM-PAC ADL Inpatient form. Current research shows that an AM-PAC score of 18 or greater is typically associated with a discharge to the patient's home setting. Based on the patient's AM-PAC score, and their current ADL deficits, it is recommended that the patient have 2-3 sessions per week of Occupational Therapy at d/c to increase the patient's independence. At this time, this patient demonstrates the endurance and safety to discharge home with [address] [place] [address] (home vs OP services) and a follow up treatment frequency of 2-3x/wk. Please see assessment section for further patient specific details. If patient discharges prior to next session this note will serve as a discharge summary. Please see below for the latest assessment towards goals. Assessment   Performance deficits / Impairments: Decreased functional mobility ; Decreased ADL status; Decreased safe awareness;Decreased balance;Decreased posture;Decreased ROM; Decreased endurance;Decreased high-level IADLs;Decreased strength  Assessment: 79 yo female admitted  for LLE cellulits found down by her dtr. PMH: HTN, DM, arthritis. PTA, pt lives alone with dtrs visiting often and assisting with IADLs and present during baths. Otherwise, pt independent with ADls and fxl mobility with RW in home and cane outside of home. Today, pt fucnitoning below baseline requiring SBA/CGA bed mobility, CGA sit>stand with RW with cues for hand palcement, and Min A for fxl mobility with RW.  Pt with unsteadiness with ambulate requiring chair follow from second therapist. Declines need at this time. Anticipate seated set up UB and Min/Mod A LB ADL based on balance, endurance, PLOF, cognition, and ROM. Cont acute OT and rec d/c home with 24 hr supervision and OT 2-3x/week  Treatment Diagnosis: Impaired ADl and fxl mobiliy  Prognosis: Good  Decision Making: Medium Complexity  OT Education: OT Role;Plan of Care;Transfer Training  REQUIRES OT FOLLOW UP: Yes  Activity Tolerance  Activity Tolerance: Patient Tolerated treatment well  Safety Devices  Safety Devices in place: Yes  Type of devices: Nurse notified; Left in chair;Patient at risk for falls;Call light within reach; Chair alarm in place         Patient Diagnosis(es): The primary encounter diagnosis was Septicemia ([facility] [address] Diagnoses of Cellulitis of left lower extremity, Hematoma of left foot, and DASHAWN (acute kidney injury) ([facility] [address] were also pertinent to this visit. has a past medical history of Arthritis, Cataract, Diabetes ([facility] [address] High blood pressure, Kidney problem, Neuropathy, and Thyroid disorder. has a past surgical history that includes Appendectomy; Hysterectomy; Ovary removal; Total knee arthroplasty (Right, 2006); Carpal tunnel release (Left, 09/05/2017); Colonoscopy; Carpal tunnel release (Right, 09/26/2017); and Finger trigger release (Right, 09/26/2017). Treatment Diagnosis: Impaired ADl and fxl mobiliy      Restrictions  Restrictions/Precautions  Restrictions/Precautions: Fall Risk    Subjective   General  Chart Reviewed: Yes  Patient assessed for rehabilitation services?: Yes  Additional Pertinent Hx: 79 yo female admitted 7/6 for LLE cellulits found down by her dtr.  PMH: HTN, DM, arthritis  Family / Caregiver Present: Yes (dtrs)  Referring Practitioner: Adal Platt MD  Diagnosis: LLE cellulitis  Subjective  Subjective: Pt resting supine upon arrival. Pt agreeable to OT/PT eval. Complaints of LLE pain and back pain  General Comment  Comments: RN ok to see    Social/Functional History  Social/Functional History  Lives With: Alone  Type of Home: House  Home Layout: One level  Home Access: Stairs to enter with rails  Entrance Stairs - Number of Steps: 2+2  Bathroom Shower/Tub: Tub/Shower unit  Bathroom Toilet: Handicap height  Bathroom Equipment: Grab bars in shower, Shower chair, Grab bars around toilet, Hand-held shower, Tub transfer bench  Home Equipment: 4 wheeled walker, Cane, Alert Button, Rolling walker (transport chair)  Receives Help From: Family [facility]  ADL Assistance: Independent (dtr are present for bathing)  Homemaking Assistance:  (dtr performs laundry, takes pt to grocery store)  Ambulation Assistance: Independent (primarily uses RW in home, [address] for outside of home, transport chair for longer distances)  Transfer Assistance: Independent  Active : No  Patient's  Info: dtr driving       Objective   Vision: Within Functional Limits  Hearing: Exceptions to [facility]  Hearing Exceptions: Hard of hearing/hearing concerns    Orientation  Overall Orientation Status: Within Normal Limits     Balance  Sitting Balance: Stand by assistance  Standing Balance: Minimal assistance (PRN transfers)  Functional Mobility  Functional - Mobility Device: Rolling Walker  Activity:  (EOB> around foot of bed>recliner)  Assist Level: Minimal assistance  Functional Mobility Comments: chair follow required. R knee buckling with trendelenburg. unsteadiness. No LOB  ADL  Additional Comments: Declines need at this time.  Anticipate seated set up UB and Min/Mod A LB ADL based on balance, endurance, PLOF, cognition, and ROM        Bed mobility  Supine to Sit: Stand by assistance;Contact guard assistance (HOB slightly elevated; use of bed rail)  Sit to Supine: Unable to assess (in recliner at end of session)  Transfers  Sit to stand: Contact guard assistance  Stand to sit: Contact guard assistance  Transfer Comments: RW. cues for hand placement     Cognition  Overall Cognitive Status: WNL Sensation  Overall Sensation Status: WFL        LUE AROM (degrees)  LUE AROM : WFL  RUE AROM (degrees)  RUE AROM : WFL  LUE Strength  LUE Strength Comment: moderate weakness  RUE Strength  RUE Strength Comment: moderate weakness                   Plan   Plan  Times per week: 3-5  Times per day: Daily  Current Treatment Recommendations: Strengthening, Pain Management, Positioning, ROM, Safety Education & Training, Balance Training, Self-Care / ADL, Functional Mobility Training, Endurance Training, Patient/Caregiver Education & Training    AM-PAC Score        AM-[facility] Inpatient Daily Activity Raw Score: 17 (07/07/21 1205)  AM-PAC Inpatient ADL T-Scale Score : 37.26 (07/07/21 1205)  ADL Inpatient CMS 0-100% Score: 50.11 (07/07/21 1205)  ADL Inpatient CMS G-Code Modifier : CK (07/07/21 1205)    Goals  Short term goals  Time Frame for Short term goals: prior to d/c  Short term goal 1: ADL tx SUP  Short term goal 2: toileting SUP  Short term goal 3: tolerate 3 min fxl standing task with SUP  Short term goal 4: UB dressing/bathing with SUP  Short term goal 5: LB dressing SUP  Patient Goals   Patient goals : none stated       Therapy Time   Individual Concurrent Group Co-treatment   Time In 1115         Time Out 1155         Minutes 40         Timed Code Treatment Minutes: 25 Minutes (15 eval, 25 TA)       THERESA Chisholm, OTR/L

## 2021-07-07 NOTE — PROGRESS NOTES
4 Eyes Skin Assessment     NAME:  Kimberlee Redman  YOB: 1929  MEDICAL RECORD NUMBER:  [id number]    The patient is being assess for  Admission    I agree that 2 RN's have performed a thorough Head to Toe Skin Assessment on the patient. ALL assessment sites listed below have been assessed. Areas assessed by both nurses:    Head, Face, Ears, Shoulders, Back, Chest, Arms, Elbows, Hands, Sacrum. Buttock, Coccyx, Ischium and Legs. Feet and Heels. Gen bruising on extremities, LLE red and swollen with a blood blister the size of a oft ball. Does the Patient have a Wound? Yes wound(s) were present on assessment.  LDA wound assessment was Initiated and completed        Alex Prevention initiated:  Yes   Wound Care Orders initiated:  Yes    Pressure Injury (Stage 3,4, Unstageable, DTI, NWPT, and Complex wounds) if present place consult order under [de-identified] No    New and Established Ostomies if present place consult order under : No      Nurse 1 eSignature: Electronically signed by Karla Goel RN on 7/7/21 at 6:56 AM EDT    **SHARE this note so that the co-signing nurse is able to place an eSignature**    Nurse 2 eSignature: {Esignature:[id number]}

## 2021-07-07 NOTE — ED TRIAGE NOTES
Patient presents to ED from home for being found down on the floor by daughter in law. Daughter in law states this happened on Mother's Day. Patient lives at home alone, has first alert, but was on the table and not on patient and family checks on patient multiple times per day. Patient sees Dr. Eduardo Ji, B/L feet extremely swollen, left foot noted to have a blood blister to top of left foot and noted to be red. Patient voices c/o pain to back, has hx of arthritis, but pain has been worse since fall on mothers day.

## 2021-07-07 NOTE — CONSULTS
Clinical Pharmacy Note  Vancomycin Consult    Pharmacy consult received for one-time dose of vancomycin in the Emergency Department per Moses Sanderson CNP. Ht Readings from Last 1 Encounters:   05/21/21 5' 4\" (1.626 m)        Wt Readings from Last 1 Encounters:   05/21/21 187 lb 2 oz (84.9 kg)         Assessment/Plan:   Vancomycin 1250 mg x 1 in ED.  If Vancomycin is to continue on admission and pharmacy is to manage dosing, please re-consult with admission orders.     Latonya Johnson, PharmD

## 2021-07-07 NOTE — PROGRESS NOTES
Pharmacy Medication Reconciliation Note     List of medications patient is currently taking is complete. Source of information:   1. Patient and her daughter  2.  EM[facility]      Jose Hinton PharmD, BCPS  7/7/2021  8:57 AM

## 2021-07-08 LAB
ANION GAP SERPL CALCULATED.3IONS-SCNC: 15 MMOL/L (ref 3–16)
BASOPHILS ABSOLUTE: 0.1 K/UL (ref 0–0.2)
BASOPHILS RELATIVE PERCENT: 0.3 %
BUN BLDV-MCNC: 32 MG/DL (ref 7–20)
CALCIUM SERPL-MCNC: 8.4 MG/DL (ref 8.3–10.6)
CHLORIDE BLD-SCNC: 99 MMOL/L (ref 99–110)
CO2: 19 MMOL/L (ref 21–32)
CREAT SERPL-MCNC: 0.9 MG/DL (ref 0.6–1.2)
EOSINOPHILS ABSOLUTE: 0 K/UL (ref 0–0.6)
EOSINOPHILS RELATIVE PERCENT: 0.2 %
ESTIMATED AVERAGE GLUCOSE: 148.5 MG/DL
GFR AFRICAN AMERICAN: >60
GFR NON-AFRICAN AMERICAN: 59
GLUCOSE BLD-MCNC: 150 MG/DL (ref 70–99)
GLUCOSE BLD-MCNC: 163 MG/DL (ref 70–99)
GLUCOSE BLD-MCNC: 165 MG/DL (ref 70–99)
GLUCOSE BLD-MCNC: 213 MG/DL (ref 70–99)
GLUCOSE BLD-MCNC: 254 MG/DL (ref 70–99)
HBA1C MFR BLD: 6.8 %
HCT VFR BLD CALC: 30.3 % (ref 36–48)
HEMOGLOBIN: 10.4 G/DL (ref 12–16)
LACTIC ACID: 1 MMOL/L (ref 0.4–2)
LYMPHOCYTES ABSOLUTE: 1.6 K/UL (ref 1–5.1)
LYMPHOCYTES RELATIVE PERCENT: 8.2 %
MCH RBC QN AUTO: 32 PG (ref 26–34)
MCHC RBC AUTO-ENTMCNC: 34.2 G/DL (ref 31–36)
MCV RBC AUTO: 93.6 FL (ref 80–100)
MONOCYTES ABSOLUTE: 0.6 K/UL (ref 0–1.3)
MONOCYTES RELATIVE PERCENT: 3.1 %
NEUTROPHILS ABSOLUTE: 16.9 K/UL (ref 1.7–7.7)
NEUTROPHILS RELATIVE PERCENT: 88.2 %
PDW BLD-RTO: 14.8 % (ref 12.4–15.4)
PERFORMED ON: ABNORMAL
PLATELET # BLD: 227 K/UL (ref 135–450)
PMV BLD AUTO: 7.9 FL (ref 5–10.5)
POTASSIUM SERPL-SCNC: 3.8 MMOL/L (ref 3.5–5.1)
PROCALCITONIN: 1.46 NG/ML (ref 0–0.15)
RBC # BLD: 3.24 M/UL (ref 4–5.2)
SODIUM BLD-SCNC: 133 MMOL/L (ref 136–145)
WBC # BLD: 19.2 K/UL (ref 4–11)

## 2021-07-08 PROCEDURE — 2580000003 HC RX 258: Performed by: INTERNAL MEDICINE

## 2021-07-08 PROCEDURE — 84145 PROCALCITONIN (PCT): CPT

## 2021-07-08 PROCEDURE — 85025 COMPLETE CBC W/AUTO DIFF WBC: CPT

## 2021-07-08 PROCEDURE — 80048 BASIC METABOLIC PNL TOTAL CA: CPT

## 2021-07-08 PROCEDURE — 97530 THERAPEUTIC ACTIVITIES: CPT

## 2021-07-08 PROCEDURE — 6370000000 HC RX 637 (ALT 250 FOR IP): Performed by: FAMILY MEDICINE

## 2021-07-08 PROCEDURE — 87205 SMEAR GRAM STAIN: CPT

## 2021-07-08 PROCEDURE — 6360000002 HC RX W HCPCS: Performed by: INTERNAL MEDICINE

## 2021-07-08 PROCEDURE — 83036 HEMOGLOBIN GLYCOSYLATED A1C: CPT

## 2021-07-08 PROCEDURE — 1200000000 HC SEMI PRIVATE

## 2021-07-08 PROCEDURE — 83605 ASSAY OF LACTIC ACID: CPT

## 2021-07-08 PROCEDURE — 6370000000 HC RX 637 (ALT 250 FOR IP): Performed by: INTERNAL MEDICINE

## 2021-07-08 PROCEDURE — 87077 CULTURE AEROBIC IDENTIFY: CPT

## 2021-07-08 PROCEDURE — 99233 SBSQ HOSP IP/OBS HIGH 50: CPT | Performed by: INTERNAL MEDICINE

## 2021-07-08 PROCEDURE — 87070 CULTURE OTHR SPECIMN AEROBIC: CPT

## 2021-07-08 PROCEDURE — 36415 COLL VENOUS BLD VENIPUNCTURE: CPT

## 2021-07-08 RX ADMIN — INSULIN LISPRO 3 UNITS: 100 INJECTION, SOLUTION INTRAVENOUS; SUBCUTANEOUS at 22:11

## 2021-07-08 RX ADMIN — HYDROCODONE BITARTRATE AND ACETAMINOPHEN 1 TABLET: 5; 325 TABLET ORAL at 16:06

## 2021-07-08 RX ADMIN — ATORVASTATIN CALCIUM 40 MG: 40 TABLET, FILM COATED ORAL at 08:34

## 2021-07-08 RX ADMIN — INSULIN LISPRO 4 UNITS: 100 INJECTION, SOLUTION INTRAVENOUS; SUBCUTANEOUS at 12:49

## 2021-07-08 RX ADMIN — LEVOTHYROXINE SODIUM 100 MCG: 0.1 TABLET ORAL at 06:15

## 2021-07-08 RX ADMIN — INSULIN LISPRO 2 UNITS: 100 INJECTION, SOLUTION INTRAVENOUS; SUBCUTANEOUS at 18:27

## 2021-07-08 RX ADMIN — LINEZOLID 600 MG: 600 INJECTION, SOLUTION INTRAVENOUS at 08:34

## 2021-07-08 RX ADMIN — SODIUM CHLORIDE, PRESERVATIVE FREE 10 ML: 5 INJECTION INTRAVENOUS at 08:43

## 2021-07-08 RX ADMIN — SODIUM CHLORIDE, PRESERVATIVE FREE 10 ML: 5 INJECTION INTRAVENOUS at 21:33

## 2021-07-08 RX ADMIN — ACETAMINOPHEN 650 MG: 325 TABLET ORAL at 03:13

## 2021-07-08 RX ADMIN — ASPIRIN 81 MG: 81 TABLET, CHEWABLE ORAL at 08:34

## 2021-07-08 RX ADMIN — CEFEPIME HYDROCHLORIDE 1000 MG: 1 INJECTION, POWDER, FOR SOLUTION INTRAMUSCULAR; INTRAVENOUS at 02:05

## 2021-07-08 RX ADMIN — LINEZOLID 600 MG: 600 INJECTION, SOLUTION INTRAVENOUS at 21:32

## 2021-07-08 RX ADMIN — ENOXAPARIN SODIUM 30 MG: 30 INJECTION SUBCUTANEOUS at 08:34

## 2021-07-08 RX ADMIN — INSULIN LISPRO 1 UNITS: 100 INJECTION, SOLUTION INTRAVENOUS; SUBCUTANEOUS at 08:33

## 2021-07-08 RX ADMIN — CEFEPIME HYDROCHLORIDE 1000 MG: 1 INJECTION, POWDER, FOR SOLUTION INTRAMUSCULAR; INTRAVENOUS at 12:49

## 2021-07-08 ASSESSMENT — PAIN SCALES - GENERAL
PAINLEVEL_OUTOF10: 6
PAINLEVEL_OUTOF10: 0
PAINLEVEL_OUTOF10: 5
PAINLEVEL_OUTOF10: 4
PAINLEVEL_OUTOF10: 0

## 2021-07-08 NOTE — PLAN OF CARE
Problem: Nutrition  Goal: Optimal nutrition therapy  Outcome: Ongoing   Nutrition Problem #1: Severe malnutrition  Intervention: Food and/or Nutrient Delivery: Continue Current Diet, Start Oral Nutrition Supplement  Nutritional Goals: >50% of meals and supplement consumed

## 2021-07-08 NOTE — CONSULTS
Department of Podiatry Consult Note  Dedrick Flor. Lavern Sicard, DPM Attending       Reason for Consult:  Large blister of LEFT foot  Requesting Physician:  Gil Adams MD    CHIEF COMPLAINT:  Growing blister of LEFT dorsal foot after trauma of item dropped to foot several weeks agoo    HISTORY OF PRESENT ILLNESS:                The patient is a 80 y.o. female with significant past medical history of  Obesity, Diabetes, Hypertension and Hypothyroidism who is consulted for a several days to week's duration of growing blood blister to dorsum of LEFT foot.  Patient's daughter relates an item struck her mother's foot and blister has continually grown    Past Medical History:        Diagnosis Date    Arthritis     Cataract     Diabetes ([address]     High blood pressure     Kidney problem     Neuropathy     Thyroid disorder      Past Surgical History:        Procedure Laterality Date    APPENDECTOMY      CARPAL TUNNEL RELEASE Left 09/05/2017      Left carpal tunnel release  & Left Thumb, Index Finger and Middle Finger trigger finger release    CARPAL TUNNEL RELEASE Right 09/26/2017    COLONOSCOPY      FINGER TRIGGER RELEASE Right 09/26/2017    Thumb & Index Finger    HYSTERECTOMY      OVARY REMOVAL      TOTAL KNEE ARTHROPLASTY Right 2006    Dr. Quynh Lin     Current Medications:    Current Facility-Administered Medications: insulin lispro (HUMALOG) injection vial 0-12 Units, 0-12 Units, Subcutaneous, TID WC  insulin lispro (HUMALOG) injection vial 0-6 Units, 0-6 Units, Subcutaneous, Nightly  aspirin chewable tablet 81 mg, 81 mg, Oral, Daily  atorvastatin (LIPITOR) tablet 40 mg, 40 mg, Oral, Daily  levothyroxine (SYNTHROID) tablet 100 mcg, 100 mcg, Oral, Daily  [Held by provider] lisinopril (PRINIVIL;ZESTRIL) tablet 20 mg, 20 mg, Oral, Daily  sodium chloride flush 0.9 % injection 5-40 mL, 5-40 mL, Intravenous, 2 times per day  sodium chloride flush 0.9 % injection 5-40 mL, 5-40 mL, Intravenous, PRN  0.9 % sodium chloride infusion, 25 mL, Intravenous, PRN  ondansetron (ZOFRAN) injection 4 mg, 4 mg, Intravenous, Q6H PRN  cefepime (MAXIPIME) 1000 mg IVPB minibag, 1,000 mg, Intravenous, Q12H  enoxaparin (LOVENOX) injection 30 mg, 30 mg, Subcutaneous, Daily  fentaNYL (SUBLIMAZE) injection 25 mcg, 25 mcg, Intravenous, Q6H PRN  glucose (GLUTOSE) 40 % oral gel 15 g, 15 g, Oral, PRN  dextrose 50 % IV solution, 12.5 g, Intravenous, PRN  glucagon (rDNA) injection 1 mg, 1 mg, Intramuscular, PRN  dextrose 5 % solution, 100 mL/hr, Intravenous, PRN  HYDROcodone-acetaminophen (NORCO) 5-325 MG per tablet 1 tablet, 1 tablet, Oral, Q6H PRN  acetaminophen (TYLENOL) tablet 650 mg, 650 mg, Oral, Q4H PRN  linezolid (ZYVOX) IVPB 600 mg, 600 mg, Intravenous, Q12H  Allergies:   Gabapentin, Pravachol [pravastatin sodium], and Tegaderm ag mesh 2\"x2\" [wound dressings]  Social History:    TOBACCO:  Never used tobacco  ETOH:  Never drank alcohol  Patient currently lives alone  Family History:   History reviewed. No pertinent family history.   REVIEW OF SYSTEMS:    CONSTITUTIONAL:  negative  INTEGUMENT/BREAST:  positive for skin lesion(s), skin color change, changes in lesion, changes in hair and changes in nails  MUSCULOSKELETAL:  positive for  joint swelling, stiff joints and decreased range of motion  NEUROLOGICAL:  positive for gait problems, weakness and numbness  PHYSICAL EXAM:      Vitals:    /66   Pulse 84   Temp 99 °F (37.2 °C) (Oral)   Resp 20   Ht 5' 4\" (1.626 m)   Wt 189 lb 9.5 oz (86 kg)   SpO2 97%   BMI 32.54 kg/m²     LABS:   Recent Labs     07/06/21 2329 07/08/21  0758   WBC 25.2* 19.2*   HGB 11.6* 10.4*   HCT 35.0* 30.3*    227     Recent Labs     07/08/21  0758   *   K 3.8   CL 99   CO2 19*   BUN 32*   CREATININE 0.9     Recent Labs     07/06/21  2329   PROT 7.4       LOWER EXTREMITY EXAMINATION   SKIN:    Large bulloid mass of LEFT dorsal foot with surrounding erythema localized to foot from toes to nearly the ankle joint. Deep purple heme color to blister of 4.9 x 5.2 cm with raised 2 cm height            NEUROLOGIC:    Pain sensation isdecreased Left. Light touch is decreasedLeft. positive history of paresthesia Bilateral. negativehistory of burning Bilateral. Deep tendon reflexes are 1 to include patellar and achilles Bilateral. Kincaid--Dallin 5.07 monofilament sensitivity is abnormal 4 to 5 spots tested Bilateral.    VASCULAR:    left Dorsalis pedis is 1. left Posterior tibial pulse is 1. 3+ edema left. moderate Varicosities bilaterally. MUSCULOSKELETAL:  Glade is abnormal  antalgic due to pain of lesion. Muscle strength is 4/5 to all groups tested to include dorsiflexion, plantarflexion, inversion, eversion, and digital Bilateral . The ranges of motion of all joints tested from ankle distal is decreased there is crepitus noted Left. Radiology of 7/7/2021  FINDINGS:   There is no evidence of acute fracture.  There is normal alignment of the   tarsometatarsal joints.  No acute joint abnormality.  No focal osseous   lesion.  Soft tissue swelling of the forefoot.  Old fractures of the proximal   phalanges of the 4th and 5th toes.           Impression   1. No acute osseous abnormality. 2. Old fractures of the proximal phalanges of the 4th and 5th toes           IMPRESSION/RECOMMENDATIONS    1. Cellulitis of LEFT foot secondary to traumatic lesion  Afebrile and descending leukocytosis    2. Abscess of LEFT foot    Upon Verbal consent, the LEFT foot lesion was painted with Iodine and scissors used to incise the roof of blister with immediate expression of 30 cc of dark heme drainage with scant purulence noted to more proximal area overlying the 2nd metatarsal diaphysis.  The drainage was cultured, and the wound dressed with antibacterial dressing and heavy absorptive materiall    Disposition: Patient may require more formal Incision and Drainage in the OR setting, but will observe for clearance of infection thru today's procedure wih continued wound care to include Santyl in next couple days    Thank you for the opportunity to take part in the patient's care.     Janay Norman DPPOWER  [facility]  [phone]

## 2021-07-08 NOTE — PROGRESS NOTES
Comprehensive Nutrition Assessment    Type and Reason for Visit:  Initial, Positive Nutrition Screen, Wound    Nutrition Recommendations/Plan:   DC cardiac restriction   Continue CCC(e carb) diet  Add Ensure HP bid  Monitor meal and supplement intake  Monitor labs, wt, skin, bowels    Nutrition Assessment:  Pt admitted with sepsis b/o cellulitis PPt refered for nutritional services b/o poor po intake pta and wound. PMH includes DM2, HTN, HLD, obesity. Spoke with pt and family members. Pt on Sales 66 (3 carb), cardiac diet. Taking cardiac order off b/o pt age and no cardiac hx. Pt has had of 30# (13.8%)in past two months. Family indicates a real change in what she is eating which they feel is due to her not feeling like doing meal prep with the infection she has had. Pt agreeable to adding Ensure HP. Malnutrition Assessment:  Malnutrition Status:  Severe malnutrition    Context:  Acute Illness     Findings of the 6 clinical characteristics of malnutrition:  Energy Intake:  7 - 50% or less of estimated energy requirements for 5 or more days  Weight Loss:  7 - Greater than 7.5% over 3 months     Body Fat Loss:  1 - Mild body fat loss Orbital   Muscle Mass Loss:  1 - Mild muscle mass loss Temples (temporalis)  Fluid Accumulation:  7 - Moderate to Severe     Strength:  Not Performed    Estimated Daily Nutrient Needs:  Energy (kcal):  1782-1801 (15-18 x CBW); Weight Used for Energy Requirements:  Current     Protein (g):  66-83 (1.2-1.5 x IBW); Weight Used for Protein Requirements:  Ideal        Fluid (ml/day):  per provider; Method Used for Fluid Requirements:         Nutrition Related Findings:  BM 7/8, generalized non pitting and +4 pitting BLE edema, Na 133, A1C 6.8      Wounds:   (cellulitis)       Current Nutrition Therapies:    ADULT DIET;  Regular; 3 carb choices (45 gm/meal)    Anthropometric Measures:  · Height: 5' 4\" (162.6 cm)  · Current Body Weight: 189 lb 9.5 oz (86 kg)   · Usual Body Weight: 220 lb (99.8 kg)     · Ideal Body Weight: 120 lbs; % Ideal Body Weight 158 %   · BMI: 32.5  · Adjusted Body Weight:  ; No Adjustment   · BMI Categories: Obese Class 1 (BMI 30.0-34. 9)       Nutrition Diagnosis:   · Severe malnutrition related to inadequate protein-energy intake as evidenced by weight loss 7.5% in 3 months, poor intake prior to admission      Nutrition Interventions:   Food and/or Nutrient Delivery:  Continue Current Diet, Start Oral Nutrition Supplement  Nutrition Education/Counseling:  Education not indicated   Coordination of Nutrition Care:  Continue to monitor while inpatient    Goals:  >50% of meals and supplement consumed       Nutrition Monitoring and Evaluation:   Behavioral-Environmental Outcomes:  None Identified   Food/Nutrient Intake Outcomes:  Food and Nutrient Intake, Supplement Intake  Physical Signs/Symptoms Outcomes:        Discharge Planning:    Continue current diet, Continue Oral Nutrition Supplement     Electronically signed by Quinton Cash RD, LD on 7/8/21 at 12:00 PM EDT    Contact: [phone]

## 2021-07-08 NOTE — PROGRESS NOTES
Physical Therapy  Facility/Department: [address] MED SURG  Daily Treatment Note  NAME: Elfego Russell  : 1929  MRN: [id number]    Date of Service: 2021    Discharge Recommendations:  24 hour supervision or assist, Patient would benefit from continued therapy after discharge, S Level 1     Elfego Russell scored a 17/24 on the AM-PAC short mobility form. Current research shows that an AM-PAC score of 18 or greater is typically associated with a discharge to the patient's home setting. Based on the patient's AM-PAC score and their current functional mobility deficits, it is recommended that the patient have 2-3 sessions per week of Physical Therapy at d/c to increase the patient's independence. At this time, this patient demonstrates the endurance and safety to discharge home with HHPT and a follow up treatment frequency of 2-3x/wk. Please see assessment section for further patient specific details. If patient discharges prior to next session this note will serve as a discharge summary. Please see below for the latest assessment towards goals. Assessment   Body structures, Functions, Activity limitations: Decreased functional mobility ; Decreased ADL status; Decreased strength;Decreased endurance;Decreased balance  Assessment: Pt is a 80 y.o. female who presented to the ED on 21 after being found down at home. Blood glucose levels found to be >300. Prior to admission, pt living alone in one level home with 2+2 ALINE. Pt reports dtr's are with pt frequently. Pt currently functioning below baseline. Anticipate return home with initial 24hr supv and level 1 HHPT. Treatment Diagnosis: impaired mobility  Prognosis: Fair  Decision Making: Medium Complexity  History: see below  Exam: see below  Clinical Presentation: evolving  PT Education: PT Role;Plan of Care; Functional Mobility Training;General Safety;Gait Training;Transfer Training  REQUIRES PT FOLLOW UP: Yes  Activity Tolerance  Activity Tolerance: Patient Tolerated treatment well;Patient limited by fatigue;Patient limited by endurance     Patient Diagnosis(es): The primary encounter diagnosis was Septicemia ([facility] [address] Diagnoses of Cellulitis of left lower extremity, Hematoma of left foot, and DASHAWN (acute kidney injury) ([facility] [address] were also pertinent to this visit. has a past medical history of Arthritis, Cataract, Diabetes ([facility] [address] High blood pressure, Kidney problem, Neuropathy, and Thyroid disorder. has a past surgical history that includes Appendectomy; Hysterectomy; Ovary removal; Total knee arthroplasty (Right, 2006); Carpal tunnel release (Left, 09/05/2017); Colonoscopy; Carpal tunnel release (Right, 09/26/2017); and Finger trigger release (Right, 09/26/2017). Restrictions  Restrictions/Precautions  Restrictions/Precautions: Fall Risk     Subjective   General  Chart Reviewed: Yes  Additional Pertinent Hx: Pt is a 80 y.o. female who presented to the ED on 7/6/21 after being found down at home. Blood glucose levels found to be >300. Response To Previous Treatment: Patient with no complaints from previous session. Family / Caregiver Present: Yes (2 dtrs)  Referring Practitioner: Angelic Wyatt MD  Subjective  Subjective: Pt is agreeable to PT          Orientation  Orientation  Overall Orientation Status: Within Functional Limits     Cognition   Cognition  Overall Cognitive Status: WNL     Objective   Bed mobility  Supine to Sit: Unable to assess  Sit to Supine: Unable to assess  Comment: Pt in recliner at beginning and end of session  Transfers  Sit to Stand: Minimal Assistance; Moderate Assistance (min A from recliner; mod A from chair without armrests; cues for hand placement)  Stand to sit: Minimal Assistance (cues for hand placement)  Comment: cues for hand placement  Ambulation  Ambulation?: Yes  Ambulation 1  Surface: level tile  Device: Rolling Walker  Assistance: Minimal assistance; Moderate assistance  Quality of Gait: + trendelenburg gait  Gait Deviations: Slow Yasmeen;Decreased step length;Decreased step height  Distance: 20' x 2  Stairs/Curb  Stairs?: No     Balance  Posture: Fair  Sitting - Static: Good  Sitting - Dynamic: Good  Standing - Static: Fair (@RW)  Standing - Dynamic: Fair (@RW)         AM-PAC Score  AM-PAC Inpatient Mobility Raw Score : 17 (07/08/21 1312)  AM-PAC Inpatient T-Scale Score : 42.13 (07/08/21 1312)  Mobility Inpatient CMS 0-100% Score: 50.57 (07/08/21 1312)  Mobility Inpatient CMS G-Code Modifier : CK (07/08/21 1312)          Goals  Short term goals  Time Frame for Short term goals: by acute discharge - all goals ongoing as of 7/8/21  Short term goal 1: bed mobility with supv  Short term goal 2: sit<>Stand with SBA  Short term goal 3: ambulate > 20' with RW and SBA  Patient Goals   Patient goals : none stated    Plan    Plan  Times per week: 3-5x/week  Current Treatment Recommendations: Strengthening, Transfer Training, Balance Training, Functional Mobility Training, Gait Training, Safety Education & Training, Patient/Caregiver Education & Training, Neuromuscular Re-education  Safety Devices  Type of devices:  All fall risk precautions in place, Call light within reach, Gait belt, Patient at risk for falls, Left in chair, Chair alarm in place, Nurse notified     Therapy Time   Individual Concurrent Group Co-treatment   Time In [phone]         Time Out 1200         Minutes 28         Timed Code Treatment Minutes: [facility] [place], PT

## 2021-07-08 NOTE — PROGRESS NOTES
Infectious Disease Follow up Notes  Admit Date: 7/6/2021  Hospital Day: 3    Antibiotics : IV Cefepime  IV Linezolid       CHIEF COMPLAINT:     Left leg cellulitis  Sepsis  WBC elevation   Left foot abscess     Subjective interval History :  80 y.o. woman was found down at home with on going lEFT Leg infection and cellulitis with large blood filled blister on the dorsum of the Left foot.  She sustained injury to Left foot in May 2021 per family now developed progressive cellulitis with skin lesions and local redness with serous drainage and on going pain, swelling extending up the Left leg and she has h/o bi lateral lower leg edema from venous stasis and WBC elevation with lactic acid elevation, DASHAWN and fevers on admit with on going septic process we are consulted for recommendations   Location : Left leg swelling, pain and skin changes       Quality : aching       Severity :  8/10   Duration :  weeks    Timing : intermittent   Context : Left foot trauma and chronic edema    Modifying factors : none   Associated signs and symptoms:  WBC elevation, BG elevation  Confusion, fevers        Interval History : Left foot abscess and blister drained by  at bed side today and wound cx in process Left  Leg less painful on going redness but better mentation improving tolerating IV abx ok       Past Medical History:    Past Medical History:   Diagnosis Date    Arthritis     Cataract     Diabetes ([address]     High blood pressure     Kidney problem     Neuropathy     Thyroid disorder        Past Surgical History:    Past Surgical History:   Procedure Laterality Date    APPENDECTOMY      CARPAL TUNNEL RELEASE Left 09/05/2017      Left carpal tunnel release  & Left Thumb, Index Finger and Middle Finger trigger finger release    CARPAL TUNNEL RELEASE Right 09/26/2017    COLONOSCOPY      FINGER TRIGGER RELEASE Right 09/26/2017    Thumb & Index Finger    HYSTERECTOMY      OVARY REMOVAL      TOTAL KNEE ARTHROPLASTY Right 2006    Dr. Suki Gayle       Current Medications:    Outpatient Medications Marked as Taking for the 7/6/21 encounter [facility] Encounter)   Medication Sig Dispense Refill    Cholecalciferol (VITAMIN D) 50 MCG (2000 UT) CAPS capsule Take 2,000 Units by mouth daily      lisinopril (PRINIVIL;ZESTRIL) 20 MG tablet Take 1 tablet by mouth daily 90 tablet 3    atorvastatin (LIPITOR) 40 MG tablet TAKE 1 TABLET DAILY 90 tablet 3    metFORMIN (GLUCOPHAGE) 1000 MG tablet TAKE 1 TABLET TWICE A DAY WITH MEALS 180 tablet 3    levothyroxine (SYNTHROID) 112 MCG tablet TAKE ONE TABLET BY MOUTH DAILY (Patient taking differently: Take 112 mcg by mouth Daily TAKE ONE TABLET BY MOUTH DAILY at 6 am) 90 tablet 3    acetaminophen (AMINOFEN) 325 MG tablet Take 2 tablets by mouth every 6 hours as needed for Pain 120 tablet 0    Cranberry-Cholecalciferol 4200-500 MG-UNIT CAPS Take by mouth      aspirin 81 MG tablet Take 81 mg by mouth daily      Multiple Vitamin (MULTI VITAMIN DAILY PO) Take by mouth         Allergies:  Gabapentin, Pravachol [pravastatin sodium], and Tegaderm ag mesh 2\"x2\" [wound dressings]    Immunizations :   Immunization History   Administered Date(s) Administered    Influenza Vaccine, unspecified formulation 10/10/2003, 10/05/2004, 10/04/2005, 09/01/2012, 10/01/2014, 11/20/2017    Influenza Virus Vaccine 11/20/2017    Influenza, High Dose (Fluzone 65 yrs and older) 09/28/2018    Influenza, Tammie Arena, Recombinant, IM PF (Flublok 18 yrs and older) 10/05/2019    Influenza, Quadv, adjuvanted, 65 yrs +, IM, PF (Fluad) 10/26/2020    Pneumococcal Conjugate 13-valent ([id number]) 02/15/2018    Pneumococcal Polysaccharide ([id number]) 02/26/2019, 10/05/2019    Tdap (Boostrix, Adacel) 11/20/2017    Zoster Live (Zostavax) 11/20/2017    Zoster Recombinant (Shingrix) 10/05/2019, 12/23/2019       Social History:    Social History Tobacco Use    Smoking status: Never Smoker    Smokeless tobacco: Never Used   Substance Use Topics    Alcohol use: Yes     Comment: occas    Drug use: No     Social History     Tobacco Use   Smoking Status Never Smoker   Smokeless Tobacco Never Used      Family history : no Liver disorders no COPD       REVIEW OF SYSTEMS:     Constitutional:  negative for fevers, chills, night sweats  Eyes:  negative for blurred vision, eye discharge, visual disturbance   HEENT:  negative for hearing loss, ear drainage,nasal congestion  Respiratory:  negative for cough, shortness of breath or hemoptysis   Cardiovascular:  negative for chest pain, palpitations, syncope  Gastrointestinal:  negative for nausea, vomiting, diarrhea, constipation, abdominal pain  Genitourinary:  negative for frequency, dysuria, urinary incontinence, hematuria  Hematologic/Lymphatic:  negative for easy bruising, bleeding and lymphadenopathy  Allergic/Immunologic:  negative for recurrent infections, angioedema, anaphylaxis   Endocrine:  negative for weight changes, polyuria, polydipsia and polyphagia  Musculoskeletal:  negative for joint  pain, swelling, decreased range of motion  Integumentary: No rashes, skin lesions  Neurological:  negative for headaches, slurred speech, unilateral weakness  Psychiatric: negative for hallucinations,confusion,agitation.                 PHYSICAL EXAM:      Vitals:    /63   Pulse 81   Temp 98.9 °F (37.2 °C) (Oral)   Resp 16   Wt 189 lb 9.5 oz (86 kg)   SpO2 96%   BMI 32.54 kg/m²     General Appearance: alert,in some  acute distress, ++ pallor, no icterus   Skin: warm and dry, no rash or erythema  Head: normocephalic and atraumatic  Eyes: pupils equal, round, and reactive to light, conjunctivae normal  ENT: tympanic membrane, external ear and ear canal normal bilaterally, nose without deformity, nasal mucosa and turbinates normal without polyps  Neck: supple and non-tender without mass, no thyromegaly  no cervical lymphadenopathy  Pulmonary/Chest: clear to auscultation bilaterally- no wheezes, rales or rhonchi, normal air movement, no respiratory distress  Cardiovascular:  S1 and S2, no murmurs, rubs, clicks, or gallops, no carotid bruits  Abdomen: soft, non-tender, non-distended, normal bowel sounds, no masses or organomegaly  Extremities: no cyanosis, clubbing or ++ edema  Musculoskeletal: normal range of motion, no joint swelling, deformity or tenderness  Integumentary: No rashes, no abnormal skin lesions, no petechiae  Neurologic: reflexes normal and symmetric, no cranial nerve deficit  Lines: IV  Left leg extensive cellulitis with blood filled blister on the dorsum with serous fluid drainage and local warmth and cellulitis spreading up the leg /s/p ID by Podiatry and dressing+   Rt leg edema   Rt TKA+      Data Review:    CBC:   Lab Results   Component Value Date    WBC 19.2 (H) 07/08/2021    HGB 10.4 (L) 07/08/2021    HCT 30.3 (L) 07/08/2021    MCV 93.6 07/08/2021     07/08/2021     RENAL:   Lab Results   Component Value Date    CREATININE 0.9 07/08/2021    BUN 32 (H) 07/08/2021     (L) 07/08/2021    K 3.8 07/08/2021    CL 99 07/08/2021    CO2 19 (L) 07/08/2021     SED RATE: No results found for: SEDRATE  CK:   Lab Results   Component Value Date    CKTOTAL 154 05/09/2021     CRP: No results found for: CRP  Hepatic Function Panel:   Lab Results   Component Value Date    ALKPHOS 62 07/06/2021    ALT 13 07/06/2021    AST 21 07/06/2021    PROT 7.4 07/06/2021    BILITOT 0.9 07/06/2021    LABALBU 4.0 07/06/2021     UA:  Lab Results   Component Value Date    COLORU YELLOW 05/21/2021    CLARITYU Clear 05/21/2021    GLUCOSEU Negative 05/21/2021    BILIRUBINUR Negative 05/21/2021    KETUA Negative 05/21/2021    SPECGRAV 1.017 05/21/2021    BLOODU Negative 05/21/2021    PHUR 5.5 05/21/2021    PROTEINU TRACE 05/21/2021    UROBILINOGEN 1.0 05/21/2021    NITRU Negative 05/21/2021    LEUKOCYTESUR Negative 05/21/2021    LABMICR YES 05/21/2021    URINETYPE Cleancatch 05/21/2021      Urine Microscopic:   Lab Results   Component Value Date    BACTERIA RARE 05/21/2021    HYALCAST 1 05/21/2021    WBCUA 3 05/21/2021    RBCUA 1 05/21/2021    EPIU 5 05/21/2021     Urine Reflex to Culture:   Lab Results   Component Value Date    URRFLXCULT Not Indicated 05/21/2021         MICRO: cultures reviewed and updated by me   Blood Culture:   Lab Results   Component Value Date    [facility]  07/06/2021     No Growth to date. Any change in status will be called. BLOODCULT2  07/06/2021     No Growth to date. Any change in status will be called. Respiratory Culture:  No results found for: Genie Quaker  AFB:No results found for: AFBSMEAR  Viral Culture:  No results found for: COVID19  Urine Culture: No results for input(s): Caryn Carrion in the last 72 hours. IMAGING:    XR FOOT LEFT (MIN 3 VIEWS)   Final Result   1. No acute osseous abnormality. 2. Old fractures of the proximal phalanges of the 4th and 5th toes. XR CHEST PORTABLE   Final Result   No airspace disease by radiograph.                All the pertinent images and reports for the current Hospitalization were reviewed by me     Scheduled Meds:   aspirin  81 mg Oral Daily    atorvastatin  40 mg Oral Daily    levothyroxine  100 mcg Oral Daily    [Held by provider] lisinopril  20 mg Oral Daily    sodium chloride flush  5-40 mL Intravenous 2 times per day    cefepime  1,000 mg Intravenous Q12H    enoxaparin  30 mg Subcutaneous Daily    insulin lispro  0-6 Units Subcutaneous TID     insulin lispro  0-3 Units Subcutaneous Nightly    linezolid  600 mg Intravenous Q12H       Continuous Infusions:   sodium chloride      sodium chloride 50 mL/hr at 07/07/21 1158    dextrose         PRN Meds:  sodium chloride flush, sodium chloride, ondansetron, fentanNYL, glucose, dextrose, glucagon (rDNA), dextrose, HYDROcodone 5 mg - acetaminophen, acetaminophen      Assessment:     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    Arthritis of left knee    Type 2 diabetes mellitus without complication, without long-term current use of insulin (HCC)    Essential hypertension    Mixed hyperlipidemia    Pedal edema    Other secondary kyphosis, cervicothoracic region    Postablative hypothyroidism    Closed fracture of second cervical vertebra (HCC)    Closed fracture of first cervical vertebra (HCC)    Lymphedema    High risk medication use    Chronic neck pain    Rotator cuff arthropathy of right shoulder    Bradycardia    Arthritis of left hip    Orthostatic syncope    Sepsis due to cellulitis (HCC)       Sepsis on admit  WBC elevation   Lactic acidosis  DASHAWN  ON going severe Left leg cellulitis  Diabetic foot infection   Bi lateral lower leg venous stasis and edema   Left leg blood filled blistering lesion from the Trauma on the dorsum of the foot  Fevers from above  X ray Left foot with Fracture of 4th and 5thToes phalanges would correspond to the Trauma in May 2021 per patient      lEFT Leg extensive cellulitis is the source for sepsis and given the leg appearance could be streptococcal but given the trauma and DM will cover for gram -ve as well [facility]    S/p Left foot blister and abscess drainage+ and wound cx in process and WBC trending down and will need to cont IV abx for now until clinical improvement       Labs, Microbiology, Radiology and all the pertinent results from current hospitalization and  care every where were reviewed  by me as a part of the evaluation   Plan:   1. Cont IV Cefepime lower the dose x 1 gm x q 12 HRS  2. Cont   IV Linezolid x 600 mg x Q 12 hrs  3. Cont local care  4. Elevate the leg  5. Wound cx in process   6. Podiatry eval noted  7.  Trend WBC, pROCAL AND Lactic acid slowly improving   8. Check HbA1c 6.8     Discussed with patient/Family and Nursing   Risk of Complications/Morbidity: High      · Illness(es)/ Infection present that pose threat to bodily function. · There is potential for severe exacerbation of infection/side effects of treatment. · Therapy requires intensive monitoring for antimicrobial agent toxicity. Discussed with patient/Family and Nursing staff     Thanks for allowing me to participate in your patient's care and please call me with any questions or concerns.     Pedro Pablo Storm MD  Infectious Disease  [facility]) Physician  Phone: [phone]   Fax : [phone]

## 2021-07-08 NOTE — PROGRESS NOTES
Hospitalist Progress Note    CC: <principal problem not specified>      Admit date: 7/6/2021  Days in hospital:  1    Subjective/interval history: Pt S/E. No acute events. Pt got up to the chair and RR with pt/ot. States her pain improved with vicodin last night and put her to sleep. daughter at bedside. O2 status: room air    ROS:   A comprehensive review of systems was negative except for: Musculoskeletal: positive for foot pain and blisters     Objective:    /63   Pulse 81   Temp 98.9 °F (37.2 °C) (Oral)   Resp 16   Wt 189 lb 9.5 oz (86 kg)   SpO2 96%   BMI 32.54 kg/m²     Gen: alert, NAD  HEENT: NC/AT, moist mucous membranes, no oropharyngeal erythema or exudate  Neck: supple, trachea midline, no anterior cervical or SC LAD  Heart: Normal s1/s2, RRR, no murmurs, gallops, or rubs. Lungs: clear bilaterally, no wheezing, no rales, no rhonchi, no use of accessory muscles  Abd: bowel sounds present, soft, nontender, nondistended, no masses  Extrem: left foot erythematous, edematous, tender. Large black/purple blister on the dorsum of the foot, approximately 1.5 in. Skin: no rashes or lesions  Psych: A & O x3, affect appropriate  Neuro: grossly intact, no sensory deficits.  + le weakness due to pain and cellulitis   Cap refill: +2 sec    Medications:  Scheduled Meds:   aspirin  81 mg Oral Daily    atorvastatin  40 mg Oral Daily    levothyroxine  100 mcg Oral Daily    [Held by provider] lisinopril  20 mg Oral Daily    sodium chloride flush  5-40 mL Intravenous 2 times per day    cefepime  1,000 mg Intravenous Q12H    enoxaparin  30 mg Subcutaneous Daily    insulin lispro  0-6 Units Subcutaneous TID WC    insulin lispro  0-3 Units Subcutaneous Nightly    linezolid  600 mg Intravenous Q12H       PRN Meds:  sodium chloride flush, sodium chloride, ondansetron, fentanNYL, glucose, dextrose, glucagon (rDNA), dextrose, HYDROcodone 5 mg - acetaminophen, acetaminophen    IV:   sodium chloride      sodium chloride 50 mL/hr at 07/07/21 1158    dextrose           Intake/Output Summary (Last 24 hours) at 7/8/2021 1011  Last data filed at 7/8/2021 0900  Gross per 24 hour   Intake 1390 ml   Output --   Net 1390 ml       Results:  CBC:   Recent Labs     07/06/21 2329 07/08/21  0758   WBC 25.2* 19.2*   HGB 11.6* 10.4*   HCT 35.0* 30.3*   MCV 94.9 93.6    227     BMP:   Recent Labs     07/06/21 2329 07/07/21 2017 07/08/21  0758   *  --  133*   K 4.7  --  3.8   CL 99  --  99   CO2 16*  --  19*   BUN 35*  --  32*   CREATININE 1.6* 1.1 0.9     Mag: No results for input(s): MAG in the last 72 hours. Phos:   Lab Results   Component Value Date    PHOS 3.2 05/11/2021     No components found for: GLU    LIVER PROFILE:   Recent Labs     07/06/21 2329   AST 21   ALT 13   BILITOT 0.9   ALKPHOS 62     PT/INR: No results for input(s): PROTIME, INR in the last 72 hours. APTT: No results for input(s): APTT in the last 72 hours. UA:No results for input(s): NITRITE, COLORU, PHUR, LABCAST, WBCUA, RBCUA, MUCUS, TRICHOMONAS, YEAST, BACTERIA, CLARITYU, SPECGRAV, LEUKOCYTESUR, UROBILINOGEN, BILIRUBINUR, BLOODU, GLUCOSEU, AMORPHOUS in the last 72 hours. Invalid input(s): [place] input(s): ABG  Lab Results   Component Value Date    CALCIUM 8.4 07/08/2021    PHOS 3.2 05/11/2021       Assessment:    Active Problems:    Sepsis due to cellulitis [facility])  Resolved Problems:    * No resolved hospital problems. [facility] course: A 80-year-old  female was found lying on the ground with her legs that were extremely red, warm, swollen, inflamed, and had a large what appeared to be a blood blister over the dorsum of the left foot closed to the left toe. The patient notes that over the last two days, she has had rapidly progressive increasing severe throbbing leg pain that has been disabling.     Plan:  Cellulitis left leg  - xray left foot: No acute osseous abnormality  - crp, esr ordered  - blood cx  - cont zyvox, cefepime   - ID consulted  - podiatry consulted    Sepsis, POA - improving  - with criteria: leukocytosis, lactic acidosis; source is cellulitis  - blood and urine cultures  - procalcitonin 1.9 -> 1.4  - ivf given  - abx as above    Chronic conditions - continue home meds unless otherwise stated  Dm - uncontrolled, a1c pending. Only on metformin at home.  Will increase to med ssi and consider januvia or tradjenta if she is still having hyperglycemia  htn  Hypothyroidism    Code status:  full  DVT prophylaxis: [x] Lovenox  [] SQ Heparin  [] SCDs because of  [] warfarin/oral direct thrombin inhibitor [] Encourage ambulation      Disposition:  [] Home [] Rehab [] Psych [] SNF  [] LTAC  [] Transfer to ICU  [] Transfer to PCU [] Other: in pt; she and family do not wants placement       Electronically signed by Armando Mora DO on 7/8/2021 at 10:11 AM

## 2021-07-08 NOTE — PLAN OF CARE
Problem: Falls - Risk of:  Goal: Will remain free from falls  Description: Will remain free from falls  Outcome: Ongoing     Problem: Skin Integrity:  Goal: Will show no infection signs and symptoms  Description: Will show no infection signs and symptoms  Outcome: Ongoing     Problem: Pain:  Goal: Pain level will decrease  Description: Pain level will decrease  Outcome: Ongoing     Problem: Nutrition  Goal: Optimal nutrition therapy  7/8/2021 1206 by Heidi Campbell RD, LD  Outcome: Ongoing

## 2021-07-08 NOTE — PROGRESS NOTES
Occupational Therapy  Facility/Department: [address] MED SURG  Daily Treatment Note  NAME: Benard Hamman  : 1929  MRN: [id number]    Date of Service: 2021    Discharge Recommendations:  2-3 sessions per week, 24 hour supervision or assist, Patient would benefit from continued therapy after discharge       Benard Hamman scored a 17/24 on the AM-PAC ADL Inpatient form. Current research shows that an AM-PAC score of 18 or greater is typically associated with a discharge to the patient's home setting. Based on the patient's AM-PAC score, and their current ADL deficits, it is recommended that the patient have 2-3 sessions per week of Occupational Therapy at d/c to increase the patient's independence. At this time, this patient demonstrates the endurance and safety to discharge home with [address] [place] [address] (home vs OP services) and a follow up treatment frequency of 2-3x/wk. Please see assessment section for further patient specific details. If patient discharges prior to next session this note will serve as a discharge summary. Please see below for the latest assessment towards goals. Assessment   Performance deficits / Impairments: Decreased functional mobility ; Decreased ADL status; Decreased safe awareness;Decreased balance;Decreased posture;Decreased ROM; Decreased endurance;Decreased high-level IADLs;Decreased strength  Assessment: 81 yo female admitted  for LLE cellulits found down by her dtr. PMH: HTN, DM, arthritis. PTA, pt lives alone with dtrs visiting often and assisting with IADLs and present during baths. Otherwise, pt independent with ADls and fxl mobility with RW in home and cane outside of home. Today's session, pt fucnitoning below baseline with significant BLE swelling. Pt requiring Min to Mod A for sit>stand with RW with cues for hand placement, and Min A for fxl mobility with RW with chair follow of second therapist. Pt Max A LB sock donning.  Cont acute OT and rec d/c home with 24 hr supervision and OT 2-3x/week  Treatment Diagnosis: Impaired ADl and fxl mobiliy  Prognosis: Good  OT Education: OT Role;Plan of Care;Transfer Training  REQUIRES OT FOLLOW UP: Yes  Activity Tolerance  Activity Tolerance: Patient Tolerated treatment well  Activity Tolerance: Min SOB noted with ambulation  Safety Devices  Safety Devices in place: Yes  Type of devices: Nurse notified; Left in chair;Patient at risk for falls;Call light within reach; Chair alarm in place       Patient Diagnosis(es): The primary encounter diagnosis was Septicemia ([facility] [address] Diagnoses of Cellulitis of left lower extremity, Hematoma of left foot, and DASHAWN (acute kidney injury) ([facility] [address] were also pertinent to this visit. has a past medical history of Arthritis, Cataract, Diabetes ([facility] [address] High blood pressure, Kidney problem, Neuropathy, and Thyroid disorder. has a past surgical history that includes Appendectomy; Hysterectomy; Ovary removal; Total knee arthroplasty (Right, 2006); Carpal tunnel release (Left, 09/05/2017); Colonoscopy; Carpal tunnel release (Right, 09/26/2017); and Finger trigger release (Right, 09/26/2017). Restrictions  Restrictions/Precautions  Restrictions/Precautions: Fall Risk    Subjective   General  Chart Reviewed: Yes  Patient assessed for rehabilitation services?: Yes  Additional Pertinent Hx: 79 yo female admitted 7/6 for LLE cellulits found down by her dtr. PMH: HTN, DM, arthritis  Family / Caregiver Present: Yes (dtrs)  Referring Practitioner: Gilberto Walker MD  Diagnosis: LLE cellulitis  Subjective  Subjective: Pt resting supine upon arrival. Pt agreeable to OT/PT tx. Complaints of LLE pain and back pain  General Comment  Comments: RN ok to see      Orientation  Orientation  Overall Orientation Status: Within Normal Limits    Objective    ADL  LE Dressing: Maximum assistance (socks)  Additional Comments: Declines need at this time.         Balance  Sitting Balance: Stand by assistance  Standing Balance: Minimal assistance (PRN transfers)  Functional Mobility  Functional - Mobility Device: Rolling Walker  Activity:  (recliner>curtain (seated rest break)>recliner)  Assist Level: Minimal assistance  Functional Mobility Comments: chair follow required. R knee buckling with trendelenburg. unsteadiness. No LOB  Bed mobility  Comment: NESSA, recliner at start or EOS  Transfers  Sit to stand: Minimal assistance; Moderate assistance (Min A from recliner, Mod A from lower chair with no arm rests)  Stand to sit: Minimal assistance  Transfer Comments: RW. cues for hand placement                       Cognition  Overall Cognitive Status: WNL                                         Plan   Plan  Times per week: 3-5  Times per day: Daily  Current Treatment Recommendations: Strengthening, Pain Management, Positioning, ROM, Safety Education & Training, Balance Training, Self-Care / ADL, Functional Mobility Training, Endurance Training, Patient/Caregiver Education & Training    AM-PAC Score        AM-[facility] Inpatient Daily Activity Raw Score: 17 (07/08/21 1214)  -PAC Inpatient ADL T-Scale Score : 37.26 (07/08/21 1214)  ADL Inpatient CMS 0-100% Score: 50.11 (07/08/21 1214)  ADL Inpatient CMS G-Code Modifier : CK (07/08/21 1214)    Goals  Short term goals  Time Frame for Short term goals: prior to d/c  Short term goal 1: ADL tx SUP  Short term goal 2: toileting SUP  Short term goal 3: tolerate 3 min fxl standing task with SUP  Short term goal 4: UB dressing/bathing with SUP  Short term goal 5: LB dressing SUP  Patient Goals   Patient goals : none stated       Therapy Time   Individual Concurrent Group Co-treatment   Time In 1132         Time Out 1200         Minutes 28         Timed Code Treatment Minutes: 28 Minutes (28 TA)       THERESA Chisholm, OTR/L

## 2021-07-09 ENCOUNTER — ANESTHESIA EVENT (OUTPATIENT)
Dept: OPERATING ROOM | Age: 86
DRG: 853 | End: 2021-07-09
Payer: MEDICARE

## 2021-07-09 LAB
ANION GAP SERPL CALCULATED.3IONS-SCNC: 16 MMOL/L (ref 3–16)
BASOPHILS ABSOLUTE: 0 K/UL (ref 0–0.2)
BASOPHILS RELATIVE PERCENT: 0.1 %
BILIRUBIN URINE: NEGATIVE
BLOOD, URINE: ABNORMAL
BUN BLDV-MCNC: 17 MG/DL (ref 7–20)
CALCIUM SERPL-MCNC: 8.2 MG/DL (ref 8.3–10.6)
CHLORIDE BLD-SCNC: 96 MMOL/L (ref 99–110)
CLARITY: CLEAR
CO2: 19 MMOL/L (ref 21–32)
COLOR: YELLOW
CREAT SERPL-MCNC: 0.9 MG/DL (ref 0.6–1.2)
EOSINOPHILS ABSOLUTE: 0.1 K/UL (ref 0–0.6)
EOSINOPHILS RELATIVE PERCENT: 1.1 %
EPITHELIAL CELLS, UA: 1 /HPF (ref 0–5)
GFR AFRICAN AMERICAN: >60
GFR NON-AFRICAN AMERICAN: 59
GLUCOSE BLD-MCNC: 151 MG/DL (ref 70–99)
GLUCOSE BLD-MCNC: 158 MG/DL (ref 70–99)
GLUCOSE BLD-MCNC: 216 MG/DL (ref 70–99)
GLUCOSE BLD-MCNC: 273 MG/DL (ref 70–99)
GLUCOSE BLD-MCNC: 299 MG/DL (ref 70–99)
GLUCOSE URINE: NEGATIVE MG/DL
HCT VFR BLD CALC: 30.5 % (ref 36–48)
HEMOGLOBIN: 10.2 G/DL (ref 12–16)
HYALINE CASTS: 0 /LPF (ref 0–8)
KETONES, URINE: NEGATIVE MG/DL
LEUKOCYTE ESTERASE, URINE: NEGATIVE
LYMPHOCYTES ABSOLUTE: 1.2 K/UL (ref 1–5.1)
LYMPHOCYTES RELATIVE PERCENT: 9.6 %
MAGNESIUM: 1.3 MG/DL (ref 1.8–2.4)
MCH RBC QN AUTO: 31.4 PG (ref 26–34)
MCHC RBC AUTO-ENTMCNC: 33.4 G/DL (ref 31–36)
MCV RBC AUTO: 93.8 FL (ref 80–100)
MICROSCOPIC EXAMINATION: YES
MONOCYTES ABSOLUTE: 0.5 K/UL (ref 0–1.3)
MONOCYTES RELATIVE PERCENT: 4.1 %
NEUTROPHILS ABSOLUTE: 10.6 K/UL (ref 1.7–7.7)
NEUTROPHILS RELATIVE PERCENT: 85.1 %
NITRITE, URINE: NEGATIVE
PDW BLD-RTO: 14.7 % (ref 12.4–15.4)
PERFORMED ON: ABNORMAL
PH UA: 6 (ref 5–8)
PLATELET # BLD: 258 K/UL (ref 135–450)
PMV BLD AUTO: 8.7 FL (ref 5–10.5)
POTASSIUM SERPL-SCNC: 4 MMOL/L (ref 3.5–5.1)
PROCALCITONIN: 0.65 NG/ML (ref 0–0.15)
PROTEIN UA: 30 MG/DL
RBC # BLD: 3.26 M/UL (ref 4–5.2)
RBC UA: 7 /HPF (ref 0–4)
SODIUM BLD-SCNC: 131 MMOL/L (ref 136–145)
SPECIFIC GRAVITY UA: 1.01 (ref 1–1.03)
URINE REFLEX TO CULTURE: ABNORMAL
URINE TYPE: ABNORMAL
UROBILINOGEN, URINE: 0.2 E.U./DL
WBC # BLD: 12.5 K/UL (ref 4–11)
WBC UA: 3 /HPF (ref 0–5)

## 2021-07-09 PROCEDURE — 97605 NEG PRS WND THER DME<=50SQCM: CPT

## 2021-07-09 PROCEDURE — 83735 ASSAY OF MAGNESIUM: CPT

## 2021-07-09 PROCEDURE — 6370000000 HC RX 637 (ALT 250 FOR IP): Performed by: INTERNAL MEDICINE

## 2021-07-09 PROCEDURE — 6360000002 HC RX W HCPCS: Performed by: FAMILY MEDICINE

## 2021-07-09 PROCEDURE — 6360000002 HC RX W HCPCS: Performed by: INTERNAL MEDICINE

## 2021-07-09 PROCEDURE — 36415 COLL VENOUS BLD VENIPUNCTURE: CPT

## 2021-07-09 PROCEDURE — 1200000000 HC SEMI PRIVATE

## 2021-07-09 PROCEDURE — 2580000003 HC RX 258: Performed by: INTERNAL MEDICINE

## 2021-07-09 PROCEDURE — 97530 THERAPEUTIC ACTIVITIES: CPT

## 2021-07-09 PROCEDURE — 84145 PROCALCITONIN (PCT): CPT

## 2021-07-09 PROCEDURE — 80048 BASIC METABOLIC PNL TOTAL CA: CPT

## 2021-07-09 PROCEDURE — 85025 COMPLETE CBC W/AUTO DIFF WBC: CPT

## 2021-07-09 PROCEDURE — 81001 URINALYSIS AUTO W/SCOPE: CPT

## 2021-07-09 PROCEDURE — 6370000000 HC RX 637 (ALT 250 FOR IP): Performed by: FAMILY MEDICINE

## 2021-07-09 PROCEDURE — 99233 SBSQ HOSP IP/OBS HIGH 50: CPT | Performed by: INTERNAL MEDICINE

## 2021-07-09 PROCEDURE — 97116 GAIT TRAINING THERAPY: CPT

## 2021-07-09 RX ORDER — SODIUM CHLORIDE 9 MG/ML
25 INJECTION, SOLUTION INTRAVENOUS PRN
Status: CANCELLED | OUTPATIENT
Start: 2021-07-09

## 2021-07-09 RX ORDER — ALOGLIPTIN 12.5 MG/1
12.5 TABLET, FILM COATED ORAL DAILY
Status: DISCONTINUED | OUTPATIENT
Start: 2021-07-09 | End: 2021-07-11

## 2021-07-09 RX ORDER — SODIUM CHLOR/HYPOCHLOROUS ACID 0.033 %
SOLUTION, IRRIGATION IRRIGATION CONTINUOUS PRN
Status: DISCONTINUED | OUTPATIENT
Start: 2021-07-09 | End: 2021-07-14 | Stop reason: HOSPADM

## 2021-07-09 RX ORDER — MAGNESIUM SULFATE IN WATER 40 MG/ML
2000 INJECTION, SOLUTION INTRAVENOUS
Status: COMPLETED | OUTPATIENT
Start: 2021-07-09 | End: 2021-07-09

## 2021-07-09 RX ADMIN — CEFEPIME HYDROCHLORIDE 1000 MG: 1 INJECTION, POWDER, FOR SOLUTION INTRAMUSCULAR; INTRAVENOUS at 02:31

## 2021-07-09 RX ADMIN — MAGNESIUM SULFATE HEPTAHYDRATE 2000 MG: 40 INJECTION, SOLUTION INTRAVENOUS at 15:03

## 2021-07-09 RX ADMIN — ASPIRIN 81 MG: 81 TABLET, CHEWABLE ORAL at 08:25

## 2021-07-09 RX ADMIN — INSULIN LISPRO 2 UNITS: 100 INJECTION, SOLUTION INTRAVENOUS; SUBCUTANEOUS at 08:25

## 2021-07-09 RX ADMIN — ACETAMINOPHEN 650 MG: 325 TABLET ORAL at 05:04

## 2021-07-09 RX ADMIN — LEVOTHYROXINE SODIUM 100 MCG: 0.1 TABLET ORAL at 05:04

## 2021-07-09 RX ADMIN — ALOGLIPTIN 12.5 MG: 12.5 TABLET, FILM COATED ORAL at 15:03

## 2021-07-09 RX ADMIN — ATORVASTATIN CALCIUM 40 MG: 40 TABLET, FILM COATED ORAL at 08:25

## 2021-07-09 RX ADMIN — ACETAMINOPHEN 650 MG: 325 TABLET ORAL at 22:11

## 2021-07-09 RX ADMIN — SODIUM CHLORIDE, PRESERVATIVE FREE 10 ML: 5 INJECTION INTRAVENOUS at 08:25

## 2021-07-09 RX ADMIN — SODIUM CHLORIDE, PRESERVATIVE FREE 10 ML: 5 INJECTION INTRAVENOUS at 23:26

## 2021-07-09 RX ADMIN — INSULIN LISPRO 2 UNITS: 100 INJECTION, SOLUTION INTRAVENOUS; SUBCUTANEOUS at 20:35

## 2021-07-09 RX ADMIN — INSULIN LISPRO 2 UNITS: 100 INJECTION, SOLUTION INTRAVENOUS; SUBCUTANEOUS at 17:51

## 2021-07-09 RX ADMIN — LINEZOLID 600 MG: 600 INJECTION, SOLUTION INTRAVENOUS at 08:24

## 2021-07-09 RX ADMIN — CEFEPIME HYDROCHLORIDE 1000 MG: 1 INJECTION, POWDER, FOR SOLUTION INTRAMUSCULAR; INTRAVENOUS at 13:57

## 2021-07-09 RX ADMIN — MAGNESIUM SULFATE HEPTAHYDRATE 2000 MG: 40 INJECTION, SOLUTION INTRAVENOUS at 17:51

## 2021-07-09 RX ADMIN — INSULIN LISPRO 6 UNITS: 100 INJECTION, SOLUTION INTRAVENOUS; SUBCUTANEOUS at 12:04

## 2021-07-09 RX ADMIN — ENOXAPARIN SODIUM 30 MG: 30 INJECTION SUBCUTANEOUS at 08:25

## 2021-07-09 RX ADMIN — CEFTRIAXONE 2000 MG: 2 INJECTION, POWDER, FOR SOLUTION INTRAMUSCULAR; INTRAVENOUS at 23:19

## 2021-07-09 RX ADMIN — ACETAMINOPHEN 650 MG: 325 TABLET ORAL at 12:58

## 2021-07-09 ASSESSMENT — PAIN DESCRIPTION - FREQUENCY
FREQUENCY: CONTINUOUS

## 2021-07-09 ASSESSMENT — PAIN - FUNCTIONAL ASSESSMENT
PAIN_FUNCTIONAL_ASSESSMENT: PREVENTS OR INTERFERES SOME ACTIVE ACTIVITIES AND ADLS

## 2021-07-09 ASSESSMENT — PAIN DESCRIPTION - DESCRIPTORS
DESCRIPTORS: ACHING
DESCRIPTORS: DISCOMFORT
DESCRIPTORS: DISCOMFORT

## 2021-07-09 ASSESSMENT — PAIN DESCRIPTION - PROGRESSION
CLINICAL_PROGRESSION: NOT CHANGED

## 2021-07-09 ASSESSMENT — PAIN SCALES - GENERAL
PAINLEVEL_OUTOF10: 4
PAINLEVEL_OUTOF10: 0
PAINLEVEL_OUTOF10: 7
PAINLEVEL_OUTOF10: 3
PAINLEVEL_OUTOF10: 0
PAINLEVEL_OUTOF10: 4

## 2021-07-09 ASSESSMENT — PAIN DESCRIPTION - PAIN TYPE
TYPE: ACUTE PAIN
TYPE: ACUTE PAIN;CHRONIC PAIN
TYPE: ACUTE PAIN
TYPE: ACUTE PAIN
TYPE: ACUTE PAIN;CHRONIC PAIN

## 2021-07-09 ASSESSMENT — PAIN DESCRIPTION - LOCATION
LOCATION: FOOT

## 2021-07-09 ASSESSMENT — PAIN DESCRIPTION - ONSET
ONSET: ON-GOING

## 2021-07-09 ASSESSMENT — PAIN DESCRIPTION - ORIENTATION
ORIENTATION: LEFT

## 2021-07-09 NOTE — PROGRESS NOTES
Hospitalist Progress Note    CC: <principal problem not specified>      Admit date: 7/6/2021  Days in hospital:  2    Subjective/interval history: Pt S/E. No acute events. Pt had a bedside I&d yesterday with podiatry, now she is getting a wound vac. She states her foot is starting to feel better. O2 status: room air    ROS:   A comprehensive review of systems was negative except for: Musculoskeletal: positive for foot pain and blisters     Objective:    /75   Pulse 68   Temp 98.1 °F (36.7 °C) (Oral)   Resp 16   Ht 5' 4\" (1.626 m)   Wt 191 lb 12.8 oz (87 kg)   SpO2 96%   BMI 32.92 kg/m²     Gen: alert, NAD  HEENT: NC/AT, moist mucous membranes  Neck: supple, trachea midline, no anterior cervical or SC LAD  Heart: Normal s1/s2, RRR, no murmurs, gallops, or rubs. Lungs: clear bilaterally, no wheezing, no rales, no rhonchi, no use of accessory muscles  Abd: bowel sounds present, soft, nontender, nondistended, no masses  Extrem: left foot with improving erythematous, edematous, tender. Wound vac to the dorsum of the left foot in place  Skin: no rashes or lesions  Psych: A & O x3, affect appropriate  Neuro: grossly intact, no sensory deficits.  + le weakness due to pain and cellulitis   Cap refill: +2 sec    Medications:  Scheduled Meds:   insulin lispro  0-12 Units Subcutaneous TID WC    insulin lispro  0-6 Units Subcutaneous Nightly    aspirin  81 mg Oral Daily    atorvastatin  40 mg Oral Daily    levothyroxine  100 mcg Oral Daily    [Held by provider] lisinopril  20 mg Oral Daily    sodium chloride flush  5-40 mL Intravenous 2 times per day    cefepime  1,000 mg Intravenous Q12H    enoxaparin  30 mg Subcutaneous Daily    linezolid  600 mg Intravenous Q12H       PRN Meds:  sodium chloride flush, sodium chloride, ondansetron, fentanNYL, glucose, dextrose, glucagon (rDNA), dextrose, HYDROcodone 5 mg - acetaminophen, acetaminophen    IV:   sodium chloride      dextrose Intake/Output Summary (Last 24 hours) at 7/9/2021 0906  Last data filed at 7/9/2021 0825  Gross per 24 hour   Intake 250 ml   Output --   Net 250 ml       Results:  CBC:   Recent Labs     07/06/21 2329 07/08/21  0758   WBC 25.2* 19.2*   HGB 11.6* 10.4*   HCT 35.0* 30.3*   MCV 94.9 93.6    227     BMP:   Recent Labs     07/06/21 2329 07/07/21 2017 07/08/21  0758   *  --  133*   K 4.7  --  3.8   CL 99  --  99   CO2 16*  --  19*   BUN 35*  --  32*   CREATININE 1.6* 1.1 0.9     Mag: No results for input(s): MAG in the last 72 hours. Phos:   Lab Results   Component Value Date    PHOS 3.2 05/11/2021     No components found for: GLU    LIVER PROFILE:   Recent Labs     07/06/21 2329   AST 21   ALT 13   BILITOT 0.9   ALKPHOS 62     PT/INR: No results for input(s): PROTIME, INR in the last 72 hours. APTT: No results for input(s): APTT in the last 72 hours. UA:  Recent Labs     07/09/21  0235   COLORU YELLOW   PHUR 6.0   WBCUA 3   RBCUA 7*   CLARITYU Clear   SPECGRAV 1.009   LEUKOCYTESUR Negative   UROBILINOGEN 0.2   BILIRUBINUR Negative   BLOODU SMALL*   GLUCOSEU Negative       Invalid input(s): ABG  Lab Results   Component Value Date    CALCIUM 8.4 07/08/2021    PHOS 3.2 05/11/2021       Assessment:    Active Problems:    Sepsis due to cellulitis [facility])  Resolved Problems:    * No resolved hospital problems. [facility] course: A 20-year-old  female was found lying on the ground with her legs that were extremely red, warm, swollen, inflamed, and had a large what appeared to be a blood blister over the dorsum of the left foot closed to the left toe. The patient notes that over the last two days, she has had rapidly progressive increasing severe throbbing leg pain that has been disabling.     Plan:  Cellulitis left leg  - xray left foot: No acute osseous abnormality  - crp, esr ordered  - blood cx  - wound cx sent  - cont zyvox, cefepime   - ID consulted  - podiatry consulted, s/p bedside I&d  - wound vac placed today, to return to the OR on Monday for further debridement     Sepsis, POA - improving  - with criteria: leukocytosis, lactic acidosis; source is cellulitis  - blood and urine cultures  - procalcitonin 1.9 -> .65  - ivf given  - abx as above    Chronic conditions - continue home meds unless otherwise stated  Dm - a1c is 6.8. Only on metformin at home.  increased to med ssi   - added tradjenta as she is still having hyperglycemia  htn  Hypothyroidism     Code status:  full  DVT prophylaxis: [x] Lovenox  [] SQ Heparin  [] SCDs because of  [] warfarin/oral direct thrombin inhibitor [] Encourage ambulation      Disposition:  [] Home [] Rehab [] Psych [] SNF  [] LTAC  [] Transfer to ICU  [] Transfer to PCU [] Other: in pt; she and family do not wants placement       Electronically signed by Marcial Santiago DO on 7/9/2021 at 9:06 AM

## 2021-07-09 NOTE — PROGRESS NOTES
Infectious Disease Follow up Notes  Admit Date: 7/6/2021  Hospital Day: 4    Antibiotics : IV Cefepime  IV Linezolid stopped      CHIEF COMPLAINT:     Left leg cellulitis  Sepsis  WBC elevation   Left foot abscess   Group C strep     Subjective interval History :  80 y.o. woman was found down at home with on going lEFT Leg infection and cellulitis with large blood filled blister on the dorsum of the Left foot.  She sustained injury to Left foot in May 2021 per family now developed progressive cellulitis with skin lesions and local redness with serous drainage and on going pain, swelling extending up the Left leg and she has h/o bi lateral lower leg edema from venous stasis and WBC elevation with lactic acid elevation, DASHAWN and fevers on admit with on going septic process we are consulted for recommendations   Location : Left leg swelling, pain and skin changes       Quality : aching       Severity :  8/10   Duration :  weeks    Timing : intermittent   Context : Left foot trauma and chronic edema    Modifying factors : none   Associated signs and symptoms:  WBC elevation, BG elevation  Confusion, fevers        Interval History : Left foot abscess and blister drained by  on 7/8 and wound cx now with streptococcus as suspected has wound vac in place and fevers trend down and WBC remains elevated but better family at bed side     Past Medical History:    Past Medical History:   Diagnosis Date    Arthritis     Cataract     Diabetes ([address]     High blood pressure     Kidney problem     Neuropathy     Thyroid disorder        Past Surgical History:    Past Surgical History:   Procedure Laterality Date    APPENDECTOMY      CARPAL TUNNEL RELEASE Left 09/05/2017      Left carpal tunnel release  & Left Thumb, Index Finger and Middle Finger trigger finger release    CARPAL TUNNEL RELEASE Right 09/26/2017    COLONOSCOPY      FINGER TRIGGER RELEASE Right 09/26/2017    Thumb & Index Finger    HYSTERECTOMY      OVARY REMOVAL      TOTAL KNEE ARTHROPLASTY Right 2006    Dr. Ella Stock       Current Medications:    Outpatient Medications Marked as Taking for the 7/6/21 encounter [facility] Encounter)   Medication Sig Dispense Refill    Cholecalciferol (VITAMIN D) 50 MCG (2000 UT) CAPS capsule Take 2,000 Units by mouth daily      lisinopril (PRINIVIL;ZESTRIL) 20 MG tablet Take 1 tablet by mouth daily 90 tablet 3    atorvastatin (LIPITOR) 40 MG tablet TAKE 1 TABLET DAILY 90 tablet 3    metFORMIN (GLUCOPHAGE) 1000 MG tablet TAKE 1 TABLET TWICE A DAY WITH MEALS 180 tablet 3    levothyroxine (SYNTHROID) 112 MCG tablet TAKE ONE TABLET BY MOUTH DAILY (Patient taking differently: Take 112 mcg by mouth Daily TAKE ONE TABLET BY MOUTH DAILY at 6 am) 90 tablet 3    acetaminophen (AMINOFEN) 325 MG tablet Take 2 tablets by mouth every 6 hours as needed for Pain 120 tablet 0    Cranberry-Cholecalciferol 4200-500 MG-UNIT CAPS Take by mouth      aspirin 81 MG tablet Take 81 mg by mouth daily      Multiple Vitamin (MULTI VITAMIN DAILY PO) Take by mouth         Allergies:  Gabapentin, Pravachol [pravastatin sodium], and Tegaderm ag mesh 2\"x2\" [wound dressings]    Immunizations :   Immunization History   Administered Date(s) Administered    Influenza Vaccine, unspecified formulation 10/10/2003, 10/05/2004, 10/04/2005, 09/01/2012, 10/01/2014, 11/20/2017    Influenza Virus Vaccine 11/20/2017    Influenza, High Dose (Fluzone 65 yrs and older) 09/28/2018    Influenza, [place], Recombinant, IM PF (Flublok 18 yrs and older) 10/05/2019    Influenza, Quadv, adjuvanted, 65 yrs +, IM, PF (Fluad) 10/26/2020    Pneumococcal Conjugate 13-valent ([id number]) 02/15/2018    Pneumococcal Polysaccharide ([id number]) 02/26/2019, 10/05/2019    Tdap (Boostrix, Adacel) 11/20/2017    Zoster Live (Zostavax) 11/20/2017    Zoster Recombinant (Shingrix) 10/05/2019, 12/23/2019 Social History:    Social History     Tobacco Use    Smoking status: Never Smoker    Smokeless tobacco: Never Used   Substance Use Topics    Alcohol use: Yes     Comment: occas    Drug use: No     Social History     Tobacco Use   Smoking Status Never Smoker   Smokeless Tobacco Never Used      Family history : no Liver disorders no COPD       REVIEW OF SYSTEMS:     Constitutional:  negative for fevers, chills, night sweats  Eyes:  negative for blurred vision, eye discharge, visual disturbance   HEENT:  negative for hearing loss, ear drainage,nasal congestion  Respiratory:  negative for cough, shortness of breath or hemoptysis   Cardiovascular:  negative for chest pain, palpitations, syncope  Gastrointestinal:  negative for nausea, vomiting, diarrhea, constipation, abdominal pain  Genitourinary:  negative for frequency, dysuria, urinary incontinence, hematuria  Hematologic/Lymphatic:  negative for easy bruising, bleeding and lymphadenopathy  Allergic/Immunologic:  negative for recurrent infections, angioedema, anaphylaxis   Endocrine:  negative for weight changes, polyuria, polydipsia and polyphagia  Musculoskeletal:  negative for joint  pain, swelling, decreased range of motion  Integumentary: No rashes, skin lesions  Neurological:  negative for headaches, slurred speech, unilateral weakness  Psychiatric: negative for hallucinations,confusion,agitation.                 PHYSICAL EXAM:      Vitals:    /87   Pulse 79   Temp 97.9 °F (36.6 °C) (Oral)   Resp 18   Ht 5' 4\" (1.626 m)   Wt 191 lb 12.8 oz (87 kg)   SpO2 96%   BMI 32.92 kg/m²     General Appearance: alert,in some  acute distress, ++ pallor, no icterus   Skin: warm and dry, no rash or erythema  Head: normocephalic and atraumatic  Eyes: pupils equal, round, and reactive to light, conjunctivae normal  ENT: tympanic membrane, external ear and ear canal normal bilaterally, nose without deformity, nasal mucosa and turbinates normal without polyps  Neck: supple and non-tender without mass, no thyromegaly  no cervical lymphadenopathy  Pulmonary/Chest: clear to auscultation bilaterally- no wheezes, rales or rhonchi, normal air movement, no respiratory distress  Cardiovascular:  S1 and S2, no murmurs, rubs, clicks, or gallops, no carotid bruits  Abdomen: soft, non-tender, non-distended, normal bowel sounds, no masses or organomegaly  Extremities: no cyanosis, clubbing or ++ edema  Musculoskeletal: normal range of motion, no joint swelling, deformity or tenderness  Integumentary: No rashes, no abnormal skin lesions, no petechiae  Neurologic: reflexes normal and symmetric, no cranial nerve deficit  Lines: IV  Left leg extensive cellulitis with blood filled blister on the dorsum now drained out has wound vac in place ace wraps+    Rt leg edema   Rt TKA+      Data Review:    CBC:   Lab Results   Component Value Date    WBC 19.2 (H) 07/08/2021    HGB 10.4 (L) 07/08/2021    HCT 30.3 (L) 07/08/2021    MCV 93.6 07/08/2021     07/08/2021     RENAL:   Lab Results   Component Value Date    CREATININE 0.9 07/08/2021    BUN 32 (H) 07/08/2021     (L) 07/08/2021    K 3.8 07/08/2021    CL 99 07/08/2021    CO2 19 (L) 07/08/2021     SED RATE: No results found for: SEDRATE  CK:   Lab Results   Component Value Date    CKTOTAL 154 05/09/2021     CRP: No results found for: CRP  Hepatic Function Panel:   Lab Results   Component Value Date    ALKPHOS 62 07/06/2021    ALT 13 07/06/2021    AST 21 07/06/2021    PROT 7.4 07/06/2021    BILITOT 0.9 07/06/2021    LABALBU 4.0 07/06/2021     UA:  Lab Results   Component Value Date    COLORU YELLOW 07/09/2021    CLARITYU Clear 07/09/2021    GLUCOSEU Negative 07/09/2021    BILIRUBINUR Negative 07/09/2021    KETUA Negative 07/09/2021    SPECGRAV 1.009 07/09/2021    BLOODU SMALL 07/09/2021    PHUR 6.0 07/09/2021    PROTEINU 30 07/09/2021    UROBILINOGEN 0.2 07/09/2021    NITRU Negative 07/09/2021    LEUKOCYTESUR Negative 07/09/2021    LABMICR YES 07/09/2021    URINETYPE NotGiven 07/09/2021      Urine Microscopic:   Lab Results   Component Value Date    BACTERIA RARE 05/21/2021    HYALCAST 0 07/09/2021    WBCUA 3 07/09/2021    RBCUA 7 07/09/2021    EPIU 1 07/09/2021     Urine Reflex to Culture:   Lab Results   Component Value Date    URRFLXCULT Not Indicated 07/09/2021         MICRO: cultures reviewed and updated by me   Blood Culture:   Lab Results   Component Value Date    [facility]  07/06/2021     No Growth to date. Any change in status will be called. BLOODCULT2  07/06/2021     No Growth to date. Any change in status will be called. Respiratory Culture:  Lab Results   Component Value Date    LABGRAM  07/08/2021     No Epithelial Cells seen  No WBCs or organisms seen       AFB:No results found for: AFBSMEAR  Viral Culture:  No results found for: COVID19  Urine Culture: No results for input(s): LABURIN in the last 72 hours. Culture, Wound [[id number]] (Abnormal) Collected: 07/08/21 1308   Order Status: Completed Specimen: Abscess Updated: 07/09/21 1029    Gram Stain Result No Epithelial Cells seen   No WBCs or organisms seen     Organism Beta Strep Group CAbnormal     WOUND/ABSCESS --    Heavy growth   Susceptibility testing of penicillin and other beta lactams is   not necessary for beta hemolytic Streptococci since resistant   strains have not been identified. (CLSI M100)    Narrative:     ORDER#: [id number]                          ORDERED BY: RUPALI PAYNE   SOURCE: Abscess Left dorsal foot           COLLECTED:  07/08/21 13:08   ANTIBIOTICS AT [facility]. :                      RECEIVED :  07/08/21 13:28   Performed at:   [facility]   [address] [facility] [facility] [address]   Phone [phone]     IMAGING:    XR FOOT LEFT (MIN 3 VIEWS)   Final Result   1. No acute osseous abnormality. 2. Old fractures of the proximal phalanges of the 4th and 5th toes.          XR CHEST PORTABLE Final Result   No airspace disease by radiograph.                All the pertinent images and reports for the current Hospitalization were reviewed by me     Scheduled Meds:   insulin lispro  0-12 Units Subcutaneous TID WC    insulin lispro  0-6 Units Subcutaneous Nightly    aspirin  81 mg Oral Daily    atorvastatin  40 mg Oral Daily    levothyroxine  100 mcg Oral Daily    [Held by provider] lisinopril  20 mg Oral Daily    sodium chloride flush  5-40 mL Intravenous 2 times per day    cefepime  1,000 mg Intravenous Q12H    enoxaparin  30 mg Subcutaneous Daily    linezolid  600 mg Intravenous Q12H       Continuous Infusions:   sodium chloride      dextrose         PRN Meds:  sodium chloride flush, sodium chloride, ondansetron, fentanNYL, glucose, dextrose, glucagon (rDNA), dextrose, HYDROcodone 5 mg - acetaminophen, acetaminophen      Assessment:     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    Arthritis of left knee    Type 2 diabetes mellitus without complication, without long-term current use of insulin (HCC)    Essential hypertension    Mixed hyperlipidemia    Pedal edema    Other secondary kyphosis, cervicothoracic region    Postablative hypothyroidism    Closed fracture of second cervical vertebra (HCC)    Closed fracture of first cervical vertebra (HCC)    Lymphedema    High risk medication use    Chronic neck pain    Rotator cuff arthropathy of right shoulder    Bradycardia    Arthritis of left hip    Orthostatic syncope    Sepsis due to cellulitis (HCC)       Sepsis on admit  WBC elevation   Lactic acidosis resolved  DASHAWN improved  ON going severe Left leg cellulitis  Diabetic foot infection   Bi lateral lower leg venous stasis and edema   Left leg blood filled blistering lesion from the Trauma on the dorsum of the foot  Fevers from above  X ray Left foot with Fracture of 4th and 5thToes phalanges would correspond to the Trauma in May 2021 per patient      lEFT Leg extensive cellulitis is the source for sepsis and given the leg appearance could be streptococcal and abscess cx now isolated as Group C strep    S/p Left foot blister and abscess drainage+ and wound noted and  Anticipate to cont IV abx as in patient and depending on her progress will see if she need IV abx therapy    She may need skin graft per [address], Microbiology, Radiology and all the pertinent results from current hospitalization and  care every where were reviewed  by me as a part of the evaluation   Plan:   1. Will change to IV  CefTRIAXONE X 2 GM  q 24 HRS   2.  D/C    IV Linezolid   3. Cont local care  4. Elevate the leg  5. Wound cx GROUP C strep  6. Podiatry eval noted  7. Trend WBC, pROCAL   8. Check HbA1c 6.8  9. Wound vac in place will need grafting once wound is liam per Podiatry      Discussed with patient/Family and Nursing d/w family at bed side   Risk of Complications/Morbidity: High      · Illness(es)/ Infection present that pose threat to bodily function. · There is potential for severe exacerbation of infection/side effects of treatment. · Therapy requires intensive monitoring for antimicrobial agent toxicity. Discussed with patient/Family and Nursing staff     Thanks for allowing me to participate in your patient's care and please call me with any questions or concerns.     Azalea Law MD  Infectious Disease  [facility] Physician  Phone: [phone]   Fax : [phone]

## 2021-07-09 NOTE — PROGRESS NOTES
Occupational Therapy  Facility/Department: [address] MED SURG  Daily Treatment Note  NAME: Jer Chowdary  : 1929  MRN: [id number]    Date of Service: 2021    Discharge Recommendations:  2-3 sessions per week, 24 hour supervision or assist, Patient would benefit from continued therapy after discharge       Assessment   Performance deficits / Impairments: Decreased functional mobility ; Decreased ADL status; Decreased safe awareness;Decreased balance;Decreased posture;Decreased ROM; Decreased endurance;Decreased high-level IADLs;Decreased strength  Assessment: 81 yo female admitted  for LLE cellulits found down by her dtr. PMH: HTN, DM, arthritis. PTA, pt lives alone with dtrs visiting often and assisting with IADLs and present during baths. Otherwise, pt independent with ADls and fxl mobility with RW in home and cane outside of home. Pt requiring Min A for sit>stand with RW with cues for hand placement, and CGA x 2 for fxl mobility with RW with chair follow of second therapist. Pt Max A LB sock donning. Cont acute OT and rec d/c home with 24 hr supervision and OT 2-3x/week  Treatment Diagnosis: Impaired ADl and fxl mobiliy  Prognosis: Good  REQUIRES OT FOLLOW UP: Yes  Activity Tolerance  Activity Tolerance: Patient Tolerated treatment well  Safety Devices  Type of devices: Call light within reach; Left in chair;Chair alarm in place;Gait belt         Patient Diagnosis(es): The primary encounter diagnosis was Septicemia ([address] Diagnoses of Cellulitis of left lower extremity, Hematoma of left foot, and DASHAWN (acute kidney injury) ([address] were also pertinent to this visit. has a past medical history of Arthritis, Cataract, Diabetes ([address] High blood pressure, Kidney problem, Neuropathy, and Thyroid disorder. has a past surgical history that includes Appendectomy; Hysterectomy; Ovary removal; Total knee arthroplasty (Right, ); Carpal tunnel release (Left, 2017); Colonoscopy;  Carpal tunnel release (Right, 09/26/2017); and Finger trigger release (Right, 09/26/2017). Restrictions  Restrictions/Precautions  Restrictions/Precautions: Fall Risk  Position Activity Restriction  Other position/activity restrictions: Wound Vac  Subjective   General  Chart Reviewed: Yes, Progress Notes  Patient assessed for rehabilitation services?: Yes  Additional Pertinent Hx: 79 yo female admitted 7/6 for LLE cellulits found down by her dtr. PMH: HTN, DM, arthritis  Family / Caregiver Present: Yes (daughters)  Referring Practitioner: Rebecca Castillo MD  Diagnosis: LLE cellulitis  Subjective  Subjective: Pt seen bedside with PT for a cotx. Pt agreed functional mobility in the room. General Comment  Comments: RN ok to see      Objective    ADL  LE Dressing: Maximum assistance (Assist to seda both socks.)        Balance  Sitting Balance: Stand by assistance  Standing Balance: Contact guard assistance  Functional Mobility  Functional - Mobility Device: Rolling Walker  Activity: Other (ambulated into rao and back with assist x 1 for balance and to manage IV pole, and assist of another for wheelchair follow.)  Assist Level: Contact guard assistance (assist x 2)  Wheelchair Bed Transfers  Wheelchair/Bed - Technique: Ambulating  Equipment Used: Other (chair with arms, recliner)  Level of Asssistance: Contact guard assistance;2 Person assistance  Bed mobility  Comment: Pt up in the recliner at beginning/end of the session.   Transfers  Sit to stand: Minimal assistance  Stand to sit: Minimal assistance  Transfer Comments: RW. cues for hand placement                                                                 Plan   Plan  Times per week: 3-5  Times per day: Daily  Current Treatment Recommendations: Strengthening, Pain Management, Positioning, ROM, Safety Education & Training, Balance Training, Self-Care / ADL, Functional Mobility Training, Endurance Training, Patient/Caregiver Education & Training    AM-PAC Score        AM-PAC Inpatient Daily Activity Raw Score: 17 (07/09/21 1505)  AM-PAC Inpatient ADL T-Scale Score : 37.26 (07/09/21 1505)  ADL Inpatient CMS 0-100% Score: 50.11 (07/09/21 1505)  ADL Inpatient CMS G-Code Modifier : CK (07/09/21 1505)    Goals  Short term goals  Time Frame for Short term goals: prior to d/c  Short term goal 1: ADL tx SUP  Short term goal 2: toileting SUP  Short term goal 3: tolerate 3 min fxl standing task with SUP  Short term goal 4: UB dressing/bathing with SUP  Short term goal 5: LB dressing SUP  Patient Goals   Patient goals : none stated       Therapy Time   Individual Concurrent Group Co-treatment   Time In 1425         Time Out 1450         Minutes 25              This note to serve as OT d/c summary if pt is d/c-ed from hospital prior to next OT session.       Rani Lyles, [address]

## 2021-07-09 NOTE — CARE COORDINATION
[facility] Nurse  Consult Note       NAME:  Mile Santiago  MEDICAL RECORD NUMBER:  [id number]  AGE: 80 y.o. GENDER: female  : 1929  TODAY'S DATE:  2021    Subjective   Reason for WOCN Evaluation and Assessment: Veraflo application to left foot traumatic wound      Mile Santiago is a 80 y.o. female referred by:   [x] Physician  [x] Nursing  [] Other:     Wound Identification:  Wound Type: traumatic  Contributing Factors: none    Wound History: blood blister sustained from dropping something on her foot. Grew increasingly larger over last few weeks. Current Wound Care Treatment:  Xeroform, kerlix, ace    Patient Goal of Care:  [x] Wound Healing  [] Odor Control  [] Palliative Care  [] Pain Control   [] Other:         PAST MEDICAL HISTORY        Diagnosis Date    Arthritis     Cataract     Diabetes ([facility] [address]     High blood pressure     Kidney problem     Neuropathy     Thyroid disorder        PAST SURGICAL HISTORY    Past Surgical History:   Procedure Laterality Date    APPENDECTOMY      CARPAL TUNNEL RELEASE Left 2017      Left carpal tunnel release  & Left Thumb, Index Finger and Middle Finger trigger finger release    CARPAL TUNNEL RELEASE Right 2017    COLONOSCOPY      FINGER TRIGGER RELEASE Right 2017    Thumb & Index Finger    HYSTERECTOMY      OVARY REMOVAL      TOTAL KNEE ARTHROPLASTY Right     Dr. Iris Galdamez    History reviewed. No pertinent family history.     SOCIAL HISTORY    Social History     Tobacco Use    Smoking status: Never Smoker    Smokeless tobacco: Never Used   Substance Use Topics    Alcohol use: Yes     Comment: occas    Drug use: No       ALLERGIES    Allergies   Allergen Reactions    Gabapentin      LOWER LEG SWELLING    Pravachol [Pravastatin Sodium] Rash    Tegaderm Ag Mesh 2\"X2\" [Wound Dressings] Rash       MEDICATIONS    No current facility-administered medications on file prior to encounter.      Current Outpatient Medications on File Prior to Encounter   Medication Sig Dispense Refill    Cholecalciferol (VITAMIN D) 50 MCG (2000 UT) CAPS capsule Take 2,000 Units by mouth daily      lisinopril (PRINIVIL;ZESTRIL) 20 MG tablet Take 1 tablet by mouth daily 90 tablet 3    atorvastatin (LIPITOR) 40 MG tablet TAKE 1 TABLET DAILY 90 tablet 3    metFORMIN (GLUCOPHAGE) 1000 MG tablet TAKE 1 TABLET TWICE A DAY WITH MEALS 180 tablet 3    levothyroxine (SYNTHROID) 112 MCG tablet TAKE ONE TABLET BY MOUTH DAILY (Patient taking differently: Take 112 mcg by mouth Daily TAKE ONE TABLET BY MOUTH DAILY at 6 am) 90 tablet 3    acetaminophen (AMINOFEN) 325 MG tablet Take 2 tablets by mouth every 6 hours as needed for Pain 120 tablet 0    Cranberry-Cholecalciferol 4200-500 MG-UNIT CAPS Take by mouth      aspirin 81 MG tablet Take 81 mg by mouth daily      Multiple Vitamin (MULTI VITAMIN DAILY PO) Take by mouth         Objective    /87   Pulse 79   Temp 97.9 °F (36.6 °C) (Oral)   Resp 18   Ht 5' 4\" (1.626 m)   Wt 191 lb 12.8 oz (87 kg)   SpO2 96%   BMI 32.92 kg/m²     LABS:  WBC:    Lab Results   Component Value Date    WBC 12.5 07/09/2021     H/H:    Lab Results   Component Value Date    HGB 10.2 07/09/2021    HCT 30.5 07/09/2021     PTT:    Lab Results   Component Value Date    APTT 29.2 07/06/2018   [APTT}  PT/INR:    Lab Results   Component Value Date    PROTIME 10.4 07/06/2018    INR 0.91 07/06/2018     HgBA1c:    Lab Results   Component Value Date    LABA1C 6.8 07/08/2021       Assessment   Alex Risk Score: Alex Scale Score: 15    Patient Active Problem List   Diagnosis Code    Carpal tunnel syndrome, bilateral G56.03    Trigger index finger M65.329    Trigger thumb of both thumbs M65.311, M65.312    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 Z96.651    Arthritis of left knee M17.12    Type 2 diabetes mellitus without complication, without long-term current use of insulin ([facility]) E11.9    Essential hypertension I10    Mixed hyperlipidemia E78.2    Pedal edema R60.0    Other secondary kyphosis, 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    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    Closed fracture of second cervical vertebra (HCC) S12.100A    Closed fracture of first cervical vertebra (HCC) S12.000A    Lymphedema I89.0    High risk medication use Z79.899    Chronic neck pain M54.2, G89.29    Rotator cuff arthropathy of right shoulder M12.811    Bradycardia R00.1    Arthritis of left hip M16.12    Orthostatic syncope I95.1    Sepsis due to cellulitis (HCC) L03.90, A41.9       Measurements:          Negative Pressure Wound Therapy Foot Left; Anterior (Active)   $ Standard NPWT <=50 sq cm PER TX $ Yes 07/09/21 1230   Wound Type Acute/Traumatic 07/09/21 1230   Unit Type Veraflo 07/09/21 1230   Dressing Type Veraflo 07/09/21 1230   Number of pieces used 1 07/09/21 1230   Cycle Continuous 07/09/21 1230   Target Pressure (mmHg) 125 07/09/21 1230   Intensity 5 07/09/21 1230   Irrigation Solution Vashe 07/09/21 1230   Instillation Volume  8 mL 07/09/21 1230   Soak Time  2 minutes 07/09/21 1230   Vac Frequency 2 hours 07/09/21 1230   Canister changed? Yes 07/09/21 1230   Dressing Status Clean;Dry; Intact 07/09/21 1230   Dressing Changed Changed/New 07/09/21 1230   Number of days: 0   Wound 07/07/21 Pedal Anterior; Left (Active)   Wound Image   07/09/21 1230   Wound Etiology Traumatic 07/09/21 1230   Dressing Status New dressing applied 07/09/21 1230   Wound Cleansed Cleansed with saline 07/09/21 1230   Dressing/Treatment Negative pressure wound therapy 07/09/21 1230   Dressing Change Due 07/12/21 07/09/21 1230   Wound Length (cm) 4 cm 07/09/21 1230   Wound Width (cm) 4.5 cm 07/09/21 1230   Wound Depth (cm) 0.3 cm 07/09/21 1230   Wound Surface Area (cm^2) 18 cm^2 07/09/21 1230   Wound Volume (cm^3) 5.4 cm^3 07/09/21 1230   Wound Assessment Pink/red 07/09/21 1230   Drainage Amount Small 07/09/21 1230   Drainage Description Serous 07/09/21 1230   Odor None 07/09/21 1230   Betsy-wound Assessment Fragile;Edematous 07/09/21 1230   Margins Defined edges 07/09/21 1230   Number of days: 2      Pt seen for veraflo application. S/P debridement of blister to top of foot. Wound bed is clean, with small amount of serous drainage. Periwound skin is fragile. Explained procedure to pt and family. Wound edges prepped with skin prep, barrier ring and veraflo dressing applied. Able to achieve and maintain good seal. ABD used to protect top of foot and anterior shin from tubing. Secured with kerlix, ace wrap applied. Plan is for OR Monday and possible graft. Response to treatment:  Well tolerated by patient. Pain Assessment:  Severity:  0 / 10    Plan   Plan of Care: Wound 07/07/21 Pedal Anterior; Left-Dressing/Treatment: Negative pressure wound therapy    Specialty Bed Required : No   [] Low Air Loss   [] Pressure Redistribution  [] Fluid Immersion  [] Bariatric  [] Total Pressure Relief  [] Other:     Current Diet: ADULT DIET; Regular; 3 carb choices (45 gm/meal)  Adult Oral Nutrition Supplement;  Low Calorie/High Protein Oral Supplement  Dietician consult:  Yes    Discharge Plan:  Placement for patient upon discharge: home with support    Patient appropriate for Outpatient [address]: No    Referrals:  []   [] [facility]  [] Supplies  [] Other    Patient/Caregiver Teaching:  Level of patient/caregiver understanding able to:   [] Indicates understanding       [] Needs reinforcement  [] Unsuccessful      [x] Verbal Understanding  [] Demonstrated understanding       [] No evidence of learning  [] Refused teaching         [] N/A       Electronically signed by Macrina Barreto on 7/9/2021 at 1:24 PM

## 2021-07-09 NOTE — ACP (ADVANCE CARE PLANNING)
ADVANCED CARE PLANNING    Name:Brenda Lebron       :  1929              MRN:  [id number]      Purpose of Encounter: Advanced care planning in light of food infection. Parties in attendance: :Kimberlee Redman, Mariam Howard DO, Family members:daughters. Decisional Capacity:Yes    Diagnosis: Active Problems:    Sepsis due to cellulitis [facility])  Resolved Problems:    * No resolved hospital problems. *    Patients Medical Story: A 49-year-old  female was found lying on the ground with her legs that were extremely red, warm, swollen, inflamed, and had a large what appeared to be a blood blister over the dorsum of the left foot closed to the left toe. The patient notes that over the last two days, she has had rapidly progressive increasing severe throbbing leg pain that has been disabling. She was found to have cellulitis and a necrotic lesion of the foot, requiring debridement, wound vac placement and will need a skin graft. Goals of Care Determinations: Patient wishes to focus on getting better  Plan: Will notify Festus Santana MD of change in care plan. Will look at further interventions as needed. Code Status: At this time patient wishes to be Full Code  Time Spent with Patient: 30 minutes      Electronically signed by Channing Reynaga DO on 2021 at 1:00 PM  Thank you Festus Santana MD for the opportunity to be involved in this patient's care.

## 2021-07-09 NOTE — PROGRESS NOTES
Physical Therapy  Facility/Department: [address] MED SURG  Daily Treatment Note  NAME: Aníbal Hogan  : 1929  MRN: [id number]    Date of Service: 2021    Discharge Recommendations:  24 hour supervision or assist, Patient would benefit from continued therapy after discharge, S Level 1        Assessment   Body structures, Functions, Activity limitations: Decreased functional mobility ; Decreased ADL status; Decreased strength;Decreased endurance;Decreased balance  Assessment: Pt able to ambulate slightly increased distance with walker support, CGA, chair follow d/t wound vac, limited activity tolerance. She is gradually improving, but would benefit from continued therapy to further improve strength, balance, endurance, independence ambulating. Anticipate return home with initial  assist, HHPT. Aníbal Hogan scored a 17/ on the AM-PAC short mobility form. Current research shows that an AM-PAC score of 18 or greater is typically associated with a discharge to the patient's home setting. Based on the patient's AM-PAC score and their current functional mobility deficits, it is recommended that the patient have 2-3 sessions per week of Physical Therapy at d/c to increase the patient's independence. At this time, this patient demonstrates the endurance and safety to discharge home with home PT level 1 and a follow up treatment frequency of 2-3x/wk. Please see assessment section for further patient specific details. If patient discharges prior to next session this note will serve as a discharge summary. Please see below for the latest assessment towards goals. Prognosis: Fair  Decision Making: Medium Complexity  History: see below  Exam: see below  Clinical Presentation: evolving  PT Education: PT Role;Plan of Care; Functional Mobility Training;General Safety;Gait Training;Transfer Training  REQUIRES PT FOLLOW UP: Yes  Activity Tolerance  Activity Tolerance: Patient Tolerated treatment well;Patient limited by fatigue;Patient limited by endurance     Patient Diagnosis(es): The primary encounter diagnosis was Septicemia ([facility] [address] Diagnoses of Cellulitis of left lower extremity, Hematoma of left foot, and DASHAWN (acute kidney injury) ([facility] [address] were also pertinent to this visit. has a past medical history of Arthritis, Cataract, Diabetes ([facility] [address] High blood pressure, Kidney problem, Neuropathy, and Thyroid disorder. has a past surgical history that includes Appendectomy; Hysterectomy; Ovary removal; Total knee arthroplasty (Right, 2006); Carpal tunnel release (Left, 09/05/2017); Colonoscopy; Carpal tunnel release (Right, 09/26/2017); and Finger trigger release (Right, 09/26/2017). Restrictions  Restrictions/Precautions  Restrictions/Precautions: Fall Risk  Position Activity Restriction  Other position/activity restrictions: Wound Vac     Subjective   General  Chart Reviewed: Yes  Additional Pertinent Hx: Pt is a 80 y.o. female who presented to the ED on 7/6/21 after being found down at home. Blood glucose levels found to be >300. Response To Previous Treatment: Patient with no complaints from previous session. Family / Caregiver Present: Yes  Referring Practitioner: Alana Luke MD  Subjective  Subjective: Pt agreeable to activity with encouragement. Now has wound vac to L foot. Orientation  Orientation  Overall Orientation Status: Within Functional Limits    Objective      Transfers  Sit to Stand: Minimal Assistance  Stand to sit: Minimal Assistance     Ambulation  Ambulation?: Yes  Ambulation 1  Surface: level tile  Device: Rolling Walker  Other Apparatus: Wheelchair follow  Assistance: Contact guard assistance  Quality of Gait: Slow speed, progressive trendellenberg gait, limited d/t fatigue and pain. Distance: 30' x 2  Stairs/Curb  Stairs?: No      Other Activities: Other (see comment)  Comment: Pt required seated rest between ambulation trials d/t fatigue.   At end of session she was positioned for comfort in bedisde chair, call light in reach, alarm in place. AM-PAC Score  AM-PAC Inpatient Mobility Raw Score : 17 (07/09/21 1454)  AM-PAC Inpatient T-Scale Score : 42.13 (07/09/21 1454)  Mobility Inpatient CMS 0-100% Score: 50.57 (07/09/21 1454)  Mobility Inpatient CMS G-Code Modifier : CK (07/09/21 1454)          Goals  Short term goals  Time Frame for Short term goals: by acute discharge - all goals ongoing as of 7/8/21  Short term goal 1: bed mobility with supv  Short term goal 2: sit<>Stand with SBA  Short term goal 3: ambulate > 20' with RW and SBA  Patient Goals   Patient goals : none stated    Plan    Plan  Times per week: 3-5x/week  Current Treatment Recommendations: Strengthening, Transfer Training, Balance Training, Functional Mobility Training, Gait Training, Safety Education & Training, Patient/Caregiver Education & Training, Neuromuscular Re-education  Safety Devices  Type of devices:  All fall risk precautions in place, Call light within reach, Gait belt, Patient at risk for falls, Left in chair, Chair alarm in place, Nurse notified  Restraints  Initially in place: No     Therapy Time   Individual Concurrent Group Co-treatment   Time In 1425         Time Out 1450         Minutes 25         Timed Code Treatment Minutes: Will Mallory PT    Electronically signed by Abdi Purvis, FRANCISCO [id number] on 7/9/2021 at 3:00 PM

## 2021-07-09 NOTE — PROGRESS NOTES
Physician Progress Note      PATIENT:               Reji Glasgow  CSN #:                  [id number]  :                       1929  ADMIT DATE:       2021 11:00 PM  [address] [place] DATE:  RESPONDING  PROVIDER #:        ROGELIO WHALEN DO          QUERY TEXT:    Pt admitted with sepsis from cellulitis of left foot. Noted documentation of   severe malnutrition by dietician on . If possible, please document in the   progress notes and discharge summary:    The medical record reflects the following:  Risk Factors: acute on chronic illness  Clinical Indicators: Per dietician, meets criteria for severe malnutrition   based on the following criteria:  Energy Intake:  50% or less of estimated energy requirements for 5 or more   days  Weight Loss:  Greater than 7.5% over 3 months  Body Fat Loss:  Mild body fat loss Orbital  Muscle Mass Loss:  Mild muscle mass loss Temples (temporalis)  Fluid Accumulation:  Moderate to Severe  Treatment: dietary consult, nutritional supplements    Thank you,  Loreta Gonzales RN, BSN, CCDS  [email or web address]  Options provided:  -- Severe malnutrition confirmed present on admission  -- Other - I will add my own diagnosis  -- Disagree - Not applicable / Not valid  -- Disagree - Clinically unable to determine / Unknown  -- Refer to Clinical Documentation Reviewer    PROVIDER RESPONSE TEXT:    The diagnosis of severe malnutrition was confirmed as present on admission.     Query created by: Aldo Bravo on 2021 9:20 AM      Electronically signed by:  ROGELIO WHALEN DO 2021 12:27 PM

## 2021-07-09 NOTE — PLAN OF CARE
Problem: Falls - Risk of:  Goal: Will remain free from falls  Description: Will remain free from falls  7/8/2021 2344 by Oli Flores RN  Outcome: Ongoing  7/8/2021 1239 by Prashanth Goldberg RN  Outcome: Ongoing     Problem: Skin Integrity:  Goal: Will show no infection signs and symptoms  Description: Will show no infection signs and symptoms  7/8/2021 2344 by Oli Flores RN  Outcome: Ongoing  7/8/2021 1239 by Prashanth Goldberg RN  Outcome: Ongoing     Problem: Skin Integrity:  Goal: Absence of new skin breakdown  Description: Absence of new skin breakdown  Outcome: Ongoing     Problem: Pain:  Goal: Pain level will decrease  Description: Pain level will decrease  7/8/2021 2344 by Oli Flores RN  Outcome: Ongoing  7/8/2021 1239 by Prashanth Goldberg RN  Outcome: Ongoing

## 2021-07-09 NOTE — PROGRESS NOTES
Pt awake in bed. Pt denies any pain or discomfort at this moment. Pt's scheduled meds are given as ordered. Pt denies other needs at present. Bed alarm on. Call light and item need in reach. Electronically signed by Vanessa Fleming RN on 7/9/2021 at 10:25 AM

## 2021-07-09 NOTE — PROGRESS NOTES
Department of Podiatry Progress Note  Bradley Alfaro DPM Attending           CHIEF COMPLAINT:  Blister of LEFT dorsal foot    HISTORY OF PRESENT ILLNESS:                The patient is a 80 y.o. female with significant past medical history of  Obesity, Diabetes, Hypertension and Hypothyroidism who is consulted for a several days to week's duration of growing blood blister to dorsum of LEFT foot. Patient's daughter relates an item struck her mother's foot and blister has continually grown    Allergies:   Gabapentin, Pravachol [pravastatin sodium], and Tegaderm ag mesh 2\"x2\" [wound dressings]     Social History:  l  Patient currently lives alone, daughters as support    REVIEW OF SYSTEMS:    CONSTITUTIONAL:  negative  INTEGUMENT/BREAST:  positive for skin lesion(s), skin color change, changes in lesion, changes in hair and changes in nails  MUSCULOSKELETAL:  positive for  joint swelling, stiff joints and decreased range of motion  NEUROLOGICAL:  positive for gait problems, weakness and numbness    PHYSICAL EXAM:      Vitals:    /87   Pulse 79   Temp 97.9 °F (36.6 °C) (Oral)   Resp 18   Ht 5' 4\" (1.626 m)   Wt 191 lb 12.8 oz (87 kg)   SpO2 96%   BMI 32.92 kg/m²     LABS:   Recent Labs     07/08/21  0758 07/09/21  0952   WBC 19.2* 12.5*   HGB 10.4* 10.2*   HCT 30.3* 30.5*    258     Recent Labs     07/09/21  0952   *   K 4.0   CL 96*   CO2 19*   BUN 17   CREATININE 0.9     Recent Labs     07/06/21  2329   PROT 7.4       LOWER EXTREMITY EXAMINATION   SKIN:    Large Sub Q ulcer of 5.5 x 4.8 cm with ragged sub Q, Dermis with continued serous drainage, NO purulence today        NEUROLOGIC:    Pain sensation is decreased Left. Light touch is decreasedLeft.  positive history of paresthesia Bilateral. negativehistory of burning Bilateral. Deep tendon reflexes are 1 to include patellar and achilles Bilateral. [place]--[place] 5.07 monofilament sensitivity is abnormal 4 to 5 spots tested Bilateral.    VASCULAR:    left Dorsalis pedis is 1. left Posterior tibial pulse is 1. 3+ edema left. moderate Varicosities bilaterally. MUSCULOSKELETAL:    [place] is abnormal  antalgic due to pain of lesion. Muscle strength is 4/5 to all groups tested to include dorsiflexion, plantarflexion, inversion, eversion, and digital Bilateral . The ranges of motion of all joints tested from ankle distal is decreased there is crepitus noted Left. Radiology of 7/7/2021  FINDINGS:   There is no evidence of acute fracture.  There is normal alignment of the   tarsometatarsal joints.  No acute joint abnormality.  No focal osseous   lesion.  Soft tissue swelling of the forefoot.  Old fractures of the proximal   phalanges of the 4th and 5th toes.           Impression   1. No acute osseous abnormality. 2. Old fractures of the proximal phalanges of the 4th and 5th toes           IMPRESSION/RECOMMENDATIONS    1. Cellulitis of LEFT foot secondary to traumatic lesion  Afebrile and further descending leukocytosis post I and D    2. Abscess of LEFT foot    Upon Verbal consent, the LEFT foot  Ulcer was EXCISIONALLY Debrided with removal of non-viable Sub Q, Epidermis and Dermis for 26.4 cm2 sub Q removed    Disposition: Patient may require more formal Incision and Drainage in the OR setting. Start Wound Vac with Vasche instill for next 72 hours, with planned wash out in O.R. setting with possible grafting    Thank you for the opportunity to take part in the patient's care.     Luh Herrera DPM  [facility]  [phone]

## 2021-07-09 NOTE — PLAN OF CARE
Problem: Falls - Risk of:  Goal: Will remain free from falls  Description: Will remain free from falls  7/9/2021 1033 by Ivania Baumann RN  Outcome: Ongoing  Note: Pt free from falls this shift. Non skid socks provided. Pt and family educated on use of call light as needed. Bed alarm active. Call light always within reach. Pt able and agreeable to contact for safety appropriately. Problem: Falls - Risk of:  Goal: Absence of physical injury  Description: Absence of physical injury  7/9/2021 1033 by Ivania Baumann RN  Outcome: Ongoing     Problem: Skin Integrity:  Goal: Will show no infection signs and symptoms  Description: Will show no infection signs and symptoms  7/9/2021 1033 by Ivania Baumann RN  Outcome: Ongoing  Note: Skin assessment performed each shift per protocol. Pt encouraged to reposition every two hours and often. Will continue to monitor and assess for skin breakdown. 7/8/2021 2344 by Tonio Jameson RN  Outcome: Ongoing     Problem: Pain:  Goal: Pain level will decrease  Description: Pain level will decrease  7/9/2021 1033 by Ivania Baumann RN  Outcome: Ongoing  Note: Pt able to express presence and absence of pain using numerical pain scale. Pt pain is managed by PRN analgesics as ordered by MD. Pain reassess after each interventions. Will continue to monitor as needed. 7/8/2021 2344 by Tonio Jameson RN  Outcome: Ongoing     Problem: Pain:  Goal: Control of acute pain  Description: Control of acute pain  7/8/2021 2344 by Tonio Jameson RN  Outcome: Ongoing     Problem:  Body Temperature - Imbalanced:  Goal: Ability to maintain a body temperature in the normal range will improve  Description: Ability to maintain a body temperature in the normal range will improve  Outcome: Ongoing     Problem: Pain:  Goal: Pain level will decrease  Description: Pain level will decrease  7/9/2021 1033 by Ivania Baumann RN  Outcome: Ongoing  Note: Pt able to express presence and absence of pain using numerical pain scale. Pt pain is managed by PRN analgesics as ordered by MD. Pain reassess after each interventions. Will continue to monitor as needed.      7/8/2021 1654 by Abraham Cavanaugh RN  Outcome: Ongoing     Problem: Skin Integrity - Impaired:  Goal: Will show no infection signs and symptoms  Description: Will show no infection signs and symptoms  Outcome: Ongoing

## 2021-07-10 LAB
GLUCOSE BLD-MCNC: 145 MG/DL (ref 70–99)
GLUCOSE BLD-MCNC: 215 MG/DL (ref 70–99)
GLUCOSE BLD-MCNC: 225 MG/DL (ref 70–99)
GLUCOSE BLD-MCNC: 241 MG/DL (ref 70–99)
GRAM STAIN RESULT: ABNORMAL
ORGANISM: ABNORMAL
PERFORMED ON: ABNORMAL
WOUND/ABSCESS: ABNORMAL

## 2021-07-10 PROCEDURE — 94760 N-INVAS EAR/PLS OXIMETRY 1: CPT

## 2021-07-10 PROCEDURE — 6370000000 HC RX 637 (ALT 250 FOR IP): Performed by: NURSE PRACTITIONER

## 2021-07-10 PROCEDURE — 1200000000 HC SEMI PRIVATE

## 2021-07-10 PROCEDURE — 6360000002 HC RX W HCPCS: Performed by: INTERNAL MEDICINE

## 2021-07-10 PROCEDURE — 2580000003 HC RX 258: Performed by: INTERNAL MEDICINE

## 2021-07-10 PROCEDURE — 99233 SBSQ HOSP IP/OBS HIGH 50: CPT | Performed by: INTERNAL MEDICINE

## 2021-07-10 PROCEDURE — 6360000002 HC RX W HCPCS: Performed by: FAMILY MEDICINE

## 2021-07-10 PROCEDURE — 6370000000 HC RX 637 (ALT 250 FOR IP): Performed by: INTERNAL MEDICINE

## 2021-07-10 PROCEDURE — 6370000000 HC RX 637 (ALT 250 FOR IP): Performed by: FAMILY MEDICINE

## 2021-07-10 RX ORDER — NICOTINE POLACRILEX 4 MG
15 LOZENGE BUCCAL PRN
Status: DISCONTINUED | OUTPATIENT
Start: 2021-07-10 | End: 2021-07-11

## 2021-07-10 RX ORDER — DEXTROSE MONOHYDRATE 50 MG/ML
100 INJECTION, SOLUTION INTRAVENOUS PRN
Status: DISCONTINUED | OUTPATIENT
Start: 2021-07-10 | End: 2021-07-11

## 2021-07-10 RX ORDER — DEXTROSE MONOHYDRATE 25 G/50ML
12.5 INJECTION, SOLUTION INTRAVENOUS PRN
Status: DISCONTINUED | OUTPATIENT
Start: 2021-07-10 | End: 2021-07-11

## 2021-07-10 RX ORDER — POLYETHYLENE GLYCOL 3350 17 G/17G
17 POWDER, FOR SOLUTION ORAL 2 TIMES DAILY
Status: DISCONTINUED | OUTPATIENT
Start: 2021-07-10 | End: 2021-07-14 | Stop reason: HOSPADM

## 2021-07-10 RX ADMIN — ENOXAPARIN SODIUM 40 MG: 40 INJECTION SUBCUTANEOUS at 08:09

## 2021-07-10 RX ADMIN — SODIUM CHLORIDE, PRESERVATIVE FREE 10 ML: 5 INJECTION INTRAVENOUS at 22:18

## 2021-07-10 RX ADMIN — ALOGLIPTIN 12.5 MG: 12.5 TABLET, FILM COATED ORAL at 08:09

## 2021-07-10 RX ADMIN — LEVOTHYROXINE SODIUM 100 MCG: 0.1 TABLET ORAL at 07:50

## 2021-07-10 RX ADMIN — INSULIN LISPRO 2 UNITS: 100 INJECTION, SOLUTION INTRAVENOUS; SUBCUTANEOUS at 08:10

## 2021-07-10 RX ADMIN — HYDROCODONE BITARTRATE AND ACETAMINOPHEN 1 TABLET: 5; 325 TABLET ORAL at 20:22

## 2021-07-10 RX ADMIN — INSULIN LISPRO 4 UNITS: 100 INJECTION, SOLUTION INTRAVENOUS; SUBCUTANEOUS at 17:37

## 2021-07-10 RX ADMIN — INSULIN LISPRO 2 UNITS: 100 INJECTION, SOLUTION INTRAVENOUS; SUBCUTANEOUS at 20:27

## 2021-07-10 RX ADMIN — ATORVASTATIN CALCIUM 40 MG: 40 TABLET, FILM COATED ORAL at 08:10

## 2021-07-10 RX ADMIN — INSULIN LISPRO 4 UNITS: 100 INJECTION, SOLUTION INTRAVENOUS; SUBCUTANEOUS at 12:07

## 2021-07-10 RX ADMIN — ASPIRIN 81 MG: 81 TABLET, CHEWABLE ORAL at 08:09

## 2021-07-10 RX ADMIN — POLYETHYLENE GLYCOL 3350 17 G: 17 POWDER, FOR SOLUTION ORAL at 22:18

## 2021-07-10 RX ADMIN — ACETAMINOPHEN 650 MG: 325 TABLET ORAL at 17:46

## 2021-07-10 RX ADMIN — SODIUM CHLORIDE, PRESERVATIVE FREE 10 ML: 5 INJECTION INTRAVENOUS at 08:10

## 2021-07-10 RX ADMIN — CEFTRIAXONE 2000 MG: 2 INJECTION, POWDER, FOR SOLUTION INTRAMUSCULAR; INTRAVENOUS at 22:18

## 2021-07-10 ASSESSMENT — PAIN DESCRIPTION - PAIN TYPE
TYPE: ACUTE PAIN
TYPE: ACUTE PAIN

## 2021-07-10 ASSESSMENT — PAIN DESCRIPTION - FREQUENCY
FREQUENCY: CONTINUOUS
FREQUENCY: CONTINUOUS

## 2021-07-10 ASSESSMENT — PAIN DESCRIPTION - ORIENTATION
ORIENTATION: LEFT
ORIENTATION: LEFT

## 2021-07-10 ASSESSMENT — PAIN SCALES - GENERAL
PAINLEVEL_OUTOF10: 2
PAINLEVEL_OUTOF10: 0
PAINLEVEL_OUTOF10: 0
PAINLEVEL_OUTOF10: 5
PAINLEVEL_OUTOF10: 6

## 2021-07-10 ASSESSMENT — PAIN DESCRIPTION - DESCRIPTORS
DESCRIPTORS: DISCOMFORT
DESCRIPTORS: DISCOMFORT

## 2021-07-10 ASSESSMENT — PAIN DESCRIPTION - LOCATION
LOCATION: FOOT
LOCATION: FOOT

## 2021-07-10 ASSESSMENT — PAIN DESCRIPTION - PROGRESSION
CLINICAL_PROGRESSION: NOT CHANGED
CLINICAL_PROGRESSION: NOT CHANGED
CLINICAL_PROGRESSION: GRADUALLY IMPROVING

## 2021-07-10 ASSESSMENT — PAIN DESCRIPTION - ONSET
ONSET: ON-GOING
ONSET: ON-GOING

## 2021-07-10 ASSESSMENT — PAIN - FUNCTIONAL ASSESSMENT
PAIN_FUNCTIONAL_ASSESSMENT: ACTIVITIES ARE NOT PREVENTED
PAIN_FUNCTIONAL_ASSESSMENT: PREVENTS OR INTERFERES SOME ACTIVE ACTIVITIES AND ADLS

## 2021-07-10 NOTE — PROGRESS NOTES
Pt awake in bed. Pt denies any pain or discomfort. Left foot wound vac in place. Pt assisted up to the chair. Pt's scheduled meds are given as ordered. Pt denies other needs at present. Call light and item need in reach.  Electronically signed by Ivania Baumann RN on 7/10/2021 at 11:25 AM

## 2021-07-10 NOTE — PROGRESS NOTES
Department of Podiatry Progress Note  Ronan Lovell DPM Attending           CHIEF COMPLAINT:  Blister of LEFT dorsal foot    HISTORY OF PRESENT ILLNESS:                The patient is a 80 y.o. female with significant past medical history of  Obesity, Diabetes, Hypertension and Hypothyroidism who is consulted for a several days to week's duration of growing blood blister to dorsum of LEFT foot. Patient's daughter relates an item struck her mother's foot and blister has continually grown. Patient seen today at bedside. She is up in a chair with her daughter at bedside. She has not complaints. Allergies:   Gabapentin, Pravachol [pravastatin sodium], and Tegaderm ag mesh 2\"x2\" [wound dressings]     Social History:  l  Patient currently lives alone, daughters as support    REVIEW OF SYSTEMS:    CONSTITUTIONAL:  negative  INTEGUMENT:  positive for skin lesion(s), skin color change, changes in lesion, changes in hair and changes in nails  MUSCULOSKELETAL:  positive for  joint swelling, stiff joints and decreased range of motion  NEUROLOGICAL:  positive for gait problems, weakness and numbness    PHYSICAL EXAM:      Vitals:    BP (!) 165/73   Pulse 82   Temp 98.7 °F (37.1 °C) (Oral)   Resp 16   Ht 5' 4\" (1.626 m)   Wt 182 lb 15.7 oz (83 kg)   SpO2 96%   BMI 31.41 kg/m²     LABS:   Recent Labs     07/08/21  0758 07/09/21  0952   WBC 19.2* 12.5*   HGB 10.4* 10.2*   HCT 30.3* 30.5*    258     Recent Labs     07/09/21  0952   *   K 4.0   CL 96*   CO2 19*   BUN 17   CREATININE 0.9     No results for input(s): PROT, INR, APTT in the last 72 hours. LOWER EXTREMITY EXAMINATION   SKIN:    Wound vac with Vashe instill is intact and sealed. The erythema previously noted on the left lower leg is resolving. NEUROLOGIC:    Pain sensation is decreased Left. Light touch is decreasedLeft.  positive history of paresthesia Bilateral. negativehistory of burning Bilateral. Deep tendon reflexes are 1 to include patellar and achilles Bilateral. [place]--Dallin 5.07 monofilament sensitivity is abnormal 4 to 5 spots tested Bilateral.    VASCULAR:    left Dorsalis pedis is 1. left Posterior tibial pulse is 1. 3+ edema left. moderate Varicosities bilaterally. MUSCULOSKELETAL:    [place] is abnormal  antalgic due to pain of lesion. Muscle strength is 4/5 to all groups tested to include dorsiflexion, plantarflexion, inversion, eversion, and digital Bilateral . The ranges of motion of all joints tested from ankle distal is decreased there is crepitus noted Left. Radiology of 7/7/2021  FINDINGS:   There is no evidence of acute fracture.  There is normal alignment of the   tarsometatarsal joints.  No acute joint abnormality.  No focal osseous   lesion.  Soft tissue swelling of the forefoot.  Old fractures of the proximal   phalanges of the 4th and 5th toes.           Impression   1. No acute osseous abnormality. 2. Old fractures of the proximal phalanges of the 4th and 5th toes           IMPRESSION/RECOMMENDATIONS    1. Cellulitis of LEFT foot secondary to traumatic lesion  Afebrile and further descending leukocytosis post I and D    2. Abscess of LEFT foot   -continue abx per the recommendations of Dr. Massimo Cross. Appreciate input.  -continue instill vac with vashe to decrease bacterial burden in the ulceration    Disposition: Patient will require more formal Incision and Drainage in the OR setting. Conitnue Wound Vac with Vasche instill for next 72 hours, with planned wash out in O.R. setting with application of bioengineered skin substitutes to accelerated healing in the high risk ulceration on Monday July 12. All risk vs benefits of the planned procedure were discussed with the patient and her daughter. All questions were answered. No guarantees were given. Thank you for the opportunity to take part in the patient's care.     Dinorah Recinos DPM  [facility]  [phone]

## 2021-07-10 NOTE — PLAN OF CARE
Problem: Falls - Risk of:  Goal: Will remain free from falls  Description: Will remain free from falls  Outcome: Ongoing     Problem: Falls - Risk of:  Goal: Absence of physical injury  Description: Absence of physical injury  Outcome: Ongoing  Note: Pt free from falls this shift. Non skid socks provided. Pt educated on use of call light as needed for assistance. Bed alarm active. Call light always within reach. Pt able and agreeable to contact for safety appropriately. Problem: Skin Integrity:  Goal: Will show no infection signs and symptoms  Description: Will show no infection signs and symptoms  Outcome: Ongoing  Note: Skin assessment performed each shift per protocol. Skin care protocol in place. Pt turned and repositioned every two hours and prn with pillow support. Specialty mattress in place. Problem: Pain:  Goal: Pain level will decrease  Description: Pain level will decrease  Outcome: Ongoing  Note: Pt able to express presence and absence of pain using numerical pain scale. Pt pain is managed by PRN analgesics as ordered by MD. Pain reassess after each interventions. Will continue to monitor as needed. Problem: Discharge Planning:  Goal: Discharged to appropriate level of care  Description: Discharged to appropriate level of care  Outcome: Ongoing     Problem: Body Temperature - Imbalanced:  Goal: Ability to maintain a body temperature in the normal range will improve  Description: Ability to maintain a body temperature in the normal range will improve  Outcome: Ongoing     Problem: Mobility - Impaired:  Goal: Mobility will improve to maximum level  Description: Mobility will improve to maximum level  Outcome: Ongoing     Problem: Pain:  Goal: Pain level will decrease  Description: Pain level will decrease  Outcome: Ongoing  Note: Pt able to express presence and absence of pain using numerical pain scale.  Pt pain is managed by PRN analgesics as ordered by MD. Pain reassess after each interventions. Will continue to monitor as needed.

## 2021-07-10 NOTE — PROGRESS NOTES
HYSTERECTOMY      OVARY REMOVAL      TOTAL KNEE ARTHROPLASTY Right 2006    Dr. Rossana Davis       Current Medications:    Outpatient Medications Marked as Taking for the 7/6/21 encounter [facility] Encounter)   Medication Sig Dispense Refill    Cholecalciferol (VITAMIN D) 50 MCG (2000 UT) CAPS capsule Take 2,000 Units by mouth daily      lisinopril (PRINIVIL;ZESTRIL) 20 MG tablet Take 1 tablet by mouth daily 90 tablet 3    atorvastatin (LIPITOR) 40 MG tablet TAKE 1 TABLET DAILY 90 tablet 3    metFORMIN (GLUCOPHAGE) 1000 MG tablet TAKE 1 TABLET TWICE A DAY WITH MEALS 180 tablet 3    levothyroxine (SYNTHROID) 112 MCG tablet TAKE ONE TABLET BY MOUTH DAILY (Patient taking differently: Take 112 mcg by mouth Daily TAKE ONE TABLET BY MOUTH DAILY at 6 am) 90 tablet 3    acetaminophen (AMINOFEN) 325 MG tablet Take 2 tablets by mouth every 6 hours as needed for Pain 120 tablet 0    Cranberry-Cholecalciferol 4200-500 MG-UNIT CAPS Take by mouth      aspirin 81 MG tablet Take 81 mg by mouth daily      Multiple Vitamin (MULTI VITAMIN DAILY PO) Take by mouth         Allergies:  Gabapentin, Pravachol [pravastatin sodium], and Tegaderm ag mesh 2\"x2\" [wound dressings]    Immunizations :   Immunization History   Administered Date(s) Administered    Influenza Vaccine, unspecified formulation 10/10/2003, 10/05/2004, 10/04/2005, 09/01/2012, 10/01/2014, 11/20/2017    Influenza Virus Vaccine 11/20/2017    Influenza, High Dose (Fluzone 65 yrs and older) 09/28/2018    Influenza, Jonh Pilar, Recombinant, IM PF (Flublok 18 yrs and older) 10/05/2019    Influenza, Quadv, adjuvanted, 65 yrs +, IM, PF (Fluad) 10/26/2020    Pneumococcal Conjugate 13-valent ([id number]) 02/15/2018    Pneumococcal Polysaccharide (Lowarnhrq26) 02/26/2019, 10/05/2019    Tdap (Boostrix, Adacel) 11/20/2017    Zoster Live (Zostavax) 11/20/2017    Zoster Recombinant (Shingrix) 10/05/2019, 12/23/2019       Social History:    Social History     Tobacco Use    Smoking status: Never Smoker    Smokeless tobacco: Never Used   Substance Use Topics    Alcohol use: Yes     Comment: occas    Drug use: No     Social History     Tobacco Use   Smoking Status Never Smoker   Smokeless Tobacco Never Used      Family history : no Liver disorders no COPD       REVIEW OF SYSTEMS:     Constitutional:  negative for fevers, chills, night sweats  Eyes:  negative for blurred vision, eye discharge, visual disturbance   HEENT:  negative for hearing loss, ear drainage,nasal congestion  Respiratory:  negative for cough, shortness of breath or hemoptysis   Cardiovascular:  negative for chest pain, palpitations, syncope  Gastrointestinal:  negative for nausea, vomiting, diarrhea, constipation, abdominal pain  Genitourinary:  negative for frequency, dysuria, urinary incontinence, hematuria  Hematologic/Lymphatic:  negative for easy bruising, bleeding and lymphadenopathy  Allergic/Immunologic:  negative for recurrent infections, angioedema, anaphylaxis   Endocrine:  negative for weight changes, polyuria, polydipsia and polyphagia  Musculoskeletal:  negative for joint  pain, swelling, decreased range of motion  Integumentary: No rashes, skin lesions  Neurological:  negative for headaches, slurred speech, unilateral weakness  Psychiatric: negative for hallucinations,confusion,agitation.                 PHYSICAL EXAM:      Vitals:    BP (!) 179/83   Pulse 99   Temp 98.6 °F (37 °C) (Oral)   Resp 16   Ht 5' 4\" (1.626 m)   Wt 182 lb 15.7 oz (83 kg)   SpO2 97%   BMI 31.41 kg/m²     General Appearance: alert,in some  acute distress, ++ pallor, no icterus   Skin: warm and dry, no rash or erythema  Head: normocephalic and atraumatic  Eyes: pupils equal, round, and reactive to light, conjunctivae normal  ENT: tympanic membrane, external ear and ear canal normal bilaterally, nose without deformity, nasal mucosa and turbinates normal without polyps  Neck: supple and non-tender without mass, no thyromegaly  no cervical lymphadenopathy  Pulmonary/Chest: clear to auscultation bilaterally- no wheezes, rales or rhonchi, normal air movement, no respiratory distress  Cardiovascular:  S1 and S2, no murmurs, rubs, clicks, or gallops, no carotid bruits  Abdomen: soft, non-tender, non-distended, normal bowel sounds, no masses or organomegaly  Extremities: no cyanosis, clubbing or ++ edema  Musculoskeletal: normal range of motion, no joint swelling, deformity or tenderness  Integumentary: No rashes, no abnormal skin lesions, no petechiae  Neurologic: reflexes normal and symmetric, no cranial nerve deficit  Lines: IV  Left leg extensive cellulitis with blood filled blister on the dorsum now drained out has wound vac in place ace wraps+    Rt leg edema   Rt TKA+      Data Review:    CBC:   Lab Results   Component Value Date    WBC 12.5 (H) 07/09/2021    HGB 10.2 (L) 07/09/2021    HCT 30.5 (L) 07/09/2021    MCV 93.8 07/09/2021     07/09/2021     RENAL:   Lab Results   Component Value Date    CREATININE 0.9 07/09/2021    BUN 17 07/09/2021     (L) 07/09/2021    K 4.0 07/09/2021    CL 96 (L) 07/09/2021    CO2 19 (L) 07/09/2021     SED RATE: No results found for: SEDRATE  CK:   Lab Results   Component Value Date    CKTOTAL 154 05/09/2021     CRP: No results found for: CRP  Hepatic Function Panel:   Lab Results   Component Value Date    ALKPHOS 62 07/06/2021    ALT 13 07/06/2021    AST 21 07/06/2021    PROT 7.4 07/06/2021    BILITOT 0.9 07/06/2021    LABALBU 4.0 07/06/2021     UA:  Lab Results   Component Value Date    COLORU YELLOW 07/09/2021    CLARITYU Clear 07/09/2021    GLUCOSEU Negative 07/09/2021    BILIRUBINUR Negative 07/09/2021    KETUA Negative 07/09/2021    SPECGRAV 1.009 07/09/2021    BLOODU SMALL 07/09/2021    PHUR 6.0 07/09/2021    PROTEINU 30 07/09/2021    UROBILINOGEN 0.2 07/09/2021    NITRU Negative 07/09/2021    LEUKOCYTESUR Negative 07/09/2021    LABMICR YES 07/09/2021    URINETYPE NotGiven 07/09/2021      Urine Microscopic:   Lab Results   Component Value Date    BACTERIA RARE 05/21/2021    HYALCAST 0 07/09/2021    WBCUA 3 07/09/2021    RBCUA 7 07/09/2021    EPIU 1 07/09/2021     Urine Reflex to Culture:   Lab Results   Component Value Date    URRFLXCULT Not Indicated 07/09/2021         MICRO: cultures reviewed and updated by me   Blood Culture:   Lab Results   Component Value Date    [facility]  07/06/2021     No Growth to date. Any change in status will be called. BLOODCULT2  07/06/2021     No Growth to date. Any change in status will be called. Respiratory Culture:  Lab Results   Component Value Date    LABGRAM  07/08/2021     No Epithelial Cells seen  No WBCs or organisms seen       AFB:No results found for: AFBSMEAR  Viral Culture:  No results found for: COVID19  Urine Culture: No results for input(s): LABURIN in the last 72 hours. Culture, Wound [[id number]] (Abnormal) Collected: 07/08/21 1308   Order Status: Completed Specimen: Abscess Updated: 07/09/21 1029    Gram Stain Result No Epithelial Cells seen   No WBCs or organisms seen     Organism Beta Strep Group CAbnormal     WOUND/ABSCESS --    Heavy growth   Susceptibility testing of penicillin and other beta lactams is   not necessary for beta hemolytic Streptococci since resistant   strains have not been identified. (CLSI M100)    Narrative:     ORDER#: [id number]                          ORDERED BY: RUPALI PAYNE   SOURCE: Abscess Left dorsal foot           COLLECTED:  07/08/21 13:08   ANTIBIOTICS AT [facility]. :                      RECEIVED :  07/08/21 13:28   Performed at:   [facility]   [address] [facility], [place] [address]   Phone [phone]     IMAGING:    XR FOOT LEFT (MIN 3 VIEWS)   Final Result   1. No acute osseous abnormality. 2. Old fractures of the proximal phalanges of the 4th and 5th toes. XR CHEST PORTABLE   Final Result   No airspace disease by radiograph. All the pertinent images and reports for the current Hospitalization were reviewed by me     Scheduled Meds:   alogliptin  12.5 mg Oral Daily    enoxaparin  40 mg Subcutaneous Daily    cefTRIAXone (ROCEPHIN) IV  2,000 mg Intravenous Q24H    insulin lispro  0-12 Units Subcutaneous TID     insulin lispro  0-6 Units Subcutaneous Nightly    aspirin  81 mg Oral Daily    atorvastatin  40 mg Oral Daily    levothyroxine  100 mcg Oral Daily    [Held by provider] lisinopril  20 mg Oral Daily    sodium chloride flush  5-40 mL Intravenous 2 times per day       Continuous Infusions:   vashe wound therapy      sodium chloride      dextrose         PRN Meds:  vashe wound therapy, sodium chloride flush, sodium chloride, ondansetron, fentanNYL, glucose, dextrose, glucagon (rDNA), dextrose, HYDROcodone 5 mg - acetaminophen, acetaminophen      Assessment:     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    Arthritis of left knee    Type 2 diabetes mellitus without complication, without long-term current use of insulin (HCC)    Essential hypertension    Mixed hyperlipidemia    Pedal edema    Other secondary kyphosis, cervicothoracic region    Postablative hypothyroidism    Closed fracture of second cervical vertebra (HCC)    Closed fracture of first cervical vertebra (HCC)    Lymphedema    High risk medication use    Chronic neck pain    Rotator cuff arthropathy of right shoulder    Bradycardia    Arthritis of left hip    Orthostatic syncope    Sepsis due to cellulitis (HCC)       Sepsis on admit  WBC elevation   Lactic acidosis resolved  DASHAWN improved  ON going severe Left leg cellulitis  Diabetic foot infection   Bi lateral lower leg venous stasis and edema   Left leg blood filled blistering lesion from the Trauma on the dorsum of the foot  Fevers from above  X ray Left foot with Fracture of 4th and 5thToes phalanges would correspond to the Trauma in May 2021 per patient      lEFT Leg extensive cellulitis is the source for sepsis and given the leg appearance could be streptococcal and abscess cx now isolated as Group C strep    S/p Left foot blister and abscess drainage+ and wound noted and  Anticipate to cont IV abx as in patient and depending on her progress will see if she need IV abx therapy    She may need skin graft per Podiatry - may go to OR on Monday notes reviewed     Labs, Microbiology, Radiology and all the pertinent results from current hospitalization and  care every where were reviewed  by me as a part of the evaluation   Plan:   1. Cont IV  CEFfTRIAXONE X 2 GM  q 24 HRS   2. Going to OR on Monday ? 3. Cont local care  4. Elevate the leg  5. Wound cx GROUP C strep  6. Podiatry eval noted  7. Trend WBC, pROCAL   8. Check HbA1c 6.8  9. Wound vac in place will need grafting once wound is liam per Podiatry    10. Hopefully able to choose oral abx if the foot improves if not may need IV abx at d/c     Discussed with patient/Family and Nursing d/w family at bed side   Risk of Complications/Morbidity: High      · Illness(es)/ Infection present that pose threat to bodily function. · There is potential for severe exacerbation of infection/side effects of treatment. · Therapy requires intensive monitoring for antimicrobial agent toxicity. Discussed with patient/Family and Nursing staff     Thanks for allowing me to participate in your patient's care and please call me with any questions or concerns.     Meena Laura MD  Infectious Disease  [facility] Physician  Phone: [phone]   Fax : [phone]

## 2021-07-10 NOTE — PROGRESS NOTES
Hospitalist Progress Note    CC: <principal problem not specified>      Admit date: 7/6/2021  Days in hospital:  3    Subjective/interval history: Pt S/E. No acute events. Daughter at bedside. Pain is  Controlled. O2 status: room air    ROS:   A comprehensive review of systems was negative except for: Musculoskeletal: positive for foot pain and blisters     Objective:    BP (!) 165/73   Pulse 82   Temp 98.7 °F (37.1 °C) (Oral)   Resp 16   Ht 5' 4\" (1.626 m)   Wt 182 lb 15.7 oz (83 kg)   SpO2 96%   BMI 31.41 kg/m²     Gen: alert, NAD  HEENT: NC/AT, moist mucous membranes  Neck: supple, trachea midline  Heart: Normal s1/s2, RRR, no murmurs, gallops, or rubs. Lungs: clear bilaterally, no wheezing, no rales, no rhonchi, no use of accessory muscles  Abd: bowel sounds present, soft, nontender, nondistended, no masses  Extrem: left foot with improving erythematous, edematous, tender. Wound vac to the dorsum of the left foot in place  Skin: no rashes or lesions  Psych: A & O x3, affect appropriate, very pleasant  Neuro: grossly intact, no sensory deficits.  + le weakness due to pain and cellulitis   Cap refill: +2 sec    Medications:  Scheduled Meds:   alogliptin  12.5 mg Oral Daily    enoxaparin  40 mg Subcutaneous Daily    cefTRIAXone (ROCEPHIN) IV  2,000 mg Intravenous Q24H    insulin lispro  0-12 Units Subcutaneous TID WC    insulin lispro  0-6 Units Subcutaneous Nightly    aspirin  81 mg Oral Daily    atorvastatin  40 mg Oral Daily    levothyroxine  100 mcg Oral Daily    [Held by provider] lisinopril  20 mg Oral Daily    sodium chloride flush  5-40 mL Intravenous 2 times per day       PRN Meds:  vashe wound therapy, sodium chloride flush, sodium chloride, ondansetron, fentanNYL, glucose, dextrose, glucagon (rDNA), dextrose, HYDROcodone 5 mg - acetaminophen, acetaminophen    IV:   vashe wound therapy      sodium chloride      dextrose           Intake/Output Summary (Last 24 hours) at 7/10/2021 1001  Last data filed at 7/10/2021 0810  Gross per 24 hour   Intake 850 ml   Output --   Net 850 ml       Results:  CBC:   Recent Labs     07/08/21  0758 07/09/21  0952   WBC 19.2* 12.5*   HGB 10.4* 10.2*   HCT 30.3* 30.5*   MCV 93.6 93.8    258     BMP:   Recent Labs     07/07/21 2017 07/08/21  0758 07/09/21  0952   NA  --  133* 131*   K  --  3.8 4.0   CL  --  99 96*   CO2  --  19* 19*   BUN  --  32* 17   CREATININE 1.1 0.9 0.9     Mag: No results for input(s): MAG in the last 72 hours. Phos:   Lab Results   Component Value Date    PHOS 3.2 05/11/2021     No components found for: GLU    LIVER PROFILE:   No results for input(s): AST, ALT, LIPASE, BILIDIR, BILITOT, ALKPHOS in the last 72 hours. Invalid input(s): AMYLASE,  ALB  PT/INR: No results for input(s): PROTIME, INR in the last 72 hours. APTT: No results for input(s): APTT in the last 72 hours. UA:  Recent Labs     07/09/21  0235   COLORU YELLOW   PHUR 6.0   WBCUA 3   RBCUA 7*   CLARITYU Clear   SPECGRAV 1.009   LEUKOCYTESUR Negative   UROBILINOGEN 0.2   BILIRUBINUR Negative   BLOODU SMALL*   GLUCOSEU Negative       Invalid input(s): ABG  Lab Results   Component Value Date    CALCIUM 8.2 (L) 07/09/2021    PHOS 3.2 05/11/2021       Assessment:    Active Problems:    Sepsis due to cellulitis [facility])  Resolved Problems:    * No resolved hospital problems. [facility] course: A 72-year-old  female was found lying on the ground with her legs that were extremely red, warm, swollen, inflamed, and had a large what appeared to be a blood blister over the dorsum of the left foot closed to the left toe. The patient notes that over the last two days, she has had rapidly progressive increasing severe throbbing leg pain that has been disabling. She was found to have cellulitis and a necrotic lesion of the foot, requiring debridement, wound vac placement and will need a skin graft.      Plan:  Cellulitis left leg  - xray left foot: No acute osseous abnormality  - crp, esr ordered  - blood cx  - wound cx with gbs  - cont zyvox, cefepime -> rocephin   - ID consulted  - podiatry consulted, s/p bedside I&d  - wound vac placed today, to return to the OR on Monday for bioengineered skin graft    Sepsis, POA - improving  - with criteria: leukocytosis, lactic acidosis; source is cellulitis  - blood and urine cultures - ngtd  - procalcitonin 1.9 -> .65  - ivf given  - abx as above    Chronic conditions - continue home meds unless otherwise stated  Dm - a1c is 6.8. Only on metformin at home.  increased to med ssi   - added tradjenta as she is still having hyperglycemia  htn  Hypothyroidism     Code status:  full  DVT prophylaxis: [x] Lovenox  [] SQ Heparin  [] SCDs because of  [] warfarin/oral direct thrombin inhibitor [] Encourage ambulation      Disposition:  [] Home [] Rehab [] Psych [] SNF  [] LTAC  [] Transfer to ICU  [] Transfer to PCU [] Other: in pt; she and family do not wants placement       Electronically signed by Mack Calvin DO on 7/10/2021 at 10:01 AM

## 2021-07-11 LAB
ANION GAP SERPL CALCULATED.3IONS-SCNC: 10 MMOL/L (ref 3–16)
BANDED NEUTROPHILS RELATIVE PERCENT: 7 % (ref 0–7)
BASOPHILS ABSOLUTE: 0 K/UL (ref 0–0.2)
BASOPHILS RELATIVE PERCENT: 0 %
BLOOD CULTURE, ROUTINE: NORMAL
BUN BLDV-MCNC: 14 MG/DL (ref 7–20)
CALCIUM SERPL-MCNC: 8.3 MG/DL (ref 8.3–10.6)
CHLORIDE BLD-SCNC: 97 MMOL/L (ref 99–110)
CO2: 23 MMOL/L (ref 21–32)
CREAT SERPL-MCNC: 0.7 MG/DL (ref 0.6–1.2)
CULTURE, BLOOD 2: NORMAL
EOSINOPHILS ABSOLUTE: 0.1 K/UL (ref 0–0.6)
EOSINOPHILS RELATIVE PERCENT: 1 %
GFR AFRICAN AMERICAN: >60
GFR NON-AFRICAN AMERICAN: >60
GLUCOSE BLD-MCNC: 139 MG/DL (ref 70–99)
GLUCOSE BLD-MCNC: 173 MG/DL (ref 70–99)
GLUCOSE BLD-MCNC: 229 MG/DL (ref 70–99)
GLUCOSE BLD-MCNC: 246 MG/DL (ref 70–99)
GLUCOSE BLD-MCNC: 262 MG/DL (ref 70–99)
HCT VFR BLD CALC: 29.6 % (ref 36–48)
HEMOGLOBIN: 9.9 G/DL (ref 12–16)
LYMPHOCYTES ABSOLUTE: 1 K/UL (ref 1–5.1)
LYMPHOCYTES RELATIVE PERCENT: 8 %
MCH RBC QN AUTO: 31.2 PG (ref 26–34)
MCHC RBC AUTO-ENTMCNC: 33.3 G/DL (ref 31–36)
MCV RBC AUTO: 93.7 FL (ref 80–100)
METAMYELOCYTES RELATIVE PERCENT: 2 %
MONOCYTES ABSOLUTE: 0.5 K/UL (ref 0–1.3)
MONOCYTES RELATIVE PERCENT: 4 %
NEUTROPHILS ABSOLUTE: 10.7 K/UL (ref 1.7–7.7)
NEUTROPHILS RELATIVE PERCENT: 78 %
PDW BLD-RTO: 14.6 % (ref 12.4–15.4)
PERFORMED ON: ABNORMAL
PLATELET # BLD: 308 K/UL (ref 135–450)
PMV BLD AUTO: 7.8 FL (ref 5–10.5)
POTASSIUM REFLEX MAGNESIUM: 4.7 MMOL/L (ref 3.5–5.1)
RBC # BLD: 3.16 M/UL (ref 4–5.2)
RBC # BLD: NORMAL 10*6/UL
SODIUM BLD-SCNC: 130 MMOL/L (ref 136–145)
VACUOLATED NEUTROPHILS: PRESENT
WBC # BLD: 12.3 K/UL (ref 4–11)

## 2021-07-11 PROCEDURE — 80048 BASIC METABOLIC PNL TOTAL CA: CPT

## 2021-07-11 PROCEDURE — 1200000000 HC SEMI PRIVATE

## 2021-07-11 PROCEDURE — 6370000000 HC RX 637 (ALT 250 FOR IP): Performed by: INTERNAL MEDICINE

## 2021-07-11 PROCEDURE — 6360000002 HC RX W HCPCS: Performed by: PODIATRIST

## 2021-07-11 PROCEDURE — 6370000000 HC RX 637 (ALT 250 FOR IP): Performed by: NURSE PRACTITIONER

## 2021-07-11 PROCEDURE — 2580000003 HC RX 258: Performed by: FAMILY MEDICINE

## 2021-07-11 PROCEDURE — 85025 COMPLETE CBC W/AUTO DIFF WBC: CPT

## 2021-07-11 PROCEDURE — 94760 N-INVAS EAR/PLS OXIMETRY 1: CPT

## 2021-07-11 PROCEDURE — 6370000000 HC RX 637 (ALT 250 FOR IP): Performed by: FAMILY MEDICINE

## 2021-07-11 PROCEDURE — 2580000003 HC RX 258: Performed by: INTERNAL MEDICINE

## 2021-07-11 PROCEDURE — 36415 COLL VENOUS BLD VENIPUNCTURE: CPT

## 2021-07-11 PROCEDURE — 6360000002 HC RX W HCPCS: Performed by: FAMILY MEDICINE

## 2021-07-11 RX ORDER — DEXTROSE MONOHYDRATE 50 MG/ML
100 INJECTION, SOLUTION INTRAVENOUS PRN
Status: DISCONTINUED | OUTPATIENT
Start: 2021-07-11 | End: 2021-07-14 | Stop reason: HOSPADM

## 2021-07-11 RX ORDER — DEXTROSE MONOHYDRATE 25 G/50ML
12.5 INJECTION, SOLUTION INTRAVENOUS PRN
Status: DISCONTINUED | OUTPATIENT
Start: 2021-07-11 | End: 2021-07-14 | Stop reason: HOSPADM

## 2021-07-11 RX ORDER — NICOTINE POLACRILEX 4 MG
15 LOZENGE BUCCAL PRN
Status: DISCONTINUED | OUTPATIENT
Start: 2021-07-11 | End: 2021-07-14 | Stop reason: HOSPADM

## 2021-07-11 RX ORDER — SENNA PLUS 8.6 MG/1
1 TABLET ORAL NIGHTLY
Status: DISCONTINUED | OUTPATIENT
Start: 2021-07-11 | End: 2021-07-14 | Stop reason: HOSPADM

## 2021-07-11 RX ADMIN — SENNOSIDES 8.6 MG: 8.6 TABLET, FILM COATED ORAL at 22:22

## 2021-07-11 RX ADMIN — ENOXAPARIN SODIUM 40 MG: 40 INJECTION SUBCUTANEOUS at 12:19

## 2021-07-11 RX ADMIN — ATORVASTATIN CALCIUM 40 MG: 40 TABLET, FILM COATED ORAL at 09:51

## 2021-07-11 RX ADMIN — POLYETHYLENE GLYCOL 3350 17 G: 17 POWDER, FOR SOLUTION ORAL at 22:22

## 2021-07-11 RX ADMIN — INSULIN LISPRO 2 UNITS: 100 INJECTION, SOLUTION INTRAVENOUS; SUBCUTANEOUS at 21:55

## 2021-07-11 RX ADMIN — SODIUM CHLORIDE, PRESERVATIVE FREE 10 ML: 5 INJECTION INTRAVENOUS at 22:21

## 2021-07-11 RX ADMIN — INSULIN LISPRO 4 UNITS: 100 INJECTION, SOLUTION INTRAVENOUS; SUBCUTANEOUS at 12:19

## 2021-07-11 RX ADMIN — LEVOTHYROXINE SODIUM 100 MCG: 0.1 TABLET ORAL at 06:33

## 2021-07-11 RX ADMIN — ALOGLIPTIN 12.5 MG: 12.5 TABLET, FILM COATED ORAL at 09:51

## 2021-07-11 RX ADMIN — INSULIN LISPRO 2 UNITS: 100 INJECTION, SOLUTION INTRAVENOUS; SUBCUTANEOUS at 17:24

## 2021-07-11 RX ADMIN — ACETAMINOPHEN 650 MG: 325 TABLET ORAL at 09:51

## 2021-07-11 RX ADMIN — POLYETHYLENE GLYCOL 3350 17 G: 17 POWDER, FOR SOLUTION ORAL at 09:51

## 2021-07-11 RX ADMIN — SODIUM CHLORIDE, PRESERVATIVE FREE 10 ML: 5 INJECTION INTRAVENOUS at 09:51

## 2021-07-11 RX ADMIN — ASPIRIN 81 MG: 81 TABLET, CHEWABLE ORAL at 09:51

## 2021-07-11 RX ADMIN — CEFTRIAXONE 2000 MG: 2 INJECTION, POWDER, FOR SOLUTION INTRAMUSCULAR; INTRAVENOUS at 22:22

## 2021-07-11 ASSESSMENT — PAIN DESCRIPTION - PROGRESSION
CLINICAL_PROGRESSION: NOT CHANGED
CLINICAL_PROGRESSION: NOT CHANGED

## 2021-07-11 ASSESSMENT — PAIN DESCRIPTION - DESCRIPTORS
DESCRIPTORS: DISCOMFORT
DESCRIPTORS: DISCOMFORT

## 2021-07-11 ASSESSMENT — PAIN DESCRIPTION - ONSET
ONSET: ON-GOING
ONSET: ON-GOING

## 2021-07-11 ASSESSMENT — PAIN DESCRIPTION - LOCATION
LOCATION: FOOT
LOCATION: FOOT

## 2021-07-11 ASSESSMENT — PAIN DESCRIPTION - PAIN TYPE
TYPE: ACUTE PAIN
TYPE: ACUTE PAIN

## 2021-07-11 ASSESSMENT — PAIN DESCRIPTION - FREQUENCY
FREQUENCY: CONTINUOUS
FREQUENCY: CONTINUOUS

## 2021-07-11 ASSESSMENT — PAIN SCALES - GENERAL
PAINLEVEL_OUTOF10: 0
PAINLEVEL_OUTOF10: 4
PAINLEVEL_OUTOF10: 0
PAINLEVEL_OUTOF10: 3

## 2021-07-11 ASSESSMENT — PAIN DESCRIPTION - ORIENTATION
ORIENTATION: LEFT
ORIENTATION: LEFT

## 2021-07-11 NOTE — PROGRESS NOTES
patellar and achilles Bilateral. [place] 5.07 monofilament sensitivity is abnormal 4 to 5 spots tested Bilateral.    VASCULAR:    left Dorsalis pedis is 1. left Posterior tibial pulse is 1. 3+ edema left. moderate Varicosities bilaterally. MUSCULOSKELETAL:    Fleming is abnormal  antalgic due to pain of lesion. Muscle strength is 4/5 to all groups tested to include dorsiflexion, plantarflexion, inversion, eversion, and digital Bilateral . The ranges of motion of all joints tested from ankle distal is decreased there is crepitus noted Left. Radiology of 7/7/2021  FINDINGS:   There is no evidence of acute fracture.  There is normal alignment of the   tarsometatarsal joints.  No acute joint abnormality.  No focal osseous   lesion.  Soft tissue swelling of the forefoot.  Old fractures of the proximal   phalanges of the 4th and 5th toes.           Impression   1. No acute osseous abnormality. 2. Old fractures of the proximal phalanges of the 4th and 5th toes           IMPRESSION/RECOMMENDATIONS    1. Cellulitis of LEFT foot secondary to traumatic lesion  Afebrile and further descending leukocytosis post I and D    2. Abscess of LEFT foot   -continue abx per the recommendations of Dr. Pedro Laura. Appreciate input.  -continue instill vac with vashe to decrease bacterial burden in the ulceration    Disposition: Patient will require more formal Incision and Drainage in the OR setting. Conitnue Wound Vac with Vasche instill for next 72 hours, with planned wash out in O.R. setting with application of bioengineered skin substitutes to accelerated healing in the high risk ulceration on Monday July 12. All risk vs benefits of the planned procedure were discussed with the patient and her daughter. All questions were answered. No guarantees were given. NPO after midnight. Consent to be signed. AM labs ordered and Lovenox held. Thank you for the opportunity to take part in the patient's care.     Ronan Lovell DPM  [facility]  [phone]

## 2021-07-11 NOTE — PROGRESS NOTES
Pt and family  made aware of scheduled procedure for tomorrow by dr. Ginna nolasco. Pt denies no further questions about the procedure. Pt  Verbalized understandings. Sighed informed consent place in pt's soft chart.  Electronically signed by Salvador Devine RN on 7/11/2021 at 10:24 AM

## 2021-07-11 NOTE — PROGRESS NOTES
Hospitalist Progress Note    CC: <principal problem not specified>      Admit date: 7/6/2021  Days in hospital:  4    Subjective/interval history: Pt S/E. No acute events. Daughter at bedside. Pain is  Controlled and she is very talkative today. O2 status: room air    ROS:   A comprehensive review of systems was negative except for: Musculoskeletal: positive for foot pain and blisters     Objective:    BP (!) 153/72   Pulse 85   Temp 98.8 °F (37.1 °C) (Oral)   Resp 18   Ht 5' 4\" (1.626 m)   Wt 182 lb 15.7 oz (83 kg)   SpO2 93%   BMI 31.41 kg/m²     Gen: alert, NAD  HEENT: NC/AT, moist mucous membranes  Neck: supple, trachea midline  Heart: Normal s1/s2, RRR, no murmurs  Lungs: clear bilaterally, no wheezing, no rales, no rhonchi, no use of accessory muscles  Abd: bowel sounds present, soft, nontender, nondistended  Extrem: left foot with improving erythematous, edematous, tender. Wound vac to the dorsum of the left foot in place  Skin: no rashes or lesions  Psych: A & O x3, affect appropriate, very pleasant  Neuro: grossly intact, no sensory deficits.  + le weakness due to pain and cellulitis   Cap refill: +2 sec    Medications:  Scheduled Meds:   polyethylene glycol  17 g Oral BID    alogliptin  12.5 mg Oral Daily    [Held by provider] enoxaparin  40 mg Subcutaneous Daily    cefTRIAXone (ROCEPHIN) IV  2,000 mg Intravenous Q24H    insulin lispro  0-12 Units Subcutaneous TID WC    insulin lispro  0-6 Units Subcutaneous Nightly    aspirin  81 mg Oral Daily    atorvastatin  40 mg Oral Daily    levothyroxine  100 mcg Oral Daily    [Held by provider] lisinopril  20 mg Oral Daily    sodium chloride flush  5-40 mL Intravenous 2 times per day       PRN Meds:  glucose, dextrose, glucagon (rDNA), dextrose, vashe wound therapy, sodium chloride flush, sodium chloride, ondansetron, fentanNYL, HYDROcodone 5 mg - acetaminophen, acetaminophen    IV:   dextrose      vashe wound therapy      sodium chloride           Intake/Output Summary (Last 24 hours) at 7/11/2021 0952  Last data filed at 7/11/2021 0918  Gross per 24 hour   Intake 1260 ml   Output 50 ml   Net 1210 ml       Results:  CBC:   Recent Labs     07/09/21 0952   WBC 12.5*   HGB 10.2*   HCT 30.5*   MCV 93.8        BMP:   Recent Labs     07/09/21 0952   *   K 4.0   CL 96*   CO2 19*   BUN 17   CREATININE 0.9     Mag: No results for input(s): MAG in the last 72 hours. Phos:   Lab Results   Component Value Date    PHOS 3.2 05/11/2021     No components found for: GLU    LIVER PROFILE:   No results for input(s): AST, ALT, LIPASE, BILIDIR, BILITOT, ALKPHOS in the last 72 hours. Invalid input(s): AMYLASE,  ALB  PT/INR: No results for input(s): PROTIME, INR in the last 72 hours. APTT: No results for input(s): APTT in the last 72 hours. UA:  Recent Labs     07/09/21  0235   COLORU YELLOW   PHUR 6.0   WBCUA 3   RBCUA 7*   CLARITYU Clear   SPECGRAV 1.009   LEUKOCYTESUR Negative   UROBILINOGEN 0.2   BILIRUBINUR Negative   BLOODU SMALL*   GLUCOSEU Negative       Invalid input(s): ABG  Lab Results   Component Value Date    CALCIUM 8.2 (L) 07/09/2021    PHOS 3.2 05/11/2021       Assessment:    Active Problems:    Sepsis due to cellulitis [facility])  Resolved Problems:    * No resolved hospital problems. [facility] course: A 25-year-old  female was found lying on the ground with her legs that were extremely red, warm, swollen, inflamed, and had a large what appeared to be a blood blister over the dorsum of the left foot closed to the left toe. The patient notes that over the last two days, she has had rapidly progressive increasing severe throbbing leg pain that has been disabling.     Plan:  Cellulitis left leg  - xray left foot: No acute osseous abnormality  - blood cx negative  - wound cx with gbs  - cont zyvox, cefepime -> rocephin only   - ID consulted  - podiatry consulted, s/p bedside I&d  - wound vac placed 7/9, to return to the OR on Monday for further debridement     Sepsis, POA - resolved  - with criteria: leukocytosis, lactic acidosis; source is cellulitis  - blood and urine cultures - ngtd  - procalcitonin 1.9 -> .65  - ivf given   - abx as above    Chronic conditions - continue home meds unless otherwise stated  Dm - a1c is 6.8. Only on metformin at home. increased to med ssi. She is likely having hyperglycemia here due to infection, and the d5 in her abx.  D/w pharmacy, will change to dextrose to ns  - add low dose lantus if she is still having hyperglycemia  htn  Hypothyroidism     Code status:  full  DVT prophylaxis: [x] Lovenox  [] SQ Heparin  [] SCDs because of  [] warfarin/oral direct thrombin inhibitor [] Encourage ambulation    Disposition:  [] Home [] Rehab [] Psych [] SNF  [] LTAC  [] Transfer to ICU  [] Transfer to PCU [] Other: in pt; she and family do not wants placement       Electronically signed by Lillian Arriaga DO on 7/11/2021 at 9:52 AM

## 2021-07-11 NOTE — PLAN OF CARE
Problem: Falls - Risk of:  Goal: Will remain free from falls  Description: Will remain free from falls  7/11/2021 1042 by Guzman Sauer RN  Outcome: Ongoing  Note: Pt free from falls this shift. Non skid socks provided. Pt educated on use of call light as needed for assistance. Bed and chair alarm active. Call light always within reach. Pt able and agreeable to contact for safety appropriately. Problem: Falls - Risk of:  Goal: Absence of physical injury  Description: Absence of physical injury  7/11/2021 1042 by Guzman Sauer RN  Outcome: Ongoing     Problem: Skin Integrity:  Goal: Will show no infection signs and symptoms  Description: Will show no infection signs and symptoms  7/11/2021 1042 by Guzman Sauer RN  Outcome: Ongoing  Note: Skin assessment performed each shift per protocol. Pt encouraged to reposition often. Will continue to monitor and assess for skin breakdown. 7/11/2021 0302 by Michelle Hendricks RN  Outcome: Ongoing     Problem: Skin Integrity:  Goal: Absence of new skin breakdown  Description: Absence of new skin breakdown  7/11/2021 1042 by Guzman Sauer RN  Outcome: Ongoing     Problem: Pain:  Goal: Pain level will decrease  Description: Pain level will decrease  7/11/2021 1042 by Guzman Sauer RN  Outcome: Ongoing  Note: Pt able to express presence and absence of pain using numerical pain scale. Pt pain is managed by PRN analgesics as ordered by MD. Pain reassess after each interventions. Will continue to monitor as needed.      7/11/2021 0302 by Michelle Hendricks RN  Outcome: Ongoing     Problem: Nutrition  Goal: Optimal nutrition therapy  7/11/2021 1042 by Guzman Sauer RN  Outcome: Ongoing  7/11/2021 0302 by Michelle Hendricks RN  Outcome: Ongoing     Problem: Discharge Planning:  Goal: Discharged to appropriate level of care  Description: Discharged to appropriate level of care  7/11/2021 1042 by Guzman Sauer RN  Outcome: Ongoing  7/11/2021 0302 by Alka Price RN  Outcome: Ongoing     Problem:  Body Temperature - Imbalanced:  Goal: Ability to maintain a body temperature in the normal range will improve  Description: Ability to maintain a body temperature in the normal range will improve  7/11/2021 1042 by Cabrera Michel RN  Outcome: Ongoing  7/11/2021 0302 by Alka Price RN  Outcome: Ongoing     Problem: Mobility - Impaired:  Goal: Mobility will improve to maximum level  Description: Mobility will improve to maximum level  7/11/2021 1042 by Cabrera Michel RN  Outcome: Ongoing  7/11/2021 0302 by Alka Price RN  Outcome: Ongoing

## 2021-07-11 NOTE — PROGRESS NOTES
Pt reported left foot pain and discomfort-pt medicated with prn tylenol. Pt denies other needs at present. Left foot wound vac dressing intact.  Electronically signed by Moo De Dios RN on 7/11/2021 at 10:26 AM

## 2021-07-12 ENCOUNTER — ANESTHESIA (OUTPATIENT)
Dept: OPERATING ROOM | Age: 86
DRG: 853 | End: 2021-07-12
Payer: MEDICARE

## 2021-07-12 VITALS
DIASTOLIC BLOOD PRESSURE: 60 MMHG | SYSTOLIC BLOOD PRESSURE: 129 MMHG | RESPIRATION RATE: 34 BRPM | OXYGEN SATURATION: 100 %

## 2021-07-12 LAB
ANION GAP SERPL CALCULATED.3IONS-SCNC: 11 MMOL/L (ref 3–16)
BANDED NEUTROPHILS RELATIVE PERCENT: 6 % (ref 0–7)
BASOPHILS ABSOLUTE: 0 K/UL (ref 0–0.2)
BASOPHILS RELATIVE PERCENT: 0 %
BUN BLDV-MCNC: 11 MG/DL (ref 7–20)
CALCIUM SERPL-MCNC: 8.5 MG/DL (ref 8.3–10.6)
CHLORIDE BLD-SCNC: 100 MMOL/L (ref 99–110)
CO2: 23 MMOL/L (ref 21–32)
CREAT SERPL-MCNC: 0.6 MG/DL (ref 0.6–1.2)
EOSINOPHILS ABSOLUTE: 0 K/UL (ref 0–0.6)
EOSINOPHILS RELATIVE PERCENT: 0 %
GFR AFRICAN AMERICAN: >60
GFR NON-AFRICAN AMERICAN: >60
GLUCOSE BLD-MCNC: 144 MG/DL (ref 70–99)
GLUCOSE BLD-MCNC: 147 MG/DL (ref 70–99)
GLUCOSE BLD-MCNC: 154 MG/DL (ref 70–99)
GLUCOSE BLD-MCNC: 159 MG/DL (ref 70–99)
GLUCOSE BLD-MCNC: 163 MG/DL (ref 70–99)
GLUCOSE BLD-MCNC: 273 MG/DL (ref 70–99)
HCT VFR BLD CALC: 29.3 % (ref 36–48)
HEMOGLOBIN: 9.9 G/DL (ref 12–16)
INR BLD: 1.1 (ref 0.88–1.12)
LYMPHOCYTES ABSOLUTE: 1.7 K/UL (ref 1–5.1)
LYMPHOCYTES RELATIVE PERCENT: 14 %
MCH RBC QN AUTO: 31.2 PG (ref 26–34)
MCHC RBC AUTO-ENTMCNC: 33.6 G/DL (ref 31–36)
MCV RBC AUTO: 92.8 FL (ref 80–100)
MONOCYTES ABSOLUTE: 1 K/UL (ref 0–1.3)
MONOCYTES RELATIVE PERCENT: 8 %
NEUTROPHILS ABSOLUTE: 9.6 K/UL (ref 1.7–7.7)
NEUTROPHILS RELATIVE PERCENT: 72 %
OVALOCYTES: ABNORMAL
PDW BLD-RTO: 14.9 % (ref 12.4–15.4)
PERFORMED ON: ABNORMAL
PLATELET # BLD: 337 K/UL (ref 135–450)
PLATELET SLIDE REVIEW: ADEQUATE
PMV BLD AUTO: 7.2 FL (ref 5–10.5)
POIKILOCYTES: ABNORMAL
POTASSIUM SERPL-SCNC: 4.7 MMOL/L (ref 3.5–5.1)
PROTHROMBIN TIME: 12.5 SEC (ref 9.9–12.7)
RBC # BLD: 3.16 M/UL (ref 4–5.2)
SLIDE REVIEW: ABNORMAL
SMUDGE CELLS: PRESENT
SODIUM BLD-SCNC: 134 MMOL/L (ref 136–145)
VACUOLATED NEUTROPHILS: PRESENT
WBC # BLD: 12.3 K/UL (ref 4–11)

## 2021-07-12 PROCEDURE — 2580000003 HC RX 258: Performed by: INTERNAL MEDICINE

## 2021-07-12 PROCEDURE — 2709999900 HC NON-CHARGEABLE SUPPLY: Performed by: PODIATRIST

## 2021-07-12 PROCEDURE — 36415 COLL VENOUS BLD VENIPUNCTURE: CPT

## 2021-07-12 PROCEDURE — 3700000001 HC ADD 15 MINUTES (ANESTHESIA): Performed by: PODIATRIST

## 2021-07-12 PROCEDURE — 3700000000 HC ANESTHESIA ATTENDED CARE: Performed by: PODIATRIST

## 2021-07-12 PROCEDURE — 6370000000 HC RX 637 (ALT 250 FOR IP): Performed by: INTERNAL MEDICINE

## 2021-07-12 PROCEDURE — 85610 PROTHROMBIN TIME: CPT

## 2021-07-12 PROCEDURE — 94760 N-INVAS EAR/PLS OXIMETRY 1: CPT

## 2021-07-12 PROCEDURE — 1200000000 HC SEMI PRIVATE

## 2021-07-12 PROCEDURE — 6370000000 HC RX 637 (ALT 250 FOR IP): Performed by: PODIATRIST

## 2021-07-12 PROCEDURE — 7100000001 HC PACU RECOVERY - ADDTL 15 MIN: Performed by: PODIATRIST

## 2021-07-12 PROCEDURE — 99233 SBSQ HOSP IP/OBS HIGH 50: CPT | Performed by: INTERNAL MEDICINE

## 2021-07-12 PROCEDURE — 80048 BASIC METABOLIC PNL TOTAL CA: CPT

## 2021-07-12 PROCEDURE — 6360000002 HC RX W HCPCS: Performed by: NURSE ANESTHETIST, CERTIFIED REGISTERED

## 2021-07-12 PROCEDURE — 3600000013 HC SURGERY LEVEL 3 ADDTL 15MIN: Performed by: PODIATRIST

## 2021-07-12 PROCEDURE — 6370000000 HC RX 637 (ALT 250 FOR IP): Performed by: FAMILY MEDICINE

## 2021-07-12 PROCEDURE — 3600000003 HC SURGERY LEVEL 3 BASE: Performed by: PODIATRIST

## 2021-07-12 PROCEDURE — 7100000000 HC PACU RECOVERY - FIRST 15 MIN: Performed by: PODIATRIST

## 2021-07-12 PROCEDURE — 6360000002 HC RX W HCPCS: Performed by: PODIATRIST

## 2021-07-12 PROCEDURE — 2580000003 HC RX 258: Performed by: PODIATRIST

## 2021-07-12 PROCEDURE — 2500000003 HC RX 250 WO HCPCS: Performed by: NURSE ANESTHETIST, CERTIFIED REGISTERED

## 2021-07-12 PROCEDURE — 85025 COMPLETE CBC W/AUTO DIFF WBC: CPT

## 2021-07-12 DEVICE — GRAFT HUM TISS W3XL6CM CRYOPRESERVED PLCNTA TISS UMB AMNION: Type: IMPLANTABLE DEVICE | Site: FOOT | Status: FUNCTIONAL

## 2021-07-12 RX ORDER — OXYCODONE HYDROCHLORIDE 5 MG/1
5 TABLET ORAL PRN
Status: DISCONTINUED | OUTPATIENT
Start: 2021-07-12 | End: 2021-07-12 | Stop reason: HOSPADM

## 2021-07-12 RX ORDER — ONDANSETRON 2 MG/ML
4 INJECTION INTRAMUSCULAR; INTRAVENOUS
Status: DISCONTINUED | OUTPATIENT
Start: 2021-07-12 | End: 2021-07-12 | Stop reason: HOSPADM

## 2021-07-12 RX ORDER — FENTANYL CITRATE 50 UG/ML
50 INJECTION, SOLUTION INTRAMUSCULAR; INTRAVENOUS EVERY 5 MIN PRN
Status: DISCONTINUED | OUTPATIENT
Start: 2021-07-12 | End: 2021-07-12 | Stop reason: HOSPADM

## 2021-07-12 RX ORDER — PROPOFOL 10 MG/ML
INJECTION, EMULSION INTRAVENOUS CONTINUOUS PRN
Status: DISCONTINUED | OUTPATIENT
Start: 2021-07-12 | End: 2021-07-12 | Stop reason: SDUPTHER

## 2021-07-12 RX ORDER — MEPERIDINE HYDROCHLORIDE 25 MG/ML
12.5 INJECTION INTRAMUSCULAR; INTRAVENOUS; SUBCUTANEOUS EVERY 5 MIN PRN
Status: DISCONTINUED | OUTPATIENT
Start: 2021-07-12 | End: 2021-07-12 | Stop reason: HOSPADM

## 2021-07-12 RX ORDER — MAGNESIUM HYDROXIDE 1200 MG/15ML
LIQUID ORAL CONTINUOUS PRN
Status: COMPLETED | OUTPATIENT
Start: 2021-07-12 | End: 2021-07-12

## 2021-07-12 RX ORDER — LISINOPRIL 20 MG/1
20 TABLET ORAL DAILY
Status: DISCONTINUED | OUTPATIENT
Start: 2021-07-12 | End: 2021-07-14 | Stop reason: HOSPADM

## 2021-07-12 RX ORDER — OXYCODONE HYDROCHLORIDE 10 MG/1
10 TABLET ORAL PRN
Status: DISCONTINUED | OUTPATIENT
Start: 2021-07-12 | End: 2021-07-12 | Stop reason: HOSPADM

## 2021-07-12 RX ORDER — SODIUM CHLORIDE 0.9 % (FLUSH) 0.9 %
10 SYRINGE (ML) INJECTION PRN
Status: DISCONTINUED | OUTPATIENT
Start: 2021-07-12 | End: 2021-07-14 | Stop reason: HOSPADM

## 2021-07-12 RX ORDER — MORPHINE SULFATE 2 MG/ML
2 INJECTION, SOLUTION INTRAMUSCULAR; INTRAVENOUS EVERY 5 MIN PRN
Status: DISCONTINUED | OUTPATIENT
Start: 2021-07-12 | End: 2021-07-12 | Stop reason: HOSPADM

## 2021-07-12 RX ORDER — SODIUM CHLORIDE 0.9 % (FLUSH) 0.9 %
10 SYRINGE (ML) INJECTION EVERY 12 HOURS SCHEDULED
Status: DISCONTINUED | OUTPATIENT
Start: 2021-07-12 | End: 2021-07-12 | Stop reason: HOSPADM

## 2021-07-12 RX ORDER — SODIUM CHLORIDE 9 MG/ML
INJECTION, SOLUTION INTRAVENOUS CONTINUOUS
Status: DISCONTINUED | OUTPATIENT
Start: 2021-07-12 | End: 2021-07-12 | Stop reason: HOSPADM

## 2021-07-12 RX ORDER — MORPHINE SULFATE 2 MG/ML
1 INJECTION, SOLUTION INTRAMUSCULAR; INTRAVENOUS EVERY 5 MIN PRN
Status: DISCONTINUED | OUTPATIENT
Start: 2021-07-12 | End: 2021-07-12 | Stop reason: HOSPADM

## 2021-07-12 RX ORDER — FENTANYL CITRATE 50 UG/ML
25 INJECTION, SOLUTION INTRAMUSCULAR; INTRAVENOUS EVERY 5 MIN PRN
Status: DISCONTINUED | OUTPATIENT
Start: 2021-07-12 | End: 2021-07-12 | Stop reason: HOSPADM

## 2021-07-12 RX ORDER — LIDOCAINE HYDROCHLORIDE 20 MG/ML
INJECTION, SOLUTION EPIDURAL; INFILTRATION; INTRACAUDAL; PERINEURAL PRN
Status: DISCONTINUED | OUTPATIENT
Start: 2021-07-12 | End: 2021-07-12 | Stop reason: SDUPTHER

## 2021-07-12 RX ADMIN — POLYETHYLENE GLYCOL 3350 17 G: 17 POWDER, FOR SOLUTION ORAL at 21:39

## 2021-07-12 RX ADMIN — PROPOFOL 140 MCG/KG/MIN: 10 INJECTION, EMULSION INTRAVENOUS at 13:00

## 2021-07-12 RX ADMIN — CEFTRIAXONE 2000 MG: 2 INJECTION, POWDER, FOR SOLUTION INTRAMUSCULAR; INTRAVENOUS at 21:59

## 2021-07-12 RX ADMIN — SENNOSIDES 8.6 MG: 8.6 TABLET, FILM COATED ORAL at 21:39

## 2021-07-12 RX ADMIN — INSULIN LISPRO 2 UNITS: 100 INJECTION, SOLUTION INTRAVENOUS; SUBCUTANEOUS at 16:03

## 2021-07-12 RX ADMIN — INSULIN LISPRO 2 UNITS: 100 INJECTION, SOLUTION INTRAVENOUS; SUBCUTANEOUS at 08:26

## 2021-07-12 RX ADMIN — LEVOTHYROXINE SODIUM 100 MCG: 0.1 TABLET ORAL at 06:01

## 2021-07-12 RX ADMIN — SODIUM CHLORIDE, PRESERVATIVE FREE 10 ML: 5 INJECTION INTRAVENOUS at 21:40

## 2021-07-12 RX ADMIN — SODIUM CHLORIDE: 9 INJECTION, SOLUTION INTRAVENOUS at 12:49

## 2021-07-12 RX ADMIN — HYDROCODONE BITARTRATE AND ACETAMINOPHEN 1 TABLET: 5; 325 TABLET ORAL at 06:01

## 2021-07-12 RX ADMIN — ACETAMINOPHEN 650 MG: 325 TABLET ORAL at 18:52

## 2021-07-12 RX ADMIN — LISINOPRIL 20 MG: 20 TABLET ORAL at 16:03

## 2021-07-12 RX ADMIN — SODIUM CHLORIDE, PRESERVATIVE FREE 10 ML: 5 INJECTION INTRAVENOUS at 08:26

## 2021-07-12 RX ADMIN — HYDROCODONE BITARTRATE AND ACETAMINOPHEN 1 TABLET: 5; 325 TABLET ORAL at 00:02

## 2021-07-12 RX ADMIN — INSULIN LISPRO 3 UNITS: 100 INJECTION, SOLUTION INTRAVENOUS; SUBCUTANEOUS at 22:03

## 2021-07-12 RX ADMIN — LIDOCAINE HYDROCHLORIDE 100 MG: 20 INJECTION, SOLUTION EPIDURAL; INFILTRATION; INTRACAUDAL; PERINEURAL at 13:00

## 2021-07-12 ASSESSMENT — PAIN DESCRIPTION - ORIENTATION
ORIENTATION: LEFT
ORIENTATION: LEFT

## 2021-07-12 ASSESSMENT — LIFESTYLE VARIABLES: SMOKING_STATUS: 0

## 2021-07-12 ASSESSMENT — PULMONARY FUNCTION TESTS
PIF_VALUE: 0

## 2021-07-12 ASSESSMENT — PAIN SCALES - GENERAL
PAINLEVEL_OUTOF10: 5
PAINLEVEL_OUTOF10: 0
PAINLEVEL_OUTOF10: 6
PAINLEVEL_OUTOF10: 0
PAINLEVEL_OUTOF10: 5

## 2021-07-12 ASSESSMENT — PAIN - FUNCTIONAL ASSESSMENT
PAIN_FUNCTIONAL_ASSESSMENT: ACTIVITIES ARE NOT PREVENTED
PAIN_FUNCTIONAL_ASSESSMENT: 0-10
PAIN_FUNCTIONAL_ASSESSMENT: PREVENTS OR INTERFERES SOME ACTIVE ACTIVITIES AND ADLS

## 2021-07-12 ASSESSMENT — PAIN DESCRIPTION - PROGRESSION
CLINICAL_PROGRESSION: NOT CHANGED
CLINICAL_PROGRESSION: NOT CHANGED

## 2021-07-12 ASSESSMENT — PAIN DESCRIPTION - PAIN TYPE
TYPE: ACUTE PAIN
TYPE: ACUTE PAIN

## 2021-07-12 ASSESSMENT — PAIN DESCRIPTION - DESCRIPTORS
DESCRIPTORS: SHARP
DESCRIPTORS: SHARP

## 2021-07-12 ASSESSMENT — PAIN DESCRIPTION - FREQUENCY
FREQUENCY: CONTINUOUS
FREQUENCY: CONTINUOUS

## 2021-07-12 ASSESSMENT — PAIN DESCRIPTION - ONSET
ONSET: ON-GOING
ONSET: ON-GOING

## 2021-07-12 ASSESSMENT — PAIN DESCRIPTION - LOCATION
LOCATION: FOOT
LOCATION: FOOT

## 2021-07-12 ASSESSMENT — ENCOUNTER SYMPTOMS: SHORTNESS OF BREATH: 0

## 2021-07-12 NOTE — PROGRESS NOTES
Hospitalist Progress Note      PCP: Blsa Breaux MD    Date of Admission: 7/6/2021    Chief Complaint: [facility] Course: A 59-year-old  female was found lying on the ground with her legs that were extremely red, warm, swollen, inflamed, and had a large what appeared to be a blood blister over the dorsum of the left foot closed to the left toe. The patient notes that over the last two days, she has had rapidly progressive increasing severe throbbing leg pain that has been disabling. Subjective: Patient seen and examined. Patient states her left lower extremity cellulitis is getting better. She was up working with therapy today. Family states she does not want to go to a rehab facility and will just go home. Discussed [facility] in S4 level of care. Plan for OR later on today.       Medications:  Reviewed    Infusion Medications    dextrose      vashe wound therapy      sodium chloride       Scheduled Medications    senna  1 tablet Oral Nightly    cefTRIAXone (ROCEPHIN) IVPB in 50 mL NS  2,000 mg Intravenous Daily    polyethylene glycol  17 g Oral BID    [Held by provider] enoxaparin  40 mg Subcutaneous Daily    insulin lispro  0-12 Units Subcutaneous TID WC    insulin lispro  0-6 Units Subcutaneous Nightly    aspirin  81 mg Oral Daily    atorvastatin  40 mg Oral Daily    levothyroxine  100 mcg Oral Daily    [Held by provider] lisinopril  20 mg Oral Daily    sodium chloride flush  5-40 mL Intravenous 2 times per day     PRN Meds: sodium chloride flush, glucose, dextrose, glucagon (rDNA), dextrose, vashe wound therapy, sodium chloride flush, sodium chloride, ondansetron, fentanNYL, HYDROcodone 5 mg - acetaminophen, acetaminophen      Intake/Output Summary (Last 24 hours) at 7/12/2021 1501  Last data filed at 7/11/2021 1806  Gross per 24 hour   Intake 150 ml   Output --   Net 150 ml       Physical Exam Performed:    BP (!) 176/76   Pulse 93   Temp 98.1 °F (36.7 °C) (Oral)   Resp 18   Ht 5' 4\" (1.626 m)   Wt 181 lb 10.5 oz (82.4 kg)   SpO2 96%   BMI 31.18 kg/m²     General appearance: No apparent distress, appears stated age, morbidly obese  HEENT: Pupils equal, round, and reactive to light. Conjunctivae/corneas clear. Neck: Supple, with full range of motion. No jugular venous distention. Trachea midline. Respiratory:  Normal respiratory effort. Clear to auscultation, bilaterally without Rales/Wheezes/Rhonchi. Cardiovascular: Regular rate and rhythm with normal S1/S2   Abdomen: Soft, non-tender, non-distended with normal bowel sounds. Musculoskeletal: Left lower extremity surgically wrapped  Skin: Skin color, texture, turgor normal.  No rashes or lesions. Neurologic:  Neurovascularly intact without any focal sensory/motor deficits. Cranial nerves: II-XII intact, grossly non-focal.  Psychiatric: Alert and oriented, thought content appropriate, normal insight  Capillary Refill: Brisk,< 3 seconds   Peripheral Pulses: +2 palpable, equal bilaterally       Labs:   Recent Labs     07/11/21  1005 07/12/21  0655   WBC 12.3* 12.3*   HGB 9.9* 9.9*   HCT 29.6* 29.3*    337     Recent Labs     07/11/21  1005 07/12/21  0655   * 134*   K 4.7 4.7   CL 97* 100   CO2 23 23   BUN 14 11   CREATININE 0.7 0.6   CALCIUM 8.3 8.5     No results for input(s): AST, ALT, BILIDIR, BILITOT, ALKPHOS in the last 72 hours. Recent Labs     07/12/21  0655   INR 1.10     No results for input(s): Patrisha Meigs in the last 72 hours. Urinalysis:      Lab Results   Component Value Date    NITRU Negative 07/09/2021    WBCUA 3 07/09/2021    BACTERIA RARE 05/21/2021    RBCUA 7 07/09/2021    BLOODU SMALL 07/09/2021    SPECGRAV 1.009 07/09/2021    GLUCOSEU Negative 07/09/2021       Radiology:  XR FOOT LEFT (MIN 3 VIEWS)   Final Result   1. No acute osseous abnormality. 2. Old fractures of the proximal phalanges of the 4th and 5th toes.          XR CHEST PORTABLE   Final Result   No airspace disease by radiograph. Assessment/Plan:    Cellulitis left leg  - xray left foot: No acute osseous abnormality  - blood cx negative  - wound cx with gbs  - cont zyvox, cefepime -> rocephin only   - ID consulted  - podiatry consulted, s/p bedside I&d  - wound vac placed 7/9, returned OR on Monday for further debridement      Sepsis, POA - resolved  - with criteria: leukocytosis, lactic acidosis; source is cellulitis  - blood and urine cultures - ngtd  - procalcitonin 1.9 -> .65  - ivf given   - abx as above    Diabetes mellitus  A1c 6.8  Continue sliding scale    Hypothyroidism  Continue home Synthroid    Hypertension  Restart lisinopril    DVT Prophylaxis: Lovenox  Diet: ADULT DIET;  Regular  Code Status: Full Code    PT/OT Eval Status: 3-5 sessions    Dispo -inpatient    Rubin Bowman MD

## 2021-07-12 NOTE — CARE COORDINATION
Per chart review patient would like referral to [facility]. Patient confirmed. Message to Sheila Hunter regarding possible discharge for tomorrow. Patient would need S4 [facility]. The Plan for Transition of Care is related to the following treatment goals: home with family. The Patient was provided with a choice of provider and agrees   with the discharge plan. [x] Yes [] No    Freedom of choice list was provided with basic dialogue that supports the patient's individualized plan of care/goals, treatment preferences and shares the quality data associated with the providers.  [x] Yes [] No    WAGNER Mariee, Social Work  [phone]    Electronically signed by VIDYA Alejandre, NE on 7/12/2021 at 12:11 PM

## 2021-07-12 NOTE — PROGRESS NOTES
Wound vac kept alarming. Attempted to reinforce. Wound vac removed. Wound cleansed and wet to dry dressing applied.

## 2021-07-12 NOTE — PROGRESS NOTES
Pt alert and oriented x4. No acute signs of distress noted. Complains of pain to left foot 2/10, states tolerable at this time. Pt up to bathroom with stedy and back to bed, tolerated well. Wound vac in place to left foot and intact. Denies further needs at this time. Daughter rooming in. Bed alarm on, call light within reach. Will monitor.

## 2021-07-12 NOTE — BRIEF OP NOTE
Brief Postoperative Note      Patient: Reji Vergara  YOB: 1929  MRN: [id number]    Date of Procedure: 7/12/2021    Pre-Op Diagnosis: LEFT FOOT ULCER    Post-Op Diagnosis: Same       Procedure(s):  LEFT FOOT INCISION AND DRAINAGE, [address] OUT WITH POSSIBLE APPLICATION GRAFT    Surgeon(s):  Jada Mora DPM    Assistant:  Surgical Assistant: Brenda Dent    Anesthesia: Monitor Anesthesia Care    Estimated Blood Loss (mL): less than 3 ml    Complications: None    Specimens:   * No specimens in log *    Implants:  Implant Name Type Inv. Item Serial No.  Lot No. LRB No. Used Action   GRAFT HUM TISS [id number] CRYOPRESERVED PLCNTA TISS [employer] - [id number]  GRAFT HUM TISS [id number] CRYOPRESERVED PLCNTA TISS [employer]MB [employer]NION [id number] [employer]- [id number] Left 1 Implanted         Drains:   Negative Pressure Wound Therapy Foot Left; Anterior (Active)   $ Standard NPWT <=50 sq cm PER TX $ Yes 07/09/21 1230   Wound Type Acute/Traumatic 07/12/21 0918   Unit Type VERAFLO 07/12/21 0918   Dressing Type Veraflo 07/12/21 0918   Number of pieces used 1 07/12/21 0918   Cycle Continuous 07/12/21 0918   Target Pressure (mmHg) 125 07/12/21 0918   Intensity 5 07/12/21 0918   Irrigation Solution Vashe 07/11/21 0958   Instillation Volume  8 mL 07/12/21 0918   Soak Time  2 minutes 07/12/21 0918   Vac Frequency 2 hours 07/12/21 0918   Canister changed?  No 07/12/21 0918   Dressing Status Other (Comment) 07/12/21 0918   Dressing Changed Changed/New 07/09/21 1230   Drainage Description Serous 07/12/21 0918   Output (ml) 50 ml 07/10/21 1650   Wound Assessment Other (Comment) 07/10/21 2300   Betsy-wound Assessment Other (Comment) 07/10/21 2300   Odor None 07/09/21 2200       Findings: Viable sub Q and Tendons without discovery of purulence or infection    Electronically signed by Jada Mora DPM on 7/12/2021 at 1:25 PM

## 2021-07-12 NOTE — ANESTHESIA PRE PROCEDURE
Department of Anesthesiology  Preprocedure Note       Name:  Juni Salas   Age:  80 y.o.  :  1929                                          MRN:  [id number]         Date:  2021      Surgeon: Raymon Maldonado):  Radha Mensah DPM    Procedure: Procedure(s):  LEFT FOOT INCISION AND DRAINAGE, [address] OUT WITH POSSIBLE APPLICATION GRAFT    Medications prior to admission:   Prior to Admission medications    Medication Sig Start Date End Date Taking?  Authorizing Provider   Cholecalciferol (VITAMIN D) 50 MCG (2000) CAPS capsule Take 2,000 Units by mouth daily   Yes Historical Provider, MD   lisinopril (PRINIVIL;ZESTRIL) 20 MG tablet Take 1 tablet by mouth daily 21  Yes Asa Fontana MD   atorvastatin (LIPITOR) 40 MG tablet TAKE 1 TABLET DAILY 21  Yes Asa Fontana MD   metFORMIN (GLUCOPHAGE) 1000 MG tablet TAKE 1 TABLET TWICE A DAY WITH MEALS 21  Yes Asa Fontana MD   levothyroxine (SYNTHROID) 112 MCG tablet TAKE ONE TABLET BY MOUTH DAILY  Patient taking differently: Take 112 mcg by mouth Daily TAKE ONE TABLET BY MOUTH DAILY at 6 am 10/26/20  Yes Asa Fontana MD   acetaminophen (AMINOFEN) 325 MG tablet Take 2 tablets by mouth every 6 hours as needed for Pain 18  Yes Hussain Wylie MD   Cranberry-Cholecalciferol 4200-500 MG-UNIT CAPS Take by mouth   Yes Historical Provider, MD   aspirin 81 MG tablet Take 81 mg by mouth daily   Yes Historical Provider, MD   Multiple Vitamin (MULTI VITAMIN DAILY PO) Take by mouth   Yes Historical Provider, MD       Current medications:    Current Facility-Administered Medications   Medication Dose Route Frequency Provider Last Rate Last Admin    senna (SENOKOT) tablet 8.6 mg  1 tablet Oral Nightly Mariam ADRIANNE Tomlin, DO   8.6 mg at 21    glucose (GLUTOSE) 40 % oral gel 15 g  15 g Oral PRN Mariam Tomlin,         dextrose 50 % IV solution  12.5 g Intravenous PRN Mariam ADRIANNE Jonesst, DO        glucagon (rDNA) injection 1 mg  1 mg Intramuscular PRN Mariam R Hurst, DO        dextrose 5 % solution  100 mL/hr Intravenous PRN Mariam R Hurst, DO        cefTRIAXone (ROCEPHIN) 2,000 mg in sodium chloride 0.9 % 50 mL IVPB  2,000 mg Intravenous Daily Mariam R Hurst, DO   Stopped at 07/12/21 0002    polyethylene glycol (GLYCOLAX) packet 17 g  17 g Oral BID Ana Laura Melo APRREGGIE - CNP   17 g at 07/11/21 2222    vashe wound therapy external solution   Irrigation Continuous PRN Harriet Chino DPM        [Held by provider] enoxaparin (LOVENOX) injection 40 mg  40 mg Subcutaneous Daily Mariam R Hurst, DO   40 mg at 07/10/21 0809    insulin lispro (HUMALOG) injection vial 0-12 Units  0-12 Units Subcutaneous TID WC Mariam R Karenst, DO   2 Units at 07/12/21 0826    insulin lispro (HUMALOG) injection vial 0-6 Units  0-6 Units Subcutaneous Nightly Mariam ADRIANNE Tomlin, DO   2 Units at 07/11/21 2155    aspirin chewable tablet 81 mg  81 mg Oral Daily Madi Sosa MD   81 mg at 07/11/21 0951    atorvastatin (LIPITOR) tablet 40 mg  40 mg Oral Daily Madi Sosa MD   40 mg at 07/11/21 0951    levothyroxine (SYNTHROID) tablet 100 mcg  100 mcg Oral Daily Madi Sosa MD   100 mcg at 07/12/21 0601    [Held by provider] lisinopril (PRINIVIL;ZESTRIL) tablet 20 mg  20 mg Oral Daily Madi Sosa MD   20 mg at 07/07/21 0829    sodium chloride flush 0.9 % injection 5-40 mL  5-40 mL Intravenous 2 times per day Madi Sosa MD   10 mL at 07/12/21 0826    sodium chloride flush 0.9 % injection 5-40 mL  5-40 mL Intravenous PRN Madi Sosa MD        0.9 % sodium chloride infusion  25 mL Intravenous PRN Madi Sosa MD        ondansetron (ZOFRAN) injection 4 mg  4 mg Intravenous Q6H PRN Madi Sosa MD        fentaNYL (SUBLIMAZE) injection 25 mcg  25 mcg Intravenous Q6H PRN Madi Sosa MD        HYDROcodone-acetaminophen (NORCO) 5-325 MG per tablet 1 tablet  1 tablet Oral Q6H PRN Balta Nicholas MD   1 tablet at 07/12/21 0601    acetaminophen (TYLENOL) tablet 650 mg  650 mg Oral Q4H PRN Marta Mckinnon MD   650 mg at 07/11/21 0951       Allergies:     Allergies   Allergen Reactions    Gabapentin      LOWER LEG SWELLING    Pravachol [Pravastatin Sodium] Rash    Tegaderm Ag Mesh 2\"X2\" [Wound Dressings] Rash       Problem List:    Patient Active Problem List   Diagnosis Code    Carpal tunnel syndrome, bilateral G56.03    Trigger index finger M65.329    Trigger thumb of both thumbs M65.311, M65.312    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 Z96.651    Arthritis of left knee M17.12    Type 2 diabetes mellitus without complication, without long-term current use of insulin ([facility]) E11.9    Essential hypertension I10    Mixed hyperlipidemia E78.2    Pedal edema R60.0    Other secondary kyphosis, 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    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    Closed fracture of second cervical vertebra ([facility]) S12.100A    Closed fracture of first cervical vertebra ([facility]) S12.000A    Lymphedema I89.0    High risk medication use Z79.899    Chronic neck pain M54.2, G89.29    Rotator cuff arthropathy of right shoulder M12.811    Bradycardia R00.1    Arthritis of left hip M16.12    Orthostatic syncope I95.1    Sepsis due to cellulitis ([facility]) L03.90, A41.9       Past Medical History:        Diagnosis Date    Arthritis     Cataract     Diabetes ([address]     High blood pressure     Kidney problem     Neuropathy     Thyroid disorder        Past Surgical History:        Procedure Laterality Date    APPENDECTOMY      CARPAL TUNNEL RELEASE Left 09/05/2017      Left carpal tunnel release  & Left Thumb, Index Finger and Middle Finger trigger finger release    CARPAL TUNNEL RELEASE Right 09/26/2017    COLONOSCOPY      FINGER TRIGGER RELEASE Right 09/26/2017    Thumb & Index Finger    HYSTERECTOMY      OVARY REMOVAL      TOTAL KNEE ARTHROPLASTY Right 2006    Dr. Draper [place]       Social History:    Social History     Tobacco Use    Smoking status: Never Smoker    Smokeless tobacco: Never Used   Substance Use Topics    Alcohol use: Yes     Comment: occas                                Counseling given: Not Answered      Vital Signs (Current):   Vitals:    07/11/21 1418 07/11/21 2124 07/12/21 0440 07/12/21 1119   BP: (!) 174/82 (!) 152/66 (!) 181/80 (!) 152/74   Pulse: 95 86 86 85   Resp: 16 16 16 16   Temp: 99.5 °F (37.5 °C) 98.8 °F (37.1 °C) 98.3 °F (36.8 °C) 97 °F (36.1 °C)   TempSrc: Oral Oral Oral    SpO2: 97% 95% 96% 96%   Weight:   181 lb 10.5 oz (82.4 kg)    Height:                                                  BP Readings from Last 3 Encounters:   07/12/21 (!) 152/74   05/21/21 134/70   05/11/21 (!) 175/57       NPO Status:                                                   Date of last liquid consumption: 07/11/21                        Date of last solid food consumption: 07/11/21    BMI:   Wt Readings from Last 3 Encounters:   07/12/21 181 lb 10.5 oz (82.4 kg)   05/21/21 187 lb 2 oz (84.9 kg)   05/11/21 219 lb 12.8 oz (99.7 kg)     Body mass index is 31.18 kg/m².     CBC:   Lab Results   Component Value Date    WBC 12.3 07/12/2021    RBC 3.16 07/12/2021    HGB 9.9 07/12/2021    HCT 29.3 07/12/2021    MCV 92.8 07/12/2021    RDW 14.9 07/12/2021     07/12/2021       CMP:   Lab Results   Component Value Date     07/12/2021    K 4.7 07/12/2021    K 4.7 07/11/2021     07/12/2021    CO2 23 07/12/2021    BUN 11 07/12/2021    CREATININE 0.6 07/12/2021    GFRAA >60 07/12/2021    GFRAA >60 08/06/2010    AGRATIO 1.2 07/06/2021    LABGLOM >60 07/12/2021    GLUCOSE 154 07/12/2021    PROT 7.4 07/06/2021    CALCIUM 8.5 07/12/2021    BILITOT 0.9 07/06/2021    ALKPHOS 62 07/06/2021    AST 21 07/06/2021    ALT 13 07/06/2021       POC Tests:   Recent Labs     07/12/21  1127   POCGLU 147*       Coags:   Lab Results   Component Value Date    PROTIME 12.5 07/12/2021    INR 1.10 07/12/2021    APTT 29.2 07/06/2018       HCG (If Applicable): No results found for: PREGTESTUR, PREGSERUM, HCG, HCGQUANT     ABGs: No results found for: PHART, PO2ART, SYJ8WSZ, JIB6LML, BEART, V4QJJGEM     Type & Screen (If Applicable):  No results found for: LABABO, LABRH    Drug/Infectious Status (If Applicable):  No results found for: HIV, HEPCAB    COVID-19 Screening (If Applicable): No results found for: COVID19        Anesthesia Evaluation  Patient summary reviewed and Nursing notes reviewed no history of anesthetic complications:   Airway: Mallampati: II  TM distance: >3 FB   Neck ROM: full  Mouth opening: > = 3 FB Dental:    (+) upper dentures, lower dentures and edentulous      Pulmonary:       (-) pneumonia, COPD, asthma, shortness of breath, recent URI, sleep apnea and not a current smoker                           Cardiovascular:  Exercise tolerance: good (>4 METS),   (+) hypertension:, hyperlipidemia    (-) pacemaker, valvular problems/murmurs, past MI, CAD, CABG/stent, dysrhythmias,  angina,  CHF, orthopnea, PND,  YOUNG and no pulmonary hypertension      Rhythm: regular                      Neuro/Psych:   (+) neuromuscular disease:,    (-) seizures, TIA, CVA, headaches, psychiatric history and depression/anxiety            GI/Hepatic/Renal:        (-) hiatal hernia, GERD, PUD, hepatitis, liver disease, no renal disease, bowel prep and no morbid obesity       Endo/Other:    (+) Diabetes (A1c 6.8% July 2021)Type II DM, , hypothyroidism, blood dyscrasia: anemia, arthritis: OA., no malignancy/cancer. (-) hyperthyroidism, no electrolyte abnormalities, no malignancy/cancer                ROS comment: Abscess and cellulitis of left foot Abdominal:             Vascular:     - PVD, DVT and PE. Other Findings:           Anesthesia Plan      MAC     ASA 3       Induction: intravenous. MIPS: Prophylactic antiemetics administered. Anesthetic plan and risks discussed with patient and child/children.       Plan discussed with

## 2021-07-12 NOTE — PROGRESS NOTES
Physical Therapy  Attempt  Pt scheduled for I&D today. Resting quietly at this time - does not want to get OOB. Will attempt to see pt after procedure.     Electronically signed by Abdi Purvis, PT [id number] on 7/12/2021 at 8:02 AM

## 2021-07-12 NOTE — PROGRESS NOTES
Occupational Therapy    Attempted OT follow-up. Pt scheduled to have I&D done today and reporting not wanting to participate in therapy at this time. Will attempt to see after procedure as schedule permits.   Enrico Padilla, [address]

## 2021-07-12 NOTE — PLAN OF CARE
Problem: Falls - Risk of:  Goal: Will remain free from falls  Description: Will remain free from falls  Outcome: Ongoing  Goal: Absence of physical injury  Description: Absence of physical injury  Outcome: Ongoing     Problem: Skin Integrity:  Goal: Will show no infection signs and symptoms  Description: Will show no infection signs and symptoms  Outcome: Ongoing  Goal: Absence of new skin breakdown  Description: Absence of new skin breakdown  Outcome: Ongoing     Problem: Nutrition  Goal: Optimal nutrition therapy  Outcome: Ongoing     Problem: Discharge Planning:  Goal: Discharged to appropriate level of care  Description: Discharged to appropriate level of care  Outcome: Ongoing     Problem:  Body Temperature - Imbalanced:  Goal: Ability to maintain a body temperature in the normal range will improve  Description: Ability to maintain a body temperature in the normal range will improve  Outcome: Ongoing     Problem: Mobility - Impaired:  Goal: Mobility will improve to maximum level  Description: Mobility will improve to maximum level  Outcome: Ongoing     Problem: Pain:  Goal: Pain level will decrease  Description: Pain level will decrease  Outcome: Ongoing  Goal: Control of acute pain  Description: Control of acute pain  Outcome: Ongoing  Goal: Control of chronic pain  Description: Control of chronic pain  Outcome: Ongoing     Problem: Skin Integrity - Impaired:  Goal: Will show no infection signs and symptoms  Description: Will show no infection signs and symptoms  Outcome: Ongoing  Goal: Absence of new skin breakdown  Description: Absence of new skin breakdown  Outcome: Ongoing

## 2021-07-12 NOTE — PLAN OF CARE
Nutrition Problem #1: Severe malnutrition  Intervention: Food and/or Nutrient Delivery:  (Resume diet once appropriate per MD; resume ONS when appropriate)  Nutritional Goals: >50% of meals and supplement consumed

## 2021-07-12 NOTE — PROGRESS NOTES
Comprehensive Nutrition Assessment    Type and Reason for Visit:  Reassess    Nutrition Recommendations/Plan:   - RD to recommend 4 carb diet once diet resumes  - Will resume Glucerna BID once diet begins    Nutrition Assessment:  Follow-up. Hx diabetes. Pt previously with wound and wound vac. Currently NPO for I & D today. Pt previously on five carb diet with intakes %. Glucose elevated. Previously receiving glucerna with intakes %. Will resume ONS once diet resumes for additional protein intake. Malnutrition Assessment:  Malnutrition Status:  Severe malnutrition    Context:  Acute Illness     Findings of the 6 clinical characteristics of malnutrition:  Energy Intake:  7 - 50% or less of estimated energy requirements for 5 or more days  Weight Loss:  7 - Greater than 7.5% over 3 months     Body Fat Loss:  1 - Mild body fat loss Orbital   Muscle Mass Loss:  1 - Mild muscle mass loss Temples (temporalis)  Fluid Accumulation:  7 - Moderate to Severe     Strength:  Not Performed    Estimated Daily Nutrient Needs:  Energy (kcal):  1807-4575 (15-18 x CBW); Weight Used for Energy Requirements:  Current     Protein (g):  66-83 (1.2-1.5 x IBW); Weight Used for Protein Requirements:  Ideal        Fluid (ml/day):  per provider     Nutrition Related Findings:  Glucerna 163. Wounds:  Wound Vac, Open Wounds       Current Nutrition Therapies:    Diet NPO Exceptions are: Sips of Water with Meds    Anthropometric Measures:  · Height: 5' 4\" (162.6 cm)  · Current Body Weight: 181 lb (82.1 kg)   · Admission Body Weight: 189 lb (85.7 kg)    · Usual Body Weight: 220 lb (99.8 kg)     · Ideal Body Weight: 120 lbs; % Ideal Body Weight 150.8 %   · BMI: 31.1   · BMI Categories: Obese Class 1 (BMI 30.0-34. 9)       Nutrition Diagnosis:   · Severe malnutrition related to inadequate protein-energy intake as evidenced by weight loss 7.5% in 3 months, poor intake prior to admission    Nutrition Interventions:   Food and/or Nutrient Delivery:   (Resume diet once appropriate per MD; resume ONS when appropriate)  Nutrition Education/Counseling:  Education not indicated   Coordination of Nutrition Care:  Continue to monitor while inpatient    Goals:  >50% of meals and supplement consumed       Nutrition Monitoring and Evaluation:   Behavioral-Environmental Outcomes:  None Identified   Food/Nutrient Intake Outcomes:  Diet Advancement/Tolerance, Food and Nutrient Intake, Supplement Intake  Physical Signs/Symptoms Outcomes:  Biochemical Data, Nutrition Focused Physical Findings, Skin, Weight     Discharge Planning:     Too soon to determine     Electronically signed by Dee Zarate RD, LD on 7/12/21 at 11:44 AM EDT    Contact: [phone]

## 2021-07-12 NOTE — PROGRESS NOTES
Patient arrived to PACU from OR asleep with SPO2 of 99 on 3L and FLACC of 0. Left foot wrapped clean, dry and intact.

## 2021-07-12 NOTE — PROGRESS NOTES
Infectious Disease Follow up Notes  Admit Date: 7/6/2021  Hospital Day: 7    Antibiotics : IV Ceftriaxone       CHIEF COMPLAINT:     Left leg cellulitis  Sepsis  WBC elevation   Left foot abscess   Group C strep     Subjective interval History :  80 y.o. woman was found down at home with on going lEFT Leg infection and cellulitis with large blood filled blister on the dorsum of the Left foot.  She sustained injury to Left foot in May 2021 per family now developed progressive cellulitis with skin lesions and local redness with serous drainage and on going pain, swelling extending up the Left leg and she has h/o bi lateral lower leg edema from venous stasis and WBC elevation with lactic acid elevation, DASHAWN and fevers on admit with on going septic process we are consulted for recommendations   Location : Left leg swelling, pain and skin changes       Quality : aching       Severity :  8/10   Duration :  weeks    Timing : intermittent   Context : Left foot trauma and chronic edema    Modifying factors : none   Associated signs and symptoms:  WBC elevation, BG elevation  Confusion, fevers        Interval History : s/p Left foot surgery and grafting and WBC slow improvement has on going Left leg swelling and redness but improving slowly mentation is better no chills family at bed side     Past Medical History:    Past Medical History:   Diagnosis Date    Arthritis     Cataract     Diabetes ([address]     High blood pressure     Kidney problem     Neuropathy     Thyroid disorder        Past Surgical History:    Past Surgical History:   Procedure Laterality Date    APPENDECTOMY      CARPAL TUNNEL RELEASE Left 09/05/2017      Left carpal tunnel release  & Left Thumb, Index Finger and Middle Finger trigger finger release    CARPAL TUNNEL RELEASE Right 09/26/2017    COLONOSCOPY      FINGER TRIGGER RELEASE Right 09/26/2017    Thumb & Index Finger    HYSTERECTOMY      OVARY REMOVAL      TOTAL KNEE ARTHROPLASTY Right 2006    Dr. Benny Reaves       Current Medications:    Outpatient Medications Marked as Taking for the 7/6/21 encounter [facility] Encounter)   Medication Sig Dispense Refill    Cholecalciferol (VITAMIN D) 50 MCG (2000 UT) CAPS capsule Take 2,000 Units by mouth daily      lisinopril (PRINIVIL;ZESTRIL) 20 MG tablet Take 1 tablet by mouth daily 90 tablet 3    atorvastatin (LIPITOR) 40 MG tablet TAKE 1 TABLET DAILY 90 tablet 3    metFORMIN (GLUCOPHAGE) 1000 MG tablet TAKE 1 TABLET TWICE A DAY WITH MEALS 180 tablet 3    levothyroxine (SYNTHROID) 112 MCG tablet TAKE ONE TABLET BY MOUTH DAILY (Patient taking differently: Take 112 mcg by mouth Daily TAKE ONE TABLET BY MOUTH DAILY at 6 am) 90 tablet 3    acetaminophen (AMINOFEN) 325 MG tablet Take 2 tablets by mouth every 6 hours as needed for Pain 120 tablet 0    Cranberry-Cholecalciferol 4200-500 MG-UNIT CAPS Take by mouth      aspirin 81 MG tablet Take 81 mg by mouth daily      Multiple Vitamin (MULTI VITAMIN DAILY PO) Take by mouth         Allergies:  Gabapentin, Pravachol [pravastatin sodium], and Tegaderm ag mesh 2\"x2\" [wound dressings]    Immunizations :   Immunization History   Administered Date(s) Administered    Influenza Vaccine, unspecified formulation 10/10/2003, 10/05/2004, 10/04/2005, 09/01/2012, 10/01/2014, 11/20/2017    Influenza Virus Vaccine 11/20/2017    Influenza, High Dose (Fluzone 65 yrs and older) 09/28/2018    Influenza, Tennie Mock, Recombinant, IM PF (Flublok 18 yrs and older) 10/05/2019    Influenza, Quadv, adjuvanted, 65 yrs +, IM, PF (Fluad) 10/26/2020    Pneumococcal Conjugate 13-valent ([id number]) 02/15/2018    Pneumococcal Polysaccharide ([id number]) 02/26/2019, 10/05/2019    Tdap (Boostrix, Adacel) 11/20/2017    Zoster Live (Zostavax) 11/20/2017    Zoster Recombinant (Shingrix) 10/05/2019, 12/23/2019       Social History:    Social History     Tobacco Use  Smoking status: Never Smoker    Smokeless tobacco: Never Used   Substance Use Topics    Alcohol use: Yes     Comment: occas    Drug use: No     Social History     Tobacco Use   Smoking Status Never Smoker   Smokeless Tobacco Never Used      Family history : no Liver disorders no COPD       REVIEW OF SYSTEMS:     Constitutional:  negative for fevers, chills, night sweats  Eyes:  negative for blurred vision, eye discharge, visual disturbance   HEENT:  negative for hearing loss, ear drainage,nasal congestion  Respiratory:  negative for cough, shortness of breath or hemoptysis   Cardiovascular:  negative for chest pain, palpitations, syncope  Gastrointestinal:  negative for nausea, vomiting, diarrhea, constipation, abdominal pain  Genitourinary:  negative for frequency, dysuria, urinary incontinence, hematuria  Hematologic/Lymphatic:  negative for easy bruising, bleeding and lymphadenopathy  Allergic/Immunologic:  negative for recurrent infections, angioedema, anaphylaxis   Endocrine:  negative for weight changes, polyuria, polydipsia and polyphagia  Musculoskeletal:  Left leg swelling+ and local redness and wound+ , swelling, decreased range of motion  Integumentary: No rashes, skin lesions  Neurological:  negative for headaches, slurred speech, unilateral weakness  Psychiatric: negative for hallucinations,confusion,agitation.                 PHYSICAL EXAM:      Vitals:    BP (!) 181/80   Pulse 86   Temp 98.3 °F (36.8 °C) (Oral)   Resp 16   Ht 5' 4\" (1.626 m)   Wt 181 lb 10.5 oz (82.4 kg)   SpO2 96%   BMI 31.18 kg/m²     General Appearance: alert,in some  acute distress, ++ pallor, no icterus   Skin: warm and dry, no rash or erythema  Head: normocephalic and atraumatic  Eyes: pupils equal, round, and reactive to light, conjunctivae normal  ENT: tympanic membrane, external ear and ear canal normal bilaterally, nose without deformity, nasal mucosa and turbinates normal without polyps  Neck: supple and non-tender without mass, no thyromegaly  no cervical lymphadenopathy  Pulmonary/Chest: clear to auscultation bilaterally- no wheezes, rales or rhonchi, normal air movement, no respiratory distress  Cardiovascular:  S1 and S2, no murmurs, rubs, clicks, or gallops, no carotid bruits  Abdomen: soft, non-tender, non-distended, normal bowel sounds, no masses or organomegaly  Extremities: no cyanosis, clubbing or ++ edema  Musculoskeletal: normal range of motion, no joint swelling, deformity or tenderness  Integumentary: No rashes, no abnormal skin lesions, no petechiae  Neurologic: reflexes normal and symmetric, no cranial nerve deficit  Lines: IV  Left leg extensive cellulitis with blood filled blister slow improvement  on the dorsum now has grafting and post op dressing+  Rt leg edema   Rt TKA+      Data Review:    CBC:   Lab Results   Component Value Date    WBC 12.3 (H) 07/12/2021    HGB 9.9 (L) 07/12/2021    HCT 29.3 (L) 07/12/2021    MCV 92.8 07/12/2021     07/12/2021     RENAL:   Lab Results   Component Value Date    CREATININE 0.6 07/12/2021    BUN 11 07/12/2021     (L) 07/12/2021    K 4.7 07/12/2021     07/12/2021    CO2 23 07/12/2021     SED RATE: No results found for: SEDRATE  CK:   Lab Results   Component Value Date    CKTOTAL 154 05/09/2021     CRP: No results found for: CRP  Hepatic Function Panel:   Lab Results   Component Value Date    ALKPHOS 62 07/06/2021    ALT 13 07/06/2021    AST 21 07/06/2021    PROT 7.4 07/06/2021    BILITOT 0.9 07/06/2021    LABALBU 4.0 07/06/2021     UA:  Lab Results   Component Value Date    COLORU YELLOW 07/09/2021    CLARITYU Clear 07/09/2021    GLUCOSEU Negative 07/09/2021    BILIRUBINUR Negative 07/09/2021    KETUA Negative 07/09/2021    SPECGRAV 1.009 07/09/2021    BLOODU SMALL 07/09/2021    PHUR 6.0 07/09/2021    PROTEINU 30 07/09/2021    UROBILINOGEN 0.2 07/09/2021    NITRU Negative 07/09/2021    LEUKOCYTESUR Negative 07/09/2021    LABMICR YES 07/09/2021    URINETYPE NotGiven 07/09/2021      Urine Microscopic:   Lab Results   Component Value Date    BACTERIA RARE 05/21/2021    HYALCAST 0 07/09/2021    WBCUA 3 07/09/2021    RBCUA 7 07/09/2021    EPIU 1 07/09/2021     Urine Reflex to Culture:   Lab Results   Component Value Date    URRFLXCULT Not Indicated 07/09/2021         MICRO: cultures reviewed and updated by me   Blood Culture:   Lab Results   Component Value Date    BC No Growth after 4 days of incubation. 07/06/2021    BLOODCULT2 No Growth after 4 days of incubation. 07/06/2021       Respiratory Culture:  Lab Results   Component Value Date    LABGRAM  07/08/2021     No Epithelial Cells seen  No WBCs or organisms seen       AFB:No results found for: AFBSMEAR  Viral Culture:  No results found for: COVID19  Urine Culture: No results for input(s): LABURIN in the last 72 hours. Culture, Wound [[id number]] (Abnormal) Collected: 07/08/21 1308   Order Status: Completed Specimen: Abscess Updated: 07/09/21 1029    Gram Stain Result No Epithelial Cells seen   No WBCs or organisms seen     Organism Beta Strep Group CAbnormal     WOUND/ABSCESS --    Heavy growth   Susceptibility testing of penicillin and other beta lactams is   not necessary for beta hemolytic Streptococci since resistant   strains have not been identified. (CLSI M100)    Narrative:     ORDER#: [id number]                          ORDERED BY: RUPALI PAYNE   SOURCE: Abscess Left dorsal foot           COLLECTED:  07/08/21 13:08   ANTIBIOTICS AT [facility]. :                      RECEIVED :  07/08/21 13:28   Performed at:   [facility]   [address] [address]   Phone [phone]     IMAGING:    XR FOOT LEFT (MIN 3 VIEWS)   Final Result   1. No acute osseous abnormality. 2. Old fractures of the proximal phalanges of the 4th and 5th toes. XR CHEST PORTABLE   Final Result   No airspace disease by radiograph.                All the pertinent images and reports for the current Hospitalization were reviewed by me     Scheduled Meds:   senna  1 tablet Oral Nightly    cefTRIAXone (ROCEPHIN) IVPB in 50 mL NS  2,000 mg Intravenous Daily    polyethylene glycol  17 g Oral BID    [Held by provider] enoxaparin  40 mg Subcutaneous Daily    insulin lispro  0-12 Units Subcutaneous TID     insulin lispro  0-6 Units Subcutaneous Nightly    aspirin  81 mg Oral Daily    atorvastatin  40 mg Oral Daily    levothyroxine  100 mcg Oral Daily    [Held by provider] lisinopril  20 mg Oral Daily    sodium chloride flush  5-40 mL Intravenous 2 times per day       Continuous Infusions:   dextrose      vashe wound therapy      sodium chloride         PRN Meds:  glucose, dextrose, glucagon (rDNA), dextrose, vashe wound therapy, sodium chloride flush, sodium chloride, ondansetron, fentanNYL, HYDROcodone 5 mg - acetaminophen, acetaminophen      Assessment:     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    Arthritis of left knee    Type 2 diabetes mellitus without complication, without long-term current use of insulin (HCC)    Essential hypertension    Mixed hyperlipidemia    Pedal edema    Other secondary kyphosis, cervicothoracic region    Postablative hypothyroidism    Closed fracture of second cervical vertebra (HCC)    Closed fracture of first cervical vertebra (HCC)    Lymphedema    High risk medication use    Chronic neck pain    Rotator cuff arthropathy of right shoulder    Bradycardia    Arthritis of left hip    Orthostatic syncope    Sepsis due to cellulitis (HCC)       Sepsis on admit  WBC elevation   Lactic acidosis resolved  DASHAWN improved  ON going severe Left leg cellulitis  Diabetic foot infection   Bi lateral lower leg venous stasis and edema   Left leg blood filled blistering lesion from the Trauma on the dorsum of the foot  Fevers from above  X ray Left foot with Fracture of 4th and 5thToes phalanges would correspond to the Trauma in May 2021 per patient      lEFT Leg extensive cellulitis is the source for sepsis and given the leg appearance could be streptococcal and abscess cx now isolated as Group C strep    S/p Left foot blister and abscess drainage+ and wound noted and  Anticipate to cont IV abx as in patient and depending on her progress will see if she need IV abx therapy    S/p ID and debridement and grafting no additional pus noted wound down to Tendons and Left leg redness improving slowly     Labs, Microbiology, Radiology and all the pertinent results from current hospitalization and  care every where were reviewed  by me as a part of the evaluation   Plan:   1. Cont IV  CEFfTRIAXONE X 2 GM  q 24 HRS   2. S/p ID and debridement and grafting  3. Cont local care  4. Elevate the leg  5. Wound cx GROUP C strep  6. Podiatry eval noted  7. Trend WBC, pROCAL   8. Check HbA1c 6.8  9. Surgery notes reviewed    10. Hopefully able to choose oral abx if the foot improves  Can choose oral Cephalexin   11. Would cont IV abx for another x 48 hrs to see if we can choose oral abx d/w pt and family     Discussed with patient/Family and Nursing d/w family at bed side   Risk of Complications/Morbidity: High      · Illness(es)/ Infection present that pose threat to bodily function. · There is potential for severe exacerbation of infection/side effects of treatment. · Therapy requires intensive monitoring for antimicrobial agent toxicity. Discussed with patient/Family and Nursing staff     Thanks for allowing me to participate in your patient's care and please call me with any questions or concerns.     Katharine Peoples MD  Infectious Disease  [facility] Physician  Phone: [phone]   Fax : [phone] No

## 2021-07-13 LAB
GLUCOSE BLD-MCNC: 150 MG/DL (ref 70–99)
GLUCOSE BLD-MCNC: 163 MG/DL (ref 70–99)
GLUCOSE BLD-MCNC: 191 MG/DL (ref 70–99)
GLUCOSE BLD-MCNC: 289 MG/DL (ref 70–99)
PERFORMED ON: ABNORMAL

## 2021-07-13 PROCEDURE — 94761 N-INVAS EAR/PLS OXIMETRY MLT: CPT

## 2021-07-13 PROCEDURE — 6370000000 HC RX 637 (ALT 250 FOR IP): Performed by: INTERNAL MEDICINE

## 2021-07-13 PROCEDURE — 6370000000 HC RX 637 (ALT 250 FOR IP): Performed by: PODIATRIST

## 2021-07-13 PROCEDURE — 0JBR0ZZ EXCISION OF LEFT FOOT SUBCUTANEOUS TISSUE AND FASCIA, OPEN APPROACH: ICD-10-PCS | Performed by: PODIATRIST

## 2021-07-13 PROCEDURE — 0JUR0KZ SUPPLEMENT OF LEFT FOOT SUBCUTANEOUS TISSUE AND FASCIA WITH NONAUTOLOGOUS TISSUE SUBSTITUTE, OPEN APPROACH: ICD-10-PCS | Performed by: PODIATRIST

## 2021-07-13 PROCEDURE — 1200000000 HC SEMI PRIVATE

## 2021-07-13 PROCEDURE — 97116 GAIT TRAINING THERAPY: CPT

## 2021-07-13 PROCEDURE — 97530 THERAPEUTIC ACTIVITIES: CPT

## 2021-07-13 PROCEDURE — 2580000003 HC RX 258: Performed by: PODIATRIST

## 2021-07-13 PROCEDURE — 97535 SELF CARE MNGMENT TRAINING: CPT

## 2021-07-13 PROCEDURE — 99232 SBSQ HOSP IP/OBS MODERATE 35: CPT | Performed by: INTERNAL MEDICINE

## 2021-07-13 PROCEDURE — 6360000002 HC RX W HCPCS: Performed by: PODIATRIST

## 2021-07-13 RX ADMIN — LEVOTHYROXINE SODIUM 100 MCG: 0.1 TABLET ORAL at 07:10

## 2021-07-13 RX ADMIN — INSULIN LISPRO 6 UNITS: 100 INJECTION, SOLUTION INTRAVENOUS; SUBCUTANEOUS at 12:30

## 2021-07-13 RX ADMIN — HYDROCODONE BITARTRATE AND ACETAMINOPHEN 1 TABLET: 5; 325 TABLET ORAL at 01:20

## 2021-07-13 RX ADMIN — SENNOSIDES 8.6 MG: 8.6 TABLET, FILM COATED ORAL at 21:49

## 2021-07-13 RX ADMIN — POLYETHYLENE GLYCOL 3350 17 G: 17 POWDER, FOR SOLUTION ORAL at 21:49

## 2021-07-13 RX ADMIN — SODIUM CHLORIDE, PRESERVATIVE FREE 10 ML: 5 INJECTION INTRAVENOUS at 08:59

## 2021-07-13 RX ADMIN — LISINOPRIL 20 MG: 20 TABLET ORAL at 08:58

## 2021-07-13 RX ADMIN — ASPIRIN 81 MG: 81 TABLET, CHEWABLE ORAL at 08:58

## 2021-07-13 RX ADMIN — SODIUM CHLORIDE, PRESERVATIVE FREE 10 ML: 5 INJECTION INTRAVENOUS at 21:49

## 2021-07-13 RX ADMIN — CEFTRIAXONE 2000 MG: 2 INJECTION, POWDER, FOR SOLUTION INTRAMUSCULAR; INTRAVENOUS at 21:49

## 2021-07-13 RX ADMIN — INSULIN LISPRO 2 UNITS: 100 INJECTION, SOLUTION INTRAVENOUS; SUBCUTANEOUS at 08:58

## 2021-07-13 RX ADMIN — INSULIN LISPRO 1 UNITS: 100 INJECTION, SOLUTION INTRAVENOUS; SUBCUTANEOUS at 21:51

## 2021-07-13 RX ADMIN — POLYETHYLENE GLYCOL 3350 17 G: 17 POWDER, FOR SOLUTION ORAL at 08:58

## 2021-07-13 RX ADMIN — INSULIN LISPRO 2 UNITS: 100 INJECTION, SOLUTION INTRAVENOUS; SUBCUTANEOUS at 16:57

## 2021-07-13 RX ADMIN — ATORVASTATIN CALCIUM 40 MG: 40 TABLET, FILM COATED ORAL at 08:58

## 2021-07-13 RX ADMIN — HYDROCODONE BITARTRATE AND ACETAMINOPHEN 1 TABLET: 5; 325 TABLET ORAL at 14:48

## 2021-07-13 ASSESSMENT — PAIN DESCRIPTION - LOCATION: LOCATION: FOOT

## 2021-07-13 ASSESSMENT — PAIN DESCRIPTION - ORIENTATION: ORIENTATION: LEFT

## 2021-07-13 ASSESSMENT — PAIN DESCRIPTION - PROGRESSION: CLINICAL_PROGRESSION: NOT CHANGED

## 2021-07-13 ASSESSMENT — PAIN DESCRIPTION - FREQUENCY: FREQUENCY: CONTINUOUS

## 2021-07-13 ASSESSMENT — PAIN - FUNCTIONAL ASSESSMENT: PAIN_FUNCTIONAL_ASSESSMENT: PREVENTS OR INTERFERES SOME ACTIVE ACTIVITIES AND ADLS

## 2021-07-13 ASSESSMENT — PAIN DESCRIPTION - ONSET: ONSET: ON-GOING

## 2021-07-13 ASSESSMENT — PAIN SCALES - GENERAL
PAINLEVEL_OUTOF10: 6
PAINLEVEL_OUTOF10: 6
PAINLEVEL_OUTOF10: 3
PAINLEVEL_OUTOF10: 0

## 2021-07-13 ASSESSMENT — PAIN DESCRIPTION - DESCRIPTORS: DESCRIPTORS: SHARP

## 2021-07-13 ASSESSMENT — PAIN DESCRIPTION - PAIN TYPE: TYPE: ACUTE PAIN

## 2021-07-13 NOTE — OP NOTE
Operative Note      Patient: Afshan Jesus  YOB: 1929  MRN: [id number]    Date of Procedure: 7/12/2021    Pre-Op Diagnosis: LEFT FOOT ULCER    Post-Op Diagnosis: Same       Procedure(s):  LEFT FOOT INCISION AND DRAINAGE, [address] OUT WITH APPLICATION GRAFT    Surgeon(s):  Zara Arias DPM    Assistant:   Surgical Assistant: Yoan Connors    Anesthesia: Monitor Anesthesia Care    Estimated Blood Loss (mL): less than 3 ml    Complications: None    Specimens:   * No specimens in log *    Implants:  Implant Name Type Inv. Item Serial No.  Lot No. LRB No. Used Action   GRAFT HUM TISS [id number] CRYOPRESERVED PLCNTA TISS [employer] - [id number]  GRAFT HUM TISS [id number] CRYOPRESERVED PLCNTA TISS [employer]MB [employer]ION [id number] [employer]- [id number] Left 1 Implanted         Drains:   [REMOVED] Negative Pressure Wound Therapy Foot Left; Anterior (Removed)   $ Standard NPWT <=50 sq cm PER TX $ Yes 07/09/21 1230   Wound Type Acute/Traumatic 07/12/21 0918   Unit Type VERAFLO 07/12/21 0918   Dressing Type Veraflo 07/12/21 0918   Number of pieces used 1 07/12/21 0918   Cycle Continuous 07/12/21 0918   Target Pressure (mmHg) 125 07/12/21 0918   Intensity 5 07/12/21 0918   Irrigation Solution Vashe 07/11/21 0958   Instillation Volume  8 mL 07/12/21 0918   Soak Time  2 minutes 07/12/21 0918   Vac Frequency 2 hours 07/12/21 0918   Canister changed? No 07/12/21 0918   Dressing Status Other (Comment) 07/12/21 0918   Dressing Changed Changed/New 07/09/21 1230   Drainage Description Serous 07/12/21 0918   Output (ml) 50 ml 07/10/21 1650   Wound Assessment Other (Comment) 07/10/21 2300   Betsy-wound Assessment Other (Comment) 07/10/21 2300   Odor None 07/09/21 2200       Findings: Absence of purulence, no osteomyelitic bone or deeper infection identified    Detailed Description of Procedure:   Upon signed consent, Ms. Stormy Cerrato was escorted to the operative suite and placed to surgical table with extremities well padded. Upon initiation of MAC anesthesia, the LEFT foot was then further anesthetized using 10 ml of Carbocaine plain about the LEFT midfoot in ring fashion. A tourniquet was placed about the LEFT ankle with adequate cast padding. The LELT lower extremity was then scrubbed, prepped and draped in usual aseptic fashion. Having completed a TIME OUT, whereby the correct patient, extremity, allergies and procedure were confirmed, the procedure commenced. Using a #15 blade the wound was debrided EXCISIONALLY with removal of necrotic Sub Q tissues. A curet was also used for debridement, with only beefy and bleeding tissues remaining within the 4 x 5 cm wound. Using the [employer] Lavage system, 1000ml of Bactisure wound wash was instilled within the wound, and then further flushed with 1000 ml of sterile saline. The Stravix wound graft was thawed according to manufacturers recommendations and fenestrated before being placed to the dorsal left foot wound and secured using staples. The wound was then dressed using Adaptic, saline moistened 4x4s, cast padding, Ace wrap and Coban. Patient tolerated planned anesthesia well with vital signs stable and vascular status intact throughout the entirety of procedure, as the tourniquet was never inflated. Patient was discharged to the PACU for further monitoring, with eventual return to her Med/ Surg room for further medical management. Disposition: As definitive procedure has been performed, no further intervention is necessary, and discharge planning can be initiated for home going antibiotics with weekly post-op care with Podiatry.     Akshat Rojo DPM  [facility]  [phone]      Electronically signed by Speedy Shrestha DPM on 7/13/2021 at 12:34 PM

## 2021-07-13 NOTE — PROGRESS NOTES
Hospitalist Progress Note      PCP: Nataly Olivera MD    Date of Admission: 7/6/2021    Chief Complaint: [facility] Course: A 66-year-old  female was found lying on the ground with her legs that were extremely red, warm, swollen, inflamed, and had a large what appeared to be a blood blister over the dorsum of the left foot closed to the left toe. The patient notes that over the last two days, she has had rapidly progressive increasing severe throbbing leg pain that has been disabling. 7/12 Patient states her left lower extremity cellulitis is getting better. She was up working with therapy today. Family states she does not want to go to a rehab facility and will just go home. Discussed [facility] in S4 level of care. Plan for OR later on today. Subjective: Patient seen and examined. Surgical intervention earlier today without issue.  Will continue IV abx for additional day before transitioning to oral     Medications:  Reviewed    Infusion Medications    dextrose      vashe wound therapy      sodium chloride       Scheduled Medications    lisinopril  20 mg Oral Daily    senna  1 tablet Oral Nightly    cefTRIAXone (ROCEPHIN) IVPB in 50 mL NS  2,000 mg Intravenous Daily    polyethylene glycol  17 g Oral BID    [Held by provider] enoxaparin  40 mg Subcutaneous Daily    insulin lispro  0-12 Units Subcutaneous TID WC    insulin lispro  0-6 Units Subcutaneous Nightly    aspirin  81 mg Oral Daily    atorvastatin  40 mg Oral Daily    levothyroxine  100 mcg Oral Daily    sodium chloride flush  5-40 mL Intravenous 2 times per day     PRN Meds: sodium chloride flush, glucose, dextrose, glucagon (rDNA), dextrose, vashe wound therapy, sodium chloride flush, sodium chloride, ondansetron, fentanNYL, HYDROcodone 5 mg - acetaminophen, acetaminophen      Intake/Output Summary (Last 24 hours) at 7/13/2021 0922  Last data filed at 7/13/2021 0649  Gross per 24 hour   Intake 740 ml Output 75 ml   Net 665 ml       Physical Exam Performed:    BP (!) 158/71   Pulse 83   Temp 97.9 °F (36.6 °C) (Oral)   Resp 16   Ht 5' 4\" (1.626 m)   Wt 183 lb 3.2 oz (83.1 kg)   SpO2 95%   BMI 31.45 kg/m²     General appearance: No apparent distress, appears stated age, morbidly obese  HEENT: Pupils equal, round, and reactive to light. Conjunctivae/corneas clear. Neck: Supple, with full range of motion. No jugular venous distention. Trachea midline. Respiratory:  Normal respiratory effort. Clear to auscultation, bilaterally without Rales/Wheezes/Rhonchi. Cardiovascular: Regular rate and rhythm with normal S1/S2   Abdomen: Soft, non-tender, non-distended with normal bowel sounds. Musculoskeletal: Left lower extremity surgically wrapped  Skin: Skin color, texture, turgor normal.  No rashes or lesions. Neurologic:  Neurovascularly intact without any focal sensory/motor deficits. Cranial nerves: II-XII intact, grossly non-focal.  Psychiatric: Alert and oriented, thought content appropriate, normal insight  Capillary Refill: Brisk,< 3 seconds   Peripheral Pulses: +2 palpable, equal bilaterally       Labs:   Recent Labs     07/11/21  1005 07/12/21  0655   WBC 12.3* 12.3*   HGB 9.9* 9.9*   HCT 29.6* 29.3*    337     Recent Labs     07/11/21  1005 07/12/21  0655   * 134*   K 4.7 4.7   CL 97* 100   CO2 23 23   BUN 14 11   CREATININE 0.7 0.6   CALCIUM 8.3 8.5     No results for input(s): AST, ALT, BILIDIR, BILITOT, ALKPHOS in the last 72 hours. Recent Labs     07/12/21  0655   INR 1.10     No results for input(s): [place] in the last 72 hours. Urinalysis:      Lab Results   Component Value Date    NITRU Negative 07/09/2021    WBCUA 3 07/09/2021    BACTERIA RARE 05/21/2021    RBCUA 7 07/09/2021    BLOODU SMALL 07/09/2021    SPECGRAV 1.009 07/09/2021    GLUCOSEU Negative 07/09/2021       Radiology:  XR FOOT LEFT (MIN 3 VIEWS)   Final Result   1. No acute osseous abnormality.    2. Old fractures of the proximal phalanges of the 4th and 5th toes. XR CHEST PORTABLE   Final Result   No airspace disease by radiograph. Assessment/Plan:    Cellulitis left leg  - xray left foot: No acute osseous abnormality  - blood cx negative  - wound cx with gbs  - cont zyvox, cefepime -> rocephin only   - ID consulted  - podiatry consulted, s/p bedside I&d  - wound vac placed 7/9, returned OR on Monday for further debridement      Sepsis, POA - resolved  - with criteria: leukocytosis, lactic acidosis; source is cellulitis  - blood and urine cultures - ngtd  - procalcitonin 1.9 -> .65  - ivf given   - abx as above    Diabetes mellitus  A1c 6.8  Continue sliding scale    Hypothyroidism  Continue home Synthroid    Hypertension  Restart lisinopril    DVT Prophylaxis: Lovenox  Diet: Adult Oral Nutrition Supplement; Diabetic Oral Supplement  ADULT DIET;  Regular; 4 carb choices (60 gm/meal)  Code Status: Full Code    PT/OT Eval Status: 3-5 sessions    Nilson Walker MD

## 2021-07-13 NOTE — PROGRESS NOTES
Physical Therapy  Facility/Department: [address] MED SURG  Daily Treatment Note  NAME: Carey Rogers  : 1929  MRN: [id number]    Date of Service: 2021    Discharge Recommendations:  24 hour supervision or assist, Patient would benefit from continued therapy after discharge, S Level 4, Home with Home health PT        Assessment   Body structures, Functions, Activity limitations: Decreased functional mobility ; Decreased ADL status; Decreased strength;Decreased endurance;Decreased balance  Assessment: Pt able to complete mobility tasks with CGA-Min A this morning, increased time to complete d/t fatigue. Ambulation tolerance remains limited d/t hip weakness, but pt does not have to ambulate long distances at home. Anticipate safe return home with initial  assist, HHPT S4 level, and regular use of walker. Carey Rogers scored a  on the AM-PAC short mobility form. If patient discharges prior to next session this note will serve as a discharge summary. Please see below for the latest assessment towards goals. PT Education: PT Role;Plan of Care; Functional Mobility Training;General Safety;Gait Training;Transfer Training  REQUIRES PT FOLLOW UP: Yes  Activity Tolerance  Activity Tolerance: Patient Tolerated treatment well;Patient limited by fatigue;Patient limited by endurance     Patient Diagnosis(es): The primary encounter diagnosis was Septicemia ([address] Diagnoses of Cellulitis of left lower extremity, Hematoma of left foot, and DASHAWN (acute kidney injury) ([address] were also pertinent to this visit. has a past medical history of Arthritis, Cataract, Diabetes ([address] High blood pressure, Kidney problem, Neuropathy, and Thyroid disorder. has a past surgical history that includes Appendectomy; Hysterectomy; Ovary removal; Total knee arthroplasty (Right, ); Carpal tunnel release (Left, 2017); Colonoscopy; Carpal tunnel release (Right, 2017);  Finger trigger release (Right, 09/26/2017); and Foot Debridement (Left, 7/12/2021). Restrictions  Restrictions/Precautions  Restrictions/Precautions: Fall Risk  Position Activity Restriction  Other position/activity restrictions:     Subjective   General  Chart Reviewed: Yes  Additional Pertinent Hx: Pt is a 80 y.o. female who presented to the ED on 7/6/21 after being found down at home. Blood glucose levels found to be >300. Subjective  Subjective: Wound Vac removed s/p I&D L foot. Pt agreeable to attempting activity. Hopeful of D/C today or tomorrow? Orientation  Orientation  Overall Orientation Status: Within Functional Limits    Objective      Bed mobility  Supine to Sit: Stand by assistance (HOB elevated, cues for use of bedrail, increased time to complete d/t fatigue)     Transfers  Sit to Stand: Minimal Assistance;Contact guard assistance (Cues for positioning: CGA from EOB, Min A from lower couch seat; CGA from chair)  Stand to sit: Contact guard assistance     Ambulation  Ambulation?: Yes  Ambulation 1  Surface: level tile  Device: Standard Walker  Assistance: Contact guard assistance  Quality of Gait: Cues to keep walker close, extend elbows to improve UE WB, and to maintain more erect posture to avoid knee buckle/minimize trendellenberg gait. Distance: 5' x 3  Comments: Rest break between trials d/t fatigue. Other Activities: Other (see comment)  Comment: PT discussed D/C plan with pt and family. Daughter will be present to assist pt, and is comfortable providing Min A as needed for transfers/ambulation.     AM-PAC Score  AM-PAC Inpatient Mobility Raw Score : 17 (07/13/21 0908)  AM-PAC Inpatient T-Scale Score : 42.13 (07/13/21 0908)  Mobility Inpatient CMS 0-100% Score: 50.57 (07/13/21 0908)  Mobility Inpatient CMS G-Code Modifier : CK (07/13/21 0908)          Goals  Short term goals  Time Frame for Short term goals: by acute discharge - all goals ongoing as of 7/8/21  Short term goal 1: bed mobility with supv  Short term goal 2: sit<>Stand with SBA  Short term goal 3: ambulate > 20' with RW and SBA  Patient Goals   Patient goals : none stated    Plan    Plan  Times per week: 3-5x/week  Current Treatment Recommendations: Strengthening, Transfer Training, Balance Training, Functional Mobility Training, Gait Training, Safety Education & Training, Patient/Caregiver Education & Training, Neuromuscular Re-education  Safety Devices  Type of devices:  All fall risk precautions in place, Call light within reach, Chair alarm in place, Gait belt, Left in chair, Nurse notified  Restraints  Initially in place: No     Therapy Time   Individual Concurrent Group Co-treatment   Time In [address]         Minutes 25         Timed Code Treatment Minutes: 25 Minutes       Claire Mack PT    Electronically signed by Claire Mack, PT [id number] on 7/13/2021 at 9:13 AM

## 2021-07-13 NOTE — PROGRESS NOTES
Occupational Therapy  Facility/Department: [address] MED SURG  Daily Treatment Note  NAME: Josue Jaramillo  : 1929  MRN: [id number]    Date of Service: 2021    Discharge Recommendations:  2-3 sessions per week, 24 hour supervision or assist, Patient would benefit from continued therapy after discharge  OT Equipment Recommendations  Equipment Needed: No    Josue Jaramillo scored a 17/24 on the AM-PAC ADL Inpatient form. Current research shows that an AM-PAC score of 18 or greater is typically associated with a discharge to the patient's home setting. Based on the patient's AM-PAC score, and their current ADL deficits, it is recommended that the patient have 2-3 sessions per week of Occupational Therapy at d/c to increase the patient's independence. At this time, this patient demonstrates the endurance and safety to discharge home with [address] [place] [address] (home vs OP services) and a follow up treatment frequency of 2-3x/wk. Please see assessment section for further patient specific details. If patient discharges prior to next session this note will serve as a discharge summary. Please see below for the latest assessment towards goals. Assessment   Performance deficits / Impairments: Decreased functional mobility ; Decreased ADL status; Decreased safe awareness;Decreased balance;Decreased posture;Decreased ROM; Decreased endurance;Decreased high-level IADLs;Decreased strength  Assessment: 79 yo female admitted  for LLE cellulits found down by her dtr. PMH: HTN, DM, arthritis. PTA, pt lives alone with dtrs visiting often and assisting with IADLs and present during baths. Otherwise, pt independent with ADls and fxl mobility with RW in home and cane outside of home.  session: Pt tolerates session fair, limited d/t generalzized fatigue. Pt requiring Min A for sit>stand with RW with cues for hand placement from EOB, and Min A for standing balance during grooming tasks.  Pt with improve standing tolerance to 2 min with unilateral support on RW x3 sets for oral care and hair brushing. pt requiring cues for upright posture. Pt Max A LB sock doffing. Cont acute OT and rec d/c home with 24 hr supervision and OT 2-3x/week  Treatment Diagnosis: Impaired ADl and fxl mobiliy  Prognosis: Good  OT Education: OT Role;Plan of Care;Transfer Training  REQUIRES OT FOLLOW UP: Yes  Activity Tolerance  Activity Tolerance: Patient limited by fatigue  Activity Tolerance: standing tolerance limited to 2 min  Safety Devices  Safety Devices in place: Yes  Type of devices: Call light within reach;Gait belt;Nurse notified; Patient at risk for falls; Left in bed;Bed alarm in place       Patient Diagnosis(es): The primary encounter diagnosis was Septicemia ([facility] Diagnoses of Cellulitis of left lower extremity, Hematoma of left foot, and DASHAWN (acute kidney injury) ([facility] [address] were also pertinent to this visit. has a past medical history of Arthritis, Cataract, Diabetes ([facility] [address] High blood pressure, Kidney problem, Neuropathy, and Thyroid disorder. has a past surgical history that includes Appendectomy; Hysterectomy; Ovary removal; Total knee arthroplasty (Right, 2006); Carpal tunnel release (Left, 09/05/2017); Colonoscopy; Carpal tunnel release (Right, 09/26/2017); Finger trigger release (Right, 09/26/2017); and Foot Debridement (Left, 7/12/2021). Restrictions  Restrictions/Precautions  Restrictions/Precautions: Fall Risk  Position Activity Restriction  Other position/activity restrictions: Wound Vac    Subjective   General  Chart Reviewed: Yes, Progress Notes  Patient assessed for rehabilitation services?: Yes  Additional Pertinent Hx: 79 yo female admitted 7/6 for LLE cellulits found down by her dtr. PMH: HTN, DM, arthritis  Family / Caregiver Present: Yes (dtr)  Referring Practitioner: Mayur Garcia MD  Diagnosis: LLE cellulitis  Subjective  Subjective: Pt seen bedside, pt agreeable to OT tx.  No reports of pain, does report generalized fatigue  General Comment  Comments: RN ok to see      Orientation  Orientation  Overall Orientation Status: Within Normal Limits    Objective    ADL  Grooming: Minimal assistance (Min A in stance d/t need for steadying. hair brush and oral care)  LE Dressing: Dependent/Total (doff socks)        Balance  Sitting Balance: Stand by assistance  Standing Balance: Minimal assistance  Standing Balance  Time: 2 min x3  Activity: standing with unilateral support on RW for grooming with Min A, denture management + teeth brushing + hair brushing  Functional Mobility  Functional Mobility Comments: NA  Wheelchair Bed Transfers  Wheelchair/Bed - Technique: Ambulating  Equipment Used:  Other (chair with arms, recliner)  Level of Asssistance: Contact guard assistance;2 Person assistance  Bed mobility  Supine to Sit: Stand by assistance (HOB elevated, cues for use of bedrail, increased time to complete d/t fatigue)  Transfers  Sit to stand: Minimal assistance  Stand to sit: Minimal assistance  Transfer Comments: from EOB>RW. cues for hand placement                       Cognition  Overall Cognitive Status: WNL                                         Plan   Plan  Times per week: 3-5  Times per day: Daily  Current Treatment Recommendations: Strengthening, Pain Management, Positioning, ROM, Safety Education & Training, Balance Training, Self-Care / ADL, Functional Mobility Training, Endurance Training, Patient/Caregiver Education & Training    AM-PAC Score        AM-[facility] Inpatient Daily Activity Raw Score: 17 (07/13/21 1544)  AM-PAC Inpatient ADL T-Scale Score : 37.26 (07/13/21 1544)  ADL Inpatient CMS 0-100% Score: 50.11 (07/13/21 1544)  ADL Inpatient CMS G-Code Modifier : CK (07/13/21 1544)    Goals  Short term goals  Time Frame for Short term goals: prior to d/c  Short term goal 1: ADL tx SUP  Short term goal 2: toileting SUP  Short term goal 3: tolerate 3 min fxl standing task with SUP  Short term goal 4: UB dressing/bathing with SUP  Short term goal 5: LB dressing SUP  Patient Goals   Patient goals : none stated       Therapy Time   Individual Concurrent Group Co-treatment   Time In 1512         Time Out 1542         Minutes 30         Timed Code Treatment Minutes: 30 Minutes (30 ADL)       THERESA Chisholm, OTR/L

## 2021-07-13 NOTE — CARE COORDINATION
Updated Graciela with [facility] about dc planned for tomorrow with S4 level of care.     Electronically signed by Juana Bueno RN on 7/13/21 at 12:15 PM EDT

## 2021-07-13 NOTE — PROGRESS NOTES
Infectious Disease Follow up Notes  Admit Date: 7/6/2021  Hospital Day: 8    Antibiotics : IV Ceftriaxone       CHIEF COMPLAINT:     Left leg cellulitis  Sepsis  WBC elevation   Left foot abscess   Group C strep     Subjective interval History :  80 y.o. woman was found down at home with on going lEFT Leg infection and cellulitis with large blood filled blister on the dorsum of the Left foot.  She sustained injury to Left foot in May 2021 per family now developed progressive cellulitis with skin lesions and local redness with serous drainage and on going pain, swelling extending up the Left leg and she has h/o bi lateral lower leg edema from venous stasis and WBC elevation with lactic acid elevation, DASHAWN and fevers on admit with on going septic process we are consulted for recommendations   Location : Left leg swelling, pain and skin changes       Quality : aching       Severity :  8/10   Duration :  weeks    Timing : intermittent   Context : Left foot trauma and chronic edema    Modifying factors : none   Associated signs and symptoms:  WBC elevation, BG elevation  Confusion, fevers        Interval History :  Did some therapy with PT per family pain controlled and left foot swelling going down and tolerating IV abx ok   Past Medical History:    Past Medical History:   Diagnosis Date    Arthritis     Cataract     Diabetes ([address]     High blood pressure     Kidney problem     Neuropathy     Thyroid disorder        Past Surgical History:    Past Surgical History:   Procedure Laterality Date    APPENDECTOMY      CARPAL TUNNEL RELEASE Left 09/05/2017      Left carpal tunnel release  & Left Thumb, Index Finger and Middle Finger trigger finger release    CARPAL TUNNEL RELEASE Right 09/26/2017    COLONOSCOPY      FINGER TRIGGER RELEASE Right 09/26/2017    Thumb & Index Finger    FOOT DEBRIDEMENT Left 7/12/2021    LEFT FOOT INCISION AND DRAINAGE, [address] OUT WITH APPLICATION GRAFT performed by Socorro Lindsey DPM at [facility]. [address] Right 2006    Dr. Jourdan Hunt       Current Medications:    Outpatient Medications Marked as Taking for the 7/6/21 encounter [facility] Encounter)   Medication Sig Dispense Refill    Cholecalciferol (VITAMIN D) 50 MCG (2000 UT) CAPS capsule Take 2,000 Units by mouth daily      lisinopril (PRINIVIL;ZESTRIL) 20 MG tablet Take 1 tablet by mouth daily 90 tablet 3    atorvastatin (LIPITOR) 40 MG tablet TAKE 1 TABLET DAILY 90 tablet 3    metFORMIN (GLUCOPHAGE) 1000 MG tablet TAKE 1 TABLET TWICE A DAY WITH MEALS 180 tablet 3    levothyroxine (SYNTHROID) 112 MCG tablet TAKE ONE TABLET BY MOUTH DAILY (Patient taking differently: Take 112 mcg by mouth Daily TAKE ONE TABLET BY MOUTH DAILY at 6 am) 90 tablet 3    acetaminophen (AMINOFEN) 325 MG tablet Take 2 tablets by mouth every 6 hours as needed for Pain 120 tablet 0    Cranberry-Cholecalciferol 4200-500 MG-UNIT CAPS Take by mouth      aspirin 81 MG tablet Take 81 mg by mouth daily      Multiple Vitamin (MULTI VITAMIN DAILY PO) Take by mouth         Allergies:  Gabapentin, Pravachol [pravastatin sodium], and Tegaderm ag mesh 2\"x2\" [wound dressings]    Immunizations :   Immunization History   Administered Date(s) Administered    Influenza Vaccine, unspecified formulation 10/10/2003, 10/05/2004, 10/04/2005, 09/01/2012, 10/01/2014, 11/20/2017    Influenza Virus Vaccine 11/20/2017    Influenza, High Dose (Fluzone 65 yrs and older) 09/28/2018    Influenza, Liz Horn, Recombinant, IM PF (Flublok 18 yrs and older) 10/05/2019    Influenza, Quadv, adjuvanted, 65 yrs +, IM, PF (Fluad) 10/26/2020    Pneumococcal Conjugate 13-valent ([id number]) 02/15/2018    Pneumococcal Polysaccharide ([id number]) 02/26/2019, 10/05/2019    Tdap (Boostrix, Adacel) 11/20/2017    Zoster Live (Zostavax) 11/20/2017    Zoster Recombinant (Shingrix) 10/05/2019, 12/23/2019       Social History:    Social History     Tobacco Use    Smoking status: Never Smoker    Smokeless tobacco: Never Used   Substance Use Topics    Alcohol use: Yes     Comment: occas    Drug use: No     Social History     Tobacco Use   Smoking Status Never Smoker   Smokeless Tobacco Never Used      Family history : no Liver disorders no COPD       REVIEW OF SYSTEMS:     Constitutional:  negative for fevers, chills, night sweats  Eyes:  negative for blurred vision, eye discharge, visual disturbance   HEENT:  negative for hearing loss, ear drainage,nasal congestion  Respiratory:  negative for cough, shortness of breath or hemoptysis   Cardiovascular:  negative for chest pain, palpitations, syncope  Gastrointestinal:  negative for nausea, vomiting, diarrhea, constipation, abdominal pain  Genitourinary:  negative for frequency, dysuria, urinary incontinence, hematuria  Hematologic/Lymphatic:  negative for easy bruising, bleeding and lymphadenopathy  Allergic/Immunologic:  negative for recurrent infections, angioedema, anaphylaxis   Endocrine:  negative for weight changes, polyuria, polydipsia and polyphagia  Musculoskeletal:  Left leg swelling+ and local redness and wound+ , swelling, decreased range of motion  Integumentary: No rashes, skin lesions  Neurological:  negative for headaches, slurred speech, unilateral weakness  Psychiatric: negative for hallucinations,confusion,agitation.                 PHYSICAL EXAM:      Vitals:    BP (!) 158/71   Pulse 83   Temp 97.9 °F (36.6 °C) (Oral)   Resp 16   Ht 5' 4\" (1.626 m)   Wt 183 lb 3.2 oz (83.1 kg)   SpO2 95%   BMI 31.45 kg/m²     General Appearance: alert,in some  acute distress, ++ pallor, no icterus   Skin: warm and dry, no rash or erythema  Head: normocephalic and atraumatic  Eyes: pupils equal, round, and reactive to light, conjunctivae normal  ENT: tympanic membrane, external ear and ear canal normal bilaterally, nose NITRU Negative 07/09/2021    LEUKOCYTESUR Negative 07/09/2021    LABMICR YES 07/09/2021    URINETYPE NotGiven 07/09/2021      Urine Microscopic:   Lab Results   Component Value Date    BACTERIA RARE 05/21/2021    HYALCAST 0 07/09/2021    WBCUA 3 07/09/2021    RBCUA 7 07/09/2021    EPIU 1 07/09/2021     Urine Reflex to Culture:   Lab Results   Component Value Date    URRFLXCULT Not Indicated 07/09/2021         MICRO: cultures reviewed and updated by me   Blood Culture:   Lab Results   Component Value Date    BC No Growth after 4 days of incubation. 07/06/2021    BLOODCULT2 No Growth after 4 days of incubation. 07/06/2021       Respiratory Culture:  Lab Results   Component Value Date    LABGRAM  07/08/2021     No Epithelial Cells seen  No WBCs or organisms seen       AFB:No results found for: AFBSMEAR  Viral Culture:  No results found for: COVID19  Urine Culture: No results for input(s): LABURIN in the last 72 hours. Culture, Wound [[id number]] (Abnormal) Collected: 07/08/21 1308   Order Status: Completed Specimen: Abscess Updated: 07/09/21 1029    Gram Stain Result No Epithelial Cells seen   No WBCs or organisms seen     Organism Beta Strep Group CAbnormal     WOUND/ABSCESS --    Heavy growth   Susceptibility testing of penicillin and other beta lactams is   not necessary for beta hemolytic Streptococci since resistant   strains have not been identified. (CLSI M100)    Narrative:     ORDER#: [id number]                          ORDERED BY: RUPALI PAYNE   SOURCE: Abscess Left dorsal foot           COLLECTED:  07/08/21 13:08   ANTIBIOTICS AT [facility]. :                      RECEIVED :  07/08/21 13:28   Performed at:   [facility]   [address] [facility] [address]   Phone [phone]     IMAGING:    XR FOOT LEFT (MIN 3 VIEWS)   Final Result   1. No acute osseous abnormality. 2. Old fractures of the proximal phalanges of the 4th and 5th toes.          XR CHEST PORTABLE Final Result   No airspace disease by radiograph.                All the pertinent images and reports for the current Hospitalization were reviewed by me     Scheduled Meds:   lisinopril  20 mg Oral Daily    senna  1 tablet Oral Nightly    cefTRIAXone (ROCEPHIN) IVPB in 50 mL NS  2,000 mg Intravenous Daily    polyethylene glycol  17 g Oral BID    [Held by provider] enoxaparin  40 mg Subcutaneous Daily    insulin lispro  0-12 Units Subcutaneous TID WC    insulin lispro  0-6 Units Subcutaneous Nightly    aspirin  81 mg Oral Daily    atorvastatin  40 mg Oral Daily    levothyroxine  100 mcg Oral Daily    sodium chloride flush  5-40 mL Intravenous 2 times per day       Continuous Infusions:   dextrose      vashe wound therapy      sodium chloride         PRN Meds:  sodium chloride flush, glucose, dextrose, glucagon (rDNA), dextrose, vashe wound therapy, sodium chloride flush, sodium chloride, ondansetron, fentanNYL, HYDROcodone 5 mg - acetaminophen, acetaminophen      Assessment:     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    Arthritis of left knee    Type 2 diabetes mellitus without complication, without long-term current use of insulin (HCC)    Essential hypertension    Mixed hyperlipidemia    Pedal edema    Other secondary kyphosis, cervicothoracic region    Postablative hypothyroidism    Closed fracture of second cervical vertebra (HCC)    Closed fracture of first cervical vertebra (HCC)    Lymphedema    High risk medication use    Chronic neck pain    Rotator cuff arthropathy of right shoulder    Bradycardia    Arthritis of left hip    Orthostatic syncope    Sepsis due to cellulitis (HCC)       Sepsis on admit  WBC elevation   Lactic acidosis resolved  DASHAWN improved  ON going severe Left leg cellulitis  Diabetic foot infection   Bi lateral lower leg venous stasis and edema   Left leg blood filled blistering lesion from the Trauma on the dorsum of the foot  Fevers from above  X ray Left foot with Fracture of 4th and 5thToes phalanges would correspond to the Trauma in May 2021 per patient      lEFT Leg extensive cellulitis is the source for sepsis and given the leg appearance could be streptococcal and abscess cx now isolated as Group C strep    S/p Left foot blister and abscess drainage+ and wound noted and  Anticipate to cont IV abx as in patient and depending on her progress will see if she need IV abx therapy    S/p ID and debridement and grafting no additional pus noted wound down to Tendons and Left leg redness improving slowly and edema going down      Labs, Microbiology, Radiology and all the pertinent results from current hospitalization and  care every where were reviewed  by me as a part of the evaluation   Plan:   1. Cont IV  CEFfTRIAXONE X 2 GM  q 24 HRS   2. S/p ID and debridement and grafting  3. Cont local care  4. Elevate the leg  5. Wound cx GROUP C strep  6. Podiatry eval noted  7. Trend WBC, pROCAL   8. Check HbA1c 6.8  9. Surgery notes reviewed    10. Home on oral Cephalexin x 500 mg x q 8 HRS X 14 DAYS  11. Follow up with Podiatry as planned      Discussed with patient/Family and Nursing d/w family at bed side     Thanks for allowing me to participate in your patient's care and please call me with any questions or concerns.     Ariela Cancino MD  Infectious Disease  [facility]) Physician  Phone: [phone]   Fax : [phone]

## 2021-07-13 NOTE — PROGRESS NOTES
Department of Podiatry Progress Note  Faizan Short DPM Attending           CHIEF COMPLAINT:  Blister of LEFT dorsal foot    HISTORY OF PRESENT ILLNESS:                The patient is a 80 y.o. female with significant past medical history of  Obesity, Diabetes, Hypertension and Hypothyroidism who is consulted for a several days to week's duration of growing blood blister to dorsum of LEFT foot. Patient's daughter relates an item struck her mother's foot and blister has continually grown. Patient seen today at bedside. She is up in a chair with her daughter at bedside. She has not complaints. She is s/p surgeries to the foot and the patient had a graft placed yesterday by Dr. Lady Vee. Allergies:   Gabapentin, Pravachol [pravastatin sodium], and Tegaderm ag mesh 2\"x2\" [wound dressings]     Social History:  l  Patient currently lives alone, daughters as support    REVIEW OF SYSTEMS:    CONSTITUTIONAL:  negative  INTEGUMENT:  positive for skin lesion(s), skin color change, changes in lesion, changes in hair and changes in nails  MUSCULOSKELETAL:  positive for  joint swelling, stiff joints and decreased range of motion  NEUROLOGICAL:  positive for gait problems, weakness and numbness    PHYSICAL EXAM:      Vitals:    /71   Pulse 87   Temp 98.3 °F (36.8 °C) (Oral)   Resp 16   Ht 5' 4\" (1.626 m)   Wt 183 lb 3.2 oz (83.1 kg)   SpO2 96%   BMI 31.45 kg/m²     LABS:   Recent Labs     07/11/21  1005 07/12/21  0655   WBC 12.3* 12.3*   HGB 9.9* 9.9*   HCT 29.6* 29.3*    337     Recent Labs     07/12/21  0655   *   K 4.7      CO2 23   BUN 11   CREATININE 0.6     Recent Labs     07/12/21  0655   INR 1.10       LOWER EXTREMITY EXAMINATION   SKIN:    The erythema previously noted on the left lower leg is resolving. Dressing not removed below ankle to preserve skin graft substitute      NEUROLOGIC:    Pain sensation is decreased Left. Light touch is decreasedLeft.  positive history of paresthesia Bilateral. negativehistory of burning Bilateral. Deep tendon reflexes are 1 to include patellar and achilles Bilateral. [place] 5.07 monofilament sensitivity is abnormal 4 to 5 spots tested Bilateral.    VASCULAR:    left Dorsalis pedis is 1. left Posterior tibial pulse is 1. 3+ edema left. moderate Varicosities bilaterally. MUSCULOSKELETAL:    [place] is abnormal  antalgic due to pain of lesion. Muscle strength is 4/5 to all groups tested to include dorsiflexion, plantarflexion, inversion, eversion, and digital Bilateral . The ranges of motion of all joints tested from ankle distal is decreased there is crepitus noted Left. Radiology of 7/7/2021  FINDINGS:   There is no evidence of acute fracture.  There is normal alignment of the   tarsometatarsal joints.  No acute joint abnormality.  No focal osseous   lesion.  Soft tissue swelling of the forefoot.  Old fractures of the proximal   phalanges of the 4th and 5th toes.           Impression   1. No acute osseous abnormality. 2. Old fractures of the proximal phalanges of the 4th and 5th toes           IMPRESSION/RECOMMENDATIONS    1. Cellulitis of LEFT foot secondary to traumatic lesion  Afebrile and further descending leukocytosis post I and D  Still present, but improving. 2. Abscess of LEFT foot   Resolved  S/p grafting by Dr. Efraín Verma. Disposition: Ok to d/c when medically stable. Follow up with Dr. Efraín Verma in outpatient office. Thank you for the opportunity to take part in the patient's care.     Herbert Ku DPM  [facility]  Pager: [phone]  Office: [phone]  Fax: [phone]

## 2021-07-13 NOTE — DISCHARGE INSTR - COC
Continuity of Care Form    Patient Name: Benedetta Kanner   :  1929  MRN:  [id number]    Admit date:  2021  Discharge date:  ***    Code Status Order: Full Code   Advance Directives:   Advance Care Flowsheet Documentation       Date/Time Healthcare Directive Type of Healthcare Directive Copy in [address] Agent's Name Healthcare Agent's Phone Number    21 3135  Yes, patient has an advance directive for healthcare treatment  Durable power of  for health care  Yes, copy in chart  Adult Children  --  --    21 0221  Yes, patient has an advance directive for healthcare treatment  Durable power of  for health care  Yes, copy in chart  Adult Children  --  --            Admitting Physician:  Kam Barry MD  PCP: Dino Robledo MD    Discharging Nurse: Miguel Ang Unit/Room#: L1X-1826/7795-18  Discharging Unit Phone Number: [phone]    Emergency Contact:   Extended Emergency Contact Information  Primary Emergency Contact: Darian Monteiro 89 [address] Phone: [phone]  Relation: Child  Secondary Emergency Contact: [address] [place] [address] Phone: [phone]  Relation: Child    Past Surgical History:  Past Surgical History:   Procedure Laterality Date    APPENDECTOMY      CARPAL TUNNEL RELEASE Left 2017      Left carpal tunnel release  & Left Thumb, Index Finger and Middle Finger trigger finger release    CARPAL TUNNEL RELEASE Right 2017    COLONOSCOPY      FINGER TRIGGER RELEASE Right 2017    Thumb & Index Finger    FOOT DEBRIDEMENT Left 2021    LEFT FOOT INCISION AND DRAINAGE, [address] OUT WITH APPLICATION GRAFT performed by Carol Desir DPM at [facility]. [facility] 91 Right     Dr. Kiki Brooks       Immunization History:   Immunization History   Administered Date(s) Administered    Influenza Vaccine, unspecified formulation 10/10/2003, 10/05/2004, 10/04/2005, 09/01/2012, 10/01/2014, 11/20/2017    Influenza Virus Vaccine 11/20/2017    Influenza, High Dose (Fluzone 65 yrs and older) 09/28/2018    Influenza, Sumit Forget, Recombinant, IM PF (Flublok 18 yrs and older) 10/05/2019    Influenza, Quadv, adjuvanted, 65 yrs +, IM, PF (Fluad) 10/26/2020    Pneumococcal Conjugate 13-valent ([id number]) 02/15/2018    Pneumococcal Polysaccharide ([id number]) 02/26/2019, 10/05/2019    Tdap (Boostrix, Adacel) 11/20/2017    Zoster Live (Zostavax) 11/20/2017    Zoster Recombinant (Shingrix) 10/05/2019, 12/23/2019       Active Problems:  Patient Active Problem List   Diagnosis Code    Carpal tunnel syndrome, bilateral G56.03    Trigger index finger M65.329    Trigger thumb of both thumbs M65.311, M65.312    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 Z96.651    Arthritis of left knee M17.12    Type 2 diabetes mellitus without complication, without long-term current use of insulin (HCC) E11.9    Essential hypertension I10    Mixed hyperlipidemia E78.2    Pedal edema R60.0    Other secondary kyphosis, 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    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    Closed fracture of second cervical vertebra (HCC) S12.100A    Closed fracture of first cervical vertebra (HCC) S12.000A    Lymphedema I89.0    High risk medication use Z79.899    Chronic neck pain M54.2, G89.29    Rotator cuff arthropathy of right shoulder M12.811    Bradycardia R00.1    Arthritis of left hip M16.12    Orthostatic syncope I95.1    Sepsis due to cellulitis ([facility]) L03.90, A41.9       Isolation/Infection:   Isolation            No Isolation          Patient Infection Status       None to display            Nurse Assessment:  Last Vital Signs: BP (!) 158/71   Pulse 83   Temp 97.9 °F (36.6 °C) (Oral)   Resp 16   Ht 5' 4\" (1.626 m)   Wt 183 lb 3.2 oz (83.1 kg)   SpO2 95%   BMI 31.45 kg/m²     Last documented pain score (0-10 scale): Pain Level: 0  Last Weight:   Wt Readings from Last 1 Encounters:   07/13/21 183 lb 3.2 oz (83.1 kg)     Mental Status:  oriented and alert    IV Access:  - None    Nursing Mobility/ADLs:  Walking   Assisted  Transfer  Assisted  Bathing  Assisted  Dressing  Assisted  Toileting  Assisted  Feeding  Independent  Med Admin  Assisted  Med Delivery   whole    Wound Care Documentation and Therapy:  Incision (Active)   Number of days:        Incision 09/05/17 Hand Left (Active)   Number of days: 2973       Incision 09/26/17 Hand Right (Active)   Number of days: 2576       Wound 07/07/21 Pedal Anterior; Left (Active)   Wound Image   07/09/21 1230   Wound Etiology Traumatic 07/12/21 0918   Dressing Status Clean;Dry; Intact 07/13/21 1042   Wound Cleansed Cleansed with saline 07/12/21 0918   Dressing/Treatment Moist to dry 07/12/21 0918   Dressing Change Due 07/12/21 07/12/21 0918   Wound Length (cm) 4 cm 07/09/21 1230   Wound Width (cm) 4.5 cm 07/09/21 1230   Wound Depth (cm) 0.3 cm 07/09/21 1230   Wound Surface Area (cm^2) 18 cm^2 07/09/21 1230   Wound Volume (cm^3) 5.4 cm^3 07/09/21 1230   Wound Assessment Other (Comment) 07/11/21 2031   Drainage Amount None 07/12/21 1328   Drainage Description Serous 07/11/21 2031   Odor None 07/11/21 2031   Betsy-wound Assessment Fragile;Edematous 07/11/21 2031   Margins Defined edges 07/11/21 2031   Number of days: 6        Elimination:  Continence:   · Bowel: Yes  · Bladder: Yes  Urinary Catheter: None   Colostomy/Ileostomy/Ileal Conduit: No       Date of Last BM: 7/13/2021    Intake/Output Summary (Last 24 hours) at 7/13/2021 1213  Last data filed at 7/13/2021 0814  Gross per 24 hour   Intake 1220 ml   Output 75 ml   Net 1145 ml     I/O last 3 completed shifts: In: 740 [P.O.:730;  I.V.:10]  Out: 76 [Urine:75]    Safety Concerns:     History of Falls (last 30 days) and At Risk for Falls    Impairments/Disabilities:      None    Nutrition Therapy:  Current Nutrition Therapy:   - Oral Diet:  Carb Control 4 carbs/meal (1800kcals/day)    Routes of Feeding: Oral  Liquids: Thin Liquids  Daily Fluid Restriction: no  Last Modified Barium Swallow with Video (Video Swallowing Test): not done    Treatments at the Time of Hospital Discharge:   Respiratory Treatments: ***  Oxygen Therapy:  is not on home oxygen therapy. Ventilator:    - No ventilator support    Rehab Therapies: Nurse, Physical Therapy, Occupational Therapy and HHA  Weight Bearing Status/Restrictions: No weight bearing restirctions  Other Medical Equipment (for information only, NOT a DME order):  walker  Other Treatments: [facility]: LEVEL [facility] to establish plan of care for patient over 60 day period  Nursing:  Initial home SN evaluation visit to occur within 24-48 hours of hospital discharge  [facility] nursing visits daily, then QOD with progression as warranted for:  1)  medication management  2)  VS and clinical assessment  3)  S&S chronic disease exacerbation education + when to contact MD/NP  4)  care coordination  Medication Reconciliation during 1st SN visit  Nurse telephone visit on the days that visit is not being made in person for first 30 days                               PT/OT/Speech   Evaluations in home within 24-48 hours of hospital discharge to include DME and home safety   Frontload therapy 5 days, then 3x a week   OT evaluation for [address] needs for personal care    Palliative Care referral within 5 days of hospital discharge    evaluation within 24-48 hours of hospital discharge to evaluate resources & insurance for potential  [place], [place], LTC, and Medicaid options   Dietician evaluation within 5 days of hospital discharge   PCP visit within 3 days of hospital discharge, followed by PCP visit @ 10 days, and 21 days   TeleHealth (Home care Vitals) if patient agreeable and qualifies.          Patient's personal belongings (please select all that are sent with patient):  Dentures upper and lower    RN SIGNATURE:  Electronically signed by Oneyda Wyman RN on 7/14/21 at 12:34 PM EDT    CASE MANAGEMENT/SOCIAL WORK SECTION    Inpatient Status Date: ***    Readmission Risk Assessment Score:  14    Discharging to Facility/ Agency   · Name:  [facility]    · Address: [address] [place] [address], [address] [place] [address]  · Phone: [phone]  · Fax: [phone]     / signature: Electronically signed by Ely Reich RN on 7/13/21 at 12:14 PM EDT    PHYSICIAN SECTION    Prognosis: Fair    Condition at Discharge: Stable    Rehab Potential (if transferring to Rehab):  Fair    Recommended Labs or Other Treatments After Discharge:     [address] [place] [address]: [facility] [address] [place] [address] agency to establish plan of care for patient over 60 day period  Nursing:  Initial home SN evaluation visit to occur within 24-48 hours of hospital discharge  [facility] nursing visits daily, then QOD with progression as warranted for:  1)  medication management  2)  VS and clinical assessment  3)  S&S chronic disease exacerbation education + when to contact MD/NP  4)  care coordination  Medication Reconciliation during 1st SN visit  Nurse telephone visit on the days that visit is not being made in person for first 30 days                               PT/OT/Speech   Evaluations in home within 24-48 hours of hospital discharge to include DME and home safety   Frontload therapy 5 days, then 3x a week   OT evaluation for [address] needs for personal care    Palliative Care referral within 5 days of hospital discharge    evaluation within 24-48 hours of hospital discharge to evaluate resources & insurance for potential  [place], [place], LTC, and Medicaid options   Dietician evaluation within 5 days of hospital discharge   PCP visit within 3 days of hospital discharge, followed by PCP visit @ 10 days, and 21 days   TeleHealth (Home care Vitals) if patient agreeable and qualifies. Podiatry dressing instructions:  Dressings to the left foot graft site as follows. Non-adherent layer to the graft site  Hydrogel or mupirocin ointment  Gauze fluffs  Rolled gauze  ACE      Physician Certification: I certify the above information and transfer of Mireya Salas  is necessary for the continuing treatment of the diagnosis listed and that she requires [address] for less 30 days.      Update Admission H&P: No change in H&P    PHYSICIAN SIGNATURE:  Electronically signed by Georgina Lugo MD on 7/14/21 at 8:42 AM EDT

## 2021-07-13 NOTE — PROGRESS NOTES
VSS - afebrile. Pt is alert and oriented x 4 with history of falls. Assessment completed as charted. Bed is in lowest position with 2/4 bed rails raised, bed alarm turned on, wheels locked and call light within reach - patient wearing non-skid socks and verbalizes understanding to call out for assistance. Daughter at bedside. No further requests at this time. Will continue to monitor.      Vitals:    07/12/21 1957   BP: 114/64   Pulse: 91   Resp: 16   Temp: 99.3 °F (37.4 °C)   SpO2: 94%

## 2021-07-14 VITALS
HEART RATE: 97 BPM | HEIGHT: 64 IN | RESPIRATION RATE: 18 BRPM | DIASTOLIC BLOOD PRESSURE: 74 MMHG | WEIGHT: 185.63 LBS | OXYGEN SATURATION: 97 % | BODY MASS INDEX: 31.69 KG/M2 | SYSTOLIC BLOOD PRESSURE: 115 MMHG | TEMPERATURE: 98.1 F

## 2021-07-14 LAB
ATYPICAL LYMPHOCYTE RELATIVE PERCENT: 1 % (ref 0–6)
BANDED NEUTROPHILS RELATIVE PERCENT: 6 % (ref 0–7)
BASOPHILS ABSOLUTE: 0.1 K/UL (ref 0–0.2)
BASOPHILS RELATIVE PERCENT: 1 %
EOSINOPHILS ABSOLUTE: 0.4 K/UL (ref 0–0.6)
EOSINOPHILS RELATIVE PERCENT: 3 %
GLUCOSE BLD-MCNC: 177 MG/DL (ref 70–99)
GLUCOSE BLD-MCNC: 228 MG/DL (ref 70–99)
HCT VFR BLD CALC: 29.5 % (ref 36–48)
HEMOGLOBIN: 10.1 G/DL (ref 12–16)
LYMPHOCYTES ABSOLUTE: 1.9 K/UL (ref 1–5.1)
LYMPHOCYTES RELATIVE PERCENT: 15 %
MCH RBC QN AUTO: 31.9 PG (ref 26–34)
MCHC RBC AUTO-ENTMCNC: 34.2 G/DL (ref 31–36)
MCV RBC AUTO: 93.1 FL (ref 80–100)
METAMYELOCYTES RELATIVE PERCENT: 5 %
MONOCYTES ABSOLUTE: 0.7 K/UL (ref 0–1.3)
MONOCYTES RELATIVE PERCENT: 6 %
NEUTROPHILS ABSOLUTE: 8.8 K/UL (ref 1.7–7.7)
NEUTROPHILS RELATIVE PERCENT: 63 %
OVALOCYTES: ABNORMAL
PDW BLD-RTO: 14.6 % (ref 12.4–15.4)
PERFORMED ON: ABNORMAL
PERFORMED ON: ABNORMAL
PLATELET # BLD: 386 K/UL (ref 135–450)
PLATELET SLIDE REVIEW: ADEQUATE
PMV BLD AUTO: 7.3 FL (ref 5–10.5)
POLYCHROMASIA: ABNORMAL
PROCALCITONIN: 0.09 NG/ML (ref 0–0.15)
RBC # BLD: 3.17 M/UL (ref 4–5.2)
SLIDE REVIEW: ABNORMAL
WBC # BLD: 11.9 K/UL (ref 4–11)

## 2021-07-14 PROCEDURE — 6370000000 HC RX 637 (ALT 250 FOR IP): Performed by: PODIATRIST

## 2021-07-14 PROCEDURE — 2580000003 HC RX 258: Performed by: PODIATRIST

## 2021-07-14 PROCEDURE — 85025 COMPLETE CBC W/AUTO DIFF WBC: CPT

## 2021-07-14 PROCEDURE — 6370000000 HC RX 637 (ALT 250 FOR IP): Performed by: INTERNAL MEDICINE

## 2021-07-14 PROCEDURE — 97530 THERAPEUTIC ACTIVITIES: CPT

## 2021-07-14 PROCEDURE — 36415 COLL VENOUS BLD VENIPUNCTURE: CPT

## 2021-07-14 PROCEDURE — 84145 PROCALCITONIN (PCT): CPT

## 2021-07-14 RX ORDER — SENNA PLUS 8.6 MG/1
1 TABLET ORAL NIGHTLY
Qty: 30 TABLET | Refills: 0 | Status: SHIPPED | OUTPATIENT
Start: 2021-07-14 | End: 2021-08-13

## 2021-07-14 RX ORDER — CEPHALEXIN 500 MG/1
500 CAPSULE ORAL 3 TIMES DAILY
Qty: 42 CAPSULE | Refills: 0 | Status: SHIPPED | OUTPATIENT
Start: 2021-07-14 | End: 2021-07-28

## 2021-07-14 RX ORDER — POLYETHYLENE GLYCOL 3350 17 G/17G
17 POWDER, FOR SOLUTION ORAL 2 TIMES DAILY
Qty: 527 G | Refills: 1 | Status: SHIPPED | OUTPATIENT
Start: 2021-07-14 | End: 2021-08-13

## 2021-07-14 RX ADMIN — LISINOPRIL 20 MG: 20 TABLET ORAL at 08:20

## 2021-07-14 RX ADMIN — POLYETHYLENE GLYCOL 3350 17 G: 17 POWDER, FOR SOLUTION ORAL at 08:20

## 2021-07-14 RX ADMIN — ATORVASTATIN CALCIUM 40 MG: 40 TABLET, FILM COATED ORAL at 08:20

## 2021-07-14 RX ADMIN — LEVOTHYROXINE SODIUM 100 MCG: 0.1 TABLET ORAL at 06:28

## 2021-07-14 RX ADMIN — HYDROCODONE BITARTRATE AND ACETAMINOPHEN 1 TABLET: 5; 325 TABLET ORAL at 00:51

## 2021-07-14 RX ADMIN — INSULIN LISPRO 4 UNITS: 100 INJECTION, SOLUTION INTRAVENOUS; SUBCUTANEOUS at 12:03

## 2021-07-14 RX ADMIN — INSULIN LISPRO 2 UNITS: 100 INJECTION, SOLUTION INTRAVENOUS; SUBCUTANEOUS at 08:32

## 2021-07-14 RX ADMIN — SODIUM CHLORIDE, PRESERVATIVE FREE 10 ML: 5 INJECTION INTRAVENOUS at 08:25

## 2021-07-14 RX ADMIN — ASPIRIN 81 MG: 81 TABLET, CHEWABLE ORAL at 08:20

## 2021-07-14 ASSESSMENT — PAIN SCALES - GENERAL
PAINLEVEL_OUTOF10: 0
PAINLEVEL_OUTOF10: 0
PAINLEVEL_OUTOF10: 5

## 2021-07-14 NOTE — PROGRESS NOTES
VSS - afebrile. Pt is alert and oriented x 4 with history of falls. Assessment completed as charted. Bed is in lowest position with 2/4 bed rails raised, bed alarm turned on, wheels locked and call light within reach - patient wearing non-skid socks and verbalizes understanding to call out for assistance. Dgtr-in-law at bedside. No further requests at this time. Will continue to monitor.      Vitals:    07/13/21 1953   BP: (!) 167/73   Pulse: 82   Resp: 18   Temp: 98.4 °F (36.9 °C)   SpO2: 94%

## 2021-07-14 NOTE — DISCHARGE SUMMARY
Hospital Medicine Discharge Summary    Patient ID: Debi Wolff      Patient's PCP: Emory Carson MD    Admit Date: 7/6/2021     Discharge Date:   7/14/21    Admitting Physician: Fan Gr MD     Discharge Physician: Hedy Winters MD     Discharge Diagnoses: Active Hospital Problems    Diagnosis Date Noted    [facility] [L03.90, A41.9] 07/07/2021       The patient was seen and examined on day of discharge and this discharge summary is in conjunction with any daily progress note from day of discharge. Hospital Course:   A 77-year-old  female was found lying on the ground with her legs that were extremely red, warm, swollen, inflamed, and had a large what appeared to be a blood blister over the dorsum of the left foot closed to the left toe. The patient notes that over the last two days, she has had rapidly progressive increasing severe throbbing leg pain that has been disabling. Patient underwent surgical drainage with wound VAC on July 9 followed by return to the OR on Monday, July 12. Patient was started on Zyvox and cefepime and that was titrated down to Rocephin and she will go home on oral Keflex for an additional 14 days.     Cellulitis left leg  - xray left foot: No acute osseous abnormality  - blood cx negative  - wound cx with gbs  - cont zyvox, cefepime -> rocephin only, discharged on Keflex  - ID consulted  - podiatry consulted, s/p bedside I&d  - wound vac placed 7/9, returned OR on Monday 7/12 for further debridement      Sepsis, POA - resolved  - with criteria: leukocytosis, lactic acidosis; source is cellulitis  - blood and urine cultures - ngtd  - procalcitonin 1.9 -> .65  - ivf given   - abx as above     Diabetes mellitus  A1c 6.8  Continue sliding scale     Hypothyroidism  Continue home Synthroid     Hypertension  Restart lisinopril      Exam:     /74   Pulse 97   Temp 98.1 °F (36.7 °C) (Oral)   Resp 18   Ht 5' 4\" (1.626 m) Wt 185 lb 10 oz (84.2 kg)   SpO2 97%   BMI 31.86 kg/m²     General appearance: No apparent distress, appears stated age, morbidly obese  HEENT: Pupils equal, round, and reactive to light. Conjunctivae/corneas clear. Neck: Supple, with full range of motion. No jugular venous distention. Trachea midline. Respiratory:  Normal respiratory effort. Clear to auscultation, bilaterally without Rales/Wheezes/Rhonchi. Cardiovascular: Regular rate and rhythm with normal S1/S2   Abdomen: Soft, non-tender, non-distended with normal bowel sounds. Musculoskeletal: Left lower extremity surgically wrapped  Skin: Skin color, texture, turgor normal.  No rashes or lesions. Neurologic:  Neurovascularly intact without any focal sensory/motor deficits. Cranial nerves: II-XII intact, grossly non-focal.  Psychiatric: Alert and oriented, thought content appropriate, normal insight  Capillary Refill: Brisk,< 3 seconds   Peripheral Pulses: +2 palpable, equal bilaterally       Consults:     PHARMACY TO DOSE VANCOMYCIN  IP CONSULT TO DIETITIAN  IP CONSULT TO INFECTIOUS DISEASES  IP CONSULT TO PODIATRY  IP CONSULT TO HOME CARE NEEDS    Significant Diagnostic Studies:     XR FOOT LEFT (MIN 3 VIEWS)   Final Result   1. No acute osseous abnormality. 2. Old fractures of the proximal phalanges of the 4th and 5th toes. XR CHEST PORTABLE   Final Result   No airspace disease by radiograph. Disposition:  Home with [facility]     Condition at Discharge: Stable    Discharge Instructions/Follow-up:  PCP, Podiatry    Code Status:  Full Code     Activity: activity as tolerated    Diet: diabetic diet      Labs:  For convenience and continuity at follow-up the following most recent labs are provided:      CBC:    Lab Results   Component Value Date    WBC 11.9 07/14/2021    HGB 10.1 07/14/2021    HCT 29.5 07/14/2021     07/14/2021       Renal:    Lab Results   Component Value Date     07/12/2021    K 4.7 07/12/2021    K 4.7 07/11/2021     07/12/2021    CO2 23 07/12/2021    BUN 11 07/12/2021    CREATININE 0.6 07/12/2021    CALCIUM 8.5 07/12/2021    PHOS 3.2 05/11/2021       Discharge Medications:     Discharge Medication List as of 7/14/2021 12:34 PM           Details   polyethylene glycol (GLYCOLAX) 17 g packet Take 17 g by mouth 2 times daily, Disp-527 g, R-1Normal      senna (SENOKOT) 8.6 MG tablet Take 1 tablet by mouth nightly, Disp-30 tablet, R-0Normal      cephALEXin (KEFLEX) 500 MG capsule Take 1 capsule by mouth 3 times daily for 14 days, Disp-42 capsule, R-0Normal              Details   Cholecalciferol (VITAMIN D) 50 MCG (2000 UT) CAPS capsule Take 2,000 Units by mouth dailyHistorical Med      lisinopril (PRINIVIL;ZESTRIL) 20 MG tablet Take 1 tablet by mouth daily, Disp-90 tablet, R-3Normal      atorvastatin (LIPITOR) 40 MG tablet TAKE 1 TABLET DAILY, Disp-90 tablet, R-3Normal      metFORMIN (GLUCOPHAGE) 1000 MG tablet TAKE 1 TABLET TWICE A DAY WITH MEALS, Disp-180 tablet, R-3Normal      levothyroxine (SYNTHROID) 112 MCG tablet TAKE ONE TABLET BY MOUTH DAILY, Disp-90 tablet,R-3Normal      acetaminophen (AMINOFEN) 325 MG tablet Take 2 tablets by mouth every 6 hours as needed for Pain, Disp-120 tablet, R-0Print      Cranberry-Cholecalciferol 4200-500 MG-UNIT CAPS Take by mouthHistorical Med      aspirin 81 MG tablet Take 81 mg by mouth dailyHistorical Med      Multiple Vitamin (MULTI VITAMIN DAILY PO) Take by mouthHistorical Med             Future Appointments   Date Time Provider [place]   8/16/2021  1:20 PM [facility] LASER ROOM [facility]       Time Spent on discharge is more than 30 minutes in the examination, evaluation, counseling and review of medications and discharge plan. Signed:    Kristina Devine MD   7/14/2021      Thank you Allan Mendoza MD for the opportunity to be involved in this patient's care.  If you have any questions or concerns please feel free to contact me at [phone].

## 2021-07-14 NOTE — CARE COORDINATION
[facility]  Received referral from case management Martha ORTIZ CM  Faxed orders to [address] care to see patient by   7/16/21  Duarte Gallagher LPN    [facility] Cell [phone], Fax [phone]

## 2021-07-14 NOTE — PROGRESS NOTES
CLINICAL PHARMACY NOTE: MEDS TO BEDS    Total # of Prescriptions Filled: 3   The following medications were delivered to the patient:  Current Discharge Medication List      START taking these medications    Details   polyethylene glycol (GLYCOLAX) 17 g packet Take 17 g by mouth 2 times daily  Qty: 527 g, Refills: 1      senna (SENOKOT) 8.6 MG tablet Take 1 tablet by mouth nightly  Qty: 30 tablet, Refills: 0      cephALEXin (KEFLEX) 500 MG capsule Take 1 capsule by mouth 3 times daily for 14 days  Qty: 42 capsule, Refills: 0         ·   ·     Additional Documentation:

## 2021-07-14 NOTE — PROGRESS NOTES
Physical Therapy  Facility/Department: [address] MED SURG  Daily Treatment Note  NAME: Debi Wolff  : 1929  MRN: [id number]    Date of Service: 2021    Discharge Recommendations:  24 hour supervision or assist, Patient would benefit from continued therapy after discharge, S Level 4, Home with Home health PT        Assessment   Body structures, Functions, Activity limitations: Decreased functional mobility ; Decreased ADL status; Decreased strength;Decreased endurance;Decreased balance  Assessment: Pt more limited by fatigue today, and PT noted increased swelling, temperature, and redness in LLE. RN notified. Recommend use of stedy for all transfers/mobility. Family continues to plan for pt to return directly home, and PT anticipates she will be safel to do so, once medically stable. Continue to recommend initial 24/7 assist, HHPT S4 level, regular use of walker. Debi Wolff scored a 17/ on the AM-PAC short mobility form. If patient discharges prior to next session this note will serve as a discharge summary. Please see below for the latest assessment towards goals. PT Education: PT Role;Plan of Care; Functional Mobility Training;General Safety;Gait Training;Transfer Training  REQUIRES PT FOLLOW UP: Yes  Activity Tolerance  Activity Tolerance: Patient limited by fatigue     Patient Diagnosis(es): The primary encounter diagnosis was Septicemia ([address] Diagnoses of Cellulitis of left lower extremity, Hematoma of left foot, and DAHSAWN (acute kidney injury) ([address] were also pertinent to this visit. has a past medical history of Arthritis, Cataract, Diabetes ([address] High blood pressure, Kidney problem, Neuropathy, and Thyroid disorder. has a past surgical history that includes Appendectomy; Hysterectomy; Ovary removal; Total knee arthroplasty (Right, ); Carpal tunnel release (Left, 2017); Colonoscopy; Carpal tunnel release (Right, 2017);  Finger trigger release (Right, 09/26/2017); and Foot Debridement (Left, 7/12/2021). Restrictions  Restrictions/Precautions  Restrictions/Precautions: Fall Risk  Position Activity Restriction  Other position/activity restrictions: Wound Vac  Subjective   General  Chart Reviewed: Yes  Additional Pertinent Hx: Pt is a 80 y.o. female who presented to the ED on 7/6/21 after being found down at home. Blood glucose levels found to be >300. Response To Previous Treatment: Patient with no complaints from previous session. Subjective  Subjective: LLE appears more swollen, hot to the touch. Pt does not report much pain, but is having some difficulty moving the leg in bed. Orientation  Orientation  Overall Orientation Status: Within Functional Limits    Objective      Bed mobility  Supine to Sit: Minimal assistance (Min A for LLE control)     Transfers  Sit to Stand: Minimal Assistance  Stand to sit: Minimal Assistance     Ambulation  Ambulation?: Yes  Ambulation 1  Surface: level tile  Device: Standard Walker  Other Apparatus: Wheelchair follow  Assistance: Minimal assistance  Quality of Gait: Min A to correct intermittent posterolateral LOB, increased severity of trendellenberg gait. Pt remains limited d/t fatigue. Distance: 10' x 2  Stairs/Curb  Stairs?: No     Other Activities: Other (see comment)  Comment: Pt able to urinate. She stood from commode with Min A, then completed enzo-care while PT provided Min A for balance. Standing at sink, pt experienced 1 LOB requiring Min A to correct. Decreased balance/safety awareness noted today.     AM-PAC Score  AM-PAC Inpatient Mobility Raw Score : 17 (07/14/21 0916)  AM-PAC Inpatient T-Scale Score : 42.13 (07/14/21 0916)  Mobility Inpatient CMS 0-100% Score: 50.57 (07/14/21 0916)  Mobility Inpatient CMS G-Code Modifier : CK (07/14/21 0916)          Goals  Short term goals  Time Frame for Short term goals: by acute discharge - all goals ongoing as of 7/8/21  Short term goal 1: bed mobility with supv  Short term goal 2: sit<>Stand with SBA  Short term goal 3: ambulate > 20' with RW and SBA  Patient Goals   Patient goals : none stated    Plan    Plan  Times per week: 3-5x/week  Current Treatment Recommendations: Strengthening, Transfer Training, Balance Training, Functional Mobility Training, Gait Training, Safety Education & Training, Patient/Caregiver Education & Training, Neuromuscular Re-education  Safety Devices  Type of devices:  All fall risk precautions in place, Call light within reach, Chair alarm in place, Gait belt, Left in chair, Nurse notified  Restraints  Initially in place: No     Therapy Time   Individual Concurrent Group Co-treatment   Time In 0840         Time Out 0910         Minutes 30         Timed Code Treatment Minutes: 30 Minutes       Suresh Reynolds PT    Electronically signed by Suresh Reynolds, FRANCISCO [id number] on 7/14/2021 at 9:18 AM

## 2021-07-15 ENCOUNTER — CARE COORDINATION (OUTPATIENT)
Dept: CASE MANAGEMENT | Age: 86
End: 2021-07-15

## 2021-07-15 DIAGNOSIS — G56.03 CARPAL TUNNEL SYNDROME, BILATERAL: Primary | ICD-10-CM

## 2021-07-15 PROCEDURE — 1111F DSCHRG MED/CURRENT MED MERGE: CPT | Performed by: INTERNAL MEDICINE

## 2021-07-15 NOTE — CARE COORDINATION
[facility] Transitions Initial Follow Up Call    Call within 2 business days of discharge: Yes    Patient: Cesar Pretty Patient : 1929   MRN: [id number]  Reason for Admission: septicemia  Discharge Date: 21 RARS: Readmission Risk Score: 15      Last Discharge [address]       Complaint Diagnosis Description Type Department Provider    21 Other Septicemia ([facility] . .. ED to Hosp-Admission (Discharged) (ADMITTED) Margot Zhang MD; [address] [facility] Transitions of Care Initial Call      Challenges to be reviewed by the provider   Additional needs identified to be addressed with provider: No  none             Method of communication with provider : none      Was this a readmission? No    Care Transition Nurse (CTN) contacted the family by telephone to perform post hospital discharge assessment. Verified name and  with family as identifiers. Provided introduction to self, and explanation of the CTN role. CTN reviewed discharge instructions, medical action plan and red flags with family who verbalized understanding. Family given an opportunity to ask questions and does not have any further questions or concerns at this time. Were discharge instructions available to patient? Yes. Reviewed appropriate site of care based on symptoms and resources available to patient including: PCP and Specialist. The family agrees to contact the PCP office for questions related to their healthcare. Medication reconciliation was performed with family, who verbalizes understanding of administration of home medications. Attempted to reach patient via phone for initial post hospital transition call. VM left with my contact information requesting a return call. Daughter, Farhad Mulligan (OK per hippa form) returned call. She stated her mom's leg was not hot, like it had been and her mom was getting up by herself with stand by assistance.  Farhad Mulligan stated patient has no sign of a fever, and Farhad Mulligan is buying a thermometer. Farhad Mulligan stated her mom was taking tylenol for pain. She stated there was a bandage on her mom's leg which MD wanted changed today or tomorrow. Farhad Mulligan unsure if [facility] has called them [facility] referral has been verified). She stated the family is working on getting her mom to eat better and is buying patient glucerna instead of boost.  Farhad Mulligan stated mom is drinking, urinating and having BMs without any issues. Farhad Mulligan stated mom's BG will be checked (have not been doing). Farhad Mulligan stated podiatry appt is on 7-20 and has an appt with a NP on 8-5. CTN provided contact information. Plan for follow-up call in 5-7 days based on severity of symptoms and risk factors.   Plan for next call: symptom management-leg wound, appetite        Non-face-to-face services provided:  Obtained and reviewed discharge summary and/or continuity of care documents  Assessment and support for treatment adherence and medication management-1111f completed    Care Transitions 24 Hour Call    Do you have any ongoing symptoms?: Yes  Patient-reported symptoms: Pain, Weakness  Do you have a copy of your discharge instructions?: Yes  Do you have all of your prescriptions and are they filled?: Yes  Have you scheduled your follow up appointment?: Yes  How are you going to get to your appointment?: Car - family or friend to transport  Were you discharged with any Home Care or Post Acute Services: Yes  Post Acute Services: [facility] Transitions Interventions         Follow Up  Future Appointments   Date Time Provider Thanh Rojo   8/5/2021  1:00 PM CAROLE Teran - MAKENNA [facility]   8/16/2021  1:20 PM Anish NUNEZ [facility]       [facility]

## 2021-07-25 ENCOUNTER — APPOINTMENT (OUTPATIENT)
Dept: CT IMAGING | Age: 86
End: 2021-07-25
Payer: MEDICARE

## 2021-07-25 ENCOUNTER — HOSPITAL ENCOUNTER (EMERGENCY)
Age: 86
Discharge: HOME | End: 2021-07-25
Attending: EMERGENCY MEDICINE
Payer: MEDICARE

## 2021-07-25 VITALS
DIASTOLIC BLOOD PRESSURE: 73 MMHG | BODY MASS INDEX: 30.37 KG/M2 | HEART RATE: 85 BPM | RESPIRATION RATE: 28 BRPM | TEMPERATURE: 97.2 F | WEIGHT: 189 LBS | SYSTOLIC BLOOD PRESSURE: 128 MMHG | HEIGHT: 66 IN | OXYGEN SATURATION: 98 %

## 2021-07-25 DIAGNOSIS — S39.012A LOW BACK STRAIN, INITIAL ENCOUNTER: Primary | ICD-10-CM

## 2021-07-25 LAB
A/G RATIO: 0.8 (ref 1.1–2.2)
ALBUMIN SERPL-MCNC: 3.3 G/DL (ref 3.4–5)
ALP BLD-CCNC: 84 U/L (ref 40–129)
ALT SERPL-CCNC: 18 U/L (ref 10–40)
ANION GAP SERPL CALCULATED.3IONS-SCNC: 15 MMOL/L (ref 3–16)
AST SERPL-CCNC: 18 U/L (ref 15–37)
BASOPHILS ABSOLUTE: 0.1 K/UL (ref 0–0.2)
BASOPHILS RELATIVE PERCENT: 1 %
BILIRUB SERPL-MCNC: 0.4 MG/DL (ref 0–1)
BILIRUBIN URINE: NEGATIVE
BLOOD, URINE: NEGATIVE
BUN BLDV-MCNC: 15 MG/DL (ref 7–20)
CALCIUM SERPL-MCNC: 9.2 MG/DL (ref 8.3–10.6)
CHLORIDE BLD-SCNC: 96 MMOL/L (ref 99–110)
CLARITY: CLEAR
CO2: 20 MMOL/L (ref 21–32)
COLOR: YELLOW
CREAT SERPL-MCNC: 0.8 MG/DL (ref 0.6–1.2)
EOSINOPHILS ABSOLUTE: 0.3 K/UL (ref 0–0.6)
EOSINOPHILS RELATIVE PERCENT: 2.5 %
EPITHELIAL CELLS, UA: 2 /HPF (ref 0–5)
GFR AFRICAN AMERICAN: >60
GFR NON-AFRICAN AMERICAN: >60
GLOBULIN: 4.2 G/DL
GLUCOSE BLD-MCNC: 149 MG/DL (ref 70–99)
GLUCOSE URINE: NEGATIVE MG/DL
HCT VFR BLD CALC: 30.8 % (ref 36–48)
HEMOGLOBIN: 10 G/DL (ref 12–16)
HYALINE CASTS: 3 /LPF (ref 0–8)
KETONES, URINE: NEGATIVE MG/DL
LEUKOCYTE ESTERASE, URINE: NEGATIVE
LIPASE: 33 U/L (ref 13–60)
LYMPHOCYTES ABSOLUTE: 2.4 K/UL (ref 1–5.1)
LYMPHOCYTES RELATIVE PERCENT: 18.5 %
MCH RBC QN AUTO: 30.4 PG (ref 26–34)
MCHC RBC AUTO-ENTMCNC: 32.6 G/DL (ref 31–36)
MCV RBC AUTO: 93.2 FL (ref 80–100)
MICROSCOPIC EXAMINATION: YES
MONOCYTES ABSOLUTE: 1.3 K/UL (ref 0–1.3)
MONOCYTES RELATIVE PERCENT: 10.3 %
NEUTROPHILS ABSOLUTE: 8.7 K/UL (ref 1.7–7.7)
NEUTROPHILS RELATIVE PERCENT: 67.7 %
NITRITE, URINE: NEGATIVE
PDW BLD-RTO: 15.1 % (ref 12.4–15.4)
PH UA: 5.5 (ref 5–8)
PLATELET # BLD: 533 K/UL (ref 135–450)
PMV BLD AUTO: 7.1 FL (ref 5–10.5)
POTASSIUM REFLEX MAGNESIUM: 4.3 MMOL/L (ref 3.5–5.1)
PROTEIN UA: 30 MG/DL
RBC # BLD: 3.31 M/UL (ref 4–5.2)
RBC UA: 2 /HPF (ref 0–4)
SODIUM BLD-SCNC: 131 MMOL/L (ref 136–145)
SPECIFIC GRAVITY UA: 1.02 (ref 1–1.03)
TOTAL PROTEIN: 7.5 G/DL (ref 6.4–8.2)
URINE REFLEX TO CULTURE: ABNORMAL
URINE TYPE: ABNORMAL
UROBILINOGEN, URINE: 0.2 E.U./DL
WBC # BLD: 12.8 K/UL (ref 4–11)
WBC UA: 1 /HPF (ref 0–5)

## 2021-07-25 PROCEDURE — 81001 URINALYSIS AUTO W/SCOPE: CPT

## 2021-07-25 PROCEDURE — 72131 CT LUMBAR SPINE W/O DYE: CPT

## 2021-07-25 PROCEDURE — 80053 COMPREHEN METABOLIC PANEL: CPT

## 2021-07-25 PROCEDURE — 83690 ASSAY OF LIPASE: CPT

## 2021-07-25 PROCEDURE — 99283 EMERGENCY DEPT VISIT LOW MDM: CPT

## 2021-07-25 PROCEDURE — 85025 COMPLETE CBC W/AUTO DIFF WBC: CPT

## 2021-07-25 PROCEDURE — 93005 ELECTROCARDIOGRAM TRACING: CPT | Performed by: EMERGENCY MEDICINE

## 2021-07-25 RX ORDER — ACETAMINOPHEN 325 MG/1
650 TABLET ORAL EVERY 6 HOURS PRN
Qty: 120 TABLET | Refills: 0 | Status: SHIPPED | OUTPATIENT
Start: 2021-07-25

## 2021-07-25 RX ORDER — CYCLOBENZAPRINE HCL 5 MG
5 TABLET ORAL 3 TIMES DAILY PRN
Qty: 15 TABLET | Refills: 0 | Status: SHIPPED | OUTPATIENT
Start: 2021-07-25 | End: 2021-08-04

## 2021-07-25 ASSESSMENT — PAIN DESCRIPTION - LOCATION: LOCATION: BACK

## 2021-07-25 ASSESSMENT — PAIN SCALES - GENERAL: PAINLEVEL_OUTOF10: 7

## 2021-07-25 ASSESSMENT — PAIN SCALES - WONG BAKER: WONGBAKER_NUMERICALRESPONSE: 0

## 2021-07-25 ASSESSMENT — PAIN DESCRIPTION - PAIN TYPE: TYPE: ACUTE PAIN

## 2021-07-25 ASSESSMENT — PAIN DESCRIPTION - ORIENTATION: ORIENTATION: LOWER

## 2021-07-25 NOTE — ED TRIAGE NOTES
Pt fell on mothers day and has been experiencing lower back pain, but has had increased lower back pain after the last fall which was about two weeks ago. Pt noticed the pain increase over the past two days. She has been taking OTC tylenol with some relief. Denies dysuria.

## 2021-07-25 NOTE — ED PROVIDER NOTES
CHIEF COMPLAINT  Flank Pain (most of the pain on the left side, denies h/o kidney stones, h/o UTI's)      HISTORY OF PRESENT ILLNESS  Olga Cartwright is a 80 y.o. female who  has a past medical history of Arthritis, Cataract, Diabetes ([address] High blood pressure, Kidney problem, Neuropathy, and Thyroid disorder. presents to the ED with her son and daughter-in-law complaining of left low back pain has been present over the past day and a half. Patient denies any falls or trauma. States the pain is a aching sensation and gets worse with bending or twisting. Patient denies any dysuria increasing frequency of urination. Denies any hematuria. States she has had a history of UTIs in the past.  No numbness or weakness in the lower extremities. Denies any bowel or bladder incontinence. No abdominal pain, nausea or vomiting. States he is having normal bowel movements. Patient states she thinks she may have pulled a muscle in her lower back which is causing her pain. No other complaints, modifying factors or associated symptoms. Nursing notes reviewed. Past Medical History:   Diagnosis Date    Arthritis     Cataract     Diabetes ([address]     High blood pressure     Kidney problem     Neuropathy     Thyroid disorder      Past Surgical History:   Procedure Laterality Date    APPENDECTOMY      CARPAL TUNNEL RELEASE Left 09/05/2017      Left carpal tunnel release  & Left Thumb, Index Finger and Middle Finger trigger finger release    CARPAL TUNNEL RELEASE Right 09/26/2017    COLONOSCOPY      FINGER TRIGGER RELEASE Right 09/26/2017    Thumb & Index Finger    FOOT DEBRIDEMENT Left 7/12/2021    LEFT FOOT INCISION AND DRAINAGE, [address] OUT WITH APPLICATION GRAFT performed by Reji Renee DPM at [facility]. [address] Right 2006    Dr. Farooq Ponce     History reviewed. No pertinent family history.   Social History     Socioeconomic History    Marital status:      Spouse name: Not on file    Number of children: Not on file    Years of education: Not on file    Highest education level: Not on file   Occupational History    Occupation: NA   Tobacco Use    Smoking status: Never Smoker    Smokeless tobacco: Never Used   Substance and Sexual Activity    Alcohol use: Yes     Comment: occas    Drug use: No    Sexual activity: Not on file   Other Topics Concern    Not on file   Social History Narrative    Not on file     Social Determinants of Health     Financial Resource Strain:     Difficulty of Paying Living Expenses:    Food Insecurity:     Worried About Running Out of Food in the Last Year:     [address] in the Last Year:    Transportation Needs:     Lack of Transportation (Medical):  Lack of Transportation (Non-Medical):    Physical Activity:     Days of Exercise per Week:     Minutes of Exercise per Session:    Stress:     Feeling of Stress :    Social Connections:     Frequency of Communication with Friends and Family:     Frequency of Social Gatherings with Friends and Family:     Attends Zoroastrian Services:     Active Member of Clubs or Organizations:     Attends Club or Organization Meetings:     Marital Status:    Intimate Partner Violence:     Fear of Current or Ex-Partner:     Emotionally Abused:     Physically Abused:     Sexually Abused:      No current facility-administered medications for this encounter.      Current Outpatient Medications   Medication Sig Dispense Refill    acetaminophen (AMINOFEN) 325 MG tablet Take 2 tablets by mouth every 6 hours as needed for Pain 120 tablet 0    cyclobenzaprine (FLEXERIL) 5 MG tablet Take 1 tablet by mouth 3 times daily as needed for Muscle spasms 15 tablet 0    polyethylene glycol (GLYCOLAX) 17 g packet Take 17 g by mouth 2 times daily 527 g 1    senna (SENOKOT) 8.6 MG tablet Take 1 tablet by mouth nightly 30 tablet 0    cephALEXin (KEFLEX) 500 MG capsule Take 1 capsule by mouth 3 times daily for 14 days 42 capsule 0    Cholecalciferol (VITAMIN D) 50 MCG (2000 UT) CAPS capsule Take 2,000 Units by mouth daily      lisinopril (PRINIVIL;ZESTRIL) 20 MG tablet Take 1 tablet by mouth daily 90 tablet 3    atorvastatin (LIPITOR) 40 MG tablet TAKE 1 TABLET DAILY 90 tablet 3    metFORMIN (GLUCOPHAGE) 1000 MG tablet TAKE 1 TABLET TWICE A DAY WITH MEALS 180 tablet 3    levothyroxine (SYNTHROID) 112 MCG tablet TAKE ONE TABLET BY MOUTH DAILY (Patient taking differently: Take 112 mcg by mouth Daily TAKE ONE TABLET BY MOUTH DAILY at 6 am) 90 tablet 3    Cranberry-Cholecalciferol 4200-500 MG-UNIT CAPS Take by mouth      aspirin 81 MG tablet Take 81 mg by mouth daily      Multiple Vitamin (MULTI VITAMIN DAILY PO) Take by mouth       Allergies   Allergen Reactions    Gabapentin      LOWER LEG SWELLING    Pravachol [Pravastatin Sodium] Rash    Tegaderm Ag Mesh 2\"X2\" [Wound Dressings] Rash         REVIEW OF SYSTEMS  10 systems reviewed, pertinent positives per HPI otherwise noted to be negative    PHYSICAL EXAM  /78   Pulse 72   Temp 97.2 °F (36.2 °C) (Oral)   Resp 25   Ht 5' 6\" (1.676 m)   Wt 189 lb (85.7 kg)   SpO2 97%   BMI 30.51 kg/m²      CONSTITUTIONAL: AOx4, cooperative with exam, afebrile   HEAD: normocephalic, atraumatic   EYES: PERRL, EOMI, anicteric sclera   ENT: Moist mucous membranes, uvula midline   BACK: Symmetric, paraspinal tenderness in the lumbar region on the left, no midline tenderness of the cervical, thoracic or lumbar spine, no overlying skin changes, notes step-offs or deformity  No CVA tenderness bilaterally   LUNGS: Bilateral breath sounds, CTAB, no rales/ronchi/wheezes   CARDIOVASCULAR: RRR, normal S1/S2, no m/r/g, 2+ pulses throughout   ABDOMEN: Soft, non-tender, non-distended, +BS   NEUROLOGIC:  MAEx4, 5/5 strength throughout; fine touch sensation intact throughout; normal gait; GCS 15, cranial nerves II through XII intact   MUSCULOSKELETAL: No clubbing, cyanosis or edema   SKIN: No rash, pallor or wounds on exposed surfaces         RADIOLOGY  X-RAYS:  I have reviewed radiologic plain film image(s). ALL OTHER NON-PLAIN FILM IMAGES SUCH AS CT, ULTRASOUND AND MRI HAVE BEEN READ BY THE RADIOLOGIST. CT LUMBAR SPINE WO CONTRAST   Final Result   1. Diffuse idiopathic skeletal hyperostosis throughout the lumbar spine   extends into the included thoracic spine. Age-indeterminate lucencies   involving some bilateral bridging syndesmophytes could be related to the   reported recent trauma but can also be seen in the absence of trauma. 2. No acute findings involving the lumbar vertebrae themselves. 3. Underlying lumbar spine degenerative changes as above with up to severe   spinal canal stenosis and moderate neural foraminal narrowing. 4. Bony demineralization. 5. Indeterminate bilateral adrenal lesions measure up to 2.2 cm and are   likely adenomas. Recommend follow-up with adrenal protocol abdomen CT or MRI   on a nonemergent basis as below unless there are any prior outside studies to   establish at least 1 year of stability. RECOMMENDATIONS:   Noncontrast CT, Incidental Indeterminate Adrenal Mass > 2 - < 4 cm, > 10 HU,   Homogeneous: Probable benign adenoma. Recommend adrenal washout CT or   chemical shift MRI. These guidelines do not apply to patients younger than 18 years, patients   with cancer, and patients with any clinical suspicion of a functioning   adrenal lesion. Reference: JACR 2017 Aug; 14(8):1038-44, JCAT 2016 Mar-Apr;   40(2):194-200.                 EKG INTERPRETATION  Sinus rhythm with marked sinus arrhythmia and a rate of 84, normal axis, , , QTc 463, no ST elevations, nonspecific ST changes, poor R wave progression, LVH with repull abnormality, no acute change compared EKG from 7/6/2021    PROCEDURES    ED COURSE/MDM  Muscle strain, muscle spasm, contusion, nephrolithiasis, hydronephrosis, UTI, pyelonephritis    Patient seen and evaluated. History and physical as above. Nontoxic, afebrile. Patient presents with complaints of left lower back pain over the past few days. Patient neurologically intact. No acute findings on high-sensitivity neuro exam to suggest cauda equina, epidural abscess, epidural hematoma or cord compression. Does have paraspinal tenderness in the lumbar region on the left. Does have history of frequent UTIs therefore urinalysis obtained. Will also obtain routine labs, abdominal labs, lipase, CT lumbar spine. ED Course as of Jul 25 1016   Sun Jul 25, 2021   1014 Patient's blood work unremarkable. Mild leukocytosis although patient has history of chronic leukocytosis. Hemoglobin stable at 10.0. Kidney function at baseline with creatinine 0.8. Lipase 33. Urinalysis negative for infection. Patient CT lumbar spine shows chronic changes but no acute findings. Patient's pain is mainly on the left paraspinal region in the lumbar and thoracic and with no acute findings on CT lumbar spine, I feel patient is appropriate for oral muscle relaxant and Tylenol with outpatient follow-up. Patient and family at bedside agreeable to this care plan. Return instruction provided. All questions answered prior to discharge. [DS]      ED Course User Index  [DS] Dusty Angel MD         I estimate there is LOW risk for ABDOMINAL AORTIC ANEURYSM, CAUDA EQUINA SYNDROME, EPIDURAL MASS LESION, SPINAL STENOSIS, OR HERNIATED DISK CAUSING SEVERE STENOSIS, thus I consider the discharge disposition reasonable. Oracio King and I have discussed the diagnosis and risks, and we agree with discharging home to follow-up with their primary doctor. We also discussed returning to the Emergency Department immediately if new or worsening symptoms occur.  We have discussed the symptoms which are most concerning (e.g., saddle anesthesia, urinary or bowel incontinence or retention, changing or worsening pain) that necessitate immediate return. Patient was given scripts for the following medications. I counseled patient how to take these medications. New Prescriptions    CYCLOBENZAPRINE (FLEXERIL) 5 MG TABLET    Take 1 tablet by mouth 3 times daily as needed for Muscle spasms           CLINICAL IMPRESSION  1. Low back strain, initial encounter        Blood pressure 130/78, pulse 72, temperature 97.2 °F (36.2 °C), temperature source Oral, resp. rate 25, height 5' 6\" (1.676 m), weight 189 lb (85.7 kg), SpO2 97 %, not currently breastfeeding. DISPOSITION  Patient was discharged to home in good condition. Your primary care physician    Call today  For a follow up appointment. Disclaimer: All medical record entries made by [address] [place] [address] [address] [facility].       (Please note that this note was completed with a voice recognition program. Every attempt was made to edit the dictations, but inevitably there remain words that are mis-transcribed.)           Adrian Moyer MD  07/25/21 6358

## 2021-07-25 NOTE — ED NOTES
Unsuccessful at IV placement, was able to obtain blood. This was sent to lab for analysis. Pt was fem cath'd for urine specimen, pt tolerated well. ED MD at bedside  Family at bedside    Pt denies any additional needs at this time     Fall risk screening completed. Fall risk bracelet applied to patient. Non-skid socks provided and placed on patient. The fall risk is indicated using  dome light . Based on score, a bed alarm was not indicated. The call light is within the patient's reach, and instructions for use were provided. The bed is in the lowest position with wheels locked. The patient has been advised to notify staff, using the call light, if there is a need to get up or use restroom. The patient verbalized understanding of safety precautions and how to contact staff for assistance.        Red socks placed on pt' family at bedside      [facility]  07/25/21 6246

## 2021-07-26 LAB
EKG ATRIAL RATE: 84 BPM
EKG DIAGNOSIS: NORMAL
EKG P AXIS: 35 DEGREES
EKG P-R INTERVAL: 160 MS
EKG Q-T INTERVAL: 392 MS
EKG QRS DURATION: 104 MS
EKG QTC CALCULATION (BAZETT): 463 MS
EKG R AXIS: -27 DEGREES
EKG T AXIS: 109 DEGREES
EKG VENTRICULAR RATE: 84 BPM

## 2021-07-26 PROCEDURE — 93010 ELECTROCARDIOGRAM REPORT: CPT | Performed by: INTERNAL MEDICINE

## 2021-07-27 ENCOUNTER — CARE COORDINATION (OUTPATIENT)
Dept: CASE MANAGEMENT | Age: 86
End: 2021-07-27

## 2021-07-27 NOTE — CARE COORDINATION
[address] [facility] Transitions Follow Up Call    2021    Patient: Cedrick Guerrero  Patient : 1929   MRN: <[id number]>  Reason for Admission: septicemia and Back pain  Discharge Date: 21 RARS: Readmission Risk Score: 15    Spoke with: Roxanna Sanchez (daughter) who reports that patient was seen in the ED on . Reports that patient continues with back spasms. She is taking a muscle relaxer and tylenol for the pain. Patient has a follow up with PCP 21. Patient's foot is doing great. Patient had f/u per daughter and the graft is taking. Denies any s/s of infection. She reports that patient is taking all medications as ordered. Appetite is fair and fluid intake, bowel and bladder are all good. Daughter states that they will be driving to [facility] for patient's brother's   and patient will be able to have legs elevated in back seat. Writer encourage to stop for stretching as much as possible. Daughter verbalized understanding. Daughter denies any needs at this time. Next outreach in 5 - 7 days. Care Transitions Subsequent and Final Call    Subsequent and Final Calls  Care Transitions Interventions  Other Interventions:            Follow Up  Future Appointments   Date Time Provider Thanh Rojo   2021  1:00 PM CAROLE Morelos NP [facility]   2021  1:20 PM EUNICE [facility]       Magdalene Hernandez LPN

## 2021-08-03 ENCOUNTER — CARE COORDINATION (OUTPATIENT)
Dept: CASE MANAGEMENT | Age: 86
End: 2021-08-03

## 2021-08-03 NOTE — CARE COORDINATION
[address] [facility] Transitions Follow Up Call    8/3/2021    Patient: Wali Titus  Patient : 1929   MRN: [id number]  Reason for Admission: septicemia and Back pain  Discharge Date: 21 RARS: Readmission Risk Score: 15         Spoke with: Lindsey Amos  HIPAA form verified in system    Patient's daughter answered call and verified . Hiral Renee pleasant and agreeable to transition call. Patient went to \"foot doctor\" today and graft is looking good. MD was happy with results and scheduled to follow up in 2 weeks. Patient continues to have home care services and nurses are visiting 2xweek and PT/OT have not visited yet this week. Patient scheduled to see new NP, Shira Hendricks later this week. Patient denied any pain and muscle spasms are lessened in her back. Patient was able to get in and out of the car today without difficulty per Hiral Renee. Patient denies any acute needs at present time. Agreeable to f/u calls. Educated on the use of urgent care or physicians 24 hr access line if assistance is needed after hours. Jay Kendrick RN   Care Transition Nurse  [phone]        Care Transitions Subsequent and Final Call    Subsequent and Final Calls  Do you have any ongoing symptoms?: No  Have your medications changed?: No  Do you have any questions related to your medications?: No  Do you currently have any active services?: Yes  Are you currently active with any services?: Home Health  Do you have any needs or concerns that I can assist you with?: No  Identified Barriers: None  Care Transitions Interventions  Other Interventions:            Follow Up  Future Appointments   Date Time Provider Thanh Rojo   2021  1:00 PM CAROLE Alejandra - NP [facility] - LUIGI   2021  1:20 PM EUNICE [facility] ROOM [address] [place] [address]       [place]

## 2021-08-05 ENCOUNTER — OFFICE VISIT (OUTPATIENT)
Dept: FAMILY MEDICINE CLINIC | Age: 86
End: 2021-08-05
Payer: MEDICARE

## 2021-08-05 VITALS
WEIGHT: 178 LBS | OXYGEN SATURATION: 96 % | TEMPERATURE: 99 F | DIASTOLIC BLOOD PRESSURE: 60 MMHG | HEIGHT: 66 IN | HEART RATE: 91 BPM | SYSTOLIC BLOOD PRESSURE: 124 MMHG | BODY MASS INDEX: 28.61 KG/M2

## 2021-08-05 DIAGNOSIS — E11.40 TYPE 2 DIABETES MELLITUS WITH DIABETIC NEUROPATHY, WITHOUT LONG-TERM CURRENT USE OF INSULIN (HCC): ICD-10-CM

## 2021-08-05 DIAGNOSIS — I10 ESSENTIAL HYPERTENSION: ICD-10-CM

## 2021-08-05 DIAGNOSIS — E87.1 HYPONATREMIA: ICD-10-CM

## 2021-08-05 DIAGNOSIS — M17.12 ARTHRITIS OF LEFT KNEE: ICD-10-CM

## 2021-08-05 DIAGNOSIS — Z76.89 ENCOUNTER TO ESTABLISH CARE: Primary | ICD-10-CM

## 2021-08-05 DIAGNOSIS — L03.116 CELLULITIS OF LEFT LOWER LIMB: ICD-10-CM

## 2021-08-05 DIAGNOSIS — M16.12 ARTHRITIS OF LEFT HIP: ICD-10-CM

## 2021-08-05 DIAGNOSIS — E88.09 HYPOALBUMINEMIA: ICD-10-CM

## 2021-08-05 DIAGNOSIS — I89.0 LYMPHEDEMA: ICD-10-CM

## 2021-08-05 DIAGNOSIS — E11.9 TYPE 2 DIABETES MELLITUS WITHOUT COMPLICATION, WITHOUT LONG-TERM CURRENT USE OF INSULIN (HCC): ICD-10-CM

## 2021-08-05 PROBLEM — S90.822D: Status: ACTIVE | Noted: 2021-07-15

## 2021-08-05 PROBLEM — M15.0 PRIMARY GENERALIZED (OSTEO)ARTHRITIS: Status: ACTIVE | Noted: 2021-07-15

## 2021-08-05 PROBLEM — H43.399 VITREOUS FLOATERS: Status: ACTIVE | Noted: 2019-07-31

## 2021-08-05 PROCEDURE — 1090F PRES/ABSN URINE INCON ASSESS: CPT | Performed by: NURSE PRACTITIONER

## 2021-08-05 PROCEDURE — 1036F TOBACCO NON-USER: CPT | Performed by: NURSE PRACTITIONER

## 2021-08-05 PROCEDURE — G8417 CALC BMI ABV UP PARAM F/U: HCPCS | Performed by: NURSE PRACTITIONER

## 2021-08-05 PROCEDURE — 4040F PNEUMOC VAC/ADMIN/RCVD: CPT | Performed by: NURSE PRACTITIONER

## 2021-08-05 PROCEDURE — 1123F ACP DISCUSS/DSCN MKR DOCD: CPT | Performed by: NURSE PRACTITIONER

## 2021-08-05 PROCEDURE — 1111F DSCHRG MED/CURRENT MED MERGE: CPT | Performed by: NURSE PRACTITIONER

## 2021-08-05 PROCEDURE — G8427 DOCREV CUR MEDS BY ELIG CLIN: HCPCS | Performed by: NURSE PRACTITIONER

## 2021-08-05 PROCEDURE — 99204 OFFICE O/P NEW MOD 45 MIN: CPT | Performed by: NURSE PRACTITIONER

## 2021-08-05 SDOH — ECONOMIC STABILITY: FOOD INSECURITY: WITHIN THE PAST 12 MONTHS, YOU WORRIED THAT YOUR FOOD WOULD RUN OUT BEFORE YOU GOT MONEY TO BUY MORE.: PATIENT DECLINED

## 2021-08-05 SDOH — ECONOMIC STABILITY: FOOD INSECURITY: WITHIN THE PAST 12 MONTHS, THE FOOD YOU BOUGHT JUST DIDN'T LAST AND YOU DIDN'T HAVE MONEY TO GET MORE.: PATIENT DECLINED

## 2021-08-05 ASSESSMENT — LIFESTYLE VARIABLES
HOW OFTEN DURING THE LAST YEAR HAVE YOU HAD A FEELING OF GUILT OR REMORSE AFTER DRINKING: NEVER
HOW OFTEN DO YOU HAVE A DRINK CONTAINING ALCOHOL: MONTHLY OR LESS
HOW OFTEN DURING THE LAST YEAR HAVE YOU NEEDED AN ALCOHOLIC DRINK FIRST THING IN THE MORNING TO GET YOURSELF GOING AFTER A NIGHT OF HEAVY DRINKING: 0
HOW OFTEN DURING THE LAST YEAR HAVE YOU FOUND THAT YOU WERE NOT ABLE TO STOP DRINKING ONCE YOU HAD STARTED: 0
HOW MANY STANDARD DRINKS CONTAINING ALCOHOL DO YOU HAVE ON A TYPICAL DAY: 0
HAVE YOU OR SOMEONE ELSE BEEN INJURED AS A RESULT OF YOUR DRINKING: 0
HAS A RELATIVE, FRIEND, DOCTOR, OR ANOTHER HEALTH PROFESSIONAL EXPRESSED CONCERN ABOUT YOUR DRINKING OR SUGGESTED YOU CUT DOWN: NO
AUDIT-C TOTAL SCORE: 1
HAS A RELATIVE, FRIEND, DOCTOR, OR ANOTHER HEALTH PROFESSIONAL EXPRESSED CONCERN ABOUT YOUR DRINKING OR SUGGESTED YOU CUT DOWN: 0
HOW OFTEN DURING THE LAST YEAR HAVE YOU FOUND THAT YOU WERE NOT ABLE TO STOP DRINKING ONCE YOU HAD STARTED: NEVER
HOW OFTEN DO YOU HAVE SIX OR MORE DRINKS ON ONE OCCASION: NEVER
AUDIT-C TOTAL SCORE: 0
HAVE YOU OR SOMEONE ELSE BEEN INJURED AS A RESULT OF YOUR DRINKING: NO
AUDIT TOTAL SCORE: 1
HOW OFTEN DURING THE LAST YEAR HAVE YOU HAD A FEELING OF GUILT OR REMORSE AFTER DRINKING: 0
HOW OFTEN DO YOU HAVE SIX OR MORE DRINKS ON ONE OCCASION: 0
HOW OFTEN DURING THE LAST YEAR HAVE YOU FAILED TO DO WHAT WAS NORMALLY EXPECTED FROM YOU BECAUSE OF DRINKING: NEVER
AUDIT TOTAL SCORE: 0
HOW OFTEN DURING THE LAST YEAR HAVE YOU FAILED TO DO WHAT WAS NORMALLY EXPECTED FROM YOU BECAUSE OF DRINKING: 0
HOW OFTEN DURING THE LAST YEAR HAVE YOU BEEN UNABLE TO REMEMBER WHAT HAPPENED THE NIGHT BEFORE BECAUSE YOU HAD BEEN DRINKING: 0
HOW OFTEN DURING THE LAST YEAR HAVE YOU NEEDED AN ALCOHOLIC DRINK FIRST THING IN THE MORNING TO GET YOURSELF GOING AFTER A NIGHT OF HEAVY DRINKING: NEVER
HOW OFTEN DO YOU HAVE A DRINK CONTAINING ALCOHOL: 1
HOW OFTEN DURING THE LAST YEAR HAVE YOU BEEN UNABLE TO REMEMBER WHAT HAPPENED THE NIGHT BEFORE BECAUSE YOU HAD BEEN DRINKING: NEVER
HOW MANY STANDARD DRINKS CONTAINING ALCOHOL DO YOU HAVE ON A TYPICAL DAY: ONE OR TWO

## 2021-08-05 ASSESSMENT — PATIENT HEALTH QUESTIONNAIRE - PHQ9
SUM OF ALL RESPONSES TO PHQ QUESTIONS 1-9: 1
SUM OF ALL RESPONSES TO PHQ QUESTIONS 1-9: 1
DEPRESSION UNABLE TO ASSESS: PT REFUSES
SUM OF ALL RESPONSES TO PHQ QUESTIONS 1-9: 1
1. LITTLE INTEREST OR PLEASURE IN DOING THINGS: 1
SUM OF ALL RESPONSES TO PHQ9 QUESTIONS 1 & 2: 1
2. FEELING DOWN, DEPRESSED OR HOPELESS: 0

## 2021-08-05 ASSESSMENT — ENCOUNTER SYMPTOMS
DIARRHEA: 0
ABDOMINAL PAIN: 0
WHEEZING: 0
VOMITING: 0
SHORTNESS OF BREATH: 0
COUGH: 0
NAUSEA: 0
COLOR CHANGE: 1

## 2021-08-05 ASSESSMENT — SOCIAL DETERMINANTS OF HEALTH (SDOH): HOW HARD IS IT FOR YOU TO PAY FOR THE VERY BASICS LIKE FOOD, HOUSING, MEDICAL CARE, AND HEATING?: PATIENT DECLINED

## 2021-08-05 NOTE — PROGRESS NOTES
Ari Ozuna (:  1929) is a 80 y.o. female,New patient, here for evaluation of the following chief complaint(s):  Established New Doctor (no new issues)         ASSESSMENT/PLAN:  1. Hypoalbuminemia  Increase protein in diet, follow patient education regarding protein and chronic kidney disease. Repeat labs in future. Keep appt with Nephrologist    2. Type 2 diabetes mellitus without complication, without long-term current use of insulin ([address]  Well controlled on mediation and diet, A1c 6.8 2021  - CBC; Future  - Comprehensive Metabolic Panel; Future  - HEMOGLOBIN A1C; Future    3. Hyponatremia  Recheck labs in future    4. Arthritis of left hip  On going, continue PT     5. Arthritis of left knee  Ongoing continue pt    6. Essential hypertension  Well controlled  - Lipid Panel; Future    7. Lymphedema  Ongoing, continue to follow up with     8. Cellulitis of lower limb   Ongoing, continue to follow up with Podiatry as scheduled    9. Establish Care  Reviewed PMH, labs and medications. Return in 6 months for follow up or sooner with concerns or complaints. SUBJECTIVE/OBJECTIVE:  Here today for [address] and to establish care. Overall feels well, lives independently,family has been staying with her. Has in home PT and they tell her she's improving. strengthening the left hip. Hx of back spasms but relieved with Back stretches, has not had to use tylenol or muscle relaxer in 2 weeks. Uses walker at home but wc when out of the house. Lives on the first floor, has a basement but does not go down there. Family does not leave her alone, pt showers with a shower chair by herself. She is currently seeing a vascular surgeon Dr. Hue Mcgrath and podiatry Dr. Jered Royal at [facility] for wound to left foot and swelling of lower extremities, seen them last week. Dr. Yulisa Hunter is her kidney doc at [facility], following up with her in November.  Orthopedic is dr. Julio Cesar Doll and Shirley Pierre PA    Reports she has a living will, [facility] and [facility]. Daughter states she will bring in a copy. Review of Systems   Constitutional: Positive for activity change and fatigue. Negative for chills and fever. HENT: Negative for congestion. Respiratory: Negative for cough, shortness of breath and wheezing. Cardiovascular: Positive for leg swelling. Negative for chest pain and palpitations. Gastrointestinal: Negative for abdominal pain, diarrhea, nausea and vomiting. Genitourinary: Negative for difficulty urinating. Musculoskeletal: Positive for arthralgias, gait problem, joint swelling and neck stiffness. Skin: Positive for color change and wound. Neurological: Positive for numbness (peripheral neuropathy). Negative for dizziness, light-headedness and headaches. Hematological: Does not bruise/bleed easily. Psychiatric/Behavioral: Negative for sleep disturbance. The patient is not nervous/anxious. Objective   Physical Exam  Constitutional:       General: She is not in acute distress. Appearance: She is not ill-appearing. HENT:      Right Ear: Tympanic membrane normal.      Left Ear: Tympanic membrane normal.      Mouth/Throat:      Mouth: Mucous membranes are moist.      Pharynx: Oropharynx is clear. No oropharyngeal exudate or posterior oropharyngeal erythema. Eyes:      Extraocular Movements: Extraocular movements intact. Pupils: Pupils are equal, round, and reactive to light. Neck:      Vascular: No carotid bruit. Cardiovascular:      Rate and Rhythm: Normal rate and regular rhythm. Heart sounds: Normal heart sounds. No murmur heard. No friction rub. No gallop. Pulmonary:      Effort: Pulmonary effort is normal. No respiratory distress. Breath sounds: No wheezing. Abdominal:      General: Bowel sounds are normal.      Palpations: Abdomen is soft. There is no mass. Tenderness: There is no abdominal tenderness.  There is no right CVA tenderness or left CVA MG-UNIT CAPS Take by mouth Yes Historical Provider, MD   aspirin 81 MG tablet Take 81 mg by mouth daily Yes Historical Provider, MD   Multiple Vitamin (MULTI VITAMIN DAILY PO) Take by mouth Yes Historical Provider, MD         Past Medical History:   Diagnosis Date    Arthritis     Cataract     Diabetes ([address]     High blood pressure     Kidney problem     Neuropathy     Thyroid disorder        Past Surgical History:   Procedure Laterality Date    APPENDECTOMY      CARPAL TUNNEL RELEASE Left 09/05/2017      Left carpal tunnel release  & Left Thumb, Index Finger and Middle Finger trigger finger release    CARPAL TUNNEL RELEASE Right 09/26/2017    COLONOSCOPY      FINGER TRIGGER RELEASE Right 09/26/2017    Thumb & Index Finger    FOOT DEBRIDEMENT Left 7/12/2021    LEFT FOOT INCISION AND DRAINAGE, [address] OUT WITH APPLICATION GRAFT performed by Nereida North DPM at [facility]. [address] Right 2006    Dr. Janiya Adan       History reviewed. No pertinent family history. CareTeam (Including outside providers/suppliers regularly involved in providing care):   Patient Care Team:  CAROLE Quinones - NP as PCP - General (Emergency Medicine)  Jose Alfredo Montes RN as Care Transitions Nurse    Wt Readings from Last 3 Encounters:   08/05/21 178 lb (80.7 kg)   07/25/21 189 lb (85.7 kg)   07/14/21 185 lb 10 oz (84.2 kg)     Vitals:    08/05/21 1257   BP: 124/60   Site: Left Upper Arm   Position: Sitting   Cuff Size: Medium Adult   Pulse: 91   Temp: 99 °F (37.2 °C)   TempSrc: Oral   SpO2: 96%   Weight: 178 lb (80.7 kg)   Height: 5' 6\" (1.676 m)     Patient's complete Health Risk Assessment and screening values have been reviewed and are found in Flowsheets. The following problems were reviewed today and where indicated follow up appointments were made and/or referrals ordered.     Positive Risk Factor Screenings with Interventions:     Fall Risk:  2 or more falls in past year?: (!) yes  Fall with injury in past year?: (!) yes  Fall Risk Interventions:    · Home safety tips provided   · Pt walks with walker  · Pt does not do stairs     Depression:  PHQ-2 Score: 1  PHQ-9 Total Score: 1    Severity:1-4 = minimal depression, 5-9 = mild depression, 10-14 = moderate depression, 15-19 = moderately severe depression, 20-27 = severe depression       Health Habits/Nutrition:  Health Habits/Nutrition  Do you exercise for at least 20 minutes 2-3 times per week?: Yes  Have you lost any weight without trying in the past 3 months?: (!) Yes  Do you eat only one meal per day?: No  Have you seen the dentist within the past year?: N/A - wear dentures  Body mass index: (!) 28.73  Health Habits/Nutrition Interventions:  · Family cooks for pt 3 times a day   · Pt currently receiving home PT/OT      ADL:  ADLs  In the past 7 days, did you need help from others to perform any of the following everyday activities? Eating, dressing, grooming, bathing, toileting, or walking/balance?: (!) Bathing, Walking/Balance  In the past 7 days, did you need help from others to take care of any of the following?  Laundry, housekeeping, banking/finances, shopping, telephone use, food preparation, transportation, or taking medications?: [employer], Housekeeping, Shopping, Food Preparation, Transportation  ADL Interventions:  · Patient declines any further evaluation/treatment for this issue   · Pt family currently provides 24 hr assistance    Personalized Preventive Plan   Current Health Maintenance Status  Immunization History   Administered Date(s) Administered    Influenza Vaccine, unspecified formulation 10/10/2003, 10/05/2004, 10/04/2005, 09/01/2012, 10/01/2014, 11/20/2017    Influenza Virus Vaccine 11/20/2017    Influenza, High Dose (Fluzone 65 yrs and older) 09/28/2018    Influenza, Rubina Duron, Recombinant, IM PF (Flublok 18 yrs and older) 10/05/2019    Influenza, Quadv, adjuvanted, 65 yrs +, IM, PF (Fluad) 10/26/2020    Pneumococcal Conjugate 13-valent ([id number]) 02/15/2018    Pneumococcal Polysaccharide ([id number]) 02/26/2019, 10/05/2019    Tdap (Boostrix, Adacel) 11/20/2017    Zoster Live (Zostavax) 11/20/2017    Zoster Recombinant (Shingrix) 10/05/2019, 12/23/2019        Health Maintenance   Topic Date Due    Flu vaccine (1) 09/01/2021    Annual Wellness Visit (AWV)  10/27/2021    Lipid screen  02/26/2022    TSH testing  02/26/2022    Potassium monitoring  07/25/2022    Creatinine monitoring  07/25/2022    DTaP/Tdap/Td vaccine (2 - Td or Tdap) 11/20/2027    Shingles Vaccine  Completed    Pneumococcal 65+ years Vaccine  Completed    COVID-19 Vaccine  Completed    Hepatitis A vaccine  Aged Out    Hib vaccine  Aged Out    Meningococcal (ACWY) vaccine  Aged Out     Recommendations for [employer] Due: see orders and patient instructions/AVS.  . Recommended screening schedule for the next 5-10 years is provided to the patient in written form: see Patient Instructions/AVS.       Advance Care Planning   Advanced Care Planning: Discussed the patients choices for care and treatment in case of a health event that adversely affects decision-making abilities. Also discussed the patients long-term treatment options.  Patient and daughter report advanced care planning is completed and bring in a copy  Time spent (minutes): 5

## 2021-08-05 NOTE — PATIENT INSTRUCTIONS
[employer], [facility] disclaims any warranty or liability for your use of this information. Patient Education       Patient Education        Learning About Chronic Kidney Disease and Protein  What is protein? Protein is an important nutrient made up of chemicals called amino acids. Protein provides energy. Your body also needs protein to make new cells, maintain and rebuild muscles, carry other nutrients, act as messengers in the body, and support the immune system. How does protein affect chronic kidney disease? When protein breaks down in your body, it forms waste products. If you have kidney disease, the kidneys have trouble getting rid of waste products, so waste can build up in your blood. Eating more protein than your body can handle can be bad for your kidneys and make you very sick. But if you don't get enough protein, you can become weak and tired and are more likely to get infections. If you have chronic kidney disease, you can help protect your kidneys by making changes to your diet so that you get the right amount of protein. How can you manage your diet? Ask your doctor or dietitian how much protein you can have each day, and learn which foods contain protein. Your doctor or dietitian can help you plan a diet that gives you the right amount of protein. There is no single diet that is right for everyone who has chronic kidney disease. Your diet will be based on how well your kidneys are working and whether you are on dialysis. When you have kidney disease, your diet may change over time as your disease changes. See your doctor for regular testing so you know when your diet may need to change. Your doctor or dietitian can help you adjust your diet as needed. Changing your diet can be hard. You may have to give up many foods you like. But it is very important to make the recommended changes so you can stay healthy for as long as possible. Which foods contain protein?   High-protein foods include meat, poultry, seafood, and eggs. Other foods that contain protein include milk and milk products, beans and nuts, vegetables, and breads, pastas, and cereals. Where can you learn more? Go to https://[email or web address].[employer]. org and sign in to your [employer] account. Enter D386 in the [facility] box to learn more about \"Learning About Chronic Kidney Disease and Protein. \"     If you do not have an account, please click on the \"Sign Up Now\" link. Current as of: December 17, 2020               Content Version: 12.9  © 5682-9025 Healthwise, [employer]. Care instructions adapted under license by [facility] ([facility]). If you have questions about a medical condition or this instruction, always ask your healthcare professional. [address] any warranty or liability for your use of this information. Personalized Preventive Plan for Noayane Medici - 8/5/2021  Medicare offers a range of preventive health benefits. Some of the tests and screenings are paid in full while other may be subject to a deductible, co-insurance, and/or copay. Some of these benefits include a comprehensive review of your medical history including lifestyle, illnesses that may run in your family, and various assessments and screenings as appropriate. After reviewing your medical record and screening and assessments performed today your provider may have ordered immunizations, labs, imaging, and/or referrals for you. A list of these orders (if applicable) as well as your Preventive Care list are included within your After Visit Summary for your review. Other Preventive Recommendations:    · A preventive eye exam performed by an eye specialist is recommended every 1-2 years to screen for glaucoma; cataracts, macular degeneration, and other eye disorders. · A preventive dental visit is recommended every 6 months.   · Try to get at least 150 minutes of exercise per week or 10,000 steps per day on a pedometer . · Order or download the FREE \"Exercise & Physical Activity: Your Everyday Guide\" from [employer]. Call 6-901.162.9463 or search [employer] online. · You need 3530-6706 mg of calcium and 0169-2471 IU of vitamin D per day. It is possible to meet your calcium requirement with diet alone, but a vitamin D supplement is usually necessary to meet this goal.  · When exposed to the sun, use a sunscreen that protects against both UVA and UVB radiation with an SPF of 30 or greater. Reapply every 2 to 3 hours or after sweating, drying off with a towel, or swimming. · Always wear a seat belt when traveling in a car. Always wear a helmet when riding a bicycle or motorcycle.

## 2021-08-12 ENCOUNTER — CARE COORDINATION (OUTPATIENT)
Dept: CASE MANAGEMENT | Age: 86
End: 2021-08-12

## 2021-08-12 NOTE — CARE COORDINATION
[address] Transitions Follow Up Call    2021    Patient: Sabino Rodriguez  Patient : 1929   MRN: <[id number]>  Reason for Admission: Sepsis due to cellulitus  Discharge Date: 21 RARS: Readmission Risk Score: 15         Spoke with: Honey(daughter-HIPPA verified) reports that patient is doing very well. Cellulitis has resolved and just a small amount of swelling in ble when patient is up for long periods. Patient was seen by new NP and visit went well. Patient is to increase protein in her diet and daughter states that patient is eating more and she has added a protein shake at least 1 a day. Patient fluid intake is good and no issues with bowel or bladder. Patient is taking all medications as ordered no new medications. HHC still active in home. Wound to foot healing well no s/s of infection noted and patient has appointment with Podiatrist next Tuesday. Daughter denies any other concerns or issues. Informed daughter that this will be the last outreach. Advised to contact HHC/PCP  with any health concerns for early intervention. Encouraged to use urgent care for non emergent needs. Care Transitions Final Call    Needs to be reviewed by the provider   Additional needs identified to be addressed with provider: No               Method of communication with provider : none      Care Transition Nurse (CTN) contacted the family by telephone to follow up after admission on 21. Verified name and  with family as identifiers. Addressed changes since last contact: none  Discussed follow-up appointments. The family agrees to contact the PCP office for questions related to their healthcare. [facility] provided contact information for future needs. No further follow-up call indicated based on severity of symptoms and risk factors.           Care Transitions Subsequent and Final Call    Subsequent and Final Calls  Do you have any ongoing symptoms?: No  Have your medications changed?: No  Do you have any questions related to your medications?: No  Do you currently have any active services?: Yes  Are you currently active with any services?: Home Health  Do you have any needs or concerns that I can assist you with?: No  Care Transitions Interventions  Other Interventions:            Follow Up  Future Appointments   Date Time Provider Thanh Rojo   2/3/2022  1:00 PM [facility]

## 2021-09-15 DIAGNOSIS — E11.9 TYPE 2 DIABETES MELLITUS WITHOUT COMPLICATION, WITHOUT LONG-TERM CURRENT USE OF INSULIN (HCC): ICD-10-CM

## 2021-09-15 NOTE — TELEPHONE ENCOUNTER
Pt daughter called pt has 7 days left of metformin and is leaving Friday morning to go out of town.  Please send over 30 day supply to [facility] and the rest to [address]

## 2021-09-16 ENCOUNTER — TELEPHONE (OUTPATIENT)
Dept: FAMILY MEDICINE CLINIC | Age: 86
End: 2021-09-16

## 2021-09-16 DIAGNOSIS — E89.0 POSTABLATIVE HYPOTHYROIDISM: ICD-10-CM

## 2021-09-16 RX ORDER — LEVOTHYROXINE SODIUM 112 UG/1
TABLET ORAL
Qty: 90 TABLET | Refills: 3 | Status: SHIPPED | OUTPATIENT
Start: 2021-09-16 | End: 2022-02-09 | Stop reason: ALTCHOICE

## 2021-09-16 NOTE — TELEPHONE ENCOUNTER
Hiral Renee (caregiver) calling to get a new 90 day rx levothyroxine (SYNTHROID) 112 MCG tablet sent to [facility].

## 2021-12-17 ENCOUNTER — IMMUNIZATION (OUTPATIENT)
Dept: FAMILY MEDICINE CLINIC | Age: 86
End: 2021-12-17
Payer: MEDICARE

## 2021-12-17 DIAGNOSIS — Z23 NEEDS FLU SHOT: Primary | ICD-10-CM

## 2021-12-17 PROCEDURE — 90674 CCIIV4 VAC NO PRSV 0.5 ML IM: CPT | Performed by: NURSE PRACTITIONER

## 2021-12-17 PROCEDURE — G0008 ADMIN INFLUENZA VIRUS VAC: HCPCS | Performed by: NURSE PRACTITIONER

## 2021-12-17 NOTE — PROGRESS NOTES
Vaccine Information Sheet, \"Influenza - Inactivated\"  given to Shawna Prieto, or parent/legal guardian of  Shawna Prieto and verbalized understanding. Patient responses:    Have you ever had a reaction to a flu vaccine? No  Do you have any current illness? No  Have you ever had Guillian Dennard Syndrome? No  Do you have a serious allergy to any of the follow: Neomycin, Polymyxin, Thimerosal, eggs or egg products? No    Flu vaccine given per order. Please see immunization tab. Risks and benefits explained. Current VIS given.

## 2022-01-31 DIAGNOSIS — I10 ESSENTIAL HYPERTENSION: ICD-10-CM

## 2022-01-31 DIAGNOSIS — E11.9 TYPE 2 DIABETES MELLITUS WITHOUT COMPLICATION, WITHOUT LONG-TERM CURRENT USE OF INSULIN (HCC): ICD-10-CM

## 2022-01-31 LAB
A/G RATIO: 1.6 (ref 1.1–2.2)
ALBUMIN SERPL-MCNC: 4.1 G/DL (ref 3.4–5)
ALP BLD-CCNC: 61 U/L (ref 40–129)
ALT SERPL-CCNC: 13 U/L (ref 10–40)
ANION GAP SERPL CALCULATED.3IONS-SCNC: 18 MMOL/L (ref 3–16)
AST SERPL-CCNC: 19 U/L (ref 15–37)
BILIRUB SERPL-MCNC: 0.6 MG/DL (ref 0–1)
BUN BLDV-MCNC: 46 MG/DL (ref 7–20)
CALCIUM SERPL-MCNC: 10.1 MG/DL (ref 8.3–10.6)
CHLORIDE BLD-SCNC: 98 MMOL/L (ref 99–110)
CHOLESTEROL, TOTAL: 127 MG/DL (ref 0–199)
CO2: 21 MMOL/L (ref 21–32)
CREAT SERPL-MCNC: 1.8 MG/DL (ref 0.6–1.2)
GFR AFRICAN AMERICAN: 32
GFR NON-AFRICAN AMERICAN: 26
GLUCOSE BLD-MCNC: 127 MG/DL (ref 70–99)
HCT VFR BLD CALC: 31 % (ref 36–48)
HDLC SERPL-MCNC: 48 MG/DL (ref 40–60)
HEMOGLOBIN: 10.2 G/DL (ref 12–16)
LDL CHOLESTEROL CALCULATED: 58 MG/DL
MCH RBC QN AUTO: 31.4 PG (ref 26–34)
MCHC RBC AUTO-ENTMCNC: 33 G/DL (ref 31–36)
MCV RBC AUTO: 95.1 FL (ref 80–100)
PDW BLD-RTO: 14.2 % (ref 12.4–15.4)
PLATELET # BLD: 264 K/UL (ref 135–450)
PMV BLD AUTO: 8.5 FL (ref 5–10.5)
POTASSIUM SERPL-SCNC: 4.8 MMOL/L (ref 3.5–5.1)
RBC # BLD: 3.26 M/UL (ref 4–5.2)
SODIUM BLD-SCNC: 137 MMOL/L (ref 136–145)
TOTAL PROTEIN: 6.6 G/DL (ref 6.4–8.2)
TRIGL SERPL-MCNC: 107 MG/DL (ref 0–150)
VLDLC SERPL CALC-MCNC: 21 MG/DL
WBC # BLD: 8.9 K/UL (ref 4–11)

## 2022-02-01 LAB
ESTIMATED AVERAGE GLUCOSE: 134.1 MG/DL
HBA1C MFR BLD: 6.3 %

## 2022-02-08 ENCOUNTER — OFFICE VISIT (OUTPATIENT)
Dept: FAMILY MEDICINE CLINIC | Age: 87
End: 2022-02-08
Payer: MEDICARE

## 2022-02-08 VITALS
WEIGHT: 138 LBS | HEIGHT: 66 IN | OXYGEN SATURATION: 98 % | HEART RATE: 71 BPM | DIASTOLIC BLOOD PRESSURE: 63 MMHG | TEMPERATURE: 97.7 F | BODY MASS INDEX: 22.18 KG/M2 | SYSTOLIC BLOOD PRESSURE: 122 MMHG

## 2022-02-08 DIAGNOSIS — M40.13 OTHER SECONDARY KYPHOSIS, CERVICOTHORACIC REGION: ICD-10-CM

## 2022-02-08 DIAGNOSIS — I10 ESSENTIAL HYPERTENSION: ICD-10-CM

## 2022-02-08 DIAGNOSIS — E11.40 TYPE 2 DIABETES MELLITUS WITH DIABETIC NEUROPATHY, WITHOUT LONG-TERM CURRENT USE OF INSULIN (HCC): Primary | ICD-10-CM

## 2022-02-08 DIAGNOSIS — E78.2 MIXED HYPERLIPIDEMIA: ICD-10-CM

## 2022-02-08 DIAGNOSIS — E89.0 POSTABLATIVE HYPOTHYROIDISM: ICD-10-CM

## 2022-02-08 DIAGNOSIS — Z00.00 ROUTINE GENERAL MEDICAL EXAMINATION AT A HEALTH CARE FACILITY: ICD-10-CM

## 2022-02-08 DIAGNOSIS — N18.4 CHRONIC KIDNEY DISEASE (CKD) STAGE G4/A1, SEVERELY DECREASED GLOMERULAR FILTRATION RATE (GFR) BETWEEN 15-29 ML/MIN/1.73 SQUARE METER AND ALBUMINURIA CREATININE RATIO LESS THAN 30 MG/G (HCC): ICD-10-CM

## 2022-02-08 DIAGNOSIS — M15.0 PRIMARY GENERALIZED (OSTEO)ARTHRITIS: ICD-10-CM

## 2022-02-08 PROBLEM — S12.000A CLOSED FRACTURE OF FIRST CERVICAL VERTEBRA (HCC): Status: RESOLVED | Noted: 2018-07-07 | Resolved: 2022-02-08

## 2022-02-08 PROBLEM — I48.91 ATRIAL FIBRILLATION, UNSPECIFIED TYPE (HCC): Status: RESOLVED | Noted: 2022-02-08 | Resolved: 2022-02-08

## 2022-02-08 PROBLEM — M65.312 TRIGGER THUMB OF BOTH THUMBS: Status: RESOLVED | Noted: 2017-08-07 | Resolved: 2022-02-08

## 2022-02-08 PROBLEM — M65.329 TRIGGER INDEX FINGER: Status: RESOLVED | Noted: 2017-08-07 | Resolved: 2022-02-08

## 2022-02-08 PROBLEM — M12.811 ROTATOR CUFF ARTHROPATHY OF RIGHT SHOULDER: Status: RESOLVED | Noted: 2021-01-05 | Resolved: 2022-02-08

## 2022-02-08 PROBLEM — I48.91 ATRIAL FIBRILLATION, UNSPECIFIED TYPE (HCC): Status: ACTIVE | Noted: 2022-02-08

## 2022-02-08 PROBLEM — M65.311 TRIGGER THUMB OF BOTH THUMBS: Status: RESOLVED | Noted: 2017-08-07 | Resolved: 2022-02-08

## 2022-02-08 PROBLEM — L03.116 CELLULITIS OF LEFT LOWER LIMB: Status: RESOLVED | Noted: 2021-07-15 | Resolved: 2022-02-08

## 2022-02-08 PROBLEM — S12.100A CLOSED FRACTURE OF SECOND CERVICAL VERTEBRA (HCC): Status: RESOLVED | Noted: 2018-07-07 | Resolved: 2022-02-08

## 2022-02-08 PROCEDURE — G8482 FLU IMMUNIZE ORDER/ADMIN: HCPCS | Performed by: NURSE PRACTITIONER

## 2022-02-08 PROCEDURE — 1123F ACP DISCUSS/DSCN MKR DOCD: CPT | Performed by: NURSE PRACTITIONER

## 2022-02-08 PROCEDURE — G0439 PPPS, SUBSEQ VISIT: HCPCS | Performed by: NURSE PRACTITIONER

## 2022-02-08 PROCEDURE — 4040F PNEUMOC VAC/ADMIN/RCVD: CPT | Performed by: NURSE PRACTITIONER

## 2022-02-08 ASSESSMENT — PATIENT HEALTH QUESTIONNAIRE - PHQ9
SUM OF ALL RESPONSES TO PHQ QUESTIONS 1-9: 0
2. FEELING DOWN, DEPRESSED OR HOPELESS: 0
SUM OF ALL RESPONSES TO PHQ QUESTIONS 1-9: 0
SUM OF ALL RESPONSES TO PHQ9 QUESTIONS 1 & 2: 0
SUM OF ALL RESPONSES TO PHQ QUESTIONS 1-9: 0
1. LITTLE INTEREST OR PLEASURE IN DOING THINGS: 0
SUM OF ALL RESPONSES TO PHQ QUESTIONS 1-9: 0

## 2022-02-08 ASSESSMENT — ENCOUNTER SYMPTOMS
CONSTIPATION: 0
ABDOMINAL PAIN: 0
WHEEZING: 0
VOMITING: 0
SHORTNESS OF BREATH: 0
COUGH: 0
NAUSEA: 0
DIARRHEA: 0

## 2022-02-08 ASSESSMENT — LIFESTYLE VARIABLES
AUDIT-C TOTAL SCORE: INCOMPLETE
HOW OFTEN DO YOU HAVE A DRINK CONTAINING ALCOHOL: 0
HOW OFTEN DO YOU HAVE A DRINK CONTAINING ALCOHOL: NEVER
AUDIT TOTAL SCORE: INCOMPLETE

## 2022-02-08 NOTE — PROGRESS NOTES
HISTORY AND PHYSICAL             Date: 2/8/2022        Patient Name: Marcus Monreal     YOB: 1929      Age:  80 y.o. Chief Complaint     Chief Complaint   Patient presents with   [facility]       Patient is here for medicarewellness today. History Obtained From   Patient and family member - Anna Concepcion    History of Present Illness   Pt denies concerns or complaints. States she is not very active at home, uses walker at home to get around, DnL, daughter, son and neighbor who is like a daughter to pt, check or stay with pt daily. Overall she doesn't feel like doing much. States she does not eat a lot because she's nor very busy. States she urinates throughout the day and has not noticed a decrease in urination. BM every other day. States she does not take tylenol often maybe once a week. Dnl reports a few months ago pt was accidentally doubling her medications due to sleeping/napping and when she wakes up she thought it was a time to take the again. Unsure how long this was going on or what medications she was taking double of. Now they only give her medications daily. Pt states her legs are not as swollen and her wound is completely healed. Denies other skin complaints.      Past Medical History     Past Medical History:   Diagnosis Date    Arthritis     Atrial fibrillation, unspecified type ([address] 2/8/2022    Cataract     Cellulitis of left lower limb 7/15/2021    Closed fracture of first cervical vertebra (HCC) 7/7/2018    Closed fracture of second cervical vertebra (HCC) 7/7/2018    Diabetes (HCC)     High blood pressure     Kidney problem     Neuropathy     Rotator cuff arthropathy of right shoulder 1/5/2021    Thyroid disorder     Trigger index finger 8/7/2017    Trigger thumb of both thumbs 8/7/2017        Past Surgical History     Past Surgical History:   Procedure Laterality Date    APPENDECTOMY      CARPAL TUNNEL RELEASE Left 09/05/2017      Left carpal tunnel release  & Left Thumb, Index Finger and Middle Finger trigger finger release    CARPAL TUNNEL RELEASE Right 09/26/2017    COLONOSCOPY      FINGER TRIGGER RELEASE Right 09/26/2017    Thumb & Index Finger    FOOT DEBRIDEMENT Left 7/12/2021    LEFT FOOT INCISION AND DRAINAGE, [address] OUT WITH APPLICATION GRAFT performed by Vernell Segovia DPM at [facility]. [address] Right 2006    Dr. Angela Richardson        Medications Prior to Admission     Prior to Admission medications    Medication Sig Start Date End Date Taking? Authorizing Provider   levothyroxine (SYNTHROID) 112 MCG tablet TAKE ONE TABLET BY MOUTH DAILY 9/16/21  Yes Stephanie Craigs, APRN - NP   acetaminophen (AMINOFEN) 325 MG tablet Take 2 tablets by mouth every 6 hours as needed for Pain 7/25/21  Yes Dejan Ni MD   Cholecalciferol (VITAMIN D) 50 MCG (2000 UT) CAPS capsule Take 2,000 Units by mouth daily   Yes Historical Provider, MD   lisinopril (PRINIVIL;ZESTRIL) 20 MG tablet Take 1 tablet by mouth daily 6/1/21  Yes Jessica Meneses MD   atorvastatin (LIPITOR) 40 MG tablet TAKE 1 TABLET DAILY 5/21/21  Yes Jessica Meneses MD   Cranberry-Cholecalciferol 4200-500 MG-UNIT CAPS Take by mouth   Yes Historical Provider, MD   aspirin 81 MG tablet Take 81 mg by mouth daily   Yes Historical Provider, MD   Multiple Vitamin (MULTI VITAMIN DAILY PO) Take by mouth   Yes Historical Provider, MD        Allergies   Adhesive tape, Gabapentin, Pravachol [pravastatin], Pravachol [pravastatin sodium], and Tegaderm ag mesh 2\"x2\" [wound dressings]    Social History     Social History     Tobacco History     Smoking Status  Never Smoker    Smokeless Tobacco Use  Never Used          Alcohol History     Alcohol Use Status  Yes Comment  occas          Drug Use     Drug Use Status  No          Sexual Activity     Sexually Active  Not Asked                Family History   No family history on file.     Review of Systems   Review normal. No respiratory distress. Abdominal:      General: Bowel sounds are normal. There is no distension. Palpations: Abdomen is soft. There is no mass. Tenderness: There is no abdominal tenderness. There is no right CVA tenderness or left CVA tenderness. Musculoskeletal:      Right lower leg: Edema (1+) present. Left lower leg: Edema (1+) present. Skin:     General: Skin is dry. Capillary Refill: Capillary refill takes 2 to 3 seconds. Neurological:      Mental Status: She is alert and oriented to person, place, and time. Gait: Gait abnormal (wc). Labs      Orders Only on 01/31/2022   Component Date Value Ref Range Status    Hemoglobin A1C 01/31/2022 6.3  See comment % Final    Comment: Comment:  Diagnosis of Diabetes: > or = 6.5%  Increased risk of diabetes (Prediabetes): 5.7-6.4%  Glycemic Control: Nonpregnant Adults: <7.0%                    Pregnant: <6.0%        eAG 01/31/2022 134.1  mg/dL Final    Cholesterol, Total 01/31/2022 127  0 - 199 mg/dL Final    Triglycerides 01/31/2022 107  0 - 150 mg/dL Final    HDL 01/31/2022 48  40 - 60 mg/dL Final    LDL Calculated 01/31/2022 58  <100 mg/dL Final    VLDL Cholesterol Calculated 01/31/2022 21  Not Established mg/dL Final    Sodium 01/31/2022 137  136 - 145 mmol/L Final    Potassium 01/31/2022 4.8  3.5 - 5.1 mmol/L Final    Chloride 01/31/2022 98* 99 - 110 mmol/L Final    CO2 01/31/2022 21  21 - 32 mmol/L Final    Anion Gap 01/31/2022 18* 3 - 16 Final    Glucose 01/31/2022 127* 70 - 99 mg/dL Final    BUN 01/31/2022 46* 7 - 20 mg/dL Final    CREATININE 01/31/2022 1.8* 0.6 - 1.2 mg/dL Final    GFR Non- 01/31/2022 26* >60 Final    Comment: >60 mL/min/1.73m2 EGFR, calc. for ages 25 and older using the  MDRD formula (not corrected for weight), is valid for stable  renal function.  GFR  01/31/2022 32* >60 Final    Comment: Chronic Kidney Disease: less than 60 ml/min/1.73 sq.m. Kidney Failure: less than 15 ml/min/1.73 sq.m. Results valid for patients 18 years and older.  Calcium 01/31/2022 10.1  8.3 - 10.6 mg/dL Final    Total Protein 01/31/2022 6.6  6.4 - 8.2 g/dL Final    Albumin 01/31/2022 4.1  3.4 - 5.0 g/dL Final    Albumin/Globulin Ratio 01/31/2022 1.6  1.1 - 2.2 Final    Total Bilirubin 01/31/2022 0.6  0.0 - 1.0 mg/dL Final    Alkaline Phosphatase 01/31/2022 61  40 - 129 U/L Final    ALT 01/31/2022 13  10 - 40 U/L Final    AST 01/31/2022 19  15 - 37 U/L Final    WBC 01/31/2022 8.9  4.0 - 11.0 K/uL Final    RBC 01/31/2022 3.26* 4.00 - 5.20 M/uL Final    Hemoglobin 01/31/2022 10.2* 12.0 - 16.0 g/dL Final    Hematocrit 01/31/2022 31.0* 36.0 - 48.0 % Final    MCV 01/31/2022 95.1  80.0 - 100.0 fL Final    MCH 01/31/2022 31.4  26.0 - 34.0 pg Final    MCHC 01/31/2022 33.0  31.0 - 36.0 g/dL Final    RDW 01/31/2022 14.2  12.4 - 15.4 % Final    Platelets 98/54/4899 264  135 - 450 K/uL Final    MPV 01/31/2022 8.5  5.0 - 10.5 fL Final          Assessment & Plan   1. Type 2 diabetes mellitus with diabetic neuropathy, without long-term current use of insulin (HCC)  A1c 6. 3. pt has had decrease in PO intake. Stopping metformin due to decrese in kidney function. Repeat A1c in 3 months    2. Postablative hypothyroidism  Needs TSH today.  - TSH without Reflex; Future    3. Primary generalized (osteo)arthritis  Ongoing, discomfort controlled with PRN tyelnol    4. Other secondary kyphosis, cervicothoracic region  Ongoing, chronic    5. Essential hypertension  Well controlled in office today    6. Mixed hyperlipidemia  Well controlled on lipitor    7. Chronic kidney disease (CKD) stage G4/A1, severely decreased glomerular filtration rate (GFR) between 15-29 mL/min/1.73 square meter and albuminuria creatinine ratio less than 30 mg/g (HCC)  Worsening. Stopped metformin, encourage DnL to make follow up appt with Dr. Mari Castle.  Follow pt education and instructions included in AVS    8.  Routine general medical examination at a health care facility           Electronically signed by CAROLE Read NP on 2/8/22 at 1:47 PM EST

## 2022-02-08 NOTE — PATIENT INSTRUCTIONS
labels to see how much sodium is in each serving. Make sure that you look at the serving size. If you eat more than the serving size, you will get more sodium than what is listed on the label. ? Do not add salt to your food. ? Buy foods that are labeled \"no salt added,\" \"sodium-free,\" or \"low-sodium. \" Foods labeled \"reduced-sodium\" and \"light sodium\" may still have too much sodium. ? Limit processed foods, fast food, and restaurant foods. And avoid salted snacks, like pretzels and chips. ? Try lemon, herbs, and spices to flavor your meals. · Know how much fluid you can drink each day. Every day fill a pitcher with that amount of water. If you drink another fluid (such as coffee) that day, pour an equal amount out of the pitcher. And remember to count foods that are liquid at room temperature as fluids. These include ice, gelatin, ice pops, and ice cream.  · Limit potassium. ? Choose low-potassium fruits and vegetables. These include things like grapes, pineapple, lettuce, green beans, and cucumber. ? Choose low-potassium foods such as pasta, noodles, rice, tortillas, and bagels. And avoid high-potassium foods, including things like milk, bananas, oranges, spinach, tomatoes, and broccoli. ? Do not use a salt substitute or lite salt unless your doctor says it is okay. They can be high in potassium. · Limit phosphorus. ? Follow your food plan to know how much milk and milk products you can have. ? Avoid nuts, peanut butter, seeds, lentils, beans, organ meats, and sardines. ? Avoid cola drinks and bran breads or bran cereals. ? Take phosphate binders as directed, if prescribed by your doctor. Always check with your doctor if you have questions about your diet. Do not take any vitamins or minerals, supplements, or herbal products without talking to your doctor first. And check with your doctor about whether it is safe for you to drink alcohol. Where can you learn more?   Go to [email or web address] org and sign in to your [employer] account. Enter F718 in the [facility] box to learn more about \"Diet for End-Stage Renal Disease (Dialysis): Care Instructions. \"     If you do not have an account, please click on the \"Sign Up Now\" link. Current as of: September 8, 2021               Content Version: 13.1  © 2006-2021 [employer]. Care instructions adapted under license by [facility] ([facility]). If you have questions about a medical condition or this instruction, always ask your healthcare professional. [place] [address] any warranty or liability for your use of this information. Patient Education        Diet for End-Stage Renal Disease (Dialysis): Care Instructions  Overview  You need to change your diet when you are on dialysis for end-stage renal disease (kidney failure). You will need more protein than you did before you started dialysis. You may need to limit salt and fluids. You also may need to limit minerals such as potassium and phosphorus. A diet for end-stage renal disease takes planning. A dietitian who specializes in kidney disease can help you plan meals that meet your needs. Your nutrition needs depend on the type of dialysis you get. Talk with your doctor or dietitian to make sure your diet is right for your condition. Do not change your diet without talking to your doctor or dietitian. Follow-up care is a key part of your treatment and safety. Be sure to make and go to all appointments, and call your doctor if you are having problems. It's also a good idea to know your test results and keep a list of the medicines you take. How can you care for yourself at home? Each person's dialysis diet may be a little different. Work with your doctor or dietitian to create a food plan that guides your daily food choices. Here are some tips that may help you follow your plan. · Do not go for many hours without eating.    If you do not feel very hungry, try to eat 4 or 5 small meals instead of 1 or 2 big meals. If you have a hard time eating enough, talk to your doctor or dietitian about ways you can add calories to your diet. · Get the right amount of protein. ? Ask your doctor or dietitian how much protein you need each day. You will probably need more protein while you are on dialysis than you did before you started dialysis. ? Choose high-quality protein sources, such as lean meat, poultry, fish, or eggs. ? If you are a vegetarian, your diet may require extra planning with your dietitian to make sure you're getting enough protein and nutrients. · Limit salt. ? Look at food labels to see how much sodium is in each serving. Make sure that you look at the serving size. If you eat more than the serving size, you will get more sodium than what is listed on the label. ? Do not add salt to your food. ? Buy foods that are labeled \"no salt added,\" \"sodium-free,\" or \"low-sodium. \" Foods labeled \"reduced-sodium\" and \"light sodium\" may still have too much sodium. ? Limit processed foods, fast food, and restaurant foods. And avoid salted snacks, like pretzels and chips. ? Try lemon, herbs, and spices to flavor your meals. · Know how much fluid you can drink each day. Every day fill a pitcher with that amount of water. If you drink another fluid (such as coffee) that day, pour an equal amount out of the pitcher. And remember to count foods that are liquid at room temperature as fluids. These include ice, gelatin, ice pops, and ice cream.  · Limit potassium. ? Choose low-potassium fruits and vegetables. These include things like grapes, pineapple, lettuce, green beans, and cucumber. ? Choose low-potassium foods such as pasta, noodles, rice, tortillas, and bagels. And avoid high-potassium foods, including things like milk, bananas, oranges, spinach, tomatoes, and broccoli. ?  Do not use a salt substitute or lite salt unless your doctor says it is okay. They can be high in potassium. · Limit phosphorus. ? Follow your food plan to know how much milk and milk products you can have. ? Avoid nuts, peanut butter, seeds, lentils, beans, organ meats, and sardines. ? Avoid cola drinks and bran breads or bran cereals. ? Take phosphate binders as directed, if prescribed by your doctor. Always check with your doctor if you have questions about your diet. Do not take any vitamins or minerals, supplements, or herbal products without talking to your doctor first. And check with your doctor about whether it is safe for you to drink alcohol. Where can you learn more? Go to https://chpepiceweb.[email or web address]. org and sign in to your [employer] account. Enter K253 in the [employer] box to learn more about \"Diet for End-Stage Renal Disease (Dialysis): Care Instructions. \"     If you do not have an account, please click on the \"Sign Up Now\" link. Current as of: September 8, 2021               Content Version: 13.1  © 9967-5881 [employer] Care instructions adapted under license by [facility] ([facility]). If you have questions about a medical condition or this instruction, always ask your healthcare professional. [place] [address] any warranty or liability for your use of this information. Personalized Preventive Plan for [facility] - 2/8/2022  Medicare offers a range of preventive health benefits. Some of the tests and screenings are paid in full while other may be subject to a deductible, co-insurance, and/or copay. Some of these benefits include a comprehensive review of your medical history including lifestyle, illnesses that may run in your family, and various assessments and screenings as appropriate. After reviewing your medical record and screening and assessments performed today your provider may have ordered immunizations, labs, imaging, and/or referrals for you.   A list of these orders (if applicable)

## 2022-02-09 DIAGNOSIS — E89.0 POSTABLATIVE HYPOTHYROIDISM: Primary | ICD-10-CM

## 2022-02-09 LAB — TSH SERPL DL<=0.05 MIU/L-ACNC: 0.1 UIU/ML (ref 0.27–4.2)

## 2022-04-25 DIAGNOSIS — E78.2 MIXED HYPERLIPIDEMIA: ICD-10-CM

## 2022-04-25 RX ORDER — ATORVASTATIN CALCIUM 40 MG/1
TABLET, FILM COATED ORAL
Qty: 90 TABLET | Refills: 3 | OUTPATIENT
Start: 2022-04-25

## 2022-04-25 NOTE — TELEPHONE ENCOUNTER
Requested Prescriptions     Pending Prescriptions Disp Refills    atorvastatin (LIPITOR) 40 MG tablet [Pharmacy Med Name: ATORVASTATIN TABS 40MG] 90 tablet 3     Sig: TAKE 1 TABLET DAILY   Patient requesting a medication refill.   [facility]  Next office visit: Visit date not found  Last regular office visit: 5/21/2021

## 2022-05-09 ENCOUNTER — OFFICE VISIT (OUTPATIENT)
Dept: FAMILY MEDICINE CLINIC | Age: 87
End: 2022-05-09
Payer: MEDICARE

## 2022-05-09 VITALS
OXYGEN SATURATION: 97 % | SYSTOLIC BLOOD PRESSURE: 110 MMHG | WEIGHT: 153.4 LBS | DIASTOLIC BLOOD PRESSURE: 64 MMHG | HEART RATE: 67 BPM | BODY MASS INDEX: 24.65 KG/M2 | TEMPERATURE: 98.7 F | HEIGHT: 66 IN

## 2022-05-09 DIAGNOSIS — E89.0 POSTABLATIVE HYPOTHYROIDISM: ICD-10-CM

## 2022-05-09 DIAGNOSIS — I10 ESSENTIAL HYPERTENSION: Primary | ICD-10-CM

## 2022-05-09 DIAGNOSIS — E11.40 TYPE 2 DIABETES MELLITUS WITH DIABETIC NEUROPATHY, WITHOUT LONG-TERM CURRENT USE OF INSULIN (HCC): ICD-10-CM

## 2022-05-09 DIAGNOSIS — Z91.81 AT HIGH RISK FOR FALLS: ICD-10-CM

## 2022-05-09 LAB — TSH SERPL DL<=0.05 MIU/L-ACNC: 0.14 UIU/ML (ref 0.27–4.2)

## 2022-05-09 PROCEDURE — G8427 DOCREV CUR MEDS BY ELIG CLIN: HCPCS | Performed by: NURSE PRACTITIONER

## 2022-05-09 PROCEDURE — G8420 CALC BMI NORM PARAMETERS: HCPCS | Performed by: NURSE PRACTITIONER

## 2022-05-09 PROCEDURE — 1036F TOBACCO NON-USER: CPT | Performed by: NURSE PRACTITIONER

## 2022-05-09 PROCEDURE — 3044F HG A1C LEVEL LT 7.0%: CPT | Performed by: NURSE PRACTITIONER

## 2022-05-09 PROCEDURE — 99214 OFFICE O/P EST MOD 30 MIN: CPT | Performed by: NURSE PRACTITIONER

## 2022-05-09 PROCEDURE — 1090F PRES/ABSN URINE INCON ASSESS: CPT | Performed by: NURSE PRACTITIONER

## 2022-05-09 PROCEDURE — 4040F PNEUMOC VAC/ADMIN/RCVD: CPT | Performed by: NURSE PRACTITIONER

## 2022-05-09 PROCEDURE — 1123F ACP DISCUSS/DSCN MKR DOCD: CPT | Performed by: NURSE PRACTITIONER

## 2022-05-09 RX ORDER — LEVOTHYROXINE SODIUM 112 UG/1
112 TABLET ORAL DAILY
COMMUNITY
End: 2022-05-10 | Stop reason: ALTCHOICE

## 2022-05-09 RX ORDER — FUROSEMIDE 20 MG/1
20 TABLET ORAL 2 TIMES DAILY
COMMUNITY

## 2022-05-09 RX ORDER — AMLODIPINE BESYLATE 5 MG/1
5 TABLET ORAL DAILY
COMMUNITY

## 2022-05-09 ASSESSMENT — ENCOUNTER SYMPTOMS
WHEEZING: 0
DIARRHEA: 0
SHORTNESS OF BREATH: 0
COUGH: 0
NAUSEA: 0
CONSTIPATION: 1
ABDOMINAL PAIN: 0
VOMITING: 0
BLOOD IN STOOL: 0

## 2022-05-09 NOTE — PROGRESS NOTES
Hilary León (:  1929) is a 80 y.o. female,Established patient, here for evaluation of the following chief complaint(s):  Check-Up and Diabetes         ASSESSMENT/PLAN:  1. Essential hypertension  Well controlled in office today. Continue low salt diet, checking bp at home. 2. Type 2 diabetes mellitus with diabetic neuropathy, without long-term current use of insulin (HCC)  Well controlled, need repeat A1c today. Continue with diet control.  - Hemoglobin A1C; Future    3. Postablative hypothyroidism  Ongoing, needs repeat TSH. 4. At high risk for falls  Follow pt education and instructions. Return in about 6 months (around 2022). Subjective   SUBJECTIVE/OBJECTIVE:  HPI  Kevin Covarrubias daughter in law, with pt. Pt reports overall feeling well and denies complaints or concerns. Kevin Covarrubias states she is only putting one days worth of medicine in her pill containers to prevent pt from doubling up which was a problem noted at her visit 2022. States she is following low salt diet, does not salt food, avoids prepakaged foods and can goods. Kevin Covarrubias cooks and brings her most of her meals. Pt states her appetiite is back and is gaining weight. BP well controlled at home, history of low bp and dizziness and had bp meds chagned. Denies dizziness, syncope, increased weakness, nose bleeds or HA. Ongoing leg swelling R>L. Improves with compression wraps and elevation. BM everyother day with miralzx. Denies abd pain, n/v.  Not checking bs at home, stopped metformin at last visit due to CKD worsening. Pt has labs every 3 months with renal doc. States she sees her podiatirst every 12 wks for toenail managment and to evaluate skin condintion.    Current Outpatient Medications   Medication Sig Dispense Refill    amLODIPine (NORVASC) 5 MG tablet Take 5 mg by mouth daily      furosemide (LASIX) 20 MG tablet Take 20 mg by mouth 2 times daily      levothyroxine (SYNTHROID) 112 MCG tablet Take 112 mcg by mouth Daily      acetaminophen (AMINOFEN) 325 MG tablet Take 2 tablets by mouth every 6 hours as needed for Pain 120 tablet 0    Cholecalciferol (VITAMIN D) 50 MCG (2000 UT) CAPS capsule Take 2,000 Units by mouth daily      lisinopril (PRINIVIL;ZESTRIL) 20 MG tablet Take 1 tablet by mouth daily 90 tablet 3    atorvastatin (LIPITOR) 40 MG tablet TAKE 1 TABLET DAILY 90 tablet 3    Cranberry-Cholecalciferol 4200-500 MG-UNIT CAPS Take by mouth      aspirin 81 MG tablet Take 81 mg by mouth daily      Multiple Vitamin (MULTI VITAMIN DAILY PO) Take by mouth       No current facility-administered medications for this visit. Past Medical History:   Diagnosis Date    Arthritis     Atrial fibrillation, unspecified type ([facility] [address] 2/8/2022    Cataract     Cellulitis of left lower limb 7/15/2021    Closed fracture of first cervical vertebra (HCC) 7/7/2018    Closed fracture of second cervical vertebra (HCC) 7/7/2018    Diabetes ([facility])     High blood pressure     Kidney problem     Neuropathy     Rotator cuff arthropathy of right shoulder 1/5/2021    Thyroid disorder     Trigger index finger 8/7/2017    Trigger thumb of both thumbs 8/7/2017        Review of Systems   Constitutional: Negative for activity change, fatigue, fever and unexpected weight change. Respiratory: Negative for cough, shortness of breath and wheezing. Cardiovascular: Positive for leg swelling. Negative for palpitations. Gastrointestinal: Positive for constipation. Negative for abdominal pain, blood in stool, diarrhea, nausea and vomiting. Endocrine: Negative for cold intolerance, heat intolerance, polydipsia, polyphagia and polyuria. Genitourinary: Negative for difficulty urinating. Neurological: Positive for weakness (general in her legs, uses wc when out or for long distances). Negative for dizziness, facial asymmetry, speech difficulty, light-headedness and headaches.    Psychiatric/Behavioral: Negative for sleep disturbance. Objective   Physical Exam  Constitutional:       General: She is not in acute distress. Appearance: Normal appearance. She is not ill-appearing. HENT:      Head: Normocephalic and atraumatic. Eyes:      Extraocular Movements: Extraocular movements intact. Conjunctiva/sclera: Conjunctivae normal.      Pupils: Pupils are equal, round, and reactive to light. Neck:      Vascular: No carotid bruit. Cardiovascular:      Rate and Rhythm: Normal rate and regular rhythm. Pulses: Normal pulses. Heart sounds: Normal heart sounds. No murmur heard. Pulmonary:      Effort: No respiratory distress. Breath sounds: Normal breath sounds. No wheezing. Abdominal:      General: Bowel sounds are normal.      Palpations: Abdomen is soft. There is no mass. Tenderness: There is no abdominal tenderness. Musculoskeletal:      Cervical back: No tenderness. Right lower leg: Edema (2+) present. Left lower leg: Edema (1+) present. Lymphadenopathy:      Cervical: No cervical adenopathy. Skin:     General: Skin is warm and dry. Neurological:      Mental Status: She is alert and oriented to person, place, and time. Gait: Gait abnormal (using wc). --CAROLE Guerrero NP On the basis of positive falls risk screening, assessment and plan is as follows: home safety tips provided.

## 2022-05-09 NOTE — PATIENT INSTRUCTIONS
Patient Education        Diabetes and Preventing Falls: Care Instructions  Overview     Complications of diabetes--such as nerve damage, foot problems, and reduced vision--may increase your risk of a fall. Some of your medicines also may add toyour risk. By making your home safer, you can lower your risk of falling. Doing things toprevent diabetes complications may also help to lower your risk. You can make your home safer with a few simple measures. Follow-up care is a key part of your treatment and safety. Be sure to make and go to all appointments, and call your doctor if you are having problems. It's also a good idea to know your test results and keep alist of the medicines you take. How can you care for yourself at home? Taking care of yourself   Keep your blood sugar at a target level (which you set with your doctor).  Exercise regularly to improve your strength, muscle tone, and balance. Walk if you can. Swimming may be a good choice if you cannot walk easily.  Have your vision checked as often as your doctor recommends. It is usually once a year or more often if you have eye problems.  Know the side effects of the medicines you take. Ask your doctor or pharmacist whether the medicines you take can affect your balance. Sleeping pills or sedatives can affect your balance.  Limit the amount of alcohol you drink. Alcohol can impair your balance and other senses.  Have your doctor check your feet during each visit. If you have a foot problem, see your doctor. Preventing falls at home   Remove raised doorway thresholds, throw rugs, and clutter. Repair loose carpet or raised areas in the floor. [facility] furniture and electrical cords to keep them out of walking paths.  Use nonskid floor wax, and wipe up spills right away, especially on ceramic tile floors.  If you use a walker or cane, put rubber tips on it.  If you use crutches, clean the bottoms of them regularly with an abrasive pad, such as steel wool.  Keep your house well lit, especially [place], and outside walkways. Use night-lights in areas such as hallways and bathrooms. Add extra light switches or use remote switches (such as switches that go on or off when you clap your hands) to make it easier to turn lights on if you have to get up during the night.  Install sturdy handrails on stairways. Put grab bars near your shower, bathtub, and toilet.  Store household items on low shelves so that you do not have to climb or reach high. Or use a reaching device that you can get at a medical supply store. If you have to climb for something, use a step stool with handrails, or ask someone to get it for you.  Keep a cordless phone and a flashlight with new batteries by your bed. If possible, put a phone in each of the main rooms of your house, or carry a cell phone in case you fall and cannot reach a phone. Or you can wear a device around your neck or wrist. You push a button that sends a signal for help.  Wear low-heeled shoes that fit well and give your feet good support. Use footwear with nonskid soles. Check the heels and soles of your shoes for wear. Repair or replace worn heels or soles.  Do not wear socks without shoes on wood floors.  Walk on the grass when the sidewalks are slippery. If you live in an area that gets snow and ice in the winter, sprinkle salt on slippery steps and sidewalks. Where can you learn more? Go to https://[employer]ulud[employer]pti.Pact. org and sign in to your [employer] account. Enter D362 in the [facility] box to learn more about \"Diabetes and Preventing Falls: Care Instructions. \"     If you do not have an account, please click on the \"Sign Up Now\" link. Current as of: September 8, 2021               Content Version: 13.2  © 5838-1844 Healthwise, Incorporated. Care instructions adapted under license by [facility] ([facility]).  If you have questions about a medical condition or this instruction, always ask your healthcare professional. [place] [address] any warranty or liability for your use of this information.

## 2022-05-10 LAB
ESTIMATED AVERAGE GLUCOSE: 134.1 MG/DL
HBA1C MFR BLD: 6.3 %

## 2022-05-10 RX ORDER — LEVOTHYROXINE SODIUM 0.1 MG/1
100 TABLET ORAL DAILY
Qty: 90 TABLET | Refills: 0 | Status: SHIPPED | OUTPATIENT
Start: 2022-05-10 | End: 2022-07-21

## 2022-07-21 RX ORDER — LEVOTHYROXINE SODIUM 0.1 MG/1
TABLET ORAL
Qty: 90 TABLET | Refills: 3 | Status: SHIPPED | OUTPATIENT
Start: 2022-07-21

## 2022-10-31 ENCOUNTER — TELEPHONE (OUTPATIENT)
Dept: FAMILY MEDICINE CLINIC | Age: 87
End: 2022-10-31

## 2022-10-31 DIAGNOSIS — E89.0 POSTABLATIVE HYPOTHYROIDISM: ICD-10-CM

## 2022-10-31 DIAGNOSIS — E11.9 TYPE 2 DIABETES MELLITUS WITHOUT COMPLICATION, WITHOUT LONG-TERM CURRENT USE OF INSULIN (HCC): Primary | ICD-10-CM

## 2022-10-31 NOTE — TELEPHONE ENCOUNTER
Pt has an appt on November 17th for a 6 mo htn/hld/dm f/u  Does pt need to blood work prior to the appt?     Pl advise [phone] (home)

## 2022-11-04 DIAGNOSIS — E11.9 TYPE 2 DIABETES MELLITUS WITHOUT COMPLICATION, WITHOUT LONG-TERM CURRENT USE OF INSULIN (HCC): ICD-10-CM

## 2022-11-04 DIAGNOSIS — E89.0 POSTABLATIVE HYPOTHYROIDISM: ICD-10-CM

## 2022-11-05 LAB
ESTIMATED AVERAGE GLUCOSE: 154.2 MG/DL
HBA1C MFR BLD: 7 %
TSH SERPL DL<=0.05 MIU/L-ACNC: 12.63 UIU/ML (ref 0.27–4.2)

## 2022-11-07 ENCOUNTER — OFFICE VISIT (OUTPATIENT)
Dept: FAMILY MEDICINE CLINIC | Age: 87
End: 2022-11-07
Payer: MEDICARE

## 2022-11-07 VITALS
DIASTOLIC BLOOD PRESSURE: 60 MMHG | TEMPERATURE: 98.3 F | BODY MASS INDEX: 24.76 KG/M2 | HEIGHT: 66 IN | HEART RATE: 74 BPM | SYSTOLIC BLOOD PRESSURE: 100 MMHG | OXYGEN SATURATION: 96 %

## 2022-11-07 DIAGNOSIS — E11.40 TYPE 2 DIABETES MELLITUS WITH DIABETIC NEUROPATHY, WITHOUT LONG-TERM CURRENT USE OF INSULIN (HCC): ICD-10-CM

## 2022-11-07 DIAGNOSIS — Z23 NEED FOR INFLUENZA VACCINATION: ICD-10-CM

## 2022-11-07 DIAGNOSIS — N18.32 STAGE 3B CHRONIC KIDNEY DISEASE (HCC): ICD-10-CM

## 2022-11-07 DIAGNOSIS — E78.2 MIXED HYPERLIPIDEMIA: ICD-10-CM

## 2022-11-07 DIAGNOSIS — I10 ESSENTIAL HYPERTENSION: Primary | ICD-10-CM

## 2022-11-07 DIAGNOSIS — E89.0 POSTABLATIVE HYPOTHYROIDISM: ICD-10-CM

## 2022-11-07 PROCEDURE — 1036F TOBACCO NON-USER: CPT | Performed by: NURSE PRACTITIONER

## 2022-11-07 PROCEDURE — G8420 CALC BMI NORM PARAMETERS: HCPCS | Performed by: NURSE PRACTITIONER

## 2022-11-07 PROCEDURE — G8484 FLU IMMUNIZE NO ADMIN: HCPCS | Performed by: NURSE PRACTITIONER

## 2022-11-07 PROCEDURE — G8427 DOCREV CUR MEDS BY ELIG CLIN: HCPCS | Performed by: NURSE PRACTITIONER

## 2022-11-07 PROCEDURE — 3051F HG A1C>EQUAL 7.0%<8.0%: CPT | Performed by: NURSE PRACTITIONER

## 2022-11-07 PROCEDURE — 1123F ACP DISCUSS/DSCN MKR DOCD: CPT | Performed by: NURSE PRACTITIONER

## 2022-11-07 PROCEDURE — 99214 OFFICE O/P EST MOD 30 MIN: CPT | Performed by: NURSE PRACTITIONER

## 2022-11-07 PROCEDURE — 90694 VACC AIIV4 NO PRSRV 0.5ML IM: CPT | Performed by: NURSE PRACTITIONER

## 2022-11-07 PROCEDURE — 1090F PRES/ABSN URINE INCON ASSESS: CPT | Performed by: NURSE PRACTITIONER

## 2022-11-07 PROCEDURE — G0008 ADMIN INFLUENZA VIRUS VAC: HCPCS | Performed by: NURSE PRACTITIONER

## 2022-11-07 RX ORDER — LATANOPROST 50 UG/ML
1 SOLUTION/ DROPS OPHTHALMIC NIGHTLY
COMMUNITY
Start: 2022-10-26

## 2022-11-07 RX ORDER — LEVOTHYROXINE SODIUM 112 UG/1
112 TABLET ORAL DAILY
Qty: 90 TABLET | Refills: 1 | Status: SHIPPED | OUTPATIENT
Start: 2022-11-07

## 2022-11-07 RX ORDER — POLYETHYLENE GLYCOL 3350 17 G/17G
17 POWDER, FOR SOLUTION ORAL DAILY
COMMUNITY

## 2022-11-07 RX ORDER — ATORVASTATIN CALCIUM 20 MG/1
20 TABLET, FILM COATED ORAL DAILY
Qty: 30 TABLET | Refills: 3
Start: 2022-11-07 | End: 2022-11-15 | Stop reason: SDUPTHER

## 2022-11-07 SDOH — ECONOMIC STABILITY: FOOD INSECURITY: WITHIN THE PAST 12 MONTHS, YOU WORRIED THAT YOUR FOOD WOULD RUN OUT BEFORE YOU GOT MONEY TO BUY MORE.: NEVER TRUE

## 2022-11-07 SDOH — ECONOMIC STABILITY: FOOD INSECURITY: WITHIN THE PAST 12 MONTHS, THE FOOD YOU BOUGHT JUST DIDN'T LAST AND YOU DIDN'T HAVE MONEY TO GET MORE.: NEVER TRUE

## 2022-11-07 ASSESSMENT — SOCIAL DETERMINANTS OF HEALTH (SDOH): HOW HARD IS IT FOR YOU TO PAY FOR THE VERY BASICS LIKE FOOD, HOUSING, MEDICAL CARE, AND HEATING?: NOT VERY HARD

## 2022-11-07 ASSESSMENT — ENCOUNTER SYMPTOMS
COUGH: 0
CONSTIPATION: 1
NAUSEA: 0
ABDOMINAL PAIN: 0
VOMITING: 0
SHORTNESS OF BREATH: 0

## 2022-11-07 NOTE — PROGRESS NOTES
Tiny Lara (:  1929) is a 80 y.o. female,Established patient, here for evaluation of the following chief complaint(s):  Follow-up (6 month check up htn/hld/dm)         ASSESSMENT/PLAN:  1. Essential hypertension  Well controlled in office today. Continue current dose of amlodipine. 2. Postablative hypothyroidism  Not controlled, TSH 12, increased synthroid to 112mcg, repeat labs in 12 wks  - TSH; Future    3. Type 2 diabetes mellitus with diabetic neuropathy, without long-term current use of insulin (HCC)  Stable, A1c 7. Continue to control with diet restrictions, repeat in 12 weeks. 4. Mixed hyperlipidemia  Controlled. Decreased lipitor to 20 mg. Repeat lipid panel in 12 wks. 5. Stage 3b chronic kidney disease ([facility] [address]  Ongoing, continue routine follow ups with Neprhology. Return in about 4 months (around 3/7/2023) for medicare annual wellness. Subjective   SUBJECTIVE/OBJECTIVE:  HPI  Presents with daughter in law today for 6 month follow up. PMH significant for HTN, T2DM, Hypothyroid, HLD, CKD. Overall pt feeling well, states she was a little weak today due to not moving around much over the weekend. States she recently had a bout of constiparion when she was [place], relief with suppository, miralax and stool softer, normally BM every 3 days. Denies ongoing concerns, abd pain, n/v. Hypothyroid- taking 100 mcg daily. Denies changes in weight, hair, skin or nails. Denies temp intolerance, palpitations. T2DM- controlled with diet. No longer taking medication due to CKD. CKD-lasix once a day for leg swelling. Following with neph every 6 months. No salt added to diet. Trying to get at [facility] of water a day. Pt does not wear support hose, Nephrology referred pt to lymphedema clinic, pt has not followed up yet.    Current Outpatient Medications   Medication Sig Dispense Refill    polyethylene glycol (MIRALAX) 17 GM/SCOOP powder Take 17 g by mouth daily      levothyroxine (SYNTHROID) 112 MCG tablet Take 1 tablet by mouth Daily 90 tablet 1    levothyroxine (SYNTHROID) 100 MCG tablet TAKE 1 TABLET DAILY 90 tablet 3    amLODIPine (NORVASC) 5 MG tablet Take 5 mg by mouth daily      furosemide (LASIX) 20 MG tablet Take 20 mg by mouth 2 times daily      acetaminophen (AMINOFEN) 325 MG tablet Take 2 tablets by mouth every 6 hours as needed for Pain 120 tablet 0    Cholecalciferol (VITAMIN D) 50 MCG (2000 UT) CAPS capsule Take 2,000 Units by mouth daily      atorvastatin (LIPITOR) 40 MG tablet TAKE 1 TABLET DAILY 90 tablet 3    Cranberry-Cholecalciferol 4200-500 MG-UNIT CAPS Take by mouth      aspirin 81 MG tablet Take 81 mg by mouth daily      Multiple Vitamin (MULTI VITAMIN DAILY PO) Take by mouth      latanoprost (XALATAN) 0.005 % ophthalmic solution Apply 1 drop to eye nightly       No current facility-administered medications for this visit. Past Medical History:   Diagnosis Date    Arthritis     Atrial fibrillation, unspecified type ([facility] [address] 2/8/2022    Cataract     Cellulitis of left lower limb 7/15/2021    Closed fracture of first cervical vertebra ([facility] [address] 7/7/2018    Closed fracture of second cervical vertebra ([facility] [address] 7/7/2018    Diabetes (HCC)     High blood pressure     Kidney problem     Neuropathy     Rotator cuff arthropathy of right shoulder 1/5/2021    Thyroid disorder     Trigger index finger 8/7/2017    Trigger thumb of both thumbs 8/7/2017        Review of Systems   Constitutional:  Negative for activity change, fatigue, fever and unexpected weight change. Eyes:  Negative for visual disturbance. Respiratory:  Negative for cough and shortness of breath. Cardiovascular:  Positive for leg swelling. Negative for chest pain and palpitations. Gastrointestinal:  Positive for constipation (resolved). Negative for abdominal pain, nausea and vomiting. Endocrine: Negative for cold intolerance, heat intolerance, polydipsia, polyphagia and polyuria.    Genitourinary: Negative for difficulty urinating. Musculoskeletal:  Positive for gait problem (unsteady and weak, using wc). Skin: Negative. Neurological:  Positive for weakness (generalized). Psychiatric/Behavioral:  Negative for sleep disturbance. Objective   Physical Exam  Constitutional:       General: She is not in acute distress. HENT:      Head: Normocephalic. Neck:      Vascular: No carotid bruit. Cardiovascular:      Rate and Rhythm: Normal rate and regular rhythm. Heart sounds: Normal heart sounds. No murmur heard. Pulmonary:      Effort: Pulmonary effort is normal.      Breath sounds: Normal breath sounds. Abdominal:      General: Bowel sounds are normal.      Palpations: Abdomen is soft. Tenderness: There is no abdominal tenderness. There is no right CVA tenderness or left CVA tenderness. Musculoskeletal:      Right lower leg: Edema present. Left lower leg: Edema present. Comments: kyphosis   Skin:     General: Skin is warm and dry. Neurological:      Mental Status: She is alert.    Psychiatric:         Mood and Affect: Mood normal.            --Rudolph Eisenmenger, APRN - MAKENNA

## 2022-11-07 NOTE — PROGRESS NOTES
Vaccine Information Sheet, \"Influenza - Inactivated\"  given to Pepe Snider, or parent/legal guardian of  Pepe Snider and verbalized understanding. Patient responses:    Have you ever had a reaction to a flu vaccine? No  Do you have any current illness? No  Have you ever had [place] Syndrome? No  Do you have a serious allergy to any of the follow: Neomycin, Polymyxin, Thimerosal, eggs or egg products? No    Flu vaccine given per order. Please see immunization tab. Risks and benefits explained. Current VIS given.

## 2022-11-15 RX ORDER — ATORVASTATIN CALCIUM 20 MG/1
20 TABLET, FILM COATED ORAL DAILY
Qty: 30 TABLET | Refills: 3 | Status: SHIPPED | OUTPATIENT
Start: 2022-11-15

## 2022-11-16 PROBLEM — R60.0 PEDAL EDEMA: Status: RESOLVED | Noted: 2018-04-24 | Resolved: 2022-11-16

## 2022-11-16 PROBLEM — A41.9 SEPSIS DUE TO CELLULITIS (HCC): Status: RESOLVED | Noted: 2021-07-07 | Resolved: 2022-11-16

## 2022-11-16 PROBLEM — L03.90 SEPSIS DUE TO CELLULITIS (HCC): Status: RESOLVED | Noted: 2021-07-07 | Resolved: 2022-11-16

## 2022-11-16 PROBLEM — Z96.651 HISTORY OF TOTAL RIGHT KNEE REPLACEMENT: Status: RESOLVED | Noted: 2017-08-28 | Resolved: 2022-11-16

## 2022-11-16 PROBLEM — Z79.899 HIGH RISK MEDICATION USE: Status: RESOLVED | Noted: 2019-02-26 | Resolved: 2022-11-16

## 2023-02-21 RX ORDER — ATORVASTATIN CALCIUM 20 MG/1
TABLET, FILM COATED ORAL
Qty: 30 TABLET | Refills: 11 | Status: SHIPPED | OUTPATIENT
Start: 2023-02-21 | End: 2023-02-24 | Stop reason: SDUPTHER

## 2023-02-24 RX ORDER — ATORVASTATIN CALCIUM 20 MG/1
TABLET, FILM COATED ORAL
Qty: 90 TABLET | Refills: 3 | Status: SHIPPED | OUTPATIENT
Start: 2023-02-24

## 2023-03-09 ENCOUNTER — OFFICE VISIT (OUTPATIENT)
Dept: FAMILY MEDICINE CLINIC | Age: 88
End: 2023-03-09
Payer: MEDICARE

## 2023-03-09 VITALS
OXYGEN SATURATION: 96 % | TEMPERATURE: 97.6 F | SYSTOLIC BLOOD PRESSURE: 102 MMHG | HEIGHT: 65 IN | DIASTOLIC BLOOD PRESSURE: 58 MMHG | HEART RATE: 75 BPM | BODY MASS INDEX: 25.53 KG/M2

## 2023-03-09 DIAGNOSIS — N18.4 CHRONIC KIDNEY DISEASE (CKD) STAGE G4/A1, SEVERELY DECREASED GLOMERULAR FILTRATION RATE (GFR) BETWEEN 15-29 ML/MIN/1.73 SQUARE METER AND ALBUMINURIA CREATININE RATIO LESS THAN 30 MG/G (HCC): ICD-10-CM

## 2023-03-09 DIAGNOSIS — Z00.00 MEDICARE ANNUAL WELLNESS VISIT, SUBSEQUENT: Primary | ICD-10-CM

## 2023-03-09 DIAGNOSIS — E11.40 TYPE 2 DIABETES MELLITUS WITH DIABETIC NEUROPATHY, WITHOUT LONG-TERM CURRENT USE OF INSULIN (HCC): ICD-10-CM

## 2023-03-09 PROCEDURE — 1123F ACP DISCUSS/DSCN MKR DOCD: CPT | Performed by: NURSE PRACTITIONER

## 2023-03-09 PROCEDURE — 83036 HEMOGLOBIN GLYCOSYLATED A1C: CPT | Performed by: NURSE PRACTITIONER

## 2023-03-09 PROCEDURE — G0439 PPPS, SUBSEQ VISIT: HCPCS | Performed by: NURSE PRACTITIONER

## 2023-03-09 PROCEDURE — G8484 FLU IMMUNIZE NO ADMIN: HCPCS | Performed by: NURSE PRACTITIONER

## 2023-03-09 SDOH — ECONOMIC STABILITY: FOOD INSECURITY: WITHIN THE PAST 12 MONTHS, YOU WORRIED THAT YOUR FOOD WOULD RUN OUT BEFORE YOU GOT MONEY TO BUY MORE.: NEVER TRUE

## 2023-03-09 SDOH — ECONOMIC STABILITY: INCOME INSECURITY: HOW HARD IS IT FOR YOU TO PAY FOR THE VERY BASICS LIKE FOOD, HOUSING, MEDICAL CARE, AND HEATING?: NOT HARD AT ALL

## 2023-03-09 SDOH — ECONOMIC STABILITY: FOOD INSECURITY: WITHIN THE PAST 12 MONTHS, THE FOOD YOU BOUGHT JUST DIDN'T LAST AND YOU DIDN'T HAVE MONEY TO GET MORE.: NEVER TRUE

## 2023-03-09 SDOH — ECONOMIC STABILITY: HOUSING INSECURITY
IN THE LAST 12 MONTHS, WAS THERE A TIME WHEN YOU DID NOT HAVE A STEADY PLACE TO SLEEP OR SLEPT IN A SHELTER (INCLUDING NOW)?: NO

## 2023-03-09 ASSESSMENT — LIFESTYLE VARIABLES
HOW MANY STANDARD DRINKS CONTAINING ALCOHOL DO YOU HAVE ON A TYPICAL DAY: PATIENT DOES NOT DRINK
HOW OFTEN DO YOU HAVE A DRINK CONTAINING ALCOHOL: NEVER

## 2023-03-09 ASSESSMENT — PATIENT HEALTH QUESTIONNAIRE - PHQ9
SUM OF ALL RESPONSES TO PHQ QUESTIONS 1-9: 0
SUM OF ALL RESPONSES TO PHQ QUESTIONS 1-9: 0
1. LITTLE INTEREST OR PLEASURE IN DOING THINGS: 0
2. FEELING DOWN, DEPRESSED OR HOPELESS: 0
SUM OF ALL RESPONSES TO PHQ QUESTIONS 1-9: 0
SUM OF ALL RESPONSES TO PHQ9 QUESTIONS 1 & 2: 0
SUM OF ALL RESPONSES TO PHQ QUESTIONS 1-9: 0

## 2023-03-09 NOTE — PROGRESS NOTES
Medicare Annual Wellness Visit    Shari Aragon is here for Medicare AWV (Medicare annual wellness)    Assessment & Plan   Medicare annual wellness visit, subsequent  Type 2 diabetes mellitus with diabetic neuropathy, without long-term current use of insulin ([facility])  -     POCT glycosylated hemoglobin (Hb A1C)  Chronic kidney disease (CKD) stage G4/A1, severely decreased glomerular filtration rate (GFR) between 15-29 mL/min/1.73 square meter and albuminuria creatinine ratio less than 30 mg/g ([facility])    Recommendations for Preventive Services Due: see orders and patient instructions/AVS.  Recommended screening schedule for the next 5-10 years is provided to the patient in written form: see Patient Instructions/AVS.     Return for Medicare Annual Wellness Visit in 1 year. Subjective       Patient's complete Health Risk Assessment and screening values have been reviewed and are found in Flowsheets. The following problems were reviewed today and where indicated follow up appointments were made and/or referrals ordered. Positive Risk Factor Screenings with Interventions:    Fall Risk:  Do you feel unsteady or are you worried about falling? : (!) yes  2 or more falls in past year?: (!) yes  Fall with injury in past year?: no  Pt declines PT for strength and balance training. Interventions:    See AVS for additional education material    Cognitive:    Words recalled: 0 Words Recalled   Clock Drawing Test (CDT): Normal   Total Score: (!) 2   Total Score Interpretation: Abnormal Mini-Cog      Interventions:  Patient declines any further evaluation or treatment           General HRA Questions:  Select all that apply: (!) New or Increased Fatigue  Pt states she does not want to get up and move around, comfortable with her daily routine  Fatigue Interventions:  See AVS for additional education material       Weight and Activity:  Physical Activity: Inactive    Days of Exercise per Week: 0 days    Minutes of Exercise per Session: 0 min     On average, how many days per week do you engage in moderate to strenuous exercise (like a brisk walk)?: 0 days  Have you lost any weight without trying in the past 3 months?:  (pt is unsure. Refused weigh in)       Inactivity Interventions:  See AVS for additional education material          ADL's:   Patient reports needing help with:  Select all that apply: (!) Walking/Balance  Select all that apply: [employer], Housekeeping, Banking/Finances, Shopping, Food Preparation, Transportation  Interventions:  Pt daughter in law and son take care of above. Objective   Vitals:    03/09/23 1437   BP: (!) 102/58   Site: Left Upper Arm   Position: Sitting   Cuff Size: Medium Adult   Pulse: 75   Temp: 97.6 °F (36.4 °C)   TempSrc: Temporal   SpO2: 96%   Height: 5' 5\" (1.651 m)      Body mass index is 25.53 kg/m². Allergies   Allergen Reactions    Adhesive Tape     Gabapentin      LOWER LEG SWELLING    Pravachol [Pravastatin]     Pravachol [Pravastatin Sodium] Rash    Tegaderm Ag Mesh 2\"X2\" [Wound Dressings] Rash     Prior to Visit Medications    Medication Sig Taking?  Authorizing Provider   Apoaequorin (PREVAGEN PO) Take by mouth Yes Historical Provider, MD   atorvastatin (LIPITOR) 20 MG tablet TAKE 1 TABLET DAILY Yes CAROLE Walter NP   latanoprost (XALATAN) 0.005 % ophthalmic solution Apply 1 drop to eye nightly Yes Historical Provider, MD   polyethylene glycol (GLYCOLAX) 17 GM/SCOOP powder Take 17 g by mouth daily Yes Historical Provider, MD   levothyroxine (SYNTHROID) 112 MCG tablet Take 1 tablet by mouth Daily Yes CAROLE Walter NP   amLODIPine (NORVASC) 5 MG tablet Take 5 mg by mouth daily Yes Historical Provider, MD   furosemide (LASIX) 20 MG tablet Take 20 mg by mouth 2 times daily Yes Historical Provider, MD   acetaminophen (AMINOFEN) 325 MG tablet Take 2 tablets by mouth every 6 hours as needed for Pain Yes Savannah Dunlap MD   Cholecalciferol (VITAMIN D) 50 MCG (2000 UT) CAPS capsule Take 2,000 Units by mouth daily Yes Historical Provider, MD   Cranberry-Cholecalciferol 4200-500 MG-UNIT CAPS Take by mouth Yes Historical Provider, MD   aspirin 81 MG tablet Take 81 mg by mouth daily Yes Historical Provider, MD   Multiple Vitamin (MULTI VITAMIN DAILY PO) Take by mouth Yes Historical Provider, MD       CareTeam (Including outside providers/suppliers regularly involved in providing care):   Patient Care Team:  CAROLE Pugh NP as PCP - General (Emergency Medicine)  CAROLE Pugh NP as PCP - Empaneled Provider  [facility] treating for glaucoma   Reviewed and updated this visit:  Tobacco  Allergies  Meds  Med Hx  Surg Hx  Soc Hx  Fam Hx             CAROLE Pugh NP

## 2023-03-09 NOTE — PATIENT INSTRUCTIONS
Follow up with Dr. Carmen Vidales for PVD, needs new compression hose. Preventing Falls: Care Instructions  Overview     Getting around your home safely can be a challenge if you have injuries or health problems that make it easy for you to fall. Loose rugs and furniture in walkways are among the dangers for many older people who have problems walking or who have poor eyesight. People who have conditions such as arthritis, osteoporosis, or dementia also have to be careful not to fall. You can make your home safer with a few simple measures. Follow-up care is a key part of your treatment and safety. Be sure to make and go to all appointments, and call your doctor if you are having problems. It's also a good idea to know your test results and keep a list of the medicines you take. How can you care for yourself at home? Taking care of yourself  Exercise regularly to improve your strength, muscle tone, and balance. Walk if you can. Swimming may be a good choice if you cannot walk easily. Have your vision and hearing checked each year or any time you notice a change. If you have trouble seeing and hearing, you might not be able to avoid objects and could lose your balance. Know the side effects of the medicines you take. Ask your doctor or pharmacist whether the medicines you take can affect your balance. Sleeping pills or sedatives can affect your balance. Limit the amount of alcohol you drink. Alcohol can impair your balance and other senses. Ask your doctor whether calluses or corns on your feet need to be removed. If you wear loose-fitting shoes because of calluses or corns, you can lose your balance and fall. Talk to your doctor if you have numbness in your feet. You may get dizzy if you do not drink enough water. To prevent dehydration, drink plenty of fluids. Choose water and other clear liquids.  If you have kidney, heart, or liver disease and have to limit fluids, talk with your doctor before you increase the amount of fluids you drink. Preventing falls at home  Remove raised doorway thresholds, throw rugs, and clutter. Repair loose carpet or raised areas in the floor. Move furniture and electrical cords to keep them out of walking paths. Use nonskid floor wax, and wipe up spills right away, especially on ceramic tile floors. If you use a walker or cane, put rubber tips on it. If you use crutches, clean the bottoms of them regularly with an abrasive pad, such as steel wool. Keep your house well lit, especially stairways, porches, and outside walkways. Use night-lights in areas such as hallways and bathrooms. Add extra light switches or use remote switches (such as switches that go on or off when you clap your hands) to make it easier to turn lights on if you have to get up during the night. Install sturdy handrails on stairways. Move items in your cabinets so that the things you use a lot are on the lower shelves (about waist level). Keep a cordless phone and a flashlight with new batteries by your bed. If possible, put a phone in each of the main rooms of your house, or carry a cell phone in case you fall and cannot reach a phone. Or, you can wear a device around your neck or wrist. You push a button that sends a signal for help. Wear low-heeled shoes that fit well and give your feet good support. Use footwear with nonskid soles. Check the heels and soles of your shoes for wear. Repair or replace worn heels or soles. Do not wear socks without shoes on smooth floors, such as wood. Walk on the grass when the sidewalks are slippery. If you live in an area that gets snow and ice in the winter, sprinkle salt on slippery steps and sidewalks. Or ask a family member or friend to do this for you. Preventing falls in the bath  Install grab bars and nonskid mats inside and outside your shower or tub and near the toilet and sinks. Use shower chairs and bath benches.   Use a hand-held shower head that will allow you to sit while showering. Get into a tub or shower by putting the weaker leg in first. Get out of a tub or shower with your strong side first.  Repair loose toilet seats and consider installing a raised toilet seat to make getting on and off the toilet easier. Keep your bathroom door unlocked while you are in the shower. Where can you learn more? Go to http://www.ansari.com/ and enter G117 to learn more about \"Preventing Falls: Care Instructions. \"  Current as of: May 4, 2022               Content Version: 13.5  © 8496-4140 [employer]. Care instructions adapted under license by [facility] ([facility]). If you have questions about a medical condition or this instruction, always ask your healthcare professional. [address] any warranty or liability for your use of this information. Fatigue: Care Instructions  Your Care Instructions     Fatigue is a feeling of tiredness, exhaustion, or lack of energy. You may feel fatigue because of too much or not enough activity. It can also come from stress, lack of sleep, boredom, and poor diet. Many medical problems, such as viral infections, can cause fatigue. Emotional problems, especially depression, are often the cause of fatigue. Fatigue is most often a symptom of another problem. Treatment for fatigue depends on the cause. For example, if you have fatigue because you have a certain health problem, treating this problem also treats your fatigue. If depression or anxiety is the cause, treatment may help. Follow-up care is a key part of your treatment and safety. Be sure to make and go to all appointments, and call your doctor if you are having problems. It's also a good idea to know your test results and keep a list of the medicines you take. How can you care for yourself at home? Get regular exercise. But don't overdo it. Go back and forth between rest and exercise. Get plenty of rest.  Eat a healthy diet.  Do not skip meals, especially breakfast.  Reduce your use of caffeine, tobacco, and alcohol. Caffeine is most often found in coffee, tea, cola drinks, and chocolate. Limit medicines that can cause fatigue. This includes tranquilizers and cold and allergy medicines. When should you call for help? Watch closely for changes in your health, and be sure to contact your doctor if:    You have new symptoms such as fever or a rash. Your fatigue gets worse. You have been feeling down, depressed, or hopeless. Or you may have lost interest in things that you usually enjoy. You are not getting better as expected. Where can you learn more? Go to http://www.woods.com/ and enter M854 to learn more about \"Fatigue: Care Instructions. \"  Current as of: February 9, 2022               Content Version: 13.5  © 3014-6471 [employer]. Care instructions adapted under license by [facility] ([facility]). If you have questions about a medical condition or this instruction, always ask your healthcare professional. [place] [address] any warranty or liability for your use of this information. Learning About Being Active as an Older Adult  Why is being active important as you get older? Being active is one of the best things you can do for your health. And it's never too late to start. Being active--or getting active, if you aren't already--has definite benefits. It can:  Give you more energy,  Keep your mind sharp. Improve balance to reduce your risk of falls. Help you manage chronic illness with fewer medicines. No matter how old you are, how fit you are, or what health problems you have, there is a form of activity that will work for you. And the more physical activity you can do, the better your overall health will be. What kinds of activity can help you stay healthy? Being more active will make your daily activities easier.  Physical activity includes planned exercise and things you do in daily life. There are four types of activity:  Aerobic. Doing aerobic activity makes your heart and lungs strong. Includes walking, dancing, and gardening. Aim for at least 2½ hours spread throughout the week. It improves your energy and can help you sleep better. Muscle-strengthening. This type of activity can help maintain muscle and strengthen bones. Includes climbing stairs, using resistance bands, and lifting or carrying heavy loads. Aim for at least twice a week. It can help protect the knees and other joints. Stretching. Stretching gives you better range of motion in joints and muscles. Includes upper arm stretches, calf stretches, and gentle yoga. Aim for at least twice a week, preferably after your muscles are warmed up from other activities. It can help you function better in daily life. Balancing. This helps you stay coordinated and have good posture. Includes heel-to-toe walking, ian chi, and certain types of yoga. Aim for at least 3 days a week. It can reduce your risk of falling. Even if you have a hard time meeting the recommendations, it's better to be more active than less active. All activity done in each category counts toward your weekly total. You'd be surprised how daily things like carrying groceries, keeping up with grandchildren, and taking the stairs can add up. What keeps you from being active? If you've had a hard time being more active, you're not alone. Maybe you remember being able to do more. Or maybe you've never thought of yourself as being active. It's frustrating when you can't do the things you want. Being more active can help. What's holding you back? Getting started. Have a goal, but break it into easy tasks. Small steps build into big accomplishments. Staying motivated. If you feel like skipping your activity, remember your goal. Maybe you want to move better and stay independent. Every activity gets you one step closer.   Not feeling your best.  Start with 5 minutes of an activity you enjoy. Prove to yourself you can do it. As you get comfortable, increase your time. You may not be where you want to be. But you're in the process of getting there. Everyone starts somewhere. How can you find safe ways to stay active? Talk with your doctor about any physical challenges you're facing. Make a plan with your doctor if you have a health problem or aren't sure how to get started with activity. If you're already active, ask your doctor if there is anything you should change to stay safe as your body and health change. If you tend to feel dizzy after you take medicine, avoid activity at that time. Try being active before you take your medicine. This will reduce your risk of falls. If you plan to be active at home, make sure to clear your space before you get started. Remove things like TV cords, coffee tables, and throw rugs. It's safest to have plenty of space to move freely. The key to getting more active is to take it slow and steady. Try to improve only a little bit at a time. Pick just one area to improve on at first. And if an activity hurts, stop and talk to your doctor. Where can you learn more? Go to http://www.ansari.com/ and enter P600 to learn more about \"Learning About Being Active as an Older Adult. \"  Current as of: October 10, 2022               Content Version: 13.5  © 0731-2678 Healthwise, Incorporated. Care instructions adapted under license by [facility] ([facility]). If you have questions about a medical condition or this instruction, always ask your healthcare professional. [address] any warranty or liability for your use of this information. Hearing Loss: Care Instructions  Overview     Hearing loss is a sudden or slow decrease in how well you hear. It can range from mild to severe. Permanent hearing loss can occur with aging. It also can happen when you are exposed long-term to loud noise. Examples include listening to loud music, riding motorcycles, or being around other loud machines. Hearing loss can affect your work and home life. It can make you feel lonely or depressed. You may feel that you have lost your independence. But hearing aids and other devices can help you hear better and feel connected to others. Follow-up care is a key part of your treatment and safety. Be sure to make and go to all appointments, and call your doctor if you are having problems. It's also a good idea to know your test results and keep a list of the medicines you take. How can you care for yourself at home? Avoid loud noises whenever possible. This helps keep your hearing from getting worse. Always wear hearing protection around loud noises. Wear a hearing aid as directed. See a professional who can help you pick a hearing aid that fits you. Have hearing tests as your doctor suggests. They can show whether your hearing has changed. Your hearing aid may need to be adjusted. Use other devices as needed. These may include:  Telephone amplifiers and hearing aids that can connect to a television, stereo, radio, or microphone. Devices that use lights or vibrations. These alert you to the doorbell, a ringing telephone, or a baby monitor. Television closed-captioning. This shows the words at the bottom of the screen. Most new TVs can do this. TTY (text telephone). This lets you type messages back and forth on the telephone instead of talking or listening. These devices are also called TDD. When messages are typed on the keyboard, they are sent over the phone line to a receiving TTY. The message is shown on a monitor. Use text messaging, social media, and email if it is hard for you to communicate by telephone. Try to learn a listening technique called speechreading. It is not lipreading. You pay attention to people's gestures, expressions, posture, and tone of voice.  These clues can help you understand what a person is saying. Face the person you are talking to, and have them face you. Make sure the lighting is good. You need to see the other person's face clearly. Think about counseling if you need help to adjust to your hearing loss. When should you call for help? Watch closely for changes in your health, and be sure to contact your doctor if:    You think your hearing is getting worse. You have new symptoms, such as dizziness or nausea. Where can you learn more? Go to http://www.ansari.com/ and enter R798 to learn more about \"Hearing Loss: Care Instructions. \"  Current as of: May 4, 2022               Content Version: 13.5  © 2006-2022 [employer]. Care instructions adapted under license by [facility] ([facility]). If you have questions about a medical condition or this instruction, always ask your healthcare professional. [place] [address] any warranty or liability for your use of this information. Learning About Activities of Daily Living  What are activities of daily living? Activities of daily living (ADLs) are the basic self-care tasks you do every day. As you age, and if you have health problems, you may find that it's harder to do these things for yourself. That's when you may need some help. Your doctor uses ADLs to measure how much help you need. Knowing what you can and can't do for yourself is an important first step to getting help. And when you have the help you need, you can stay as independent as possible. Your doctor will want to know if you are able to do tasks such as: Take a bath or shower without help. Go to the bathroom by yourself. Dress and undress without help. Shave, comb your hair, and brush teeth on your own. Get in and out of bed or a chair without help. Feed yourself without help. If you are having trouble doing basic self-care tasks, talk with your doctor.  You may want to bring a caregiver or family member who can help the doctor understand your needs and abilities. How will a doctor assess your ADLs? Asking about ADLs is part of a routine health checkup your doctor will likely do as you age. Your health check might be done in a doctor's office, in your home, or at a hospital. The goal is to find out if you are having any problems that could make your health problems worse or that make it unsafe for you to be on your own. To measure your ADLs, your doctor will ask how hard it is for you to do routine tasks. He or she may also want to know if you have changed the way you do a task because of a health problem. He or she may watch how you:  Walk back and forth. Keep your balance while you stand or walk. Move from sitting to standing or from a bed to a chair. Button or unbutton a shirt or sweater. Remove and put on your shoes. It's normal to feel a little worried or anxious if you find you can't do all the things you used to be able to do. Talking with your doctor about ADLs isn't a test that you either pass or fail. It's just a way to get more information about your health and safety. Follow-up care is a key part of your treatment and safety. Be sure to make and go to all appointments, and call your doctor if you are having problems. It's also a good idea to know your test results and keep a list of the medicines you take. Current as of: October 6, 2021               Content Version: 13.5  © 2006-2022 Health[employer]. Care instructions adapted under license by [facility] ([facility]). If you have questions about a medical condition or this instruction, always ask your healthcare professional. [place] [address] any warranty or liability for your use of this information. Advance Directives: Care Instructions  Overview  An advance directive is a legal way to state your wishes at the end of your life. It tells your family and your doctor what to do if you can't say what you want.   There are two main types of advance directives. You can change them any time your wishes change. Living will. This form tells your family and your doctor your wishes about life support and other treatment. The form is also called a declaration. Medical power of . This form lets you name a person to make treatment decisions for you when you can't speak for yourself. This person is called a health care agent (health care proxy, health care surrogate). The form is also called a durable power of  for health care. If you do not have an advance directive, decisions about your medical care may be made by a family member, or by a doctor or a  who doesn't know you. It may help to think of an advance directive as a gift to the people who care for you. If you have one, they won't have to make tough decisions by themselves. For more information, including forms for your state, see the [address] website (www.caringinfo.org/planning/advance-directives/). Follow-up care is a key part of your treatment and safety. Be sure to make and go to all appointments, and call your doctor if you are having problems. It's also a good idea to know your test results and keep a list of the medicines you take. What should you include in an advance directive? Many states have a unique advance directive form. (It may ask you to address specific issues.) Or you might use a universal form that's approved by many states. If your form doesn't tell you what to address, it may be hard to know what to include in your advance directive. Use the questions below to help you get started. Who do you want to make decisions about your medical care if you are not able to? What life-support measures do you want if you have a serious illness that gets worse over time or can't be cured? What are you most afraid of that might happen?  (Maybe you're afraid of having pain, losing your independence, or being kept alive by machines.)  Where would you prefer to die? (Your home? A hospital? A nursing home?)  Do you want to donate your organs when you die? Do you want certain Restorationism practices performed before you die? When should you call for help? Be sure to contact your doctor if you have any questions. Where can you learn more? Go to http://www.ansari.com/ and enter R264 to learn more about \"Advance Directives: Care Instructions. \"  Current as of: June 16, 2022               Content Version: 13.5  © 2943-2875 [employer]. Care instructions adapted under license by [facility] ([facility]). If you have questions about a medical condition or this instruction, always ask your healthcare professional. [address] any warranty or liability for your use of this information. A Healthy Heart: Care Instructions  Your Care Instructions     Coronary artery disease, also called heart disease, occurs when a substance called plaque builds up in the vessels that supply oxygen-rich blood to your heart muscle. This can narrow the blood vessels and reduce blood flow. A heart attack happens when blood flow is completely blocked. A high-fat diet, smoking, and other factors increase the risk of heart disease. Your doctor has found that you have a chance of having heart disease. You can do lots of things to keep your heart healthy. It may not be easy, but you can change your diet, exercise more, and quit smoking. These steps really work to lower your chance of heart disease. Follow-up care is a key part of your treatment and safety. Be sure to make and go to all appointments, and call your doctor if you are having problems. It's also a good idea to know your test results and keep a list of the medicines you take. How can you care for yourself at home? Diet    Use less salt when you cook and eat. This helps lower your blood pressure. Taste food before salting. Add only a little salt when you think you need it.  With time, your taste buds will adjust to less salt. Eat fewer snack items, fast foods, canned soups, and other high-salt, high-fat, processed foods. Read food labels and try to avoid saturated and trans fats. They increase your risk of heart disease by raising cholesterol levels. Limit the amount of solid fat-butter, margarine, and shortening-you eat. Use olive, peanut, or canola oil when you cook. Bake, broil, and steam foods instead of frying them. Eat a variety of fruit and vegetables every day. Dark green, deep orange, red, or yellow fruits and vegetables are especially good for you. Examples include spinach, carrots, peaches, and berries. Foods high in fiber can reduce your cholesterol and provide important vitamins and minerals. High-fiber foods include whole-grain cereals and breads, oatmeal, beans, brown rice, citrus fruits, and apples. Eat lean proteins. Heart-healthy proteins include seafood, lean meats and poultry, eggs, beans, peas, nuts, seeds, and soy products. Limit drinks and foods with added sugar. These include candy, desserts, and soda pop. Lifestyle changes    If your doctor recommends it, get more exercise. Walking is a good choice. Bit by bit, increase the amount you walk every day. Try for at least 30 minutes on most days of the week. You also may want to swim, bike, or do other activities. Do not smoke. If you need help quitting, talk to your doctor about stop-smoking programs and medicines. These can increase your chances of quitting for good. Quitting smoking may be the most important step you can take to protect your heart. It is never too late to quit. Limit alcohol to 2 drinks a day for men and 1 drink a day for women. Too much alcohol can cause health problems. Manage other health problems such as diabetes, high blood pressure, and high cholesterol. If you think you may have a problem with alcohol or drug use, talk to your doctor.    Medicines    Take your medicines exactly as prescribed. Call your doctor if you think you are having a problem with your medicine. If your doctor recommends aspirin, take the amount directed each day. Make sure you take aspirin and not another kind of pain reliever, such as acetaminophen (Tylenol). When should you call for help? Call 911 if you have symptoms of a heart attack. These may include:    Chest pain or pressure, or a strange feeling in the chest.     Sweating. Shortness of breath. Pain, pressure, or a strange feeling in the back, neck, jaw, or upper belly or in one or both shoulders or arms. Lightheadedness or sudden weakness. A fast or irregular heartbeat. After you call 911, the  may tell you to chew 1 adult-strength or 2 to 4 low-dose aspirin. Wait for an ambulance. Do not try to drive yourself. Watch closely for changes in your health, and be sure to contact your doctor if you have any problems. Where can you learn more? Go to http://www.ansari.com/ and enter F075 to learn more about \"A Healthy Heart: Care Instructions. \"  Current as of: September 7, 2022               Content Version: 13.5  © 2281-7157 Healthwise, [employer]. Care instructions adapted under license by [facility] ([facility]). If you have questions about a medical condition or this instruction, always ask your healthcare professional. [place] [address] any warranty or liability for your use of this information. Personalized Preventive Plan for Tenisha Carl - 3/9/2023  Medicare offers a range of preventive health benefits. Some of the tests and screenings are paid in full while other may be subject to a deductible, co-insurance, and/or copay. Some of these benefits include a comprehensive review of your medical history including lifestyle, illnesses that may run in your family, and various assessments and screenings as appropriate.     After reviewing your medical record and screening and assessments performed today your provider may have ordered immunizations, labs, imaging, and/or referrals for you. A list of these orders (if applicable) as well as your Preventive Care list are included within your After Visit Summary for your review. Other Preventive Recommendations:    A preventive eye exam performed by an eye specialist is recommended every 1-2 years to screen for glaucoma; cataracts, macular degeneration, and other eye disorders. A preventive dental visit is recommended every 6 months. Try to get at least 150 minutes of exercise per week or 10,000 steps per day on a pedometer . Order or download the FREE \"Exercise & Physical Activity: Your Everyday Guide\" from [employer]. Call 7-883.587.8561 or search [employer] online. You need 5251-6764 mg of calcium and 9489-3836 IU of vitamin D per day. It is possible to meet your calcium requirement with diet alone, but a vitamin D supplement is usually necessary to meet this goal.  When exposed to the sun, use a sunscreen that protects against both UVA and UVB radiation with an SPF of 30 or greater. Reapply every 2 to 3 hours or after sweating, drying off with a towel, or swimming. Always wear a seat belt when traveling in a car. Always wear a helmet when riding a bicycle or motorcycle.

## 2023-03-10 ENCOUNTER — TELEPHONE (OUTPATIENT)
Dept: SURGERY | Age: 88
End: 2023-03-10

## 2023-03-18 ENCOUNTER — APPOINTMENT (OUTPATIENT)
Dept: CT IMAGING | Age: 88
End: 2023-03-18
Payer: MEDICARE

## 2023-03-18 ENCOUNTER — APPOINTMENT (OUTPATIENT)
Dept: GENERAL RADIOLOGY | Age: 88
End: 2023-03-18
Payer: MEDICARE

## 2023-03-18 ENCOUNTER — HOSPITAL ENCOUNTER (OUTPATIENT)
Age: 88
Setting detail: OBSERVATION
Discharge: OTHER INSTITUTION | End: 2023-03-20
Attending: EMERGENCY MEDICINE | Admitting: INTERNAL MEDICINE
Payer: MEDICARE

## 2023-03-18 DIAGNOSIS — S81.011A LACERATION OF RIGHT KNEE, INITIAL ENCOUNTER: ICD-10-CM
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
## 2023-03-18 DIAGNOSIS — Z97.8 ORTHOPEDIC HARDWARE PRESENT: ICD-10-CM

## 2023-03-18 DIAGNOSIS — R93.6 ABNORMAL X-RAY OF KNEE: ICD-10-CM

## 2023-03-18 PROBLEM — S22.009A THORACIC VERTEBRAL FRACTURE (HCC): Status: ACTIVE | Noted: 2023-03-18

## 2023-03-18 PROBLEM — S81.019A: Status: ACTIVE | Noted: 2023-03-18

## 2023-03-18 PROBLEM — S81.019A KNEE LACERATION: Status: ACTIVE | Noted: 2023-03-18

## 2023-03-18 LAB
ALBUMIN SERPL-MCNC: 3.6 G/DL (ref 3.4–5)
ALBUMIN/GLOB SERPL: 1 {RATIO} (ref 1.1–2.2)
ALP SERPL-CCNC: 112 U/L (ref 40–129)
ALT SERPL-CCNC: 18 U/L (ref 10–40)
ANION GAP SERPL CALCULATED.3IONS-SCNC: 15 MMOL/L (ref 3–16)
AST SERPL-CCNC: 18 U/L (ref 15–37)
BACTERIA URNS QL MICRO: ABNORMAL /HPF
BASOPHILS # BLD: 0.1 K/UL (ref 0–0.2)
BASOPHILS NFR BLD: 0.9 %
BILIRUB SERPL-MCNC: 0.5 MG/DL (ref 0–1)
BILIRUB UR QL STRIP.AUTO: NEGATIVE
BUN SERPL-MCNC: 36 MG/DL (ref 7–20)
CALCIUM SERPL-MCNC: 9.6 MG/DL (ref 8.3–10.6)
CHLORIDE SERPL-SCNC: 101 MMOL/L (ref 99–110)
CLARITY UR: ABNORMAL
CO2 SERPL-SCNC: 23 MMOL/L (ref 21–32)
COLOR UR: YELLOW
CREAT SERPL-MCNC: 1.5 MG/DL (ref 0.6–1.2)
DEPRECATED RDW RBC AUTO: 14.3 % (ref 12.4–15.4)
EOSINOPHIL # BLD: 0.2 K/UL (ref 0–0.6)
EOSINOPHIL NFR BLD: 2.1 %
EPI CELLS #/AREA URNS AUTO: 8 /HPF (ref 0–5)
GFR SERPLBLD CREATININE-BSD FMLA CKD-EPI: 32 ML/MIN/{1.73_M2}
GLUCOSE BLD-MCNC: 209 MG/DL (ref 70–99)
GLUCOSE SERPL-MCNC: 152 MG/DL (ref 70–99)
GLUCOSE UR STRIP.AUTO-MCNC: NEGATIVE MG/DL
HCT VFR BLD AUTO: 30.8 % (ref 36–48)
HGB BLD-MCNC: 10.1 G/DL (ref 12–16)
HGB UR QL STRIP.AUTO: ABNORMAL
HYALINE CASTS #/AREA URNS AUTO: 3 /LPF (ref 0–8)
KETONES UR STRIP.AUTO-MCNC: NEGATIVE MG/DL
LEUKOCYTE ESTERASE UR QL STRIP.AUTO: ABNORMAL
LYMPHOCYTES # BLD: 1.9 K/UL (ref 1–5.1)
LYMPHOCYTES NFR BLD: 19.4 %
MCH RBC QN AUTO: 31.1 PG (ref 26–34)
MCHC RBC AUTO-ENTMCNC: 32.8 G/DL (ref 31–36)
MCV RBC AUTO: 94.8 FL (ref 80–100)
MONOCYTES # BLD: 0.9 K/UL (ref 0–1.3)
MONOCYTES NFR BLD: 8.7 %
NEUTROPHILS # BLD: 6.9 K/UL (ref 1.7–7.7)
NEUTROPHILS NFR BLD: 68.9 %
NITRITE UR QL STRIP.AUTO: NEGATIVE
PERFORMED ON: ABNORMAL
PH UR STRIP.AUTO: 5 [PH] (ref 5–8)
PLATELET # BLD AUTO: 294 K/UL (ref 135–450)
PMV BLD AUTO: 7.8 FL (ref 5–10.5)
POTASSIUM SERPL-SCNC: 4.3 MMOL/L (ref 3.5–5.1)
PROT SERPL-MCNC: 7.1 G/DL (ref 6.4–8.2)
PROT UR STRIP.AUTO-MCNC: ABNORMAL MG/DL
RBC # BLD AUTO: 3.24 M/UL (ref 4–5.2)
RBC CLUMPS #/AREA URNS AUTO: 1 /HPF (ref 0–4)
SODIUM SERPL-SCNC: 139 MMOL/L (ref 136–145)
SP GR UR STRIP.AUTO: 1.01 (ref 1–1.03)
TROPONIN T SERPL-MCNC: <0.01 NG/ML
UA COMPLETE W REFLEX CULTURE PNL UR: YES
UA DIPSTICK W REFLEX MICRO PNL UR: YES
URN SPEC COLLECT METH UR: ABNORMAL
UROBILINOGEN UR STRIP-ACNC: 0.2 E.U./DL
WBC # BLD AUTO: 10 K/UL (ref 4–11)
WBC #/AREA URNS AUTO: 257 /HPF (ref 0–5)

## 2023-03-18 PROCEDURE — 72125 CT NECK SPINE W/O DYE: CPT

## 2023-03-18 PROCEDURE — 6370000000 HC RX 637 (ALT 250 FOR IP): Performed by: INTERNAL MEDICINE

## 2023-03-18 PROCEDURE — 73560 X-RAY EXAM OF KNEE 1 OR 2: CPT

## 2023-03-18 PROCEDURE — 90715 TDAP VACCINE 7 YRS/> IM: CPT | Performed by: PHYSICIAN ASSISTANT

## 2023-03-18 PROCEDURE — 71046 X-RAY EXAM CHEST 2 VIEWS: CPT

## 2023-03-18 PROCEDURE — 12004 RPR S/N/AX/GEN/TRK7.6-12.5CM: CPT

## 2023-03-18 PROCEDURE — 85025 COMPLETE CBC W/AUTO DIFF WBC: CPT

## 2023-03-18 PROCEDURE — 93005 ELECTROCARDIOGRAM TRACING: CPT | Performed by: PHYSICIAN ASSISTANT

## 2023-03-18 PROCEDURE — 80053 COMPREHEN METABOLIC PANEL: CPT

## 2023-03-18 PROCEDURE — G0378 HOSPITAL OBSERVATION PER HR: HCPCS

## 2023-03-18 PROCEDURE — 99285 EMERGENCY DEPT VISIT HI MDM: CPT

## 2023-03-18 PROCEDURE — 36415 COLL VENOUS BLD VENIPUNCTURE: CPT

## 2023-03-18 PROCEDURE — 90471 IMMUNIZATION ADMIN: CPT | Performed by: PHYSICIAN ASSISTANT

## 2023-03-18 PROCEDURE — 81001 URINALYSIS AUTO W/SCOPE: CPT

## 2023-03-18 PROCEDURE — 70450 CT HEAD/BRAIN W/O DYE: CPT

## 2023-03-18 PROCEDURE — 84484 ASSAY OF TROPONIN QUANT: CPT

## 2023-03-18 PROCEDURE — 6360000002 HC RX W HCPCS: Performed by: PHYSICIAN ASSISTANT

## 2023-03-18 PROCEDURE — 87086 URINE CULTURE/COLONY COUNT: CPT

## 2023-03-18 PROCEDURE — 2580000003 HC RX 258: Performed by: INTERNAL MEDICINE

## 2023-03-18 RX ORDER — FUROSEMIDE 20 MG/1
20 TABLET ORAL 2 TIMES DAILY
Status: DISCONTINUED | OUTPATIENT
Start: 2023-03-19 | End: 2023-03-19

## 2023-03-18 RX ORDER — LEVOTHYROXINE SODIUM 112 UG/1
112 TABLET ORAL DAILY
Status: DISCONTINUED | OUTPATIENT
Start: 2023-03-19 | End: 2023-03-20 | Stop reason: HOSPADM

## 2023-03-18 RX ORDER — DEXTROSE MONOHYDRATE 100 MG/ML
INJECTION, SOLUTION INTRAVENOUS CONTINUOUS PRN
Status: DISCONTINUED | OUTPATIENT
Start: 2023-03-18 | End: 2023-03-20 | Stop reason: HOSPADM

## 2023-03-18 RX ORDER — ONDANSETRON 2 MG/ML
4 INJECTION INTRAMUSCULAR; INTRAVENOUS EVERY 6 HOURS PRN
Status: DISCONTINUED | OUTPATIENT
Start: 2023-03-18 | End: 2023-03-20 | Stop reason: HOSPADM

## 2023-03-18 RX ORDER — ONDANSETRON 4 MG/1
4 TABLET, ORALLY DISINTEGRATING ORAL EVERY 8 HOURS PRN
Status: DISCONTINUED | OUTPATIENT
Start: 2023-03-18 | End: 2023-03-20 | Stop reason: HOSPADM

## 2023-03-18 RX ORDER — ACETAMINOPHEN 325 MG/1
650 TABLET ORAL EVERY 6 HOURS PRN
Status: DISCONTINUED | OUTPATIENT
Start: 2023-03-18 | End: 2023-03-20 | Stop reason: HOSPADM

## 2023-03-18 RX ORDER — AMLODIPINE BESYLATE 5 MG/1
5 TABLET ORAL DAILY
Status: DISCONTINUED | OUTPATIENT
Start: 2023-03-19 | End: 2023-03-20 | Stop reason: HOSPADM

## 2023-03-18 RX ORDER — ASPIRIN 81 MG/1
81 TABLET, CHEWABLE ORAL DAILY
Status: DISCONTINUED | OUTPATIENT
Start: 2023-03-19 | End: 2023-03-20 | Stop reason: HOSPADM

## 2023-03-18 RX ORDER — LATANOPROST 50 UG/ML
1 SOLUTION/ DROPS OPHTHALMIC NIGHTLY
Status: DISCONTINUED | OUTPATIENT
Start: 2023-03-18 | End: 2023-03-20 | Stop reason: HOSPADM

## 2023-03-18 RX ORDER — VITAMIN B COMPLEX
2000 TABLET ORAL DAILY
Status: DISCONTINUED | OUTPATIENT
Start: 2023-03-19 | End: 2023-03-19

## 2023-03-18 RX ORDER — INSULIN LISPRO 100 [IU]/ML
0-4 INJECTION, SOLUTION INTRAVENOUS; SUBCUTANEOUS NIGHTLY
Status: DISCONTINUED | OUTPATIENT
Start: 2023-03-18 | End: 2023-03-20 | Stop reason: HOSPADM

## 2023-03-18 RX ORDER — SODIUM CHLORIDE 0.9 % (FLUSH) 0.9 %
5-40 SYRINGE (ML) INJECTION PRN
Status: DISCONTINUED | OUTPATIENT
Start: 2023-03-18 | End: 2023-03-20 | Stop reason: HOSPADM

## 2023-03-18 RX ORDER — ATORVASTATIN CALCIUM 20 MG/1
20 TABLET, FILM COATED ORAL
Status: DISCONTINUED | OUTPATIENT
Start: 2023-03-18 | End: 2023-03-20 | Stop reason: HOSPADM

## 2023-03-18 RX ORDER — INSULIN LISPRO 100 [IU]/ML
0-4 INJECTION, SOLUTION INTRAVENOUS; SUBCUTANEOUS
Status: DISCONTINUED | OUTPATIENT
Start: 2023-03-19 | End: 2023-03-20 | Stop reason: HOSPADM

## 2023-03-18 RX ORDER — SODIUM CHLORIDE 0.9 % (FLUSH) 0.9 %
5-40 SYRINGE (ML) INJECTION EVERY 12 HOURS SCHEDULED
Status: DISCONTINUED | OUTPATIENT
Start: 2023-03-18 | End: 2023-03-20 | Stop reason: HOSPADM

## 2023-03-18 RX ORDER — POLYETHYLENE GLYCOL 3350 17 G/17G
17 POWDER, FOR SOLUTION ORAL DAILY PRN
Status: DISCONTINUED | OUTPATIENT
Start: 2023-03-18 | End: 2023-03-20 | Stop reason: HOSPADM

## 2023-03-18 RX ORDER — ACETAMINOPHEN 650 MG/1
650 SUPPOSITORY RECTAL EVERY 6 HOURS PRN
Status: DISCONTINUED | OUTPATIENT
Start: 2023-03-18 | End: 2023-03-20 | Stop reason: HOSPADM

## 2023-03-18 RX ORDER — SODIUM CHLORIDE 9 MG/ML
INJECTION, SOLUTION INTRAVENOUS PRN
Status: DISCONTINUED | OUTPATIENT
Start: 2023-03-18 | End: 2023-03-20 | Stop reason: HOSPADM

## 2023-03-18 RX ADMIN — ATORVASTATIN CALCIUM 20 MG: 20 TABLET, FILM COATED ORAL at 21:34

## 2023-03-18 RX ADMIN — TETANUS TOXOID, REDUCED DIPHTHERIA TOXOID AND ACELLULAR PERTUSSIS VACCINE, ADSORBED 0.5 ML: 5; 2.5; 8; 8; 2.5 SUSPENSION INTRAMUSCULAR at 13:37

## 2023-03-18 RX ADMIN — SODIUM CHLORIDE, PRESERVATIVE FREE 10 ML: 5 INJECTION INTRAVENOUS at 23:59

## 2023-03-18 RX ADMIN — ACETAMINOPHEN 650 MG: 325 TABLET ORAL at 22:42

## 2023-03-18 RX ADMIN — LATANOPROST 1 DROP: 50 SOLUTION OPHTHALMIC at 22:42

## 2023-03-18 ASSESSMENT — PAIN - FUNCTIONAL ASSESSMENT: PAIN_FUNCTIONAL_ASSESSMENT: 0-10

## 2023-03-18 ASSESSMENT — LIFESTYLE VARIABLES
HOW OFTEN DO YOU HAVE A DRINK CONTAINING ALCOHOL: NEVER
HOW MANY STANDARD DRINKS CONTAINING ALCOHOL DO YOU HAVE ON A TYPICAL DAY: PATIENT DOES NOT DRINK

## 2023-03-18 ASSESSMENT — PAIN DESCRIPTION - DESCRIPTORS
DESCRIPTORS: ACHING
DESCRIPTORS: ACHING

## 2023-03-18 ASSESSMENT — PAIN DESCRIPTION - FREQUENCY
FREQUENCY: INTERMITTENT
FREQUENCY: CONTINUOUS

## 2023-03-18 ASSESSMENT — PAIN SCALES - GENERAL
PAINLEVEL_OUTOF10: 7
PAINLEVEL_OUTOF10: 3
PAINLEVEL_OUTOF10: 3

## 2023-03-18 ASSESSMENT — PAIN DESCRIPTION - LOCATION
LOCATION: KNEE

## 2023-03-18 ASSESSMENT — PAIN DESCRIPTION - ORIENTATION
ORIENTATION: RIGHT

## 2023-03-18 ASSESSMENT — PAIN DESCRIPTION - PAIN TYPE
TYPE: ACUTE PAIN
TYPE: ACUTE PAIN

## 2023-03-18 ASSESSMENT — PAIN DESCRIPTION - ONSET
ONSET: ON-GOING
ONSET: ON-GOING

## 2023-03-18 NOTE — ED NOTES
Pt readjusted in bed, updated on poc awaiting bed to be ready for transfer.      Talita Reed RN  03/18/23 7727

## 2023-03-18 NOTE — ED NOTES
TAMMY received from GALLO Pierre RN. Assumed care of patient for lunch relief.       Júnior Land RN  03/18/23 9464

## 2023-03-18 NOTE — ED PROVIDER NOTES
I have personally performed a face to face diagnostic evaluation on this patient. I have fully participated in the care of this patient I personally saw the patient and performed a substantive portion of the visit including all aspects of the medical decision making. I have reviewed and agree with all pertinent clinical information including history, physical exam, diagnostic tests, and the plan. HPI: Karma Carson presented with fall that occurred earlier today with right knee injury. Of note patient states that she has a history of chronic edema in her bilateral legs but significantly worse. Daughter at bedside states that is more than normal and patient been having more difficulty walking at home patient lives alone. Patient reports that she believes it was a mechanical fall she called EMS patient was found on the floor unable to get up on her own. Chief Complaint   Patient presents with    Fall     Pt arrives from home wit ems c/o mechanical fall, pt found on floor unable to get up herself. states \"she was getting up from  chair and her leg may have given out states It happened few times yesterday. \" Pt states no loc. Pt had spoke to children 20 min prior by phone. Pt states she did not hit her head. Knee Injury     Pt has rt knee inj, lac from fall. Bleeding is controlled with bandage by ems. Leg Swelling     Pt has edema of bilateral legs.  Daughter in law states she has swelling normally but it is more than normal.      Review of Systems: See NIKO note  Vital Signs: BP (!) 129/46   Pulse 70   Temp 98.4 °F (36.9 °C) (Oral)   Resp 16   Ht 5' 6\" (1.676 m)   Wt 198 lb 6.6 oz (90 kg)   SpO2 98%   BMI 32.02 kg/m²     Alert 80 y.o. female who does not appear toxic or acutely ill  HENT: Atraumatic, oral mucosa moist  Neck: Grossly normal ROM  Chest/Lungs: respiratory effort normal   Abdomen: Nontender  Musculoskeletal: Tenderness to right knee with large laceration some active pulsatile

## 2023-03-18 NOTE — ED NOTES
Daughter in law bedside with pt.  Rt knee bandage is taken off and chux placed under pt for provider exam.     Flori Deutsch RN  03/18/23 [id number]

## 2023-03-18 NOTE — ED PROVIDER NOTES
[address] [facility]        Pt Name: Joe Bach  MRN: [id number]  [place] 12/30/1929  Date of evaluation: 3/18/2023  Provider: SARITHA Crane  PCP: CAORLE Ludwig NP  Note Started: 1:28 PM EDT 3/18/23       I have seen and evaluated this patient with my supervising physician Michael Ramirez MD.      [address] [place] [address]       Chief Complaint   Patient presents with    Fall     Pt arrives from home wit ems c/o mechanical fall, pt found on floor unable to get up herself. states \"she was getting up from  chair and her leg may have given out states It happened few times yesterday. \" Pt states no loc. Pt had spoke to children 20 min prior by phone. Pt states she did not hit her head. Knee Injury     Pt has rt knee inj, lac from fall. Bleeding is controlled with bandage by ems. Leg Swelling     Pt has edema of bilateral legs. Daughter in law states she has swelling normally but it is more than normal.       HISTORY OF PRESENT ILLNESS: 1 or more Elements     History From: patient and daughter in law    Joe Bach is a 80 y.o. female who presents for fall, right knee injury, and bilateral leg swelling. Patient and daughter in law give history as patient has some memory issues. Daughter-in-law reports she talk to the patient at [phone] this morning and was normal.  Daughter-in-law headed over to see the patient at her house as she visits the patient every day and when she arrived at 1110am she is found patient sitting on the floor. Patient told her she had fallen while trying to walk to the kitchen. She uses a walker but ambulates poorly at baseline. She denies head injury. Patient no longer remembers the details surrounding the fall which daughter reports is normal for the patient. She has a large right knee laceration. She denies headache or neck pain.   Denies nausea, vomiting, abdominal pain, chest pain, shortness

## 2023-03-18 NOTE — H&P
[pravastatin sodium], and Tegaderm ag mesh 2\"x2\" [wound dressings]    Social History:      The patient currently lives at home    TOBACCO:   reports that she has never smoked. She has never used smokeless tobacco.  ETOH:   reports current alcohol use. E-cigarette/Vaping       Questions Responses    E-cigarette/Vaping Use     Start Date     Passive Exposure     Quit Date     Counseling Given     Comments               Family History:      Reviewed and negative in regards to presenting illness/complaint. No family history on file. REVIEW OF SYSTEMS COMPLETED:   Pertinent positives as noted in the HPI. All other systems reviewed and negative. PHYSICAL EXAM PERFORMED:    BP (!) 149/117   Pulse 62   Temp 98.6 °F (37 °C) (Oral)   Resp 16   Ht 5' 6\" (1.676 m)   Wt 198 lb 6.6 oz (90 kg)   SpO2 100%   BMI 32.02 kg/m²     General appearance:  No apparent distress  HEENT:  Normal cephalic  Neck: Supple  Respiratory:  Normal respiratory effort. Clear to auscultation, bilaterally without Rales/Wheezes/Rhonchi. Cardiovascular:  Regular rate and rhythm with normal S1/S2 without murmurs, rubs or gallops. Abdomen: Soft, non-tender  Musculoskeletal:  No clubbing, cyanosis bilateral lower extremity edema more on the right side, right knee wrapped postsurgical repair  Skin: As above  Neurologic: No focal weakness  Psychiatric:  Alert  Capillary Refill: Brisk,3 seconds, normal  Peripheral Pulses: +2 palpable, equal bilaterally       Labs:     Recent Labs     03/18/23  1343   WBC 10.0   HGB 10.1*   HCT 30.8*        Recent Labs     03/18/23  1343      K 4.3      CO2 23   BUN 36*   CREATININE 1.5*   CALCIUM 9.6     Recent Labs     03/18/23  1343   AST 18   ALT 18   BILITOT 0.5   ALKPHOS 112     No results for input(s): INR in the last 72 hours.   Recent Labs     03/18/23  1343   TROPONINI <0.01       Urinalysis:      Lab Results   Component Value Date/Time    NITRU Negative 10/04/2022 11:36 AM    WBCUA

## 2023-03-18 NOTE — ED NOTES
Daughter in law at bedside, updated on poc. Pt placed on bedpan, unable to provide urine sample at this time.      Ramila Saba RN  03/18/23 [id number]
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
## 2023-03-19 LAB
ALBUMIN SERPL-MCNC: 3.3 G/DL (ref 3.4–5)
ALBUMIN/GLOB SERPL: 1.1 {RATIO} (ref 1.1–2.2)
ALP SERPL-CCNC: 95 U/L (ref 40–129)
ALT SERPL-CCNC: 15 U/L (ref 10–40)
ANION GAP SERPL CALCULATED.3IONS-SCNC: 14 MMOL/L (ref 3–16)
AST SERPL-CCNC: 15 U/L (ref 15–37)
BASOPHILS # BLD: 0.1 K/UL (ref 0–0.2)
BASOPHILS NFR BLD: 0.8 %
BILIRUB SERPL-MCNC: 0.4 MG/DL (ref 0–1)
BUN SERPL-MCNC: 36 MG/DL (ref 7–20)
CALCIUM SERPL-MCNC: 9 MG/DL (ref 8.3–10.6)
CHLORIDE SERPL-SCNC: 104 MMOL/L (ref 99–110)
CO2 SERPL-SCNC: 22 MMOL/L (ref 21–32)
CREAT SERPL-MCNC: 1.7 MG/DL (ref 0.6–1.2)
DEPRECATED RDW RBC AUTO: 14.2 % (ref 12.4–15.4)
EKG ATRIAL RATE: 64 BPM
EKG DIAGNOSIS: NORMAL
EKG P AXIS: 40 DEGREES
EKG P-R INTERVAL: 180 MS
EKG Q-T INTERVAL: 444 MS
EKG QRS DURATION: 118 MS
EKG QTC CALCULATION (BAZETT): 458 MS
EKG R AXIS: -20 DEGREES
EKG T AXIS: 64 DEGREES
EKG VENTRICULAR RATE: 64 BPM
EOSINOPHIL # BLD: 0.2 K/UL (ref 0–0.6)
EOSINOPHIL NFR BLD: 2.4 %
GFR SERPLBLD CREATININE-BSD FMLA CKD-EPI: 28 ML/MIN/{1.73_M2}
GLUCOSE BLD-MCNC: 131 MG/DL (ref 70–99)
GLUCOSE BLD-MCNC: 133 MG/DL (ref 70–99)
GLUCOSE BLD-MCNC: 177 MG/DL (ref 70–99)
GLUCOSE BLD-MCNC: 201 MG/DL (ref 70–99)
GLUCOSE SERPL-MCNC: 132 MG/DL (ref 70–99)
HCT VFR BLD AUTO: 25.8 % (ref 36–48)
HGB BLD-MCNC: 8.7 G/DL (ref 12–16)
LYMPHOCYTES # BLD: 2.6 K/UL (ref 1–5.1)
LYMPHOCYTES NFR BLD: 28.5 %
MCH RBC QN AUTO: 31.6 PG (ref 26–34)
MCHC RBC AUTO-ENTMCNC: 33.8 G/DL (ref 31–36)
MCV RBC AUTO: 93.4 FL (ref 80–100)
MONOCYTES # BLD: 0.9 K/UL (ref 0–1.3)
MONOCYTES NFR BLD: 10.2 %
NEUTROPHILS # BLD: 5.4 K/UL (ref 1.7–7.7)
NEUTROPHILS NFR BLD: 58.1 %
PERFORMED ON: ABNORMAL
PLATELET # BLD AUTO: 259 K/UL (ref 135–450)
PMV BLD AUTO: 8 FL (ref 5–10.5)
POTASSIUM SERPL-SCNC: 4.3 MMOL/L (ref 3.5–5.1)
PROT SERPL-MCNC: 6.3 G/DL (ref 6.4–8.2)
RBC # BLD AUTO: 2.76 M/UL (ref 4–5.2)
SODIUM SERPL-SCNC: 140 MMOL/L (ref 136–145)
WBC # BLD AUTO: 9.3 K/UL (ref 4–11)

## 2023-03-19 PROCEDURE — 97110 THERAPEUTIC EXERCISES: CPT

## 2023-03-19 PROCEDURE — 97530 THERAPEUTIC ACTIVITIES: CPT

## 2023-03-19 PROCEDURE — 93010 ELECTROCARDIOGRAM REPORT: CPT | Performed by: INTERNAL MEDICINE

## 2023-03-19 PROCEDURE — 96365 THER/PROPH/DIAG IV INF INIT: CPT

## 2023-03-19 PROCEDURE — 36415 COLL VENOUS BLD VENIPUNCTURE: CPT

## 2023-03-19 PROCEDURE — 2580000003 HC RX 258: Performed by: INTERNAL MEDICINE

## 2023-03-19 PROCEDURE — 96375 TX/PRO/DX INJ NEW DRUG ADDON: CPT

## 2023-03-19 PROCEDURE — 85025 COMPLETE CBC W/AUTO DIFF WBC: CPT

## 2023-03-19 PROCEDURE — 6360000002 HC RX W HCPCS: Performed by: INTERNAL MEDICINE

## 2023-03-19 PROCEDURE — 99221 1ST HOSP IP/OBS SF/LOW 40: CPT | Performed by: ORTHOPAEDIC SURGERY

## 2023-03-19 PROCEDURE — 97162 PT EVAL MOD COMPLEX 30 MIN: CPT

## 2023-03-19 PROCEDURE — 96366 THER/PROPH/DIAG IV INF ADDON: CPT

## 2023-03-19 PROCEDURE — 80053 COMPREHEN METABOLIC PANEL: CPT

## 2023-03-19 PROCEDURE — G0378 HOSPITAL OBSERVATION PER HR: HCPCS

## 2023-03-19 PROCEDURE — 94760 N-INVAS EAR/PLS OXIMETRY 1: CPT

## 2023-03-19 PROCEDURE — 6370000000 HC RX 637 (ALT 250 FOR IP): Performed by: INTERNAL MEDICINE

## 2023-03-19 RX ORDER — FUROSEMIDE 10 MG/ML
40 INJECTION INTRAMUSCULAR; INTRAVENOUS DAILY
Status: DISCONTINUED | OUTPATIENT
Start: 2023-03-19 | End: 2023-03-20 | Stop reason: HOSPADM

## 2023-03-19 RX ADMIN — ASPIRIN 81 MG 81 MG: 81 TABLET ORAL at 09:37

## 2023-03-19 RX ADMIN — Medication 2000 UNITS: at 09:37

## 2023-03-19 RX ADMIN — CEFTRIAXONE 1000 MG: 1 INJECTION, POWDER, FOR SOLUTION INTRAMUSCULAR; INTRAVENOUS at 09:39

## 2023-03-19 RX ADMIN — ATORVASTATIN CALCIUM 20 MG: 20 TABLET, FILM COATED ORAL at 20:14

## 2023-03-19 RX ADMIN — FUROSEMIDE 40 MG: 10 INJECTION, SOLUTION INTRAMUSCULAR; INTRAVENOUS at 18:52

## 2023-03-19 RX ADMIN — HYDROMORPHONE HYDROCHLORIDE 0.5 MG: 1 INJECTION, SOLUTION INTRAMUSCULAR; INTRAVENOUS; SUBCUTANEOUS at 00:00

## 2023-03-19 RX ADMIN — INSULIN LISPRO 1 UNITS: 100 INJECTION, SOLUTION INTRAVENOUS; SUBCUTANEOUS at 12:07

## 2023-03-19 RX ADMIN — LEVOTHYROXINE SODIUM 112 MCG: 0.11 TABLET ORAL at 05:56

## 2023-03-19 RX ADMIN — ACETAMINOPHEN 650 MG: 325 TABLET ORAL at 20:14

## 2023-03-19 RX ADMIN — LATANOPROST 1 DROP: 50 SOLUTION OPHTHALMIC at 20:14

## 2023-03-19 RX ADMIN — POLYETHYLENE GLYCOL 3350 17 G: 17 POWDER, FOR SOLUTION ORAL at 20:21

## 2023-03-19 RX ADMIN — ACETAMINOPHEN 650 MG: 325 TABLET ORAL at 14:18

## 2023-03-19 RX ADMIN — SODIUM CHLORIDE, PRESERVATIVE FREE 10 ML: 5 INJECTION INTRAVENOUS at 20:16

## 2023-03-19 RX ADMIN — AMLODIPINE BESYLATE 5 MG: 5 TABLET ORAL at 09:37

## 2023-03-19 RX ADMIN — FUROSEMIDE 20 MG: 20 TABLET ORAL at 09:37

## 2023-03-19 ASSESSMENT — PAIN DESCRIPTION - DESCRIPTORS
DESCRIPTORS: ACHING;DISCOMFORT
DESCRIPTORS: ACHING

## 2023-03-19 ASSESSMENT — PAIN DESCRIPTION - LOCATION
LOCATION: KNEE;LEG
LOCATION: LEG;MOUTH

## 2023-03-19 ASSESSMENT — PAIN DESCRIPTION - DIRECTION: RADIATING_TOWARDS: BLE

## 2023-03-19 ASSESSMENT — PAIN SCALES - GENERAL
PAINLEVEL_OUTOF10: 2
PAINLEVEL_OUTOF10: 2
PAINLEVEL_OUTOF10: 5
PAINLEVEL_OUTOF10: 6

## 2023-03-19 ASSESSMENT — PAIN DESCRIPTION - ONSET: ONSET: PROGRESSIVE

## 2023-03-19 ASSESSMENT — PAIN DESCRIPTION - FREQUENCY: FREQUENCY: INTERMITTENT

## 2023-03-19 ASSESSMENT — PAIN DESCRIPTION - PAIN TYPE: TYPE: ACUTE PAIN

## 2023-03-19 ASSESSMENT — PAIN DESCRIPTION - ORIENTATION
ORIENTATION: RIGHT
ORIENTATION: LEFT;RIGHT

## 2023-03-19 ASSESSMENT — PAIN - FUNCTIONAL ASSESSMENT
PAIN_FUNCTIONAL_ASSESSMENT: PREVENTS OR INTERFERES SOME ACTIVE ACTIVITIES AND ADLS
PAIN_FUNCTIONAL_ASSESSMENT: PREVENTS OR INTERFERES SOME ACTIVE ACTIVITIES AND ADLS

## 2023-03-19 NOTE — PLAN OF CARE
Problem: Discharge Planning  Goal: Discharge to home or other facility with appropriate resources  Outcome: Progressing  Flowsheets (Taken 3/19/2023 1940)  Discharge to home or other facility with appropriate resources:   Identify barriers to discharge with patient and caregiver   Identify discharge learning needs (meds, wound care, etc)     Problem: Pain  Goal: Verbalizes/displays adequate comfort level or baseline comfort level  Outcome: Progressing  Flowsheets (Taken 3/19/2023 1940)  Verbalizes/displays adequate comfort level or baseline comfort level:   Encourage patient to monitor pain and request assistance   Assess pain using appropriate pain scale   Administer analgesics based on type and severity of pain and evaluate response     Problem: Safety - Adult  Goal: Free from fall injury  Outcome: Progressing  Flowsheets  Taken 3/19/2023 1940  Free From Fall Injury: Instruct family/caregiver on patient safety  Taken 3/19/2023 1938  Free From Fall Injury: Instruct family/caregiver on patient safety     Problem: ABCDS Injury Assessment  Goal: Absence of physical injury  Outcome: Progressing  Flowsheets (Taken 3/19/2023 1940)  Absence of Physical Injury: Implement safety measures based on patient assessment

## 2023-03-19 NOTE — ACP (ADVANCE CARE PLANNING)
Schedule follow-up conversation to continue planning  [] Referred individual to Provider for additional questions/concerns   [] Advised patient/agent/surrogate to review completed ACP document and update if needed with changes in condition, patient preferences or care setting    [] This note routed to one or more involved healthcare providers

## 2023-03-19 NOTE — PLAN OF CARE
Problem: Pain  Goal: Verbalizes/displays adequate comfort level or baseline comfort level  Outcome: Progressing   Pt c/o moderate pain in right knee, medicated per mar with prn tylenol and one time dose of dilaudid. Pt verbalized relief and is now resting in bed. Will continue to assess pain level. Problem: Safety - Adult  Goal: Free from fall injury  Outcome: Progressing   Pt has been free from falls this shift, bed alarm on, bed in lowest position, 2/4 side rails up, nonskid socks on, wheels locked, bedside table and call light in reach. Encouraged pt to call out if needed anything.

## 2023-03-19 NOTE — CONSULTS
Insecurity    Worried About Running Out of Food in the Last Year: Never true    [address] in the Last Year: Never true   Transportation Needs: Unknown    Lack of Transportation (Medical): Not on file    Lack of Transportation (Non-Medical): No   Physical Activity: Inactive    Days of Exercise per Week: 0 days    Minutes of Exercise per Session: 0 min   Stress: Not on file   Social Connections: Not on file   Intimate Partner Violence: Not on file   Housing Stability: Unknown    Unable to Pay for Housing in the Last Year: Not on file    Number of Places Lived in the Last Year: Not on file    Unstable Housing in the Last Year: No       No family history on file. Review of Systems:  General: negative  Psychological: negative  Ophthalmic: negative  ENT: negative  Hematological and Lymphatic: negative  Endocrine: negative  Respiratory: negative  Cardiovascular: negative  Gastrointestinal: negative  Genito-Urinary: negative  Musculoskeletal: negative. See HPI for pertinent positives. Neurological: negative  Dermatological: negative         PHYSICAL EXAM:  Ms. Corey Watts is a 80 y.o. female who presents today in no acute distress, awake, alert, and oriented. BP (!) 143/59   Pulse 65   Temp 98.6 °F (37 °C) (Oral)   Resp 14   Ht 5' 6\" (1.676 m)   Wt 198 lb 6.6 oz (90 kg)   SpO2 95%   BMI 32.02 kg/m²      On evaluation of her right lower extremity, there is no obvious deformity. She has pitting edema in BLE. She is tender to palpation globally around her knee. No significant pain with passive ROM of right knee. Distal pulses are 2+ and symmetric bilaterally. Sensation is grossly intact to light touch and symmetric bilaterally. Dorsiflexion / plantarflexion intact. RIght knee Xrays: I independently reviewed the images, as well as the radiology report. No acute bony abnormality is seen in the right knee. Small volume knee joint   fluid.        Compared to prior radiographs, there is new mild

## 2023-03-20 VITALS
DIASTOLIC BLOOD PRESSURE: 61 MMHG | RESPIRATION RATE: 18 BRPM | OXYGEN SATURATION: 94 % | BODY MASS INDEX: 30.58 KG/M2 | WEIGHT: 190.26 LBS | TEMPERATURE: 97.6 F | HEIGHT: 66 IN | SYSTOLIC BLOOD PRESSURE: 150 MMHG | HEART RATE: 64 BPM

## 2023-03-20 LAB
BACTERIA UR CULT: NORMAL
GLUCOSE BLD-MCNC: 152 MG/DL (ref 70–99)
GLUCOSE BLD-MCNC: 180 MG/DL (ref 70–99)
PERFORMED ON: ABNORMAL
PERFORMED ON: ABNORMAL
SARS-COV-2 RDRP RESP QL NAA+PROBE: NOT DETECTED

## 2023-03-20 PROCEDURE — 6360000002 HC RX W HCPCS: Performed by: INTERNAL MEDICINE

## 2023-03-20 PROCEDURE — 87635 SARS-COV-2 COVID-19 AMP PRB: CPT

## 2023-03-20 PROCEDURE — 97530 THERAPEUTIC ACTIVITIES: CPT

## 2023-03-20 PROCEDURE — 97166 OT EVAL MOD COMPLEX 45 MIN: CPT

## 2023-03-20 PROCEDURE — 97535 SELF CARE MNGMENT TRAINING: CPT

## 2023-03-20 PROCEDURE — 6370000000 HC RX 637 (ALT 250 FOR IP): Performed by: INTERNAL MEDICINE

## 2023-03-20 PROCEDURE — G0378 HOSPITAL OBSERVATION PER HR: HCPCS

## 2023-03-20 PROCEDURE — 2580000003 HC RX 258: Performed by: INTERNAL MEDICINE

## 2023-03-20 PROCEDURE — 94760 N-INVAS EAR/PLS OXIMETRY 1: CPT

## 2023-03-20 PROCEDURE — 96376 TX/PRO/DX INJ SAME DRUG ADON: CPT

## 2023-03-20 PROCEDURE — 96366 THER/PROPH/DIAG IV INF ADDON: CPT

## 2023-03-20 RX ADMIN — AMLODIPINE BESYLATE 5 MG: 5 TABLET ORAL at 09:20

## 2023-03-20 RX ADMIN — SODIUM CHLORIDE, PRESERVATIVE FREE 10 ML: 5 INJECTION INTRAVENOUS at 09:21

## 2023-03-20 RX ADMIN — LEVOTHYROXINE SODIUM 112 MCG: 0.11 TABLET ORAL at 05:20

## 2023-03-20 RX ADMIN — CEFTRIAXONE 1000 MG: 1 INJECTION, POWDER, FOR SOLUTION INTRAMUSCULAR; INTRAVENOUS at 09:24

## 2023-03-20 RX ADMIN — FUROSEMIDE 40 MG: 10 INJECTION, SOLUTION INTRAMUSCULAR; INTRAVENOUS at 09:20

## 2023-03-20 RX ADMIN — ACETAMINOPHEN 650 MG: 325 TABLET ORAL at 05:20

## 2023-03-20 RX ADMIN — ASPIRIN 81 MG 81 MG: 81 TABLET ORAL at 09:20

## 2023-03-20 ASSESSMENT — PAIN DESCRIPTION - ONSET: ONSET: PROGRESSIVE

## 2023-03-20 ASSESSMENT — PAIN DESCRIPTION - FREQUENCY: FREQUENCY: INTERMITTENT

## 2023-03-20 ASSESSMENT — PAIN DESCRIPTION - ORIENTATION: ORIENTATION: LEFT;RIGHT

## 2023-03-20 ASSESSMENT — PAIN DESCRIPTION - PAIN TYPE: TYPE: ACUTE PAIN

## 2023-03-20 ASSESSMENT — PAIN SCALES - GENERAL
PAINLEVEL_OUTOF10: 6
PAINLEVEL_OUTOF10: 0

## 2023-03-20 ASSESSMENT — PAIN DESCRIPTION - DESCRIPTORS: DESCRIPTORS: ACHING;DISCOMFORT

## 2023-03-20 ASSESSMENT — PAIN - FUNCTIONAL ASSESSMENT: PAIN_FUNCTIONAL_ASSESSMENT: PREVENTS OR INTERFERES SOME ACTIVE ACTIVITIES AND ADLS

## 2023-03-20 ASSESSMENT — PAIN DESCRIPTION - LOCATION: LOCATION: LEG;KNEE

## 2023-03-20 NOTE — DISCHARGE SUMMARY
portion of the orbits demonstrate no acute abnormality. SINUSES: The visualized paranasal sinuses and mastoid air cells demonstrate no acute abnormality. SOFT TISSUES/SKULL:  No acute abnormality of the visualized skull or soft tissues. No acute intracranial abnormality. CT CERVICAL SPINE WO CONTRAST    Result Date: 3/18/2023  EXAMINATION: CT OF THE CERVICAL SPINE WITHOUT CONTRAST 3/18/2023 1:23 pm TECHNIQUE: CT of the cervical spine was performed without the administration of intravenous contrast. Multiplanar reformatted images are provided for review. Automated exposure control, iterative reconstruction, and/or weight based adjustment of the mA/kV was utilized to reduce the radiation dose to as low as reasonably achievable. COMPARISON: CT cervical spine 05/09/2021 HISTORY: ORDERING SYSTEM PROVIDED HISTORY: fall Decision Support Exception - unselect if not a suspected or confirmed emergency medical condition->Emergency Medical Condition (MA) Reason for Exam: Fall; Knee Injury; Leg Swelling FINDINGS: BONES/ALIGNMENT: There is no acute fracture or traumatic malalignment. 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 Chronic retrolisthesis C1-C2 unchanged from prior study. DEGENERATIVE CHANGES: Diffuse mild-to-moderate degenerative changes. These appear most severe C3-4 with posterior disc osteophyte complex and ligamentum flavum and facet hypertrophic change narrowing the canal to measurement of 8 mm AP dimension. SOFT TISSUES: There is no prevertebral soft tissue swelling. 1. No acute cervical fracture. 2. 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 3. Chronic retrolisthesis C1-C2 unchanged from prior study. 4. Diffuse mild-to-moderate degenerative changes with resultant mild acquired cervical spinal canal stenosis C3-4.         Results for orders placed or performed during the hospital encounter of 03/18/23   Culture,

## 2023-03-20 NOTE — DISCHARGE INSTR - COC
Continuity of Care Form    Patient Name: Kaylynn Clifton   :  1929  MRN:  [id number]    Admit date:  3/18/2023  Discharge date:  3/20/23    Code Status Order: Full Code   Advance Directives:     Admitting Physician:  Kelsea Chapin MD  PCP: CAROLE Mosqueda NP    Discharging Nurse: Jodie Chacko [facility] Unit/Room#: B2K-8825/0061-20  Discharging Unit Phone Number: [phone]    Emergency Contact:   Extended Emergency Contact Information  Primary Emergency Contact: [facility]  Address: [address] Phone: [phone]  Mobile Phone: [phone]  Relation: Daughter-in-Law  Secondary Emergency Contact: [address] [facility] Phone: [phone]  Relation: Child    Past Surgical History:  Past Surgical History:   Procedure Laterality Date    APPENDECTOMY      CARPAL TUNNEL RELEASE Left 2017      Left carpal tunnel release  & Left Thumb, Index Finger and Middle Finger trigger finger release    CARPAL TUNNEL RELEASE Right 2017    COLONOSCOPY      FINGER TRIGGER RELEASE Right 2017    Thumb & Index Finger    FOOT DEBRIDEMENT Left 2021    LEFT FOOT INCISION AND DRAINAGE, [address] OUT WITH APPLICATION GRAFT performed by Matt Villavicencio DPM at [facility] (CERVIX STATUS UNKNOWN)      OVARY REMOVAL      TOTAL KNEE ARTHROPLASTY Right     Dr. Jason Stone       Immunization History:   Immunization History   Administered Date(s) Administered    COVID-19, MODERNA BLUE border, Primary or Immunocompromised, (age 12y+), IM, 100 mcg/0.5mL 2021, 2021    Influenza Vaccine, unspecified formulation 10/10/2003, 10/05/2004, 10/04/2005, 2012, 10/01/2014, 2017    Influenza Virus Vaccine 2017    Influenza, FLUAD, (age 72 y+), Adjuvanted, 0.5mL 10/26/2020, 2022    Influenza, FLUBLOK, (age 25 y+), PF, 0.5mL 10/05/2019    Influenza, FLUCELVAX, (age 10 mo+), MDCK, PF, 0.5mL

## 2023-03-20 NOTE — PROGRESS NOTES
4 Eyes Admission Assessment     I agree as the admission nurse that 2 RN's have performed a thorough Head to Toe Skin Assessment on the patient. ALL assessment sites listed below have been assessed on admission. Areas assessed by both nurses:   [x]   Head, Face, and Ears   [x]   Shoulders, Back, and Chest  [x]   Arms, Elbows, and Hands   [x]   Coccyx, Sacrum, and Ischium  [x]   Legs, Feet, and Heels        Does the Patient have Skin Breakdown?   No         Alex Prevention initiated:  No   Wound Care Orders initiated:  No      [facility] nurse consulted for Pressure Injury (Stage 3,4, Unstageable, DTI, NWPT, and Complex wounds) or Alex score 18 or lower:  No      Nurse 1 eSignature: Electronically signed by Debi Benavides RN on 3/19/23 at 6:51 AM EDT    **SHARE this note so that the co-signing nurse is able to place an eSignature**    Nurse 2 eSignature: Electronically signed by Cady Ramos RN on 3/19/23 at 6:53 AM EDT
Dressing changed on right knee wound. Ortho NP, Vanda Suarez, at bedside to assess. Incision cleansed, Mepilex AG applied with ace wrap. Leg wrapped from foot to knee. Patient tolerated well. Will monitor.      Electronically signed by Marta Yi RN on 3/20/2023 at 11:37 AM
Medication Reconciliation    List of medications patient is currently taking is complete. Source of information: 1. Conversation with patient's family at bedside                                      2. EPIC records      Notes regarding home medications:   1. Patient only received amlodipine, furosemide and levothyroxine prior to arrival to the ED.
Patient discharged with belongings with family via stretcher via [employer] to [employer]. Gave report to [place], RN at facility. IV D/Cd patient tolerated well, no complications.
Patient resting in bed with family at bedside. Patient currently reports pain in lower extremities, okay with waiting until PRN Tylenol due in less than half an hour. Will administer once due. Bed in locked and lowest position with alarm in place. Call light and side table within reach. Denies any other current needs at this time.  Will continue to monitor.    - Singh Gill RN
Patient resting quietly in bed and visiting with Family. Call light in reach . Fall precautions in place. Inder ventura
Patient sitting up in chair. Legs elevated bilaterally . Right knee  drsg dry and intact. Daughter in law at bedside. Call light in reach. Fall precautions in place. Jeniffer Kuhn lorenzo
Patient up in chair. Family at bedside. Alert to self. Denies pain. Changed to dental soft diet due to patient having trouble swallowing due to mouth sores. Morning medications given without difficulty. VSS. Chair alarm on. Call light and belongings within reach. Will monitor.      Electronically signed by Thelma Sandoval RN on 3/20/2023 at 11:12 AM
Physical Therapy  Facility/Department: [address] ORTHOPEDICS  Physical Therapy treatment note    Name: Lai Amaro  : 1929  MRN: [id number]  Date of Service: 3/20/2023    Assessment / Discharge Recommendations:  -debility. .. fall with right knee laceration  -will need continued PTOT and nursing care in a skilled setting with a slow pace     Lai Amaro scored a 8/24 on the AM-PAC short mobility form. Current research shows that an AM-PAC score of 17 or less is typically not associated with a discharge to the patient's home setting. Based on the patient's AM-PAC score and their current functional mobility deficits, it is recommended that the patient have 3-5 sessions per week of Physical Therapy at d/c to increase the patient's independence. Patient Diagnosis(es): The primary encounter diagnosis was Fall, initial encounter. Diagnoses of Laceration of right knee, initial encounter, Orthopedic hardware present, and Abnormal x-ray of knee were also pertinent to this visit. Past Medical History:  has a past medical history of Arthritis, Atrial fibrillation, unspecified type ([address] Cataract, Cellulitis of left lower limb, Closed fracture of first cervical vertebra ([address] Closed fracture of second cervical vertebra ([address] Diabetes ([address] High blood pressure, High risk medication use, History of total right knee replacement-, Kidney problem, Neuropathy, Pedal edema, Rotator cuff arthropathy of right shoulder, Sepsis due to cellulitis [facility]), Thyroid disorder, Trigger index finger, Trigger thumb of both thumbs, and Type 2 diabetes mellitus without complication ([address] Past Surgical History:  has a past surgical history that includes Appendectomy; Hysterectomy; Ovary removal; Total knee arthroplasty (Right, ); Carpal tunnel release (Left, 2017); Colonoscopy; Carpal tunnel release (Right, 2017);  Finger trigger release (Right, 2017); and Foot Debridement (Left,
Physical Therapy  Facility/Department: [address] ORTHOPEDICS  Physical Therapy Initial Assessment    Name: Andre Shelton  : 1929  MRN: [id number]  Date of Service: 3/19/2023    Discharge Recommendations:  Continue to assess pending progress, Subacute/Skilled Nursing Facility   PT Equipment Recommendations  Other: Will monitor for potential equipt needs. Ander Shelton scored a 10/24 on the AM-PAC short mobility form. Current research shows that an AM-PAC score of 17 or less is typically not associated with a discharge to the patient's home setting. Based on the patient's AM-PAC score and their current functional mobility deficits, it is recommended that the patient have 3-5 sessions per week of Physical Therapy at d/c to increase the patient's independence. Please see assessment section for further patient specific details. If patient discharges prior to next session this note will serve as a discharge summary. Please see below for the latest assessment towards goals. Assessment   Body Structures, Functions, Activity Limitations Requiring Skilled Therapeutic Intervention: Decreased functional mobility ; Decreased strength;Decreased safe awareness;Decreased endurance;Decreased balance  Assessment: 79 y/o female admit 3/18/2023 with Fall, Laceration R Knee. CT C-Spine : Negative Acute; Chronic C2 Odontoid Base Fx. CT Head : Negative. Chest X-Ray : Compress Deformity Lower T-Spine of Unknown Chronicity. X-Ray R Knee : Compared to prior Radiographs, New Mild Subsidence of Medial Tibial Tray with Mild Lucency at [facility] Correlate for signs of Hardware Loosening or Infection. PMH as noted including CKD, R TKR, Lyphedema. PTA pt living alone in own home with few steps to enter (in/out with family assist) and 1st floor bed/bath. Family in/out every day for several hours. Currently, Pt rei eob/oob via [employer] assist. Defer attempt use of Walker at this time.   General
Pt alert and oriented x4, VSS, IV ns locked in right arm. Right knee has ace wrap dressing on it. Dressing has old bloody drainage on it. Pt ambulated to bedside commode with walker and two person assist. Pt tolerated it fairly well. Bed alarm on, bedside table and call light in reach.
training, Self-Care / ADL, Patient/Caregiver education & training     Restrictions  Restrictions/Precautions  Restrictions/Precautions: Fall Risk, Weight Bearing (Simultaneous filing. User may not have seen previous data.)  Lower Extremity Weight Bearing Restrictions  Right Lower Extremity Weight Bearing: Weight Bearing As Tolerated    Subjective   General  Chart Reviewed: Yes  Patient assessed for rehabilitation services?: Yes  Additional Pertinent Hx: 81 yo female admitted after fall with R knee laceration- sutured in ED. Loren Flores shows possible right TKA loosening, asymptomatic- per orthopedic no plan for surgical treatment and WBAT. PMH: HTN, DM, CKD, Lymphedema, C1-2 fx, Afib, Obesity  Family / Caregiver Present: Yes (DIL)  Referring Practitioner: Deven Ibarra MD  Diagnosis: Knee laceration  Subjective  Subjective: Pt resting in bed upon arrival and agreeable to OT/PT eval/tx. Pt reporting mild RLE pain at knee. Pt reporting mouth sore, unable to wear dentures (RN aware)  General Comment  Comments: RN ok to see       Social/Functional History  Social/Functional History  Lives With: Alone  Type of Home: House  Home Layout: Two level, Able to Live on Main level with bedroom/bathroom, Laundry in basement (1 story with basement. Pt stays on main level only; unable to access lower level.  Family takes care of laundry.)  Home Access: Stairs to enter with rails (3 steps with handrail; in/out only with family assist.)  Entrance Stairs - Number of Steps: 3  Bathroom Shower/Tub: Tub/Shower unit  Bathroom Toilet: Handicap height  Bathroom Equipment: Grab bars in shower, Tub transfer bench, Hand-held shower, 3-in-1 commode, Grab bars around toilet  Bathroom Accessibility: Accessible  Home Equipment: Alert Button, Cane, [address] chair, Walker, rolling, Damaris Bridges, 4 wheeled (Transport chair.)  Has the patient had two or more falls in the past year or any fall with injury in the past year?: Yes  ADL Assistance: Needs assistance
resulted in her fall  -We will wait for culture in the meanwhile will start antibiotics  -Although there are epithelial cells patient does have 4+ bacteria with WBC and leukocyte esterase and given the fact that she has had progressive weakness we will treat. Past history of cervical fracture-repeat CT shows no worsening      Diet: ADULT DIET; Regular; 4 carb choices (60 gm/meal)  Code Status: Full Code    PT/OT Eval Status:  Nursing facility    Discharge plan -family to decide which nursing facility. Left a message for social work    The patient and / or the family were informed of the results of any tests, a time was given to answer questions, a plan was proposed and they agreed with plan. Discussed with consulting physicians, nursing and social work     The note was completed using EMR. Every effort was made to ensure accuracy; however, inadvertent computerized transcription errors may be present.        Paulette Cruz MD

## 2023-03-20 NOTE — CARE COORDINATION
[address] VIA [facility] ACCESS PORTAL    REQUESTED SETTING:  [facility]    ANTICIPATED ADMIT DATE TO SNF:  3/20/23    [address] #:  [phone]    Shelbi Hardin Sr.  Administrative Assist, Case Management  [phone]  Electronically signed by Shelbi Hardin on 3/20/2023 at 11:46 AM
[facility] can accept. Called [facility] 2; awaiting call back. Updated family.   Electronically signed by NE Snow on 3/19/2023 at 4:19 PM
DISCHARGE SUMMARY     DATE OF DISCHARGE: 3/20/2023    DISCHARGE DESTINATION: skilled facility    FACILITY:   Discharging to Facility/ Agency   Name: [facility]  Address:  [address]  Phone:  [phone]  Fax:  [phone]    INSURANCE [facility] [address]: yes    HENS/PASAAR COMPLETED: yes    COVID RESULT: pending      TRANSPORTATION:     Company Name:  [address] [place] [address] up Time: 3pm    Phone Number: [phone]      COMMENTS: Informed patient and family of insurance precert and transport time changed to 3pm. They are agreeable to the plan. Informed Mohini at [address] [place] [address] Dr of discharge time. Rn aware. Report number [phone].   Electronically signed by VIDYA Pradhan, SIXTO, Case Management 3/20/2023 at 1:49 PM  [id number]    Electronically signed by VIDYA Rabago LISW, Case Management on 3/20/2023 at 1:49 PM  [address] [place] [address] [id number]
[employer]  Medicare precert still pending. Paulinoarr completed. Set up [address] set up for 530pm. Informed family present in room regarding pending precert and transport time. Rn aware.    Electronically signed by VIDYA Bryant, SIXTO, Case Management on 3/20/2023 at 1:05 PM  [facility] [phone]
[facility] Authorization Received via Thumb Portal:    Plan Auth ID:  [id number]  Ervin Yonathan ID:  [id number]  Service:  SNF  Approval Dates:  03/20/2023-03/22/2023  Next Review Date:  03/22/2023    Informed Debra Wallace at [address] [place] [address]       Electronically signed by Dar Stallings, MSW, LISW, Case Management on 3/20/2023 at 1:44 PM  [facility] [id number]
Received call from [facility] w/ [facility]  They can accept and will have bed at [place]  Informed daughter-in-law Juliette Hernandezing and patient  They are agreeable and prefer [facility]  Will request precert  Electronically signed by Jannet Dawkins on 3/20/2023 at 11:24 AM  #[phone]
[facility] authorization received via portal:    Payor approval ID:  [id number]    Sanchez Approval ID:  [id number]    Service - Location:  [facility]    Dates Approved:  03/20/2023-03/22/2023    NRD:  3/22/23    Zhanna Joshi Sr.  Administrative Assist, Case Management  [phone]  Electronically signed by Zhanna Joshi on 3/20/2023 at 2:54 PM
Management to discuss the discharge plan with any other family members/significant others, and if so, who? Yes (son and daughter in law)  Plans to Return to Present Housing: No  Other Identified Issues/Barriers to RETURNING to current housing: No  Potential Assistance needed at discharge: Tahir Laura            Potential DME:    Patient expects to discharge to: Noemi Lopez [address] for transportation at discharge:      Financial    Payor: [address] [place] [address], Ne / Plan: Margaret Gaxiola / Product Type: *No Product type* /     Does insurance require precert for SNF: Yes    Potential assistance Purchasing Medications: No  Meds-to-Beds request:        [facility] [address] [facility] [address] [address]  Phone: [phone] Fax: [phone]    [address] , [address] [phone] - f [phone]  [facility] [address]  Phone: [phone] Fax: [address] [facility] [address], [address] [facility] [phone] - f [phone] [address] [facility] [address]  Phone: [phone] Fax: [phone]    [address] [address]  Phone: [phone] Fax: [phone]    [facility], [address] [place] [address] [phone] [facility] [phone]  [address] [facility] [address]  Phone: [phone] Fax: [phone]      Notes:    Factors facilitating achievement of predicted outcomes: Family support, Cooperative, and Pleasant    Barriers to discharge: none    Additional Case Management Notes: Family prefer [facility] and [facility] for post hospital rehab as has been recommended.    Referrals sent in [facility]; will require [facility] Medicare

## 2023-03-20 NOTE — PLAN OF CARE
Problem: Pain  Goal: Verbalizes/displays adequate comfort level or baseline comfort level  3/19/2023 2114 by Josefina Umana RN  Flowsheets  Taken 3/19/2023 2114 by Josefina Umana RN  Verbalizes/displays adequate comfort level or baseline comfort level:   Encourage patient to monitor pain and request assistance   Assess pain using appropriate pain scale   Administer analgesics based on type and severity of pain and evaluate response   Implement non-pharmacological measures as appropriate and evaluate response   Consider cultural and social influences on pain and pain management   Notify Licensed Independent Practitioner if interventions unsuccessful or patient reports new pain  Taken 3/19/2023 2013 by Dorothy Solis RN  Verbalizes/displays adequate comfort level or baseline comfort level:   Encourage patient to monitor pain and request assistance   Assess pain using appropriate pain scale   Administer analgesics based on type and severity of pain and evaluate response  3/19/2023 1940 by Dorothy Solis RN  Outcome: Progressing  Flowsheets (Taken 3/19/2023 1940)  Verbalizes/displays adequate comfort level or baseline comfort level:   Encourage patient to monitor pain and request assistance   Assess pain using appropriate pain scale   Administer analgesics based on type and severity of pain and evaluate response

## 2023-03-20 NOTE — PLAN OF CARE
Problem: Discharge Planning  Goal: Discharge to home or other facility with appropriate resources  3/20/2023 0826 by Rudy Collins RN  Outcome: Progressing  Flowsheets  Taken 3/20/2023 0826 by Rudy Collins RN  Discharge to home or other facility with appropriate resources:   Identify barriers to discharge with patient and caregiver   Identify discharge learning needs (meds, wound care, etc)   Arrange for needed discharge resources and transportation as appropriate  Taken 3/19/2023 2013 by Bhanu Newman RN  Discharge to home or other facility with appropriate resources: Identify barriers to discharge with patient and caregiver     Problem: Pain  Goal: Verbalizes/displays adequate comfort level or baseline comfort level  3/20/2023 0826 by Rudy Collins RN  Outcome: Progressing  Flowsheets (Taken 3/20/2023 5516)  Verbalizes/displays adequate comfort level or baseline comfort level:   Encourage patient to monitor pain and request assistance   Assess pain using appropriate pain scale   Administer analgesics based on type and severity of pain and evaluate response     Problem: Safety - Adult  Goal: Free from fall injury  3/20/2023 0826 by Rudy Collins RN  Outcome: Progressing  Flowsheets (Taken 3/20/2023 0826)  Free From Fall Injury: Instruct family/caregiver on patient safety     Problem: ABCDS Injury Assessment  Goal: Absence of physical injury  3/20/2023 0826 by Rudy Collins RN  Outcome: Progressing  Flowsheets (Taken 3/20/2023 7681)  Absence of Physical Injury: Implement safety measures based on patient assessment

## 2023-04-14 ENCOUNTER — TELEPHONE (OUTPATIENT)
Dept: FAMILY MEDICINE CLINIC | Age: 88
End: 2023-04-14
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
## 2023-04-25 ENCOUNTER — TELEPHONE (OUTPATIENT)
Dept: FAMILY MEDICINE CLINIC | Age: 88
End: 2023-04-25

## 2023-04-25 NOTE — TELEPHONE ENCOUNTER
Doc PT with [employer] just called to report a fall this morning. Patient was in her bed room and just fell slowly to the ground, no injury.         Ami Duffy [phone]

## 2023-04-25 NOTE — TELEPHONE ENCOUNTER
Doc informed and verbalized understanding. He states pt just slid to the ground and is fine.  She lifted the walker instead of scooting it and lost her balance

## 2023-05-08 RX ORDER — LEVOTHYROXINE SODIUM 112 UG/1
TABLET ORAL
Qty: 90 TABLET | Refills: 3 | Status: SHIPPED | OUTPATIENT
Start: 2023-05-08

## 2023-05-09 ENCOUNTER — TELEPHONE (OUTPATIENT)
Dept: FAMILY MEDICINE CLINIC | Age: 88
End: 2023-05-09

## 2023-05-23 RX ORDER — AMLODIPINE BESYLATE 5 MG/1
5 TABLET ORAL DAILY
Qty: 90 TABLET | Refills: 1 | Status: SHIPPED | OUTPATIENT
Start: 2023-05-23

## 2023-07-03 ENCOUNTER — TELEPHONE (OUTPATIENT)
Dept: FAMILY MEDICINE CLINIC | Age: 88
End: 2023-07-03

## 2023-07-03 RX ORDER — DONEPEZIL HYDROCHLORIDE 5 MG/1
5 TABLET, FILM COATED ORAL NIGHTLY
Qty: 30 TABLET | Refills: 3 | Status: SHIPPED | OUTPATIENT
Start: 2023-07-03 | End: 2023-07-03 | Stop reason: SDUPTHER

## 2023-07-03 RX ORDER — DONEPEZIL HYDROCHLORIDE 5 MG/1
5 TABLET, FILM COATED ORAL NIGHTLY
Qty: 90 TABLET | Refills: 1 | Status: SHIPPED | OUTPATIENT
Start: 2023-07-03

## 2023-07-03 NOTE — TELEPHONE ENCOUNTER
We can check for a UTI. We can trial Aricept. It is used to treat dementia (memory loss and mental changes) associated with mild, moderate, or severe Alzheimer's disease, it will not cure Alzheimer's disease, and it will not stop the disease from getting worse. However, it can improve thinking ability in some patients. Follow up in office in 4wks.

## 2023-07-03 NOTE — TELEPHONE ENCOUNTER
Last Hartman (daughter in law/on hipaa) calling b/c pt is becoming more forgetful and is having a hard time describing what she needs/wants. Last Hartman wants to know if there is something that they can give the pt to help w/ this? Pt uses [facility] in [place].

## 2023-07-03 NOTE — TELEPHONE ENCOUNTER
Noel Runner informed and verbalized understanding. Will try the Aricept and f/u in 4 weeks. She states pts urine is clear and she does not believe it is an UTI.

## 2023-07-11 ENCOUNTER — OFFICE VISIT (OUTPATIENT)
Dept: FAMILY MEDICINE CLINIC | Age: 88
End: 2023-07-11
Payer: MEDICARE

## 2023-07-11 VITALS
OXYGEN SATURATION: 96 % | BODY MASS INDEX: 29.07 KG/M2 | HEART RATE: 70 BPM | WEIGHT: 180.9 LBS | DIASTOLIC BLOOD PRESSURE: 60 MMHG | TEMPERATURE: 98 F | SYSTOLIC BLOOD PRESSURE: 122 MMHG | HEIGHT: 66 IN

## 2023-07-11 DIAGNOSIS — E11.40 TYPE 2 DIABETES MELLITUS WITH DIABETIC NEUROPATHY, WITHOUT LONG-TERM CURRENT USE OF INSULIN (HCC): Primary | ICD-10-CM

## 2023-07-11 DIAGNOSIS — E78.2 MIXED HYPERLIPIDEMIA: ICD-10-CM

## 2023-07-11 DIAGNOSIS — I10 ESSENTIAL HYPERTENSION: ICD-10-CM

## 2023-07-11 DIAGNOSIS — R41.0 CONFUSION: ICD-10-CM

## 2023-07-11 DIAGNOSIS — R82.998 LEUKOCYTES IN URINE: ICD-10-CM

## 2023-07-11 LAB
BILIRUBIN, POC: NEGATIVE
BLOOD URINE, POC: NEGATIVE
CLARITY, POC: CLEAR
COLOR, POC: YELLOW
GLUCOSE URINE, POC: NEGATIVE
HBA1C MFR BLD: 7.1 %
KETONES, POC: NORMAL
LEUKOCYTE EST, POC: NORMAL
NITRITE, POC: NEGATIVE
PH, POC: 5.5
PROTEIN, POC: 30
SPECIFIC GRAVITY, POC: 1.02
UROBILINOGEN, POC: 0.2

## 2023-07-11 PROCEDURE — 1036F TOBACCO NON-USER: CPT | Performed by: NURSE PRACTITIONER

## 2023-07-11 PROCEDURE — 99214 OFFICE O/P EST MOD 30 MIN: CPT | Performed by: NURSE PRACTITIONER

## 2023-07-11 PROCEDURE — 1123F ACP DISCUSS/DSCN MKR DOCD: CPT | Performed by: NURSE PRACTITIONER

## 2023-07-11 PROCEDURE — G8427 DOCREV CUR MEDS BY ELIG CLIN: HCPCS | Performed by: NURSE PRACTITIONER

## 2023-07-11 PROCEDURE — 3051F HG A1C>EQUAL 7.0%<8.0%: CPT | Performed by: NURSE PRACTITIONER

## 2023-07-11 PROCEDURE — 83037 HB GLYCOSYLATED A1C HOME DEV: CPT | Performed by: NURSE PRACTITIONER

## 2023-07-11 PROCEDURE — 81002 URINALYSIS NONAUTO W/O SCOPE: CPT | Performed by: NURSE PRACTITIONER

## 2023-07-11 PROCEDURE — G8417 CALC BMI ABV UP PARAM F/U: HCPCS | Performed by: NURSE PRACTITIONER

## 2023-07-11 PROCEDURE — 1090F PRES/ABSN URINE INCON ASSESS: CPT | Performed by: NURSE PRACTITIONER

## 2023-07-11 RX ORDER — CIPROFLOXACIN 250 MG/1
250 TABLET, FILM COATED ORAL 2 TIMES DAILY
Qty: 14 TABLET | Refills: 0 | Status: SHIPPED | OUTPATIENT
Start: 2023-07-11 | End: 2023-07-18

## 2023-07-11 NOTE — PROGRESS NOTES
Susy Gabriel (:  1929) is a 80 y.o. female,Established patient, here for evaluation of the following chief complaint(s):  Diabetes (Follow up ), Hypertension (Follow up ), Hyperlipidemia (Follow up ), and Urinary Tract Infection (Brought in urine sample in sterile container to check for uti due to confusion)         ASSESSMENT/PLAN:  1. Type 2 diabetes mellitus with diabetic neuropathy, without long-term current use of insulin ([facility])  Controlled, A1c 7.1. continue metformin  - POCT glycosylated hemoglobin (Hb A1C)    2. Confusion  New. May be related to age or UTI, POCT + leuk - nitrites, send for culture, will treat with antibiotivs  - POCT Urinalysis no Micro    3. Mixed hyperlipidemia  Well controlled. LDL 58, HDL 48. Due for repeat labs    4. Essential hypertension  Well controlled. Continue current norvasc dose. Return in about 6 months (around 2024). Subjective   SUBJECTIVE/OBJECTIVE:  HPI  Presents today for follow up. Increased in confusion would like to be checked for UTI. Confusion- worsened over last 3-4 wks, started aricept 7/3/2023. Denies worsening weakness. Pt still getting up and walking around house with walker. Daughter in law believes she has sundowners. [id number] pt have sweets when pt askes, very strict low sodium diet due to CKD. Dr. Cheryl Gonzalez restarted metfromin in  for fsbs 200. HTN-always normally when dnl checks. Norvasc 5 mg daily. Only takes lasix if legs are very swollen.        Current Outpatient Medications   Medication Sig Dispense Refill    Docusate Sodium (COLACE PO) Take by mouth Given daily      metFORMIN (GLUCOPHAGE) 500 MG tablet Take 1 tablet by mouth 2 times daily (with meals)      ciprofloxacin (CIPRO) 250 MG tablet Take 1 tablet by mouth 2 times daily for 7 days 14 tablet 0    donepezil (ARICEPT) 5 MG tablet Take 1 tablet by mouth nightly 90 tablet 1    amLODIPine (NORVASC) 5 MG tablet Take 1 tablet by mouth daily 90 tablet 1

## 2023-07-13 LAB — BACTERIA UR CULT: NORMAL

## 2023-07-13 ASSESSMENT — ENCOUNTER SYMPTOMS
ABDOMINAL PAIN: 0
COUGH: 0

## 2023-09-06 ENCOUNTER — TELEPHONE (OUTPATIENT)
Dept: FAMILY MEDICINE CLINIC | Age: 88
End: 2023-09-06

## 2023-09-06 DIAGNOSIS — E78.2 MIXED HYPERLIPIDEMIA: Primary | ICD-10-CM

## 2023-09-06 DIAGNOSIS — E89.0 POSTABLATIVE HYPOTHYROIDISM: ICD-10-CM

## 2023-09-06 NOTE — TELEPHONE ENCOUNTER
Pt daughter Last Hartman( ON HIPAA)  was at lab to get blood work for mom. Zohreh Gutierrez came up and was asking if I could see any labs in computer. I told Zohreh Gutierrez nothing was ordered. Zohreh Gutierrez went back down told pt nothing in was ordered and pt daughter said she got a message stating she needed a Lipid and TSH, on pt phone it stated you told her they . Pt daughter was very rude because I told her I couldn't order anything without you approving.      [phone]

## 2023-11-20 DIAGNOSIS — E89.0 POSTABLATIVE HYPOTHYROIDISM: ICD-10-CM

## 2023-11-20 DIAGNOSIS — E78.2 MIXED HYPERLIPIDEMIA: ICD-10-CM

## 2023-11-20 LAB
CHOLEST SERPL-MCNC: 144 MG/DL (ref 0–199)
HDLC SERPL-MCNC: 58 MG/DL (ref 40–60)
LDL CHOLESTEROL CALCULATED: 62 MG/DL
TRIGL SERPL-MCNC: 119 MG/DL (ref 0–150)
TSH SERPL DL<=0.005 MIU/L-ACNC: 7.12 UIU/ML (ref 0.27–4.2)
VLDLC SERPL CALC-MCNC: 24 MG/DL

## 2023-11-21 DIAGNOSIS — E89.0 POSTABLATIVE HYPOTHYROIDISM: Primary | ICD-10-CM

## 2023-11-21 RX ORDER — LEVOTHYROXINE SODIUM 0.12 MG/1
125 TABLET ORAL DAILY
Qty: 90 TABLET | Refills: 1 | Status: SHIPPED | OUTPATIENT
Start: 2023-11-21

## 2023-11-28 ENCOUNTER — NURSE ONLY (OUTPATIENT)
Dept: FAMILY MEDICINE CLINIC | Age: 88
End: 2023-11-28
Payer: MEDICARE

## 2023-11-28 DIAGNOSIS — Z23 NEEDS FLU SHOT: Primary | ICD-10-CM

## 2023-11-28 PROCEDURE — G0008 ADMIN INFLUENZA VIRUS VAC: HCPCS | Performed by: NURSE PRACTITIONER

## 2023-11-28 PROCEDURE — 90694 VACC AIIV4 NO PRSRV 0.5ML IM: CPT | Performed by: NURSE PRACTITIONER

## 2023-12-07 RX ORDER — AMLODIPINE BESYLATE 5 MG/1
5 TABLET ORAL DAILY
Qty: 90 TABLET | Refills: 3 | Status: SHIPPED | OUTPATIENT
Start: 2023-12-07

## 2024-01-04 ENCOUNTER — OFFICE VISIT (OUTPATIENT)
Dept: FAMILY MEDICINE CLINIC | Age: 89
End: 2024-01-04
Payer: MEDICARE

## 2024-01-04 VITALS
SYSTOLIC BLOOD PRESSURE: 112 MMHG | HEIGHT: 66 IN | RESPIRATION RATE: 12 BRPM | BODY MASS INDEX: 27 KG/M2 | WEIGHT: 168 LBS | TEMPERATURE: 96.8 F | OXYGEN SATURATION: 98 % | HEART RATE: 72 BPM | DIASTOLIC BLOOD PRESSURE: 60 MMHG

## 2024-01-04 DIAGNOSIS — E78.2 MIXED HYPERLIPIDEMIA: Primary | ICD-10-CM

## 2024-01-04 DIAGNOSIS — N18.4 CHRONIC KIDNEY DISEASE (CKD) STAGE G4/A1, SEVERELY DECREASED GLOMERULAR FILTRATION RATE (GFR) BETWEEN 15-29 ML/MIN/1.73 SQUARE METER AND ALBUMINURIA CREATININE RATIO LESS THAN 30 MG/G (HCC): ICD-10-CM

## 2024-01-04 DIAGNOSIS — E89.0 POSTABLATIVE HYPOTHYROIDISM: ICD-10-CM

## 2024-01-04 DIAGNOSIS — E11.40 TYPE 2 DIABETES MELLITUS WITH DIABETIC NEUROPATHY, WITHOUT LONG-TERM CURRENT USE OF INSULIN (HCC): ICD-10-CM

## 2024-01-04 DIAGNOSIS — I10 ESSENTIAL HYPERTENSION: ICD-10-CM

## 2024-01-04 PROCEDURE — 1036F TOBACCO NON-USER: CPT | Performed by: NURSE PRACTITIONER

## 2024-01-04 PROCEDURE — 1123F ACP DISCUSS/DSCN MKR DOCD: CPT | Performed by: NURSE PRACTITIONER

## 2024-01-04 PROCEDURE — 99214 OFFICE O/P EST MOD 30 MIN: CPT | Performed by: NURSE PRACTITIONER

## 2024-01-04 PROCEDURE — G8417 CALC BMI ABV UP PARAM F/U: HCPCS | Performed by: NURSE PRACTITIONER

## 2024-01-04 PROCEDURE — G8484 FLU IMMUNIZE NO ADMIN: HCPCS | Performed by: NURSE PRACTITIONER

## 2024-01-04 PROCEDURE — 1090F PRES/ABSN URINE INCON ASSESS: CPT | Performed by: NURSE PRACTITIONER

## 2024-01-04 PROCEDURE — G8427 DOCREV CUR MEDS BY ELIG CLIN: HCPCS | Performed by: NURSE PRACTITIONER

## 2024-01-04 ASSESSMENT — ENCOUNTER SYMPTOMS
SHORTNESS OF BREATH: 0
DIARRHEA: 1
ABDOMINAL PAIN: 0
COUGH: 0

## 2024-01-04 ASSESSMENT — PATIENT HEALTH QUESTIONNAIRE - PHQ9
SUM OF ALL RESPONSES TO PHQ9 QUESTIONS 1 & 2: 0
SUM OF ALL RESPONSES TO PHQ QUESTIONS 1-9: 0
1. LITTLE INTEREST OR PLEASURE IN DOING THINGS: 0
SUM OF ALL RESPONSES TO PHQ QUESTIONS 1-9: 0
2. FEELING DOWN, DEPRESSED OR HOPELESS: 0

## 2024-01-04 NOTE — PROGRESS NOTES
Brenda Lebron (:  1929) is a 89 y.o. female,Established patient, here for evaluation of the following chief complaint(s):  Diabetes (Diabetic check up ), Hypertension (Follow up), Hyperlipidemia (Follow up ), and Other (Pts daughter in law, Honey, is here with pt and states pt is having stool in her diaper every time it is changed which is 3-5 times a day. Honey researched that metformin could cause this and wants pt taken off of this medication to see if it is it. Pt stopped taking her Lasix months ago, per Honey, and this is the first time pts ankles has been swollen. Was given boxed tomato soup 2 weeks ago and she thinks that's why. )         ASSESSMENT/PLAN:  1. Mixed hyperlipidemia  Well controlled. LDL 62, HDL 58    2. Type 2 diabetes mellitus with diabetic neuropathy, without long-term current use of insulin ([facility])  Well controlled. A1c 6.4. family is going to hold metformin due to loose stool and will follow up with Dr. Sheridan.    3. Chronic kidney disease (CKD) stage G4/A1, severely decreased glomerular filtration rate (GFR) between 15-29 mL/min/1.73 square meter and albuminuria creatinine ratio less than 30 mg/g ([facility])  Stable. Arvin every 3 months GFR 35>38>28    4. Essential hypertension  Well controlled. Taking norvasc daily, has not had lasix in months.     5. Postablative hypothyroidism  Stable. Due for repeat TSH 2024 after synthroid dose adjustment.        Return in about 6 months (around 2024).         Subjective   SUBJECTIVE/OBJECTIVE:  HPI  Chief Complaint   Patient presents with    Diabetes     Diabetic check up     Hypertension     Follow up    Hyperlipidemia     Follow up     Other     Pts daughter in law, Honey, is here with pt and states pt is having stool in her diaper every time it is changed which is 3-5 times a day. Honey researched that metformin could cause this and wants pt taken off of this medication to see if it is it. Pt stopped taking her Lasix months ago,

## 2024-02-13 RX ORDER — ATORVASTATIN CALCIUM 20 MG/1
20 TABLET, FILM COATED ORAL NIGHTLY
Qty: 90 TABLET | Refills: 0 | Status: SHIPPED | OUTPATIENT
Start: 2024-02-13

## 2024-02-27 ENCOUNTER — HOSPITAL ENCOUNTER (INPATIENT)
Age: 89
LOS: 1 days | End: 2024-02-28
Attending: EMERGENCY MEDICINE | Admitting: INTERNAL MEDICINE
Payer: MEDICARE

## 2024-02-27 ENCOUNTER — APPOINTMENT (OUTPATIENT)
Dept: CT IMAGING | Age: 89
End: 2024-02-27
Payer: MEDICARE

## 2024-02-27 ENCOUNTER — APPOINTMENT (OUTPATIENT)
Dept: GENERAL RADIOLOGY | Age: 89
End: 2024-02-27
Payer: MEDICARE

## 2024-02-27 DIAGNOSIS — R65.20 SEPSIS WITH ENCEPHALOPATHY WITHOUT SEPTIC SHOCK, DUE TO UNSPECIFIED ORGANISM (HCC): Primary | ICD-10-CM

## 2024-02-27 DIAGNOSIS — Z71.89 GOALS OF CARE, COUNSELING/DISCUSSION: ICD-10-CM

## 2024-02-27 DIAGNOSIS — L03.116 CELLULITIS OF LEFT LOWER EXTREMITY: ICD-10-CM

## 2024-02-27 DIAGNOSIS — A41.9 SEPSIS WITH ENCEPHALOPATHY WITHOUT SEPTIC SHOCK, DUE TO UNSPECIFIED ORGANISM (HCC): Primary | ICD-10-CM

## 2024-02-27 DIAGNOSIS — I21.4 NSTEMI (NON-ST ELEVATED MYOCARDIAL INFARCTION) (HCC): ICD-10-CM

## 2024-02-27 DIAGNOSIS — I47.29 NSVT (NONSUSTAINED VENTRICULAR TACHYCARDIA) (HCC): ICD-10-CM

## 2024-02-27 DIAGNOSIS — E87.1 HYPONATREMIA: ICD-10-CM

## 2024-02-27 DIAGNOSIS — G93.41 SEPSIS WITH ENCEPHALOPATHY WITHOUT SEPTIC SHOCK, DUE TO UNSPECIFIED ORGANISM (HCC): Primary | ICD-10-CM

## 2024-02-27 LAB
ALBUMIN SERPL-MCNC: 3.3 G/DL (ref 3.4–5)
ALBUMIN/GLOB SERPL: 0.8 {RATIO} (ref 1.1–2.2)
ALP SERPL-CCNC: 127 U/L (ref 40–129)
ALT SERPL-CCNC: 17 U/L (ref 10–40)
AMORPHOUS: PRESENT
ANION GAP SERPL CALCULATED.3IONS-SCNC: 14 MMOL/L (ref 3–16)
ANTI-XA UNFRAC HEPARIN: <0.1 IU/ML (ref 0.3–0.7)
AST SERPL-CCNC: 41 U/L (ref 15–37)
BACTERIA URNS QL MICRO: ABNORMAL /HPF
BASE EXCESS BLDV CALC-SCNC: -1.5 MMOL/L
BASOPHILS # BLD: 0 K/UL (ref 0–0.2)
BASOPHILS NFR BLD: 0.1 %
BILIRUB SERPL-MCNC: 0.6 MG/DL (ref 0–1)
BILIRUB UR QL STRIP.AUTO: NEGATIVE
BUN SERPL-MCNC: 26 MG/DL (ref 7–20)
CALCIUM SERPL-MCNC: 8.8 MG/DL (ref 8.3–10.6)
CHLORIDE SERPL-SCNC: 95 MMOL/L (ref 99–110)
CLARITY UR: ABNORMAL
CO2 BLDV-SCNC: 24 MMOL/L
CO2 SERPL-SCNC: 19 MMOL/L (ref 21–32)
COHGB MFR BLDV: 1 %
COLOR UR: YELLOW
CREAT SERPL-MCNC: 1.2 MG/DL (ref 0.6–1.2)
DEPRECATED RDW RBC AUTO: 15.6 % (ref 12.4–15.4)
EOSINOPHIL # BLD: 0 K/UL (ref 0–0.6)
EOSINOPHIL NFR BLD: 0 %
EPI CELLS #/AREA URNS AUTO: 8 /HPF (ref 0–5)
FLUAV RNA UPPER RESP QL NAA+PROBE: NEGATIVE
FLUBV AG NPH QL: NEGATIVE
GFR SERPLBLD CREATININE-BSD FMLA CKD-EPI: 42 ML/MIN/{1.73_M2}
GLUCOSE BLD-MCNC: 217 MG/DL (ref 70–99)
GLUCOSE SERPL-MCNC: 157 MG/DL (ref 70–99)
GLUCOSE UR STRIP.AUTO-MCNC: NEGATIVE MG/DL
HCO3 BLDV-SCNC: 23 MMOL/L (ref 23–29)
HCT VFR BLD AUTO: 35.9 % (ref 36–48)
HGB BLD-MCNC: 11.5 G/DL (ref 12–16)
HGB UR QL STRIP.AUTO: ABNORMAL
HYALINE CASTS #/AREA URNS AUTO: 9 /LPF (ref 0–8)
KETONES UR STRIP.AUTO-MCNC: NEGATIVE MG/DL
LACTATE BLDV-SCNC: 1.7 MMOL/L (ref 0.4–1.9)
LACTATE BLDV-SCNC: 2.4 MMOL/L (ref 0.4–1.9)
LEUKOCYTE ESTERASE UR QL STRIP.AUTO: NEGATIVE
LYMPHOCYTES # BLD: 0.9 K/UL (ref 1–5.1)
LYMPHOCYTES NFR BLD: 3.8 %
MCH RBC QN AUTO: 30.5 PG (ref 26–34)
MCHC RBC AUTO-ENTMCNC: 32.1 G/DL (ref 31–36)
MCV RBC AUTO: 95 FL (ref 80–100)
METHGB MFR BLDV: 0.4 %
MONOCYTES # BLD: 0.8 K/UL (ref 0–1.3)
MONOCYTES NFR BLD: 3.3 %
MUCUS: PRESENT
NEUTROPHILS # BLD: 22.6 K/UL (ref 1.7–7.7)
NEUTROPHILS NFR BLD: 92.8 %
NITRITE UR QL STRIP.AUTO: NEGATIVE
NT-PROBNP SERPL-MCNC: ABNORMAL PG/ML (ref 0–449)
O2 THERAPY: ABNORMAL
PCO2 BLDV: 38.2 MMHG (ref 40–50)
PERFORMED ON: ABNORMAL
PH BLDV: 7.39 [PH] (ref 7.35–7.45)
PH UR STRIP.AUTO: 5.5 [PH] (ref 5–8)
PLATELET # BLD AUTO: 294 K/UL (ref 135–450)
PMV BLD AUTO: 8.1 FL (ref 5–10.5)
PO2 BLDV: 31 MMHG
POTASSIUM SERPL-SCNC: 4.1 MMOL/L (ref 3.5–5.1)
PROT SERPL-MCNC: 7.2 G/DL (ref 6.4–8.2)
PROT UR STRIP.AUTO-MCNC: 300 MG/DL
RBC # BLD AUTO: 3.78 M/UL (ref 4–5.2)
RBC CLUMPS #/AREA URNS AUTO: 1 /HPF (ref 0–4)
REASON FOR REJECTION: NORMAL
REJECTED TEST: NORMAL
SAO2 % BLDV: 53 %
SARS-COV-2 RDRP RESP QL NAA+PROBE: NOT DETECTED
SODIUM SERPL-SCNC: 128 MMOL/L (ref 136–145)
SP GR UR STRIP.AUTO: 1.02 (ref 1–1.03)
TROPONIN, HIGH SENSITIVITY: 409 NG/L (ref 0–14)
TROPONIN, HIGH SENSITIVITY: 507 NG/L (ref 0–14)
UA COMPLETE W REFLEX CULTURE PNL UR: ABNORMAL
UA DIPSTICK W REFLEX MICRO PNL UR: YES
URN SPEC COLLECT METH UR: ABNORMAL
UROBILINOGEN UR STRIP-ACNC: 1 E.U./DL
WBC # BLD AUTO: 24.4 K/UL (ref 4–11)
WBC #/AREA URNS AUTO: 3 /HPF (ref 0–5)

## 2024-02-27 PROCEDURE — 70498 CT ANGIOGRAPHY NECK: CPT

## 2024-02-27 PROCEDURE — 2580000003 HC RX 258: Performed by: INTERNAL MEDICINE

## 2024-02-27 PROCEDURE — 87077 CULTURE AEROBIC IDENTIFY: CPT

## 2024-02-27 PROCEDURE — 99285 EMERGENCY DEPT VISIT HI MDM: CPT

## 2024-02-27 PROCEDURE — 6370000000 HC RX 637 (ALT 250 FOR IP): Performed by: EMERGENCY MEDICINE

## 2024-02-27 PROCEDURE — 70450 CT HEAD/BRAIN W/O DYE: CPT

## 2024-02-27 PROCEDURE — 83880 ASSAY OF NATRIURETIC PEPTIDE: CPT

## 2024-02-27 PROCEDURE — 85025 COMPLETE CBC W/AUTO DIFF WBC: CPT

## 2024-02-27 PROCEDURE — 87150 DNA/RNA AMPLIFIED PROBE: CPT

## 2024-02-27 PROCEDURE — 96365 THER/PROPH/DIAG IV INF INIT: CPT

## 2024-02-27 PROCEDURE — 87040 BLOOD CULTURE FOR BACTERIA: CPT

## 2024-02-27 PROCEDURE — 83605 ASSAY OF LACTIC ACID: CPT

## 2024-02-27 PROCEDURE — 6360000004 HC RX CONTRAST MEDICATION: Performed by: EMERGENCY MEDICINE

## 2024-02-27 PROCEDURE — 81001 URINALYSIS AUTO W/SCOPE: CPT

## 2024-02-27 PROCEDURE — 2580000003 HC RX 258: Performed by: EMERGENCY MEDICINE

## 2024-02-27 PROCEDURE — 1200000000 HC SEMI PRIVATE

## 2024-02-27 PROCEDURE — 6370000000 HC RX 637 (ALT 250 FOR IP): Performed by: INTERNAL MEDICINE

## 2024-02-27 PROCEDURE — 71250 CT THORAX DX C-: CPT

## 2024-02-27 PROCEDURE — 73502 X-RAY EXAM HIP UNI 2-3 VIEWS: CPT

## 2024-02-27 PROCEDURE — 36415 COLL VENOUS BLD VENIPUNCTURE: CPT

## 2024-02-27 PROCEDURE — 84484 ASSAY OF TROPONIN QUANT: CPT

## 2024-02-27 PROCEDURE — 93005 ELECTROCARDIOGRAM TRACING: CPT | Performed by: EMERGENCY MEDICINE

## 2024-02-27 PROCEDURE — 73630 X-RAY EXAM OF FOOT: CPT

## 2024-02-27 PROCEDURE — 80053 COMPREHEN METABOLIC PANEL: CPT

## 2024-02-27 PROCEDURE — 87635 SARS-COV-2 COVID-19 AMP PRB: CPT

## 2024-02-27 PROCEDURE — 85520 HEPARIN ASSAY: CPT

## 2024-02-27 PROCEDURE — 87804 INFLUENZA ASSAY W/OPTIC: CPT

## 2024-02-27 PROCEDURE — 82803 BLOOD GASES ANY COMBINATION: CPT

## 2024-02-27 PROCEDURE — 6360000002 HC RX W HCPCS: Performed by: EMERGENCY MEDICINE

## 2024-02-27 PROCEDURE — 71045 X-RAY EXAM CHEST 1 VIEW: CPT

## 2024-02-27 RX ORDER — HEPARIN SODIUM 10000 [USP'U]/100ML
0-4000 INJECTION, SOLUTION INTRAVENOUS CONTINUOUS
Status: DISCONTINUED | OUTPATIENT
Start: 2024-02-27 | End: 2024-02-28

## 2024-02-27 RX ORDER — ACETAMINOPHEN 650 MG/1
650 SUPPOSITORY RECTAL EVERY 6 HOURS PRN
Status: DISCONTINUED | OUTPATIENT
Start: 2024-02-27 | End: 2024-02-28

## 2024-02-27 RX ORDER — HEPARIN SODIUM 1000 [USP'U]/ML
4000 INJECTION, SOLUTION INTRAVENOUS; SUBCUTANEOUS PRN
Status: DISCONTINUED | OUTPATIENT
Start: 2024-02-27 | End: 2024-02-27

## 2024-02-27 RX ORDER — SODIUM CHLORIDE 0.9 % (FLUSH) 0.9 %
5-40 SYRINGE (ML) INJECTION PRN
Status: DISCONTINUED | OUTPATIENT
Start: 2024-02-27 | End: 2024-02-29 | Stop reason: HOSPADM

## 2024-02-27 RX ORDER — 0.9 % SODIUM CHLORIDE 0.9 %
500 INTRAVENOUS SOLUTION INTRAVENOUS ONCE
Status: COMPLETED | OUTPATIENT
Start: 2024-02-27 | End: 2024-02-27

## 2024-02-27 RX ORDER — SODIUM CHLORIDE 9 MG/ML
INJECTION, SOLUTION INTRAVENOUS PRN
Status: DISCONTINUED | OUTPATIENT
Start: 2024-02-27 | End: 2024-02-29 | Stop reason: HOSPADM

## 2024-02-27 RX ORDER — AMLODIPINE BESYLATE 5 MG/1
5 TABLET ORAL DAILY
Status: DISCONTINUED | OUTPATIENT
Start: 2024-02-28 | End: 2024-02-28

## 2024-02-27 RX ORDER — ONDANSETRON 4 MG/1
4 TABLET, ORALLY DISINTEGRATING ORAL EVERY 8 HOURS PRN
Status: DISCONTINUED | OUTPATIENT
Start: 2024-02-27 | End: 2024-02-29 | Stop reason: HOSPADM

## 2024-02-27 RX ORDER — SODIUM CHLORIDE 0.9 % (FLUSH) 0.9 %
5-40 SYRINGE (ML) INJECTION EVERY 12 HOURS SCHEDULED
Status: DISCONTINUED | OUTPATIENT
Start: 2024-02-27 | End: 2024-02-29 | Stop reason: HOSPADM

## 2024-02-27 RX ORDER — HEPARIN SODIUM 1000 [USP'U]/ML
2000 INJECTION, SOLUTION INTRAVENOUS; SUBCUTANEOUS PRN
Status: DISCONTINUED | OUTPATIENT
Start: 2024-02-27 | End: 2024-02-27

## 2024-02-27 RX ORDER — DONEPEZIL HYDROCHLORIDE 5 MG/1
5 TABLET, FILM COATED ORAL NIGHTLY
Status: DISCONTINUED | OUTPATIENT
Start: 2024-02-27 | End: 2024-02-28

## 2024-02-27 RX ORDER — HEPARIN SODIUM 1000 [USP'U]/ML
4000 INJECTION, SOLUTION INTRAVENOUS; SUBCUTANEOUS ONCE
Status: COMPLETED | OUTPATIENT
Start: 2024-02-27 | End: 2024-02-27

## 2024-02-27 RX ORDER — ATORVASTATIN CALCIUM 20 MG/1
20 TABLET, FILM COATED ORAL NIGHTLY
Status: DISCONTINUED | OUTPATIENT
Start: 2024-02-28 | End: 2024-02-28

## 2024-02-27 RX ORDER — LEVOTHYROXINE SODIUM 0.12 MG/1
125 TABLET ORAL
Status: DISCONTINUED | OUTPATIENT
Start: 2024-02-28 | End: 2024-02-28

## 2024-02-27 RX ORDER — ONDANSETRON 2 MG/ML
4 INJECTION INTRAMUSCULAR; INTRAVENOUS EVERY 6 HOURS PRN
Status: DISCONTINUED | OUTPATIENT
Start: 2024-02-27 | End: 2024-02-29 | Stop reason: HOSPADM

## 2024-02-27 RX ORDER — ASPIRIN 81 MG/1
81 TABLET, CHEWABLE ORAL DAILY
Status: DISCONTINUED | OUTPATIENT
Start: 2024-02-28 | End: 2024-02-28

## 2024-02-27 RX ORDER — ACETAMINOPHEN 325 MG/1
650 TABLET ORAL EVERY 6 HOURS PRN
Status: DISCONTINUED | OUTPATIENT
Start: 2024-02-27 | End: 2024-02-28

## 2024-02-27 RX ORDER — POLYETHYLENE GLYCOL 3350 17 G/17G
17 POWDER, FOR SOLUTION ORAL DAILY PRN
Status: DISCONTINUED | OUTPATIENT
Start: 2024-02-27 | End: 2024-02-29 | Stop reason: HOSPADM

## 2024-02-27 RX ORDER — ASPIRIN 300 MG/1
300 SUPPOSITORY RECTAL ONCE
Status: COMPLETED | OUTPATIENT
Start: 2024-02-27 | End: 2024-02-27

## 2024-02-27 RX ORDER — LATANOPROST 50 UG/ML
1 SOLUTION/ DROPS OPHTHALMIC NIGHTLY
Status: DISCONTINUED | OUTPATIENT
Start: 2024-02-27 | End: 2024-02-28

## 2024-02-27 RX ORDER — SODIUM CHLORIDE 9 MG/ML
INJECTION, SOLUTION INTRAVENOUS CONTINUOUS
Status: DISCONTINUED | OUTPATIENT
Start: 2024-02-27 | End: 2024-02-27

## 2024-02-27 RX ADMIN — SODIUM CHLORIDE 500 ML: 9 INJECTION, SOLUTION INTRAVENOUS at 18:35

## 2024-02-27 RX ADMIN — IOPAMIDOL 75 ML: 755 INJECTION, SOLUTION INTRAVENOUS at 12:58

## 2024-02-27 RX ADMIN — LATANOPROST 1 DROP: 50 SOLUTION OPHTHALMIC at 22:00

## 2024-02-27 RX ADMIN — HEPARIN SODIUM 890 UNITS/HR: 10000 INJECTION, SOLUTION INTRAVENOUS at 18:56

## 2024-02-27 RX ADMIN — DONEPEZIL HYDROCHLORIDE 5 MG: 5 TABLET, FILM COATED ORAL at 22:00

## 2024-02-27 RX ADMIN — SODIUM CHLORIDE, PRESERVATIVE FREE 10 ML: 5 INJECTION INTRAVENOUS at 20:55

## 2024-02-27 RX ADMIN — HEPARIN SODIUM 4000 UNITS: 1000 INJECTION INTRAVENOUS; SUBCUTANEOUS at 18:54

## 2024-02-27 RX ADMIN — CEFEPIME 2000 MG: 2 INJECTION, POWDER, FOR SOLUTION INTRAVENOUS at 13:50

## 2024-02-27 RX ADMIN — ASPIRIN 300 MG: 300 SUPPOSITORY RECTAL at 18:56

## 2024-02-27 RX ADMIN — VANCOMYCIN HYDROCHLORIDE 1500 MG: 1.5 INJECTION, POWDER, LYOPHILIZED, FOR SOLUTION INTRAVENOUS at 18:37

## 2024-02-27 ASSESSMENT — PAIN SCALES - WONG BAKER
WONGBAKER_NUMERICALRESPONSE: NO HURT
WONGBAKER_NUMERICALRESPONSE: 0
WONGBAKER_NUMERICALRESPONSE: 0
WONGBAKER_NUMERICALRESPONSE: NO HURT
WONGBAKER_NUMERICALRESPONSE: 0
WONGBAKER_NUMERICALRESPONSE: NO HURT

## 2024-02-27 ASSESSMENT — PAIN - FUNCTIONAL ASSESSMENT: PAIN_FUNCTIONAL_ASSESSMENT: WONG-BAKER FACES

## 2024-02-27 NOTE — ED PROVIDER NOTES
[facility] EMERGENCY DEPARTMENT    CHIEF COMPLAINT  Altered Mental Status (Pt is from home.  EMS states skip patient has not been walking since 2/26/2024.   No  report of injury. Pt is nonverbal during triage.  EMS reports pt has been incontinent since yesterday.)       HISTORY OF PRESENT ILLNESS  Brenda Lebron is a 89 y.o. female who presents to the ED with generalized weakness, fatigue and altered mental status.  Family notes that she has not been walking since yesterday and that she has seemed altered and been incontinent of urine and stool which is new for her.  No known trauma.  Patient nonverbal and unable to provide additional history.    Once family arrived they were able to discuss with us that she typically walks with a walker at baseline, is able to go to doctors appointments, recently went to the podiatrist for wound on her left foot.  They state that she has some cognitive impairment but is able to have a conversation with mostly yes or no responses at baseline.  Family states that she reported diffuse body pain and states that she felt too weak to walk and did not eat or drink anything for 2 days.     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 08/28/2017    Kidney problem     Neuropathy     Pedal edema 04/24/2018    Rotator cuff arthropathy of right shoulder 01/05/2021    Sepsis due to cellulitis ([facility]) 07/07/2021    Thyroid disorder     Trigger index finger 08/07/2017    Trigger thumb of both thumbs 08/07/2017    Type 2  Ur Negative Negative mg/dL    Bilirubin Urine Negative Negative    Ketones, Urine Negative Negative mg/dL    Specific Gravity, UA 1.017 1.005 - 1.030    Blood, Urine SMALL (A) Negative    pH, UA 5.5 5.0 - 8.0    Protein,  Negative mg/dL    Urobilinogen, Urine 1.0 <2.0 E.U./dL    Nitrite, Urine Negative Negative    Leukocyte Esterase, Urine Negative Negative    Microscopic Examination YES     Urine Type NotGiven     Urine Reflex to Culture Not Indicated    Lactate, Sepsis   Result Value Ref Range    Lactic Acid, Sepsis 2.4 (H) 0.4 - 1.9 mmol/L   Blood Gas, Venous   Result Value Ref Range    pH, [place] 7.392 7.350 - 7.450    pCO2, [place] 38.2 (L) 40.0 - 50.0 mmHg    pO2, [place] 31 Not Established mmHg    HCO3, Venous 23 23 - 29 mmol/L    Base Excess, [place] -1.5 Not Established mmol/L    O2 Sat, [place] 53 Not Established %    Carboxyhemoglobin 1.0 %    MetHgb, [place] 0.4 <1.5 %    TC02 (Calc), [place] 24 Not Established mmol/L    O2 Therapy Unknown    Troponin   Result Value Ref Range    Troponin, High Sensitivity 409 (H) 0 - 14 ng/L   Microscopic Urinalysis   Result Value Ref Range    Bacteria, UA None Seen None Seen /HPF    Hyaline Casts, UA 9 (H) 0 - 8 /LPF    WBC, UA 3 0 - 5 /HPF    RBC, UA 1 0 - 4 /HPF    Epithelial Cells, UA 8 (H) 0 - 5 /HPF    Amorphous, UA Present (A) None Seen    Mucus, UA Present (A) None Seen   SPECIMEN REJECTION   Result Value Ref Range    Rejected Test TROP/BNP     Reason for Rejection see below    EKG 12 Lead   Result Value Ref Range    Ventricular Rate 94 BPM    QRS Duration 114 ms    Q-T Interval 360 ms    QTc Calculation (Bazett) 450 ms    R Axis -15 degrees    T Axis 36 degrees    Diagnosis       Atrial fibrillationMinimal voltage criteria for LVH, may be normal variant ( [place] product )Septal infarct (cited on or before 18-MAR-2023)Abnormal ECGWhen compared with ECG of 18-MAR-2023 14:20,Atrial fibrillation has replaced Sinus rhythm         RADIOLOGY  I have reviewed all radiographic studies for  this visit.   CT CHEST ABDOMEN PELVIS WO CONTRAST Additional Contrast? None   Final Result   Chest      Motion limited.  Trace pleural fluid is seen on the right.  No pneumonia   noted.      Abdomen and pelvis      Scattered areas of colonic wall thickening are seen either due to the   contracted state of the colon or wall thickening from underlying colitis.  A   few colonic diverticula are seen.  No significant pericolonic fat stranding         CT Head W/O Contrast   Final Result   1. No acute intracranial process identified.   2. Mild-to-moderate diffuse cerebral volume loss and moderate to severe   chronic small vessel ischemic changes.   3. Significantly limited CT angiogram of the brain due to patient motion.   Within the limitations of the exam, there is no major branch occlusion or   high-grade stenosis.   4. No significant arterial stenosis identified within the neck.         CTA HEAD NECK W CONTRAST   Final Result   1. No acute intracranial process identified.   2. Mild-to-moderate diffuse cerebral volume loss and moderate to severe   chronic small vessel ischemic changes.   3. Significantly limited CT angiogram of the brain due to patient motion.   Within the limitations of the exam, there is no major branch occlusion or   high-grade stenosis.   4. No significant arterial stenosis identified within the neck.         XR CHEST PORTABLE   Preliminary Result   No acute cardiopulmonary abnormality is identified.         XR FOOT LEFT (MIN 3 VIEWS)   Final Result   No acute osseous abnormality left foot.  Scattered degenerative changes are   seen      No acute osseous abnormality left hip.  Moderate degenerative changes are seen         XR HIP LEFT (2-3 VIEWS)   Final Result   No acute osseous abnormality left foot.  Scattered degenerative changes are   seen      No acute osseous abnormality left hip.  Moderate degenerative changes are seen                ECG interpretation by emergency physician  A flutter with  consultant(s).      CLINICAL IMPRESSION  1. Sepsis with encephalopathy without septic shock, due to unspecified organism ([facility])    2. Cellulitis of left lower extremity    3. Hyponatremia    4. NSTEMI (non-ST elevated myocardial infarction) ([facility])    5. Goals of care, counseling/discussion    6. NSVT (nonsustained ventricular tachycardia) ([facility])        DISPOSITION  Decision To Admit 02/27/2024 03:17:06 PM     Chitra Rogers MD    Note: This chart was created using voice recognition dictation software. Efforts were made by me to ensure accuracy, however some errors may be present due to limitations of this technology and occasionally words are not transcribed correctly.        Chitra Rogers MD  02/27/24 [id number]

## 2024-02-27 NOTE — PROGRESS NOTES
Medication Reconciliation    List of medications patient is currently taking is complete.     Source of information: 1. Conversation with patient/family at bedside                                      2. EPIC records     Randal Millan RPH, PharmD, BCPS  2/27/2024 1:43 PM

## 2024-02-27 NOTE — H&P
Hospital Medicine History & Physical      Date of Admission: 2/27/2024    Date of Service:  Pt seen/examined on 2/27/2024     [x]Admitted to Inpatient with expected LOS greater than two midnights due to medical therapy.  []Placed in Observation status.    Chief Admission Complaint:  Left foot cellulitis    Presenting Admission History:      89 y.o. female who presented to [facility] with confusion being nonverbal with left leg erythema and warmness.  PMHx significant for dementia , A-fib, diabetes mellitus type 2, hypertension, hypothyroidism, chronic kidney disease stage III. .  Patient lives with her son Yusuf and daughter-in-law, patient is very confused and nonverbal and not able to provide any information so most of information was obtained from reviewing medical record, discussion with ED provider and discussion with daughter-in-law at the bedside.  It seems patient has been getting weaker with increased confusion over the last 24 to 48 hours, as she has dementia and is not able to follow commands well and tend to have some Down syndrome's, she is able usually to ambulate with a walker on her own but they noticed increased fatigue with generalized weakness and over the last 24 hours she became more confused this morning she became nonverbal not following commands well, she has been incontinent for urine and stool, there was no known trauma.  She has been having increase edema in lower extremity with the left more than the right but then they noticed today that she is having some erythema and warmness in the left leg, she had suffered from a small ulcer by the great toe on the left side and has been seen by podiatry for the.  According to the daughter-in-law most of her question usually yes or no but usually she is able to talk to them more than today.  She has not been eating or drinking well the last couple of days.  The daughter was able to give her her Synthroid and metformin this morning and she got her  reformatted images are provided for review.  MIP images are provided for review. Stenosis of the internal carotid arteries measured using NASCET criteria. Automated exposure control, iterative reconstruction, and/or weight based adjustment of the mA/kV was utilized to reduce the radiation dose to as low as reasonably achievable.; CT of the head was performed without the administration of intravenous contrast. Automated exposure control, iterative reconstruction, and/or weight based adjustment of the mA/kV was utilized to reduce the radiation dose to as low as reasonably achievable. Noncontrast CT of the head with reconstructed 2-D images are also provided for review. COMPARISON: CT brain 03/18/2023 HISTORY: ORDERING SYSTEM PROVIDED HISTORY: AMS, nonverbal TECHNOLOGIST PROVIDED HISTORY: Reason for exam:->AMS, nonverbal Has a \"code stroke\" or \"stroke alert\" been called?->No Decision Support Exception - unselect if not a suspected or confirmed emergency medical condition->Emergency Medical Condition (MA) Reason for Exam: ams, nonverbal.; ORDERING SYSTEM PROVIDED HISTORY: altered mental status TECHNOLOGIST PROVIDED HISTORY: Reason for exam:->altered mental status Has a \"code stroke\" or \"stroke alert\" been called?->No Decision Support Exception - unselect if not a suspected or confirmed emergency medical condition->Emergency Medical Condition (MA) Reason for Exam: ams. nonverbal. FINDINGS: CT HEAD: BRAIN/VENTRICLES:  There is no acute infarct or acute intracranial hemorrhage present.  There is no mass effect or midline shift present.  There is mild-to-moderate diffuse cerebral volume loss.  There is abnormal hypoattenuation within the periventricular and deep white matter of both hemispheres.  No abnormal extra-axial fluid collection is identified. ORBITS: Limited evaluation of the orbits is unremarkable. SINUSES:  The paranasal sinuses and mastoid air cells are clear. SOFT TISSUES/SKULL: No lytic or blastic osseous

## 2024-02-28 VITALS
SYSTOLIC BLOOD PRESSURE: 112 MMHG | OXYGEN SATURATION: 97 % | BODY MASS INDEX: 25.99 KG/M2 | HEART RATE: 127 BPM | WEIGHT: 160.94 LBS | RESPIRATION RATE: 36 BRPM | DIASTOLIC BLOOD PRESSURE: 65 MMHG | TEMPERATURE: 98.7 F

## 2024-02-28 PROBLEM — R50.9 FEVER AND CHILLS: Status: ACTIVE | Noted: 2024-02-28

## 2024-02-28 PROBLEM — A41.9 SEVERE SEPSIS (HCC): Status: ACTIVE | Noted: 2024-02-28

## 2024-02-28 PROBLEM — A41.9 SEPSIS WITH ENCEPHALOPATHY WITHOUT SEPTIC SHOCK (HCC): Status: ACTIVE | Noted: 2024-02-28

## 2024-02-28 PROBLEM — R79.89 ELEVATED BRAIN NATRIURETIC PEPTIDE (BNP) LEVEL: Status: ACTIVE | Noted: 2024-02-28

## 2024-02-28 PROBLEM — R78.81 STREPTOCOCCAL BACTEREMIA: Status: ACTIVE | Noted: 2024-02-28

## 2024-02-28 PROBLEM — G93.41 SEPSIS WITH ENCEPHALOPATHY WITHOUT SEPTIC SHOCK (HCC): Status: ACTIVE | Noted: 2024-02-28

## 2024-02-28 PROBLEM — R65.20 SEVERE SEPSIS (HCC): Status: ACTIVE | Noted: 2024-02-28

## 2024-02-28 PROBLEM — R65.20 SEPSIS WITH ENCEPHALOPATHY WITHOUT SEPTIC SHOCK (HCC): Status: ACTIVE | Noted: 2024-02-28

## 2024-02-28 PROBLEM — E87.1 HYPONATREMIA: Status: ACTIVE | Noted: 2024-02-28

## 2024-02-28 PROBLEM — B95.5 STREPTOCOCCAL BACTEREMIA: Status: ACTIVE | Noted: 2024-02-28

## 2024-02-28 PROBLEM — D72.9 NEUTROPHILIA: Status: ACTIVE | Noted: 2024-02-28

## 2024-02-28 PROBLEM — E87.20 LACTIC ACID ACIDOSIS: Status: ACTIVE | Noted: 2024-02-28

## 2024-02-28 LAB
ANION GAP SERPL CALCULATED.3IONS-SCNC: 17 MMOL/L (ref 3–16)
ANTI-XA UNFRAC HEPARIN: 0.27 IU/ML (ref 0.3–0.7)
ANTI-XA UNFRAC HEPARIN: 0.33 IU/ML (ref 0.3–0.7)
BASE EXCESS BLDA CALC-SCNC: -7 MMOL/L (ref -3–3)
BASOPHILS # BLD: 0.1 K/UL (ref 0–0.2)
BASOPHILS NFR BLD: 0.3 %
BUN SERPL-MCNC: 33 MG/DL (ref 7–20)
CA-I BLD-SCNC: 1.13 MMOL/L (ref 1.12–1.32)
CALCIUM SERPL-MCNC: 8.5 MG/DL (ref 8.3–10.6)
CHLORIDE SERPL-SCNC: 100 MMOL/L (ref 99–110)
CO2 BLDA-SCNC: 22 MMOL/L
CO2 SERPL-SCNC: 19 MMOL/L (ref 21–32)
CREAT SERPL-MCNC: 1.6 MG/DL (ref 0.6–1.2)
DEPRECATED RDW RBC AUTO: 15.2 % (ref 12.4–15.4)
EKG ATRIAL RATE: 119 BPM
EKG DIAGNOSIS: NORMAL
EKG DIAGNOSIS: NORMAL
EKG Q-T INTERVAL: 332 MS
EKG Q-T INTERVAL: 360 MS
EKG QRS DURATION: 114 MS
EKG QRS DURATION: 126 MS
EKG QTC CALCULATION (BAZETT): 450 MS
EKG QTC CALCULATION (BAZETT): 501 MS
EKG R AXIS: -15 DEGREES
EKG R AXIS: -23 DEGREES
EKG T AXIS: 129 DEGREES
EKG T AXIS: 36 DEGREES
EKG VENTRICULAR RATE: 137 BPM
EKG VENTRICULAR RATE: 94 BPM
EOSINOPHIL # BLD: 0 K/UL (ref 0–0.6)
EOSINOPHIL NFR BLD: 0.1 %
GFR SERPLBLD CREATININE-BSD FMLA CKD-EPI: 30 ML/MIN/{1.73_M2}
GLUCOSE BLD-MCNC: 232 MG/DL (ref 70–99)
GLUCOSE BLD-MCNC: 272 MG/DL (ref 70–99)
GLUCOSE SERPL-MCNC: 198 MG/DL (ref 70–99)
HCO3 BLDA-SCNC: 20.4 MMOL/L (ref 21–29)
HCT VFR BLD AUTO: 30.7 % (ref 36–48)
HCT VFR BLD AUTO: 34 % (ref 36–48)
HGB BLD CALC-MCNC: 11.6 GM/DL (ref 12–16)
HGB BLD-MCNC: 10.4 G/DL (ref 12–16)
LACTATE BLD-SCNC: 3.1 MMOL/L (ref 0.4–2)
LYMPHOCYTES # BLD: 1.3 K/UL (ref 1–5.1)
LYMPHOCYTES NFR BLD: 5.8 %
MCH RBC QN AUTO: 30.7 PG (ref 26–34)
MCHC RBC AUTO-ENTMCNC: 33.8 G/DL (ref 31–36)
MCV RBC AUTO: 90.8 FL (ref 80–100)
MONOCYTES # BLD: 0.8 K/UL (ref 0–1.3)
MONOCYTES NFR BLD: 3.7 %
NEUTROPHILS # BLD: 19.5 K/UL (ref 1.7–7.7)
NEUTROPHILS NFR BLD: 90.1 %
PCO2 BLDA: 48.6 MM HG (ref 35–45)
PERFORMED ON: ABNORMAL
PERFORMED ON: ABNORMAL
PH BLDA: 7.23 [PH] (ref 7.35–7.45)
PLATELET # BLD AUTO: 287 K/UL (ref 135–450)
PMV BLD AUTO: 8.6 FL (ref 5–10.5)
PO2 BLDA: 157.4 MM HG (ref 75–108)
POC SAMPLE TYPE: ABNORMAL
POTASSIUM BLD-SCNC: 3.9 MMOL/L (ref 3.5–5.1)
POTASSIUM SERPL-SCNC: 3.9 MMOL/L (ref 3.5–5.1)
RBC # BLD AUTO: 3.38 M/UL (ref 4–5.2)
REPORT: NORMAL
SAO2 % BLDA: 99 % (ref 93–100)
SODIUM BLD-SCNC: 137 MMOL/L (ref 136–145)
SODIUM SERPL-SCNC: 136 MMOL/L (ref 136–145)
VANCOMYCIN SERPL-MCNC: <4 UG/ML
WBC # BLD AUTO: 21.6 K/UL (ref 4–11)

## 2024-02-28 PROCEDURE — 84295 ASSAY OF SERUM SODIUM: CPT

## 2024-02-28 PROCEDURE — 2500000003 HC RX 250 WO HCPCS: Performed by: INTERNAL MEDICINE

## 2024-02-28 PROCEDURE — 85520 HEPARIN ASSAY: CPT

## 2024-02-28 PROCEDURE — 82947 ASSAY GLUCOSE BLOOD QUANT: CPT

## 2024-02-28 PROCEDURE — 84132 ASSAY OF SERUM POTASSIUM: CPT

## 2024-02-28 PROCEDURE — 83605 ASSAY OF LACTIC ACID: CPT

## 2024-02-28 PROCEDURE — 6360000002 HC RX W HCPCS: Performed by: INTERNAL MEDICINE

## 2024-02-28 PROCEDURE — 85014 HEMATOCRIT: CPT

## 2024-02-28 PROCEDURE — 2580000003 HC RX 258: Performed by: INTERNAL MEDICINE

## 2024-02-28 PROCEDURE — 80202 ASSAY OF VANCOMYCIN: CPT

## 2024-02-28 PROCEDURE — 99223 1ST HOSP IP/OBS HIGH 75: CPT | Performed by: INTERNAL MEDICINE

## 2024-02-28 PROCEDURE — 93005 ELECTROCARDIOGRAM TRACING: CPT | Performed by: INTERNAL MEDICINE

## 2024-02-28 PROCEDURE — 9990000010 HC NO CHARGE VISIT

## 2024-02-28 PROCEDURE — 82330 ASSAY OF CALCIUM: CPT

## 2024-02-28 PROCEDURE — 80048 BASIC METABOLIC PNL TOTAL CA: CPT

## 2024-02-28 PROCEDURE — 93010 ELECTROCARDIOGRAM REPORT: CPT | Performed by: INTERNAL MEDICINE

## 2024-02-28 PROCEDURE — 6370000000 HC RX 637 (ALT 250 FOR IP): Performed by: INTERNAL MEDICINE

## 2024-02-28 PROCEDURE — 94761 N-INVAS EAR/PLS OXIMETRY MLT: CPT

## 2024-02-28 PROCEDURE — 85025 COMPLETE CBC W/AUTO DIFF WBC: CPT

## 2024-02-28 PROCEDURE — 2700000000 HC OXYGEN THERAPY PER DAY

## 2024-02-28 PROCEDURE — 36600 WITHDRAWAL OF ARTERIAL BLOOD: CPT

## 2024-02-28 PROCEDURE — 36415 COLL VENOUS BLD VENIPUNCTURE: CPT

## 2024-02-28 PROCEDURE — 82803 BLOOD GASES ANY COMBINATION: CPT

## 2024-02-28 PROCEDURE — 94760 N-INVAS EAR/PLS OXIMETRY 1: CPT

## 2024-02-28 PROCEDURE — 6360000002 HC RX W HCPCS: Performed by: EMERGENCY MEDICINE

## 2024-02-28 RX ORDER — MORPHINE SULFATE 1 MG/30ML
INJECTION INTRAVENOUS CONTINUOUS
Status: DISCONTINUED | OUTPATIENT
Start: 2024-02-28 | End: 2024-02-29 | Stop reason: HOSPADM

## 2024-02-28 RX ORDER — GLYCOPYRROLATE 0.2 MG/ML
0.2 INJECTION INTRAMUSCULAR; INTRAVENOUS EVERY 4 HOURS PRN
Status: DISCONTINUED | OUTPATIENT
Start: 2024-02-28 | End: 2024-02-29 | Stop reason: HOSPADM

## 2024-02-28 RX ORDER — ACETAMINOPHEN 650 MG/1
650 SUPPOSITORY RECTAL EVERY 4 HOURS PRN
Status: DISCONTINUED | OUTPATIENT
Start: 2024-02-28 | End: 2024-02-29 | Stop reason: HOSPADM

## 2024-02-28 RX ORDER — MORPHINE SULFATE 4 MG/ML
4 INJECTION, SOLUTION INTRAMUSCULAR; INTRAVENOUS ONCE
Status: COMPLETED | OUTPATIENT
Start: 2024-02-28 | End: 2024-02-28

## 2024-02-28 RX ORDER — LORAZEPAM 2 MG/ML
1 INJECTION INTRAMUSCULAR EVERY 4 HOURS PRN
Status: DISCONTINUED | OUTPATIENT
Start: 2024-02-28 | End: 2024-02-29 | Stop reason: HOSPADM

## 2024-02-28 RX ORDER — FUROSEMIDE 10 MG/ML
40 INJECTION INTRAMUSCULAR; INTRAVENOUS ONCE
Status: COMPLETED | OUTPATIENT
Start: 2024-02-28 | End: 2024-02-28

## 2024-02-28 RX ORDER — HEPARIN SODIUM 1000 [USP'U]/ML
2000 INJECTION, SOLUTION INTRAVENOUS; SUBCUTANEOUS ONCE
Status: COMPLETED | OUTPATIENT
Start: 2024-02-28 | End: 2024-02-28

## 2024-02-28 RX ORDER — SODIUM CHLORIDE 9 MG/ML
INJECTION, SOLUTION INTRAVENOUS CONTINUOUS
Status: DISCONTINUED | OUTPATIENT
Start: 2024-02-28 | End: 2024-02-28

## 2024-02-28 RX ORDER — KETOROLAC TROMETHAMINE 15 MG/ML
15 INJECTION, SOLUTION INTRAMUSCULAR; INTRAVENOUS EVERY 6 HOURS PRN
Status: DISCONTINUED | OUTPATIENT
Start: 2024-02-28 | End: 2024-02-29 | Stop reason: HOSPADM

## 2024-02-28 RX ORDER — METOPROLOL TARTRATE 1 MG/ML
5 INJECTION, SOLUTION INTRAVENOUS ONCE
Status: COMPLETED | OUTPATIENT
Start: 2024-02-28 | End: 2024-02-28

## 2024-02-28 RX ORDER — ACETAMINOPHEN 325 MG/1
650 TABLET ORAL EVERY 4 HOURS PRN
Status: DISCONTINUED | OUTPATIENT
Start: 2024-02-28 | End: 2024-02-29 | Stop reason: HOSPADM

## 2024-02-28 RX ADMIN — LEVOTHYROXINE SODIUM 125 MCG: 0.12 TABLET ORAL at 05:13

## 2024-02-28 RX ADMIN — LORAZEPAM 1 MG: 2 INJECTION INTRAMUSCULAR; INTRAVENOUS at 14:54

## 2024-02-28 RX ADMIN — CEFTRIAXONE SODIUM 2000 MG: 2 INJECTION, POWDER, FOR SOLUTION INTRAMUSCULAR; INTRAVENOUS at 11:18

## 2024-02-28 RX ADMIN — SODIUM CHLORIDE: 9 INJECTION, SOLUTION INTRAVENOUS at 09:10

## 2024-02-28 RX ADMIN — METOPROLOL TARTRATE 5 MG: 5 INJECTION, SOLUTION INTRAVENOUS at 14:15

## 2024-02-28 RX ADMIN — ACETAMINOPHEN 650 MG: 650 SUPPOSITORY RECTAL at 09:02

## 2024-02-28 RX ADMIN — FUROSEMIDE 40 MG: 10 INJECTION, SOLUTION INTRAVENOUS at 14:01

## 2024-02-28 RX ADMIN — HEPARIN SODIUM 2000 UNITS: 1000 INJECTION INTRAVENOUS; SUBCUTANEOUS at 11:18

## 2024-02-28 RX ADMIN — MORPHINE SULFATE 30 MG: 1 INJECTION INTRAVENOUS at 16:32

## 2024-02-28 RX ADMIN — ACETAMINOPHEN 650 MG: 325 TABLET ORAL at 05:13

## 2024-02-28 RX ADMIN — MORPHINE SULFATE 4 MG: 4 INJECTION INTRAVENOUS at 15:08

## 2024-02-28 ASSESSMENT — PAIN SCALES - WONG BAKER
WONGBAKER_NUMERICALRESPONSE: 0
WONGBAKER_NUMERICALRESPONSE: NO HURT
WONGBAKER_NUMERICALRESPONSE: 0
WONGBAKER_NUMERICALRESPONSE: 0
WONGBAKER_NUMERICALRESPONSE: NO HURT
WONGBAKER_NUMERICALRESPONSE: NO HURT
WONGBAKER_NUMERICALRESPONSE: 0
WONGBAKER_NUMERICALRESPONSE: NO HURT
WONGBAKER_NUMERICALRESPONSE: 0
WONGBAKER_NUMERICALRESPONSE: 0
WONGBAKER_NUMERICALRESPONSE: NO HURT
WONGBAKER_NUMERICALRESPONSE: NO HURT
WONGBAKER_NUMERICALRESPONSE: 0
WONGBAKER_NUMERICALRESPONSE: 0
WONGBAKER_NUMERICALRESPONSE: NO HURT
WONGBAKER_NUMERICALRESPONSE: NO HURT

## 2024-02-28 NOTE — PROGRESS NOTES
Occupational Therapy Attempt and Discharge  Brenda Lebron    OT order noted. Nursing requests hold at this time. Family upset that therapy was ordered at this time. Per conversation with family, please re-order therapy when family believes it is an appropriate time to mobilize. OT signing off.     Electronically signed by Thais Stuart OT on 2/28/24 at 1:17 PM EST

## 2024-02-28 NOTE — CONSULTS
Clinical Pharmacy Note  Vancomycin Consult    Brenda Lebron is a 89 y.o. female ordered vancomycin for SSTI; consult received from Dr. Georges to manage therapy. Also receiving cefepime.    Allergies:  Adhesive tape, Gabapentin, Pravachol [pravastatin], Pravachol [pravastatin sodium], and Tegaderm ag mesh 2\"x2\" [wound dressings]     Temp max:  Temp (24hrs), Av.7 °F (37.6 °C), Min:98.5 °F (36.9 °C), Max:101.8 °F (38.8 °C)      Recent Labs     24  1218   WBC 24.4*       Recent Labs     24  1218   BUN 26*   CREATININE 1.2       No intake or output data in the 24 hours ending 24    Culture Results:  pending    Ht Readings from Last 1 Encounters:   24 1.676 m (5' 5.98\")        Wt Readings from Last 1 Encounters:   24 73.8 kg (162 lb 11.2 oz)         Estimated Creatinine Clearance: 29 mL/min (based on SCr of 1.2 mg/dL).    Assessment/Plan:  Day # 1 of vancomycin.  Vancomycin 1500 mg IV x 1 received in ER  Will dose based on levels for now due to renal function.    Level ordered for tomorrow.      Thank you for the consult.   Linda Madsen [facility], PharmD, 2024 9:11 PM

## 2024-02-28 NOTE — PROGRESS NOTES
Pt's family appears to be supporting one another at this time and said they don't have any other spiritual needs at this time.     02/28/24 1437   Encounter Summary   Encounter Overview/Reason  Grief, Loss, and Adjustments   Service Provided For: Patient and family together   Referral/Consult From: Nurse   Support System Children;Family members   Last Encounter  02/28/24  (support and prayer CL)   Complexity of Encounter High   Begin Time 1400   End Time  1434   Total Time Calculated 34 min   Spiritual/Emotional needs   Type Spiritual Support   Grief, Loss, and Adjustments   Type End of Life   Assessment/Intervention/Outcome   Assessment Sad;Tearful   Intervention Active listening;Discussed illness injury and it’s impact;Discussed death, afterlife;Discussed belief system/Christian practices/poncho;Explored/Affirmed feelings, thoughts, concerns;Prayer (assurance of)/[place];End of Life Care   Outcome Coping;Engaged in conversation;Expressed Gratitude;Grieving

## 2024-02-28 NOTE — PROGRESS NOTES
Newly admitted patient from ED. Patient is nonverbal. Patient open eyes to pain. Noted redness on LLE and swelling BLE. Patient is tachypneic. Oxygen sats at 92% Room air. Maintained HOB elevated. Fall preventive measures promoted.    Electronically signed by Caren Ford RN on 2/27/2024 at 9:09 PM

## 2024-02-28 NOTE — PLAN OF CARE
Problem: Discharge Planning  Goal: Discharge to home or other facility with appropriate resources  Outcome: Progressing     Problem: Pain  Goal: Verbalizes/displays adequate comfort level or baseline comfort level  Outcome: Progressing     Problem: Safety - Adult  Goal: Free from fall injury  Outcome: Progressing     Problem: ABCDS Injury Assessment  Goal: Absence of physical injury  Outcome: Progressing     Problem: Skin/Tissue Integrity  Goal: Absence of new skin breakdown  Description: 1.  Monitor for areas of redness and/or skin breakdown  2.  Assess vascular access sites hourly  3.  Every 4-6 hours minimum:  Change oxygen saturation probe site  4.  Every 4-6 hours:  If on nasal continuous positive airway pressure, respiratory therapy assess nares and determine need for appliance change or resting period.  Outcome: Progressing     Problem: Confusion  Goal: Confusion, delirium, dementia, or psychosis is improved or at baseline  Description: INTERVENTIONS:  1. Assess for possible contributors to thought disturbance, including medications, impaired vision or hearing, underlying metabolic abnormalities, dehydration, psychiatric diagnoses, and notify attending LIP  2. [place] high risk fall precautions, as indicated  3. Provide frequent short contacts to provide reality reorientation, refocusing and direction  4. Decrease environmental stimuli, including noise as appropriate  5. Monitor and intervene to maintain adequate nutrition, hydration, elimination, sleep and activity  6. If unable to ensure safety without constant attention obtain sitter and review sitter guidelines with assigned personnel  7. Initiate Psychosocial CNS and Spiritual Care consult, as indicated  Outcome: Progressing

## 2024-02-28 NOTE — PLAN OF CARE
Problem: Discharge Planning  Goal: Discharge to home or other facility with appropriate resources  Outcome: Progressing  Flowsheets (Taken 2/27/2024 2046)  Discharge to home or other facility with appropriate resources: Identify barriers to discharge with patient and caregiver     Problem: Pain  Goal: Verbalizes/displays adequate comfort level or baseline comfort level  Outcome: Progressing     Problem: Safety - Adult  Goal: Free from fall injury  Outcome: Progressing     Problem: ABCDS Injury Assessment  Goal: Absence of physical injury  Outcome: Progressing     Problem: Skin/Tissue Integrity  Goal: Absence of new skin breakdown  Description: 1.  Monitor for areas of redness and/or skin breakdown  2.  Assess vascular access sites hourly  3.  Every 4-6 hours minimum:  Change oxygen saturation probe site  4.  Every 4-6 hours:  If on nasal continuous positive airway pressure, respiratory therapy assess nares and determine need for appliance change or resting period.  Outcome: Progressing     Problem: Confusion  Goal: Confusion, delirium, dementia, or psychosis is improved or at baseline  Description: INTERVENTIONS:  1. Assess for possible contributors to thought disturbance, including medications, impaired vision or hearing, underlying metabolic abnormalities, dehydration, psychiatric diagnoses, and notify attending LIP  2. [place] high risk fall precautions, as indicated  3. Provide frequent short contacts to provide reality reorientation, refocusing and direction  4. Decrease environmental stimuli, including noise as appropriate  5. Monitor and intervene to maintain adequate nutrition, hydration, elimination, sleep and activity  6. If unable to ensure safety without constant attention obtain sitter and review sitter guidelines with assigned personnel  7. Initiate Psychosocial CNS and Spiritual Care consult, as indicated  Outcome: Progressing

## 2024-02-28 NOTE — CONSULTS
Unit secretary called and cancelled consult. Order remained, called and verified with RN consult not needed. Instructed to call if Cardiology needed.     CARL Adams MSN, RN [facility]  [phone]

## 2024-02-28 NOTE — PROGRESS NOTES
Clinical Pharmacy Note  Heparin Dosing       Lab Results   Component Value Date/Time    ANTIXAUHEP 0.27 02/28/2024 09:36 AM      Lab Results   Component Value Date/Time    HGB 10.4 02/28/2024 03:14 AM    HCT 30.7 02/28/2024 03:14 AM     02/28/2024 03:14 AM    INR 1.10 07/12/2021 06:55 AM       Current Infusion Rate: 890 units/hr    Plan:  Bolus: 2000 units  Rate: increase to 1040 units/hr  Next anti-Xa level: 1700 2/28/24    Pharmacy will continue to monitor and adjust based on anti-Xa results.  Moriah Isbell [facility],2/28/2024,10:34 AM

## 2024-02-28 NOTE — CONSULTS
Infectious Diseases Inpatient Consult Note      Reason for Consult:  Severe sepsis , High fevers and Bacteremia     Requesting Physician:          Primary Care Physician:  Armida Dupree, CAROLE - NP    History Obtained From:  Epic     CHIEF COMPLAINT:     Chief Complaint   Patient presents with    Altered Mental Status     Pt is from home.  EMS states skip patient has not been walking since 2/26/2024.   No  report of injury. Pt is nonverbal during triage.  EMS reports pt has been incontinent since yesterday.         HISTORY OF PRESENT ILLNESS:  89 y.o. woman with a history of dementia, atrial fibrillation chronic kidney disease stage IIIa hypertension admitted to hospital secondary to change in mental status high fevers patient normally walks with a walker but unfortunately now has a open wound on the left foot ulcer with redness swelling difficulty with ambulation patient family was concerned about her change in mental status since brought into the hospital.  She was found to be hyponatremic with elevated WBC count up to 24 blood culture now positive for Streptococcus she had a high fever Tmax at 103.1 associated with tachypnea tachycardia lactic acid elevation with ongoing severe sepsis we are consulted for IV antibiotic recommendation.  History is from patient medical records and family patient is unable to provide any history secondary to dementia. 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 08/28/2017    Kidney  edema  Musculoskeletal: normal range of motion, no joint swelling, deformity or tenderness  Integumentary: No rashes, no abnormal skin lesions, no petechiae  Neurologic: reflexes normal and symmetric, no cranial nerve deficit   Lines: iv  Left foot swelling, redness,  +  DATA:    CBC:   Lab Results   Component Value Date    WBC 21.6 (H) 02/28/2024    HGB 10.4 (L) 02/28/2024    HCT 30.7 (L) 02/28/2024    MCV 90.8 02/28/2024     02/28/2024     RENAL:   Lab Results   Component Value Date    CREATININE 1.6 (H) 02/28/2024    BUN 33 (H) 02/28/2024     02/28/2024    K 3.9 02/28/2024     02/28/2024    CO2 19 (L) 02/28/2024     SED RATE: No results found for: \"SEDRATE\"  CK:   Lab Results   Component Value Date/Time    CKTOTAL 154 05/09/2021 11:04 AM     CRP: No results found for: \"CRP\"  Hepatic Function Panel:   Lab Results   Component Value Date/Time    ALKPHOS 127 02/27/2024 12:18 PM    ALT 17 02/27/2024 12:18 PM    AST 41 02/27/2024 12:18 PM    PROT 7.2 02/27/2024 12:18 PM    BILITOT 0.6 02/27/2024 12:18 PM    LABALBU 3.3 02/27/2024 12:18 PM     UA:  Lab Results   Component Value Date/Time    COLORU Yellow 02/27/2024 11:07 AM    CLARITYU CLOUDY 02/27/2024 11:07 AM    GLUCOSEU Negative 02/27/2024 11:07 AM    BILIRUBINUR Negative 02/27/2024 11:07 AM    BILIRUBINUR negative 07/11/2023 01:34 PM    KETUA Negative 02/27/2024 11:07 AM    SPECGRAV 1.017 02/27/2024 11:07 AM    BLOODU SMALL 02/27/2024 11:07 AM    PHUR 5.5 02/27/2024 11:07 AM    PROTEINU 300 02/27/2024 11:07 AM    UROBILINOGEN 1.0 02/27/2024 11:07 AM    NITRU Negative 02/27/2024 11:07 AM    LEUKOCYTESUR Negative 02/27/2024 11:07 AM    URINETYPE NotGiven 02/27/2024 11:07 AM      Urine Microscopic:   Lab Results   Component Value Date/Time    BACTERIA None Seen 02/27/2024 11:07 AM    HYALCAST 9 02/27/2024 11:07 AM    WBCUA 3 02/27/2024 11:07 AM    RBCUA 1 02/27/2024 11:07 AM    EPIU 8 02/27/2024 11:07 AM     Urine Reflex to Culture:   Lab Results  pertinent results from current hospitalization and care every where were reviewed by me as a part of the consultation.    PLAN :  1.D/C Cefepime   2.IV Ceftriaxone  3.D/C IV Vancomycin  4.Blood cx repeat in AM  5. Trend WBC  6. Given her age and sepsis risk for progression and family deciding on the level of care she is DNR CCA    Addendum :pt had rapid response and family now opted for Hospice care as she continues to decline since admission from on going sepsis and bacteremia  d/w RN      Discussed with patient/Family and Nursing     Medical Decision Making:  The following items were considered in medical decision making:  Discussion of patient care with other providers  Reviewed clinical lab tests  Reviewed radiology tests  Reviewed other diagnostic tests/interventions  Independent review of radiologic images  Independent review of  Microbiology cultures and other micro tests reviewed     Risk of Complications/Morbidity: High      Illness(es)/ Infection present that pose threat to bodily function.   There is potential for severe exacerbation of infection/side effects of treatment.  Therapy requires intensive monitoring for antimicrobial agent toxicity.     Thanks for allowing me to participate in your patient's care please call me with any questions or concerns.    Dr. Rachael Sow MD  Infectious Disease  [facility] Physician  Phone: [phone]   Fax : [phone]

## 2024-02-28 NOTE — CONSULTS
Clinical Pharmacy Note  Vancomycin Consult    Brenda Lebron is a 89 y.o. female ordered vancomycin for SSTI; consult received from Dr. Georges to manage therapy. Also receiving cefepime.    Allergies:  Adhesive tape, Gabapentin, Pravachol [pravastatin], Pravachol [pravastatin sodium], and Tegaderm ag mesh 2\"x2\" [wound dressings]     Temp max:  Temp (24hrs), Av.2 °F (38.4 °C), Min:98.5 °F (36.9 °C), Max:103.1 °F (39.5 °C)      Recent Labs     24  1218 24  0314   WBC 24.4* 21.6*         Recent Labs     24  1218 24  0314   BUN 26* 33*   CREATININE 1.2 1.6*           Intake/Output Summary (Last 24 hours) at 2024 1413  Last data filed at 2024 0558  Gross per 24 hour   Intake 40 ml   Output 1000 ml   Net -960 ml       Culture Results:  Blood (+) for streptococcus spec.    Ht Readings from Last 1 Encounters:   24 1.676 m (5' 5.98\")        Wt Readings from Last 1 Encounters:   24 73 kg (160 lb 15 oz)         Estimated Creatinine Clearance: 22 mL/min (A) (based on SCr of 1.6 mg/dL (H)).    Assessment/Plan:  Day # 2 of vancomycin.  Vancomycin 1500 mg IV x 1 received in ER  Random level resulted at <4.0. Lab checked value x 2     Re dose 1 gm Vanc today   Will continue to dose based on levels for now due to renal function.    Level ordered for tomorrow.      Thank you for the consult.   Kizzy Bailey [facility], 2024 2:13 PM

## 2024-02-28 NOTE — PROGRESS NOTES
CMU Called for 15 beats PAT. Assessment and VS taken. Milana Pires NP- informed.    Electronically signed by Caren Ford RN on 2/27/2024 at 11:56 PM

## 2024-02-28 NOTE — PROGRESS NOTES
Spoke with case management, family unsure how to proceed with patient care and are requesting more time to discuss. Will hold referral at this time per their request, will continue to follow and attempt to see 2/29/24.

## 2024-02-28 NOTE — CARE COORDINATION
Hospice consult acknowledged: met with patient, and family at bedside.   Addressed consult and provided hospice list. Family considering options at this time.   Family currently declining hospice services as patient is currently receiving comfort medications.       Hospice list provided, family to contact Case Management if they decide to initiate services.  The Plan for Transition of Care is related to the following treatment goals: hospice    The Patient's family was provided with a choice of provider and agrees   with the discharge plan. [] Yes [x] No, family considering hospice services.     [place] of choice list was provided with basic dialogue that supports the patient's individualized plan of care/goals, treatment preferences and shares the quality data associated with the providers. [x] Yes [] No  VIDYA Vera, LSW, Social Work/Case Management   [phone]  Electronically signed by VIDYA Vera on 2/28/2024 at 3:33 PM

## 2024-02-28 NOTE — PROGRESS NOTES
Clinical Pharmacy Note  Heparin Dosing       Lab Results   Component Value Date/Time    ANTIXAUHEP 0.33 02/28/2024 03:14 AM      Lab Results   Component Value Date/Time    HGB 10.4 02/28/2024 03:14 AM    HCT 30.7 02/28/2024 03:14 AM     02/28/2024 03:14 AM    INR 1.10 07/12/2021 06:55 AM       Current Infusion Rate: 890 units/hr    Plan:  Rate: continue at 890 units/hr  Next anti-Xa level: 1000 2/28/24    Pharmacy will continue to monitor and adjust based on anti-Xa results.  Devante Joseph, PharmD

## 2024-02-28 NOTE — PROGRESS NOTES
SLP NOTE    Swallow evaluation order received. Chart reviewed and patient discussed with RN: afternoon rapid response noted and decline in status reported with hospice consult order placed. ST to hold swallow evaluation at this time d/t current status and plan for care unless otherwise notified.    Thank you.  Alison Malave MA, CCC-SLP, #[id number]  Speech-Language Pathologist  Portable phone: [phone]

## 2024-02-28 NOTE — CODE DOCUMENTATION
Pts temp elevated at 100.9. Pt in possible AFIB. Respiratory at bedside. MD at bedside. 75 ml/hr NS and heparin running. Pt appears SOB with increased RR.

## 2024-02-28 NOTE — PROGRESS NOTES
Clinical Pharmacy Note  Heparin Dosing Consult    Brenda Lebron is a 89 y.o. female ordered heparin per CAD/STEMI/NSTEMI/UA/AFIB nomogram by Dr. Rogers.     Lab Results   Component Value Date/Time    ANTIXAUHEP <0.10 02/27/2024 06:26 PM      Lab Results   Component Value Date/Time    HGB 11.5 02/27/2024 12:18 PM    HCT 35.9 02/27/2024 12:18 PM     02/27/2024 12:18 PM    INR 1.10 07/12/2021 06:55 AM       Ht Readings from Last 1 Encounters:   01/04/24 1.676 m (5' 5.98\")        Wt Readings from Last 1 Encounters:   02/27/24 73.8 kg (162 lb 11.2 oz)        Assessment/Plan:  Initial bolus: 4000 units  Initial infusion rate: 890 units/hr  Next anti-Xa:: 0100 2/28/24    Pharmacy will continue to monitor adjust heparin based on anti-Xa results using nomogram below:     CAD/STEMI/NSTEMI/UA/AFIB Heparin Nomogram     Initial Bolus: 60 units/kg Max Bolus: 4,000 units       Initial Rate: 12 units/kg/hr Max Initial Rate: 1,000 units/hr     anti-Xa Bolus Titration   < 0.1 Heparin 60 units/kg bolus Increase drip by 4 units/kg/hr   0.1 - 0.29 Heparin 30 units/kg bolus Increase drip by 2 units/kg/hr   0.3 - 0.7 No Bolus No Change   0.71 - 0.8 No Bolus Decrease drip by 1 units/kg/hr   0.81 - 0.99 No Bolus Decrease drip by 2 units/kg/hr   > 1 Hold Heparin for 1 hour Decrease drip by 3 units/kg/hr     Obtain anti-Xa 6 hours after initial bolus and 6 hours after any dose change until two consecutive therapeutic anti-Xa levels are achieved - then daily.

## 2024-02-28 NOTE — PROGRESS NOTES
4 Eyes Skin Assessment     NAME:  Brenda Lebron  YOB: 1929  MEDICAL RECORD NUMBER:  [id number]    The patient is being assessed for  Admission    I agree that at least one RN has performed a thorough Head to Toe Skin Assessment on the patient. ALL assessment sites listed below have been assessed.      Areas assessed by both nurses:    Head, Face, Ears, Shoulders, Back, Chest, Arms, Elbows, Hands, Sacrum. Buttock, Coccyx, Ischium, and Legs. Feet and Heels        Does the Patient have a Wound? No noted wound(s)       Alex Prevention initiated by RN: Yes  Wound Care Orders initiated by RN: No    Pressure Injury (Stage 3,4, Unstageable, DTI, NWPT, and Complex wounds) if present, place Wound referral order by RN under : No    New Ostomies, if present place, Ostomy referral order under : No     Nurse 1 eSignature: Electronically signed by Caren Ford RN on 2/27/24 at 9:10 PM EST    **SHARE this note so that the co-signing nurse can place an eSignature**    Nurse 2 eSignature: Electronically signed by Sade Gaming RN on 2/28/24 at 1:36 AM EST

## 2024-02-28 NOTE — CARE COORDINATION
Case Management Assessment  Initial Evaluation    Date/Time of Evaluation: 2/28/2024 11:31 AM  Assessment Completed by: VIDYA Vera    If patient is discharged prior to next notation, then this note serves as note for discharge by case management.    Patient Name: Brenda Lebron                   YOB: 1929  Diagnosis: Hyponatremia [E87.1]  NSTEMI (non-ST elevated myocardial infarction) ([facility]) [I21.4]  Cellulitis of left foot [L03.116]  Goals of care, counseling/discussion [Z71.89]  Cellulitis of left lower extremity [L03.116]  NSVT (nonsustained ventricular tachycardia) ([facility]) [I47.29]  Sepsis with encephalopathy without septic shock, due to unspecified organism ([facility]) [A41.9, R65.20, G93.41]                   Date / Time: 2/27/2024 10:42 AM    Patient Admission Status: Inpatient   Readmission Risk (Low < 19, Mod (19-27), High > 27): Readmission Risk Score: 14.8    Current PCP: Armida Dupree APRN - NP  PCP verified by CM? (P) Yes    Chart Reviewed: Yes      History Provided by: (P) Child/Family  Patient Orientation: (P) Unable to Assess    Patient Cognition: (P) Severely Impaired    Hospitalization in the last 30 days (Readmission):  No    If yes, Readmission Assessment in  Navigator will be completed.    Advance Directives:      Code Status: DNR-CCA   Patient's Primary Decision Maker is: (P) Named in Scanned ACP Document    Primary Decision Maker: Yusuf Lebron - Child - [phone]    Discharge Planning:    Patient lives with: (P) Family Members Type of Home: (P) House  Primary Care Giver: (P) Family  Patient Support Systems include: (P) Family Members   Current Financial resources: (P) Medicare  Current community resources: (P) None  Current services prior to admission: (P) Durable Medical Equipment            Current DME: (P) Bedside Commode, Hospital Bed, Shower Chair, Walker, Wheelchair            Type of Home Care services:  (P) None    ADLS  Prior functional level: (P)  [phone] Fax: [phone]      Notes:    Factors facilitating achievement of predicted outcomes: Family support, Caregiver support, Motivated, Cooperative, Pleasant, Has needed Durable Medical Equipment at home, and Home is wheelchair accessible    Barriers to discharge: Confusion, Limited safety awareness, Upper extremity weakness, Lower extremity weakness, Medical complications, and Medication managment    Additional Case Management Notes: Met with patient, son, daughter and daughter-in-law at bedside.   Prior to admission, patient lived with son and DIL in a house, ramped entry.   Address: [address] [place] [address].     DME: Hospital bed, lift chair, walker, bedside commode, wheelchair.     PLAN: Undetermined at this time, family concerned with patient's return home if increase in level of need.   Patient may need skilled nursing facility.   PT/OT ordered -  pending recommendation.     The Plan for Transition of Care is related to the following treatment goals of Hyponatremia [E87.1]  NSTEMI (non-ST elevated myocardial infarction) (HCC) [I21.4]  Cellulitis of left foot [L03.116]  Goals of care, counseling/discussion [Z71.89]  Cellulitis of left lower extremity [L03.116]  NSVT (nonsustained ventricular tachycardia) ([facility]) [I47.29]  Sepsis with encephalopathy without septic shock, due to unspecified organism (HCC) [A41.9, R65.20, G93.41]    VIDYA Vera  Case Management Department  Ph: [phone]  Electronically signed by VIDYA Vera on 2/28/2024 at 11:32 AM

## 2024-02-28 NOTE — ACP (ADVANCE CARE PLANNING)
Advance Care Planning     Advance Care Planning Activator (Inpatient)  Conversation Note      Date of ACP Conversation: 2/28/2024     Conversation Conducted with:  Healthcare Decision Maker: Named in Advance Directive or Healthcare Power of  (name) Yusuf Lebron     ACP Activator: VIDYA Vera    Health Care Decision Maker:     Current Designated Health Care Decision Maker:     Primary Decision Maker: Yusuf Lebron - Child - [phone]  Today we documented Decision Maker(s) consistent with ACP documents on file.    Care Preferences    Ventilation:  \"If you were in your present state of health and suddenly became very ill and were unable to breathe on your own, what would your preference be about the use of a ventilator (breathing machine) if it were available to you?\"      Would the patient desire the use of ventilator (breathing machine)?: no    \"If your health worsens and it becomes clear that your chance of recovery is unlikely, what would your preference be about the use of a ventilator (breathing machine) if it were available to you?\"     Would the patient desire the use of ventilator (breathing machine)?: No      Resuscitation  \"CPR works best to restart the heart when there is a sudden event, like a heart attack, in someone who is otherwise healthy. Unfortunately, CPR does not typically restart the heart for people who have serious health conditions or who are very sick.\"    \"In the event your heart stopped as a result of an underlying serious health condition, would you want attempts to be made to restart your heart (answer \"yes\" for attempt to resuscitate) or would you prefer a natural death (answer \"no\" for do not attempt to resuscitate)?\" no       [] Yes   [x] No   Educated Patient / Decision Maker regarding differences between Advance Directives and portable DNR orders.    Length of ACP Conversation in minutes:  5    Conversation Outcomes:  Existing advance directive reviewed with  patient; no changes to patient's previously recorded wishes    Follow-up plan:    [] Schedule follow-up conversation to continue planning  [] Referred individual to Provider for additional questions/concerns   [] Advised patient/agent/surrogate to review completed ACP document and update if needed with changes in condition, patient preferences or care setting    [] This note routed to one or more involved healthcare providers         Electronically signed by VIDYA Vera on 2/28/2024 at 11:22 AM

## 2024-02-28 NOTE — PROGRESS NOTES
Physical Therapy  Initial Evaluation Attempt and Discharge    24    Name: Brenda Lebron   : 1929    MRN: [id number]    PT order noted. Nursing requests hold at this time. Family upset that therapy was ordered at this time. Per conversation with family, please re-order therapy when family believes it is an appropriate time to mobilize. PT signing off.    Electronically signed by Alexander Millan, PT on 2024 at 1:16 PM

## 2024-02-28 NOTE — CODE DOCUMENTATION
MD Hernandez speaking with family at bedside. MD ordering Morphine and metoprolol. MD to call MD Adan.

## 2024-02-28 NOTE — PROGRESS NOTES
Hospitalist   Progress Note    Patient Name: Brenda Lebron  PCP: Armida Dupree, APRN - NP  Date of Admission: 2/27/2024    Chief Complaint on Admission: Altered mental status, had not been walking since 02/26/2024  Chief diagnosis after evaluation: Acute metabolic encephalopathy, cellulitis of left lower extremity with severe sepsis    Brief Synopsis: Patient is a 89 y.o. woman with a medical history that includes dementia, atrial fibrillation chronic kidney disease stage IIIa hypertension who was admitted on 2/27/2024 for evaluation and treatment of cellulitis of left lower extremity with severe sepsis and acute metabolic encephalopathy. She developed respiratory distress/respiratory failure on 02/28/2024. Family decided to make her DNR-CC and comfort medications were started    Pt Seen/Examined and Chart Reviewed.     Subjective: Pt in respiratory distress when I saw her.  She is unresponsive. A Morphine drip had just been started.  Multiple family members at bedside and satisfied with her current level of comfort    Objective:  Allergies  Adhesive tape, Gabapentin, Pravachol [pravastatin], Pravachol [pravastatin sodium], and Tegaderm ag mesh 2\"x2\" [wound dressings]    Medications    Scheduled Meds:   sodium chloride flush  5-40 mL IntraVENous 2 times per day     Infusions:   morphine (PF)      sodium chloride       PRN Meds:  acetaminophen **OR** acetaminophen, glycopyrrolate, ketorolac, LORazepam, sodium chloride flush, sodium chloride, ondansetron **OR** ondansetron, polyethylene glycol    Physical    VITALS:  /65   Pulse (!) 127   Temp (!) 101 °F (38.3 °C) (Axillary)   Resp (!) 36   Wt 73 kg (160 lb 15 oz)   SpO2 97%   BMI 25.99 kg/m²   CONSTITUTIONAL:  Frail, elderly appearing 89 y.o. year-old female who is non responsive, in respiratory distress  EYES:  Lids and lashes normal, PERRL, EOMI, sclera clear, conjunctiva normal  ENT:  NC/AT, MMM    NECK:  Supple, symmetrical, trachea midline, no  Lactic acid acidosis    Elevated brain natriuretic peptide (BNP) level    Severe sepsis (HCC)    Sepsis with encephalopathy without septic shock (HCC)  Resolved Problems:    * No resolved hospital problems. *                DVT Prophylaxis: Not indicated, as patient is comfort care only  Diet: ADULT DIET; Regular  Code Status: DNR-CC    PT/OT Eval Status: Not indicated    Anticipate that Pt will likely pass soon in the hospital.  If she lingers, family to consider Hospice    Dispo - Anticipated discharge date < 24 hours    Mynor Adan MD

## 2024-02-28 NOTE — PROGRESS NOTES
Micro called to inform patient is positive for Strep A from Blood culture. Benjamin notified.    Electronically signed by Caren Ford RN on 2/28/2024 at 4:11 AM

## 2024-02-28 NOTE — SIGNIFICANT EVENT
Rapid Response Team Note  Date of event: 2/28/2024   Time of event: 1400  Brenda Lebron 89 y.o. year old female   YOB: 1929   Admit date:  2/27/2024   Location: [id number]-7135/4260-01   Witnessed? : [x]Yes  [] No  Monitored? : [x]Yes  [] No  Code status: [] Full  [x] DNR-CCA  []DNR-CC  ______________________________________________________________________  Reason for RRT:    [] RR < 8     [x] RR > 28   [] SpO2 <90%   [] HR < 40 bpm   [x] HR > 130 bpm  [] SBP < 90 mmHg    [] LOC    [] Seizures   [] Significant Bleeding Event    [x] Other: Encephalopthy    Subjective:   RRT called for severe respiratory distress and afib RVR.   Upon arrival the pt was propped up, on non-re-breather, tachypnea around 40 breaths per minute, tachycardia upto 160 bpm. Blood pressure stable at around 120/70 mmHg. Patient noted to be being treated for pneumonia, she has positive blood culture with Streptococcus. Noted patient to have been febrile since morning. Most recent lactic acid at 3.4. Saturation maintained on non-rebreather. STAT ABG showed pH 7.23 pCO2 48, calculated bicarb of 20. But patient likely has severe acidosis and she has significant tachypnea to compensate. Over-all patient is septic and is in impending septic shock. Explained to the family the gravity of her situation and the need for intubation and mechanical ventilation otherwise patient will tire out and will have respiratory arrest. Per family, patient wanted to be DNR and did not want to be intubated. Family also do not want patient to be intubated. Given the severity of sepsis response and significant distress the patient was in, this provider explained to the family that if aggressive measures including intubation is not wanted then it is reasonable to consider hospice and comfort measures. Family agreed to hospice. All aggressive measures were stopped. Comfort measures started with morphine infusion, ativan PRN, robinul PRN.     Objective:   Vital  03:14 AM    BANDSPCT 6 07/14/2021 10:25 AM    METASPCT 5 07/14/2021 10:25 AM    LYMPHOPCT 5.8 02/28/2024 03:14 AM    MONOPCT 3.7 02/28/2024 03:14 AM    BASOPCT 0.3 02/28/2024 03:14 AM    MONOSABS 0.8 02/28/2024 03:14 AM    LYMPHSABS 1.3 02/28/2024 03:14 AM    EOSABS 0.0 02/28/2024 03:14 AM    BASOSABS 0.1 02/28/2024 03:14 AM     BMP:    Lab Results   Component Value Date/Time     02/28/2024 03:14 AM    K 3.9 02/28/2024 03:14 AM     02/28/2024 03:14 AM    CO2 19 02/28/2024 03:14 AM    BUN 33 02/28/2024 03:14 AM    LABALBU 3.3 02/27/2024 12:18 PM    CREATININE 1.6 02/28/2024 03:14 AM    CALCIUM 8.5 02/28/2024 03:14 AM    GFRAA 42 10/04/2022 11:36 AM    GFRAA >60 08/06/2010 11:47 AM    LABGLOM 30 02/28/2024 03:14 AM    GLUCOSE 198 02/28/2024 03:14 AM     Potassium:    Lab Results   Component Value Date/Time    K 3.9 02/28/2024 03:14 AM     Magnesium:    Lab Results   Component Value Date/Time    MG 1.30 07/09/2021 09:52 AM     Phosphorus:    Lab Results   Component Value Date/Time    PHOS 3.6 11/20/2023 10:26 AM         Teams Assessment and Plan:  Septic shock  Severe metabolic acidosis, respiratory acidosis, failure of respiratory compensation  Severe respiratory distress  Afib RVR  -lasix 40 mg IV bolus for afib induces pulmonary congesetion  -metoprolol 5 mg IV for afib RVR  -morphine 4 mg IV for respiratory distress  -family did not want to pursue aggressive measures which included intubation and mechanical ventilation  -DNR-CC hospice care   -comfort measures ordered  ?  ?  ?  Disposition:  [x] No transfer   [] Transfer to monitor floor  [] Transfer to: [] MICU [] NICU [] CCU [] SICU    Patient’s family updated: [x] Yes  [] No   Discussed with:  [x] Attending: Dr. Adan       [] Primary Care Provider: Armida Dupree APRN - NP      [] Other: ?    Total time spent: 60 minutes    Cristian Hernandez MD   2:41 PM

## 2024-02-29 LAB
BACTERIA BLD CULT ORG #2: ABNORMAL
BACTERIA BLD CULT: ABNORMAL
BACTERIA BLD CULT: ABNORMAL
ORGANISM: ABNORMAL

## 2024-02-29 NOTE — PROGRESS NOTES
02/28/24 1471   Encounter Summary   Encounter Overview/Reason  Grief, Loss, and Adjustments   Service Provided For: Family   Referral/Consult From: Nurse   Last Encounter  02/28/24  (support for family post mortem. PN)   Complexity of Encounter Moderate   Begin Time 1920   End Time  1940   Total Time Calculated 20 min   Spiritual/Emotional needs   Type Spiritual Support   Grief, Loss, and Adjustments   Type Death   Assessment/Intervention/Outcome   Assessment Sad;Tearful;Peaceful   Intervention Explored/Affirmed feelings, thoughts, concerns;Active listening;Grief Care   Outcome Expressed feelings, needs, and concerns;Peace;Comfort

## 2024-02-29 NOTE — PROGRESS NOTES
Patient declared dead 1938H. Necessary care done, IV lines removed. No jewelry, dentures or any device left at bedside. Family aware of the event. ID tags provided on the Body and Bag. Transport and Security were informed.     Electronically signed by Caren Ford RN on 2/28/2024 at 10:02 PM

## 2024-02-29 NOTE — DEATH NOTES
Death Pronouncement Note  Patient's Name: Brenda Lebron   Patient's YOB: 1929  MRN Number: [id number]    Admitting Provider: Hussein Georges MD  Attending Provider: Mynor Adan MD    Patient was examined and the following were absent: Pulses, Blood Pressure, and Respiratory effort    I declared the patient dead on 2/28/24 at 19:38.    Preliminary Cause of Death:     Respiratory arrest  Sepsis  Arrhythmia  Cellulitis    Electronically signed by Deya Mathew DO on 2/28/24 at 7:33 PM EST

## 2024-02-29 NOTE — PROGRESS NOTES
PCA Morphine wasted 28 ml. Witnessed by Amna Black     Electronically signed by Caren Ford RN on 2/28/2024 at 10:50 PM

## 2024-03-12 NOTE — PROGRESS NOTES
Physician Progress Note      PATIENT:               WILTON NEVES  CSN #:                  [id number]  :                       1929  ADMIT DATE:       2024 10:42 AM  DISCH DATE:        2024 10:50 PM  RESPONDING  PROVIDER #:        Mynor BERG MD          QUERY TEXT:    Pt admitted with left leg cellulitis with open wound. Pt noted to have DM. If   possible, please document in progress notes and discharge summary the   relationship, if any, between cellulitis and DM.    The medical record reflects the following:  Risk Factors: DM  Clinical Indicators: Left leg cellulitis with open wound  Treatment: XR, Maxipime, ID consult  Options provided:  -- Left leg cellulitis with open wound associated with Diabetes  -- Left leg cellulitis with open wound unrelated to Diabetes  -- Other - I will add my own diagnosis  -- Disagree - Not applicable / Not valid  -- Disagree - Clinically unable to determine / Unknown  -- Refer to Clinical Documentation Reviewer    PROVIDER RESPONSE TEXT:    Left leg cellulitis with open wound associated with Diabetes.    Query created by: Thais Espinosa on 3/9/2024 7:47 AM      Electronically signed by:  Mynor BERG MD 3/12/2024 4:51 PM

## 2024-03-16 NOTE — DISCHARGE SUMMARY
Hospitalist Discharge Summary     Brenda Lebrno  : 1929  MRN: [id number]    Admit date: 2024  Discharge date: 2024    Admitting Physician: Hussein Georges MD    Discharge Diagnoses:   Patient Active Problem List   Diagnosis    Carpal tunnel syndrome, bilateral    Arthritis of left knee    Essential hypertension    Mixed hyperlipidemia    Other secondary kyphosis, cervicothoracic region    Postablative hypothyroidism    Lymphedema    Chronic neck pain    Bradycardia    Arthritis of left hip    Orthostatic syncope    Hypoalbuminemia    Type 2 diabetes mellitus with diabetic neuropathy    Blister (nonthermal), left foot, subsequent encounter    Primary generalized (osteo)arthritis    Vitreous floaters    Chronic kidney disease (CKD) stage G4/A1, severely decreased glomerular filtration rate (GFR) between 15-29 mL/min/1.73 square meter and albuminuria creatinine ratio less than 30 mg/g (HCC)    Thoracic vertebral fracture (HCC)    Knee laceration    Deep laceration of knee    Cellulitis of left foot    Hyponatremia    Streptococcal bacteremia    Neutrophilia    Fever and chills    Lactic acid acidosis    Elevated brain natriuretic peptide (BNP) level    Severe sepsis (HCC)    Sepsis with encephalopathy without septic shock ([facility])       Admission Condition: critical    Discharged Condition:     Discharge Exam: N/A    Hospital Course:     Chief Complaint on Admission: Altered mental status, had not been walking since 2024  Chief diagnosis after evaluation: Acute metabolic encephalopathy, cellulitis of left lower extremity with severe sepsis     Brief Synopsis: Patient is a 89 y.o. woman with a medical history that includes dementia, atrial fibrillation chronic kidney disease stage IIIa hypertension who was admitted on 2024 for evaluation and treatment of cellulitis of left lower extremity with severe sepsis and acute metabolic encephalopathy. She developed respiratory  osseous abnormality left hip.  Moderate degenerative changes are seen     XR HIP LEFT (2-3 VIEWS)    Result Date: 2024  EXAMINATION: THREE XRAY VIEWS OF THE LEFT FOOT; TWO XRAY VIEWS OF THE LEFT HIP 2024 12:23 pm COMPARISON: None. HISTORY: ORDERING SYSTEM PROVIDED HISTORY: swelling, wound on 1st MTP TECHNOLOGIST PROVIDED HISTORY: Reason for exam:->swelling, wound on 1st MTP Reason for Exam: swelling, wound on 1st MTP; ORDERING SYSTEM PROVIDED HISTORY: grimaces on logroll TECHNOLOGIST PROVIDED HISTORY: Reason for exam:->grimaces on logroll Reason for Exam: grimaces on logroll FINDINGS: Left foot: Metatarsal alignment appears normal.  Vascular calcifications are seen. Scattered areas of degenerative change are seen.  Spurring is seen at the Achilles and plantar fascia insertion.  Soft tissue swelling is seen.  No acute osseous abnormality.  No definite osteomyelitis Left hip Moderate degenerative change is seen in the hip joints.  Lower lumbar spine degenerative changes are seen.  No acute fracture noted.  Large stool load seen in the rectum. Vascular calcifications are seen     No acute osseous abnormality left foot.  Scattered degenerative changes are seen No acute osseous abnormality left hip.  Moderate degenerative changes are seen       Other Significant Diagnostic Studies: As described above    Treatments: As described above    Disposition:  Home    Discharge Medications:  N/A      Signed:  Mynor Adan MD  3/16/2024, 2:30 PM

## (undated) DEVICE — SPONGE LAP W18XL18IN WHT COT 4 PLY FLD STRUNG RADPQ DISP ST

## (undated) DEVICE — PODIATRY: Brand: MEDLINE INDUSTRIES, INC.

## (undated) DEVICE — STAPLER SKIN H3.9MM WIRE DIA0.58MM CRWN 6.9MM 35 STPL FIX

## (undated) DEVICE — Z INACTIVE USE 2660664 SOLUTION IRRIG 3000ML 0.9% SOD CHL USP UROMATIC PLAS CONT

## (undated) DEVICE — Z DISCONTINUED BY MEDLINE USE 2711682 TRAY SKIN PREP DRY W/ PREM GLV

## (undated) DEVICE — DRESSING ALG W3XL4IN TRNSPAR ANTIMIC WND CNTCT LAYR W/

## (undated) DEVICE — DRESSING PETRO W3XL3IN OIL EMUL N ADH GZ KNIT IMPREG CELOS

## (undated) DEVICE — GLOVE,SURG,SENSICARE SLT,LF,PF,7.5: Brand: MEDLINE

## (undated) DEVICE — DRAPE,REIN 53X77,STERILE: Brand: MEDLINE

## (undated) DEVICE — PAD,ABDOMINAL,8"X7.5",STERILE,LF,1/PK: Brand: MEDLINE

## (undated) DEVICE — GOWN SIRUS NONREIN XL W/TWL: Brand: MEDLINE INDUSTRIES, INC.

## (undated) DEVICE — SOLUTION SCRB 4OZ 10% POVIDONE IOD ANTIMIC BTL

## (undated) DEVICE — BAG WND LAV 1L CLR ETH ACET ACID SOD ACETT BENZALKONIUM CHL

## (undated) DEVICE — STOCKINETTE,IMPERVIOUS,12X48,STERILE: Brand: MEDLINE

## (undated) DEVICE — PAD,NON-ADHERENT,3X8,STERILE,LF,1/PK: Brand: MEDLINE

## (undated) DEVICE — GLOVE,SURG,SENSICARE SLT,LF,PF,8: Brand: MEDLINE

## (undated) DEVICE — BANDAGE COMPR W4INXL15FT BGE E SGL LAYERED CLP CLSR

## (undated) DEVICE — BANDAGE,GAUZE,CONFORMING,4"X75",STRL,LF: Brand: MEDLINE

## (undated) DEVICE — STRIP,CLOSURE,WOUND,MEDI-STRIP,1/2X4: Brand: MEDLINE

## (undated) DEVICE — BANDAGE COMPR W4INXL12FT E DISP ESMARCH EVEN

## (undated) DEVICE — SOLUTION PREP POVIDONE IOD FOR SKIN MUCOUS MEM PRIOR TO

## (undated) DEVICE — HANDPIECE SET WITH HIGH FLOW TIP AND SUCTION TUBE: Brand: INTERPULSE